# Patient Record
Sex: FEMALE | Race: BLACK OR AFRICAN AMERICAN | NOT HISPANIC OR LATINO | Employment: FULL TIME | URBAN - METROPOLITAN AREA
[De-identification: names, ages, dates, MRNs, and addresses within clinical notes are randomized per-mention and may not be internally consistent; named-entity substitution may affect disease eponyms.]

---

## 2017-01-27 ENCOUNTER — CLINICAL SUPPORT (OUTPATIENT)
Dept: CARDIOLOGY | Facility: CLINIC | Age: 42
End: 2017-01-27
Payer: COMMERCIAL

## 2017-01-27 ENCOUNTER — LAB VISIT (OUTPATIENT)
Dept: LAB | Facility: HOSPITAL | Age: 42
End: 2017-01-27
Attending: FAMILY MEDICINE
Payer: COMMERCIAL

## 2017-01-27 ENCOUNTER — OFFICE VISIT (OUTPATIENT)
Dept: FAMILY MEDICINE | Facility: CLINIC | Age: 42
End: 2017-01-27
Payer: COMMERCIAL

## 2017-01-27 VITALS
OXYGEN SATURATION: 100 % | DIASTOLIC BLOOD PRESSURE: 90 MMHG | SYSTOLIC BLOOD PRESSURE: 136 MMHG | BODY MASS INDEX: 27 KG/M2 | HEIGHT: 65 IN | HEART RATE: 103 BPM | TEMPERATURE: 98 F | RESPIRATION RATE: 18 BRPM | WEIGHT: 162.06 LBS

## 2017-01-27 DIAGNOSIS — R55 SYNCOPE, UNSPECIFIED SYNCOPE TYPE: ICD-10-CM

## 2017-01-27 DIAGNOSIS — J34.3 NASAL TURBINATE HYPERTROPHY: ICD-10-CM

## 2017-01-27 DIAGNOSIS — R55 SYNCOPE, UNSPECIFIED SYNCOPE TYPE: Primary | ICD-10-CM

## 2017-01-27 DIAGNOSIS — Z00.00 ANNUAL PHYSICAL EXAM: ICD-10-CM

## 2017-01-27 DIAGNOSIS — R51.9 HEADACHE, UNSPECIFIED HEADACHE TYPE: ICD-10-CM

## 2017-01-27 DIAGNOSIS — G47.00 INSOMNIA, UNSPECIFIED TYPE: ICD-10-CM

## 2017-01-27 LAB
ALBUMIN SERPL BCP-MCNC: 4 G/DL
ALP SERPL-CCNC: 50 U/L
ALT SERPL W/O P-5'-P-CCNC: 14 U/L
ANION GAP SERPL CALC-SCNC: 12 MMOL/L
AST SERPL-CCNC: 18 U/L
BASOPHILS # BLD AUTO: 0.02 K/UL
BASOPHILS NFR BLD: 0.2 %
BILIRUB SERPL-MCNC: 0.4 MG/DL
BUN SERPL-MCNC: 9 MG/DL
CALCIUM SERPL-MCNC: 9.2 MG/DL
CHLORIDE SERPL-SCNC: 106 MMOL/L
CHOLEST/HDLC SERPL: 3.3 {RATIO}
CO2 SERPL-SCNC: 23 MMOL/L
CREAT SERPL-MCNC: 0.8 MG/DL
DIASTOLIC DYSFUNCTION: NO
DIFFERENTIAL METHOD: ABNORMAL
EOSINOPHIL # BLD AUTO: 0.1 K/UL
EOSINOPHIL NFR BLD: 0.9 %
ERYTHROCYTE [DISTWIDTH] IN BLOOD BY AUTOMATED COUNT: 13.7 %
EST. GFR  (AFRICAN AMERICAN): >60 ML/MIN/1.73 M^2
EST. GFR  (NON AFRICAN AMERICAN): >60 ML/MIN/1.73 M^2
ESTIMATED PA SYSTOLIC PRESSURE: 24.9
GLUCOSE SERPL-MCNC: 66 MG/DL
HCT VFR BLD AUTO: 40.5 %
HDL/CHOLESTEROL RATIO: 30.2 %
HDLC SERPL-MCNC: 248 MG/DL
HDLC SERPL-MCNC: 75 MG/DL
HGB BLD-MCNC: 13.7 G/DL
LDLC SERPL CALC-MCNC: 157 MG/DL
LYMPHOCYTES # BLD AUTO: 3 K/UL
LYMPHOCYTES NFR BLD: 23.6 %
MCH RBC QN AUTO: 27.5 PG
MCHC RBC AUTO-ENTMCNC: 33.8 %
MCV RBC AUTO: 81 FL
MITRAL VALVE MOBILITY: NORMAL
MONOCYTES # BLD AUTO: 1.1 K/UL
MONOCYTES NFR BLD: 8.5 %
NEUTROPHILS # BLD AUTO: 8.4 K/UL
NEUTROPHILS NFR BLD: 66.6 %
NONHDLC SERPL-MCNC: 173 MG/DL
PLATELET # BLD AUTO: 327 K/UL
PMV BLD AUTO: 12.2 FL
POTASSIUM SERPL-SCNC: 3.8 MMOL/L
PROT SERPL-MCNC: 8 G/DL
RBC # BLD AUTO: 4.98 M/UL
RETIRED EF AND QEF - SEE NOTES: 60 (ref 55–65)
SODIUM SERPL-SCNC: 141 MMOL/L
TRIGL SERPL-MCNC: 80 MG/DL
TSH SERPL DL<=0.005 MIU/L-ACNC: 1.15 UIU/ML
WBC # BLD AUTO: 12.52 K/UL

## 2017-01-27 PROCEDURE — 1159F MED LIST DOCD IN RCRD: CPT | Mod: S$GLB,,, | Performed by: FAMILY MEDICINE

## 2017-01-27 PROCEDURE — 99999 PR PBB SHADOW E&M-EST. PATIENT-LVL III: CPT | Mod: PBBFAC,,, | Performed by: FAMILY MEDICINE

## 2017-01-27 PROCEDURE — 99203 OFFICE O/P NEW LOW 30 MIN: CPT | Mod: S$GLB,,, | Performed by: FAMILY MEDICINE

## 2017-01-27 PROCEDURE — 80053 COMPREHEN METABOLIC PANEL: CPT

## 2017-01-27 PROCEDURE — 36415 COLL VENOUS BLD VENIPUNCTURE: CPT | Mod: PO

## 2017-01-27 PROCEDURE — 93005 ELECTROCARDIOGRAM TRACING: CPT | Mod: S$GLB,,, | Performed by: FAMILY MEDICINE

## 2017-01-27 PROCEDURE — 93010 ELECTROCARDIOGRAM REPORT: CPT | Mod: S$GLB,,, | Performed by: INTERNAL MEDICINE

## 2017-01-27 PROCEDURE — 80061 LIPID PANEL: CPT

## 2017-01-27 PROCEDURE — 84443 ASSAY THYROID STIM HORMONE: CPT

## 2017-01-27 PROCEDURE — 93307 TTE W/O DOPPLER COMPLETE: CPT | Mod: S$GLB,,, | Performed by: INTERNAL MEDICINE

## 2017-01-27 PROCEDURE — 85025 COMPLETE CBC W/AUTO DIFF WBC: CPT

## 2017-01-27 RX ORDER — TRAZODONE HYDROCHLORIDE 50 MG/1
50 TABLET ORAL NIGHTLY
Qty: 30 TABLET | Refills: 0 | Status: SHIPPED | OUTPATIENT
Start: 2017-01-27 | End: 2017-12-01 | Stop reason: SDUPTHER

## 2017-01-27 RX ORDER — BUTALBITAL, ACETAMINOPHEN AND CAFFEINE 50; 325; 40 MG/1; MG/1; MG/1
1 TABLET ORAL 2 TIMES DAILY PRN
Qty: 30 TABLET | Refills: 0 | Status: SHIPPED | OUTPATIENT
Start: 2017-01-27 | End: 2017-02-26

## 2017-01-27 RX ORDER — FLUTICASONE PROPIONATE 50 MCG
2 SPRAY, SUSPENSION (ML) NASAL DAILY
Qty: 16 G | Refills: 0 | Status: SHIPPED | OUTPATIENT
Start: 2017-01-27 | End: 2018-12-28

## 2017-01-27 NOTE — PROGRESS NOTES
Subjective:       Patient ID: Florecita Noel is a 41 y.o. female.    Chief Complaint: Establish Care; Dizziness; Fall; and Headache      HPI   Ms. Noel presents to clinic today to establish care.   She states she had a episode of fainting about 1230 last night. She states she got up from the bed and was walking to the bathroom. She realized that she ended up next to the commode and tub and had micturated on her self. She does not recall what happened. She does remember that she was nauseated before the fall or syncopal epside.   She states this has never happened before. Since this incident,s he states she has felt a bit light headed.     She also has been having headaches. She states they occur often. They do not go away with over the counter medication such as Tylenol, Excedrin or Motrin.   She states Goody;s seem to help a bit. They do not get better with eating or drinking.   She also states she sleeps poorly . She wakes up multiple times throughout the night.   She states she has tried over the counter sleep aide, and it does not help.     Her last pap smear was 2016 and it was normal.   It was done at [facility].   Her last mammogram was 3-4 years ago.     Review of Systems   Constitutional: Negative for appetite change, fever and unexpected weight change.   HENT: Negative for postnasal drip, rhinorrhea, sinus pressure and sore throat.    Respiratory: Negative for cough and shortness of breath.    Gastrointestinal: Positive for nausea. Negative for abdominal pain, diarrhea and vomiting.   Neurological: Positive for light-headedness. Negative for dizziness.       Medication List with Changes/Refills   Current Medications    IBUPROFEN (ADVIL,MOTRIN) 800 MG TABLET    Take 1 tablet (800 mg total) by mouth every 6 (six) hours as needed for Pain.       Patient Active Problem List   Diagnosis    Hypokalemia    Single liveborn infant delivered vaginally    Sterilization    Avulsion fracture of middle phalanx of finger          Objective:     Physical Exam   Constitutional: She is oriented to person, place, and time. She appears well-developed and well-nourished. No distress.   HENT:   Head: Normocephalic and atraumatic.   Right Ear: External ear normal.   Left Ear: External ear normal.   Mouth/Throat: Oropharynx is clear and moist. No oropharyngeal exudate.   Turbinate hypertrophy   Eyes: EOM are normal. Right eye exhibits no discharge. Left eye exhibits no discharge.   Cardiovascular: Normal rate and regular rhythm.    No carotid bruit b/l    Pulmonary/Chest: Effort normal and breath sounds normal. No respiratory distress. She has no wheezes.   Abdominal: Soft. Bowel sounds are normal. She exhibits no distension. There is no tenderness.   Musculoskeletal: She exhibits no edema.   Neurological: She is alert and oriented to person, place, and time.   Skin: Skin is warm and dry. She is not diaphoretic. No erythema.   Psychiatric: She has a normal mood and affect.   Vitals reviewed.    Vitals:    01/27/17 0854   BP: (!) 136/90   Pulse: 103   Resp: 18   Temp: 98.4 °F (36.9 °C)       Assessment/  PLAN     Syncope, unspecified syncope type  -     CBC auto differential; Future  -     Comprehensive metabolic panel; Future; Expected date: 1/27/17  -     IN OFFICE EKG 12-LEAD (to [place])  -     2D Echo Only; Future  - likely vasovagal syncope, will check above labs to rule out other etiology   - increase water intke for now and advised her to make sure she is eating often     Annual physical exam  -     Lipid panel; Future; Expected date: 1/27/17    Headache, unspecified headache type  -     butalbital-acetaminophen-caffeine -40 mg (FIORICET, ESGIC) -40 mg per tablet; Take 1 tablet by mouth 2 (two) times daily as needed for Pain.  Dispense: 30 tablet; Refill: 0    Insomnia, unspecified type  -     trazodone (DESYREL) 50 MG tablet; Take 1 tablet (50 mg total) by mouth every evening.  Dispense: 30 tablet; Refill: 0    Nasal  turbinate hypertrophy  -     fluticasone (FLONASE) 50 mcg/actuation nasal spray; 2 sprays by Each Nare route once daily.  Dispense: 16 g; Refill: 0    Plan as above   rtc 1 month for follow up     Prem Cardenas MD.  [facility]

## 2017-01-27 NOTE — MR AVS SNAPSHOT
[facility]  [address] [place] [address]  Phone: [phone]                  Florecita Noel   2017 8:40 AM   Office Visit    Description:  Female : 1975   Provider:  Prem Cardenas MD   Department:  [facility]           Reason for Visit     Establish Care     Dizziness     Fall     Headache           Diagnoses this Visit        Comments    Syncope, unspecified syncope type    -  Primary     Annual physical exam         Headache, unspecified headache type         Insomnia, unspecified type         Nasal turbinate hypertrophy                To Do List           Future Appointments        Provider Department Dept Phone    2017 10:20 AM LABORATORY, [address] [phone]    2017 4:00 PM ECHOCARDIOGRAM, [facility] -Cardiology [phone]      Goals (5 Years of Data)     None      Follow-Up and Disposition     Return in about 1 month (around 2017) for follow up , morning .       These Medications        Disp Refills Start End    butalbital-acetaminophen-caffeine -40 mg (FIORICET, ESGIC) -40 mg per tablet 30 tablet 0 2017    Take 1 tablet by mouth 2 (two) times daily as needed for Pain. - Oral    Pharmacy: [facility] [address] [facility] Ph #: [phone]       trazodone (DESYREL) 50 MG tablet 30 tablet 0 2017    Take 1 tablet (50 mg total) by mouth every evening. - Oral    Pharmacy: [facility] [address] & [facility] Ph #: [phone]       fluticasone (FLONASE) 50 mcg/actuation nasal spray 16 g 0 2017     2 sprays by Each Nare route once daily. - Each Nare    Pharmacy: [facility] [address] [facility] Ph #: [phone]         [facility]     [facility]  Care Line -  Assistance  Registered nurses in the [facility] provide clinical advisement, health education, appointment booking, and other advisory services.  Call for this free service at [phone].             Medications           Message regarding Medications     Verify the changes and/or additions to your medication regime listed below are the same as discussed with your clinician today.  If any of these changes or additions are incorrect, please notify your healthcare provider.        START taking these NEW medications        Refills    butalbital-acetaminophen-caffeine -40 mg (FIORICET, ESGIC) -40 mg per tablet 0    Sig: Take 1 tablet by mouth 2 (two) times daily as needed for Pain.    Class: Normal    Route: Oral    trazodone (DESYREL) 50 MG tablet 0    Sig: Take 1 tablet (50 mg total) by mouth every evening.    Class: Normal    Route: Oral    fluticasone (FLONASE) 50 mcg/actuation nasal spray 0    Si sprays by Each Nare route once daily.    Class: Normal    Route: Each Nare      STOP taking these medications     ibuprofen (ADVIL,MOTRIN) 800 MG tablet Take 1 tablet (800 mg total) by mouth every 6 (six) hours as needed for Pain.           Verify that the below list of medications is an accurate representation of the medications you are currently taking.  If none reported, the list may be blank. If incorrect, please contact your healthcare provider. Carry this list with you in case of emergency.           Current Medications     butalbital-acetaminophen-caffeine -40 mg (FIORICET, ESGIC) -40 mg per tablet Take 1 tablet by mouth 2 (two) times daily as needed for Pain.    fluticasone (FLONASE) 50 mcg/actuation nasal spray 2 sprays by Each Nare route once daily.    trazodone (DESYREL) 50 MG tablet Take 1 tablet (50 mg total) by mouth every evening.           Clinical Reference Information           Vital Signs - Last Recorded  Most recent update: 2017  8:55 AM  "by iVcki Claros MA    BP Pulse Temp Resp Ht    (!) 136/90 (BP Location: Right arm, Patient Position: Sitting, BP Method: Manual) 103 98.4 °F (36.9 °C) (Tympanic) 18 5' 4.5" (1.638 m)    Wt LMP SpO2 BMI    73.5 kg (162 lb 0.6 oz) 01/25/2017 (Exact Date) 100% 27.38 kg/m2      Blood Pressure          Most Recent Value    BP  (!)  136/90      Allergies as of 1/27/2017     No Known Allergies      Immunizations Administered on Date of Encounter - 1/27/2017     None      Orders Placed During Today's Visit      Normal Orders This Visit    IN OFFICE EKG 12-LEAD (to [place])     Future Labs/Procedures Expected by Expires    Comprehensive metabolic panel  1/27/2017 3/28/2018    Lipid panel  1/27/2017 3/28/2018    TSH  1/27/2017 3/28/2018    2D Echo Only  As directed 1/27/2018    CBC auto differential  As directed 1/31/2017      "

## 2017-03-20 ENCOUNTER — OFFICE VISIT (OUTPATIENT)
Dept: FAMILY MEDICINE | Facility: CLINIC | Age: 42
End: 2017-03-20
Payer: COMMERCIAL

## 2017-03-20 ENCOUNTER — PATIENT MESSAGE (OUTPATIENT)
Dept: FAMILY MEDICINE | Facility: CLINIC | Age: 42
End: 2017-03-20

## 2017-03-20 VITALS
SYSTOLIC BLOOD PRESSURE: 130 MMHG | WEIGHT: 166.25 LBS | HEIGHT: 64 IN | BODY MASS INDEX: 28.38 KG/M2 | DIASTOLIC BLOOD PRESSURE: 80 MMHG | RESPIRATION RATE: 16 BRPM | TEMPERATURE: 97 F | HEART RATE: 71 BPM | OXYGEN SATURATION: 98 %

## 2017-03-20 DIAGNOSIS — J35.1 TONSILLAR HYPERTROPHY: Primary | ICD-10-CM

## 2017-03-20 DIAGNOSIS — R05.9 COUGH: Primary | ICD-10-CM

## 2017-03-20 DIAGNOSIS — J30.9 ALLERGIC RHINITIS, UNSPECIFIED ALLERGIC RHINITIS TRIGGER, UNSPECIFIED RHINITIS SEASONALITY: ICD-10-CM

## 2017-03-20 DIAGNOSIS — Z76.0 MEDICATION REFILL: ICD-10-CM

## 2017-03-20 LAB
CTP QC/QA: YES
S PYO RRNA THROAT QL PROBE: NEGATIVE

## 2017-03-20 PROCEDURE — 1160F RVW MEDS BY RX/DR IN RCRD: CPT | Mod: S$GLB,,, | Performed by: FAMILY MEDICINE

## 2017-03-20 PROCEDURE — 99214 OFFICE O/P EST MOD 30 MIN: CPT | Mod: 25,S$GLB,, | Performed by: FAMILY MEDICINE

## 2017-03-20 PROCEDURE — 87880 STREP A ASSAY W/OPTIC: CPT | Mod: QW,S$GLB,, | Performed by: FAMILY MEDICINE

## 2017-03-20 PROCEDURE — 99999 PR PBB SHADOW E&M-EST. PATIENT-LVL III: CPT | Mod: PBBFAC,,, | Performed by: FAMILY MEDICINE

## 2017-03-20 RX ORDER — BENZONATATE 100 MG/1
100 CAPSULE ORAL 3 TIMES DAILY PRN
Qty: 30 CAPSULE | Refills: 0 | Status: SHIPPED | OUTPATIENT
Start: 2017-03-20 | End: 2017-10-10 | Stop reason: ALTCHOICE

## 2017-03-20 RX ORDER — PROMETHAZINE HYDROCHLORIDE AND DEXTROMETHORPHAN HYDROBROMIDE 6.25; 15 MG/5ML; MG/5ML
5 SYRUP ORAL EVERY 8 HOURS PRN
Qty: 240 ML | Refills: 0 | Status: SHIPPED | OUTPATIENT
Start: 2017-03-20 | End: 2017-03-20 | Stop reason: ALTCHOICE

## 2017-03-20 RX ORDER — CETIRIZINE HYDROCHLORIDE 10 MG/1
10 TABLET, CHEWABLE ORAL DAILY
Qty: 30 TABLET | Refills: 0 | COMMUNITY
Start: 2017-03-20 | End: 2018-12-28

## 2017-03-20 RX ORDER — BUTALBITAL, ACETAMINOPHEN AND CAFFEINE 50; 325; 40 MG/1; MG/1; MG/1
1 TABLET ORAL DAILY PRN
Qty: 30 TABLET | Refills: 0 | Status: SHIPPED | OUTPATIENT
Start: 2017-03-20 | End: 2017-04-19

## 2017-03-20 NOTE — MR AVS SNAPSHOT
[facility]  [address] [address]  Phone: [phone]                  Florecita Noel   3/20/2017 11:00 AM   Office Visit    Description:  Female : 1975   Provider:  Prem Cardenas MD   Department:  [facility]           Reason for Visit     Cough           Diagnoses this Visit        Comments    Tonsillar hypertrophy    -  Primary            To Do List           Goals (5 Years of Data)     None       These Medications        Disp Refills Start End    butalbital-acetaminophen-caffeine -40 mg (FIORICET, ESGIC) -40 mg per tablet 30 tablet 0 3/20/2017 2017    Take 1 tablet by mouth daily as needed for Pain. - Oral    Pharmacy: [facility] [address] [place] [address] [address]  Ph #: [phone]       promethazine-dextromethorphan (PROMETHAZINE-DM) 6.25-15 mg/5 mL Syrp 240 mL 0 3/20/2017     Take 5 mLs by mouth every 8 (eight) hours as needed. - Oral    [facility] [address] [place] [address] [address]  Ph #: [phone]         PURCHASE these Medications (No prescription required)        Start End    cetirizine 10 mg chewable tablet 3/20/2017 3/20/2018    Sig - Route: Take 10 mg by mouth once daily. - Oral    Class: OTC      [facility]     [facility] On Call Nurse Care Line -  Assistance  Registered nurses in the [facility] On Call Center provide clinical advisement, health education, appointment booking, and other advisory services.  Call for this free service at [phone].             Medications           Message regarding Medications     Verify the changes and/or additions to your medication regime listed below are the same as discussed with your clinician today.  If any of these changes or additions are incorrect, please notify your healthcare provider.        START taking these NEW medications        Refills     "butalbital-acetaminophen-caffeine -40 mg (FIORICET, ESGIC) -40 mg per tablet 0    Sig: Take 1 tablet by mouth daily as needed for Pain.    Class: Normal    Route: Oral    cetirizine 10 mg chewable tablet 0    Sig: Take 10 mg by mouth once daily.    Class: OTC    Route: Oral    promethazine-dextromethorphan (PROMETHAZINE-DM) 6.25-15 mg/5 mL Syrp 0    Sig: Take 5 mLs by mouth every 8 (eight) hours as needed.    Class: Normal    Route: Oral           Verify that the below list of medications is an accurate representation of the medications you are currently taking.  If none reported, the list may be blank. If incorrect, please contact your healthcare provider. Carry this list with you in case of emergency.           Current Medications     trazodone (DESYREL) 50 MG tablet Take 1 tablet (50 mg total) by mouth every evening.    butalbital-acetaminophen-caffeine -40 mg (FIORICET, ESGIC) -40 mg per tablet Take 1 tablet by mouth daily as needed for Pain.    cetirizine 10 mg chewable tablet Take 10 mg by mouth once daily.    fluticasone (FLONASE) 50 mcg/actuation nasal spray 2 sprays by Each Nare route once daily.    promethazine-dextromethorphan (PROMETHAZINE-DM) 6.25-15 mg/5 mL Syrp Take 5 mLs by mouth every 8 (eight) hours as needed.           Clinical Reference Information           Your Vitals Were     BP Pulse Temp Resp Height Weight    130/80 71 96.9 °F (36.1 °C) 16 5' 4" (1.626 m) 75.4 kg (166 lb 3.6 oz)    Last Period SpO2 BMI          03/19/2017 98% 28.53 kg/m2        Blood Pressure          Most Recent Value    BP  130/80      Allergies as of 3/20/2017     No Known Allergies      Immunizations Administered on Date of Encounter - 3/20/2017     None      Orders Placed During Today's Visit      Normal Orders This Visit    POCT rapid strep A       Language Assistance Services     ATTENTION: Language assistance services are available, free of charge. Please call [phone].      ATENCIÓN: Si " habla yulissa, tiene a erickson disposición servicios gratuitos de asistencia lingüística. Llrosie al [phone].     RIGOBERTO Ý: N?u b?n nói Ti?ng Vi?t, có các d?ch v? h? tr? ngôn ng? mi?n phí dành cho b?n. G?i s? [phone].         [facility] complies with applicable Federal civil rights laws and does not discriminate on the basis of race, color, national origin, age, disability, or sex.

## 2017-03-20 NOTE — PROGRESS NOTES
Subjective:       Patient ID: Florecita Noel is a 41 y.o. female.    Chief Complaint: Cough (Congestion)      HPI   Ms. Noel presents to clinic for complaints of congestion since Tuesday.   She has had congestion and cough. On Wednesday , she was hoarse.   Her cough is productive. Her eyes are also tearing and she has been sneezing a lot.   She states her children are sick with runny nose.   She has tried mucinex and it is not helping.   The cough is worst at night.     Review of Systems   Constitutional: Negative for fever.   HENT: Positive for congestion, postnasal drip, rhinorrhea, sneezing and sore throat. Negative for sinus pressure.    Eyes: Positive for discharge and itching.   Respiratory: Positive for cough.    Gastrointestinal: Negative for abdominal pain, nausea and vomiting.       Medication List with Changes/Refills   New Medications    BENZONATATE (TESSALON) 100 MG CAPSULE    Take 1 capsule (100 mg total) by mouth 3 (three) times daily as needed.    BUTALBITAL-ACETAMINOPHEN-CAFFEINE -40 MG (FIORICET, ESGIC) -40 MG PER TABLET    Take 1 tablet by mouth daily as needed for Pain.    CETIRIZINE 10 MG CHEWABLE TABLET    Take 10 mg by mouth once daily.   Current Medications    FLUTICASONE (FLONASE) 50 MCG/ACTUATION NASAL SPRAY    2 sprays by Each Nare route once daily.    TRAZODONE (DESYREL) 50 MG TABLET    Take 1 tablet (50 mg total) by mouth every evening.       Patient Active Problem List   Diagnosis    Hypokalemia    Single liveborn infant delivered vaginally    Sterilization    Avulsion fracture of middle phalanx of finger         Objective:     Physical Exam   Constitutional: She is oriented to person, place, and time. She appears well-developed and well-nourished. No distress.   HENT:   Head: Normocephalic and atraumatic.   Right Ear: External ear normal.   Left Ear: External ear normal.   Mouth/Throat: No oropharyngeal exudate.   Turbinate hypertrophy   Tonsils that are inflamed, non  erythematous    Eyes: EOM are normal. Right eye exhibits no discharge. Left eye exhibits no discharge.   Cardiovascular: Normal rate and regular rhythm.    Pulmonary/Chest: Effort normal and breath sounds normal. No respiratory distress. She has no wheezes.   Musculoskeletal: She exhibits no edema.   Neurological: She is alert and oriented to person, place, and time.   Skin: Skin is warm and dry. She is not diaphoretic. No erythema.   Psychiatric: She has a normal mood and affect.   Vitals reviewed.    Vitals:    03/20/17 1106   BP: 130/80   Pulse: 71   Resp: 16   Temp: 96.9 °F (36.1 °C)       Assessment/  PLAN     Tonsillar hypertrophy  -     POCT rapid strep A  - strep negative     Allergic rhinitis, unspecified allergic rhinitis trigger, unspecified rhinitis seasonality  -     cetirizine 10 mg chewable tablet; Take 10 mg by mouth once daily.  Dispense: 30 tablet; Refill: 0  -continue flonase    Medication refill  -     butalbital-acetaminophen-caffeine -40 mg (FIORICET, ESGIC) -40 mg per tablet; Take 1 tablet by mouth daily as needed for Pain.  Dispense: 30 tablet; Refill: 0    Other orders  -     Discontinue: promethazine-dextromethorphan (PROMETHAZINE-DM) 6.25-15 mg/5 mL Syrp; Take 5 mLs by mouth every 8 (eight) hours as needed.  Dispense: 240 mL; Refill: 0    Plan as above   Continue supportive care, rest and hydration  rtc if not better    Prem Cardenas MD  [facility]

## 2017-03-24 DIAGNOSIS — Z12.31 OTHER SCREENING MAMMOGRAM: ICD-10-CM

## 2017-04-22 ENCOUNTER — HOSPITAL ENCOUNTER (EMERGENCY)
Facility: HOSPITAL | Age: 42
Discharge: HOME | End: 2017-04-22
Payer: COMMERCIAL

## 2017-04-22 VITALS
HEART RATE: 99 BPM | DIASTOLIC BLOOD PRESSURE: 90 MMHG | OXYGEN SATURATION: 96 % | HEIGHT: 64 IN | BODY MASS INDEX: 27.83 KG/M2 | WEIGHT: 163 LBS | RESPIRATION RATE: 16 BRPM | TEMPERATURE: 98 F | SYSTOLIC BLOOD PRESSURE: 155 MMHG

## 2017-04-22 DIAGNOSIS — R51.9 ACUTE NONINTRACTABLE HEADACHE, UNSPECIFIED HEADACHE TYPE: Primary | ICD-10-CM

## 2017-04-22 PROCEDURE — 99284 EMERGENCY DEPT VISIT MOD MDM: CPT | Mod: 25

## 2017-04-22 PROCEDURE — 63600175 PHARM REV CODE 636 W HCPCS: Performed by: PHYSICIAN ASSISTANT

## 2017-04-22 PROCEDURE — 96372 THER/PROPH/DIAG INJ SC/IM: CPT

## 2017-04-22 RX ORDER — PROMETHAZINE HYDROCHLORIDE 25 MG/ML
25 INJECTION, SOLUTION INTRAMUSCULAR; INTRAVENOUS
Status: COMPLETED | OUTPATIENT
Start: 2017-04-22 | End: 2017-04-22

## 2017-04-22 RX ORDER — METOCLOPRAMIDE 10 MG/1
10 TABLET ORAL EVERY 6 HOURS
Qty: 30 TABLET | Refills: 0 | Status: SHIPPED | OUTPATIENT
Start: 2017-04-22 | End: 2018-10-29 | Stop reason: ALTCHOICE

## 2017-04-22 RX ORDER — HYDROMORPHONE HYDROCHLORIDE 2 MG/ML
1 INJECTION, SOLUTION INTRAMUSCULAR; INTRAVENOUS; SUBCUTANEOUS
Status: COMPLETED | OUTPATIENT
Start: 2017-04-22 | End: 2017-04-22

## 2017-04-22 RX ADMIN — PROMETHAZINE HYDROCHLORIDE 25 MG: 25 INJECTION INTRAMUSCULAR; INTRAVENOUS at 11:04

## 2017-04-22 RX ADMIN — HYDROMORPHONE HYDROCHLORIDE 1 MG: 2 INJECTION, SOLUTION INTRAMUSCULAR; INTRAVENOUS; SUBCUTANEOUS at 11:04

## 2017-04-22 NOTE — ED AVS SNAPSHOT
[facility] -   [address]  [place] [address]               Florecita Noel   2017 11:13 AM   ED    Description:  Female : 1975   Department:  [facility]           Your Care was Coordinated By:     Provider Role From To    CHELSIE Rashid Physician Assistant 17 1113 --      Reason for Visit     Migraine           Diagnoses this Visit        Comments    Acute nonintractable headache, unspecified headache type    -  Primary       ED Disposition     ED Disposition Condition Comment    Discharge             To Do List           Follow-up Information     Follow up with Prem Cardenas MD In 1 week(s).    Specialty:  Family Medicine    Contact information:    [address]  [facility] [address] [phone]         These Medications        Disp Refills Start End    metoclopramide HCl (REGLAN) 10 MG tablet 30 tablet 0 2017     Take 1 tablet (10 mg total) by mouth every 6 (six) hours. - Oral    Pharmacy: [facility] [address] [place] [address] Ph #: [phone]         [facility]     [facility] Care Line -  Assistance  Unless otherwise directed by your provider, please contact [facility], our nurse care line that is available for / assistance.     Registered nurses in the [facility] provide: appointment scheduling, clinical advisement, health education, and other advisory services.  Call: [phone] (toll free)               Medications           Message regarding Medications     Verify the changes and/or additions to your medication regime listed below are the same as discussed with your clinician today.  If any of these changes or additions are incorrect, please notify your healthcare provider.        START taking these NEW medications        Refills    metoclopramide HCl (REGLAN) 10 MG tablet 0    Sig: Take 1 tablet (10 mg  "total) by mouth every 6 (six) hours.    Class: Print    Route: Oral      These medications were administered today        Dose Freq    hydromorphone (PF) injection 1 mg 1 mg ED 1 Time    Sig: Inject 0.5 mLs (1 mg total) into the muscle ED 1 Time.    Class: Normal    Route: Intramuscular    Cosign for Ordering: Required by Yue Foster DO    promethazine injection 25 mg 25 mg ED 1 Time    Sig: Inject 1 mL (25 mg total) into the muscle ED 1 Time.    Class: Normal    Route: Intramuscular    Cosign for Ordering: Required by Yue Foster DO           Verify that the below list of medications is an accurate representation of the medications you are currently taking.  If none reported, the list may be blank. If incorrect, please contact your healthcare provider. Carry this list with you in case of emergency.           Current Medications     benzonatate (TESSALON) 100 MG capsule Take 1 capsule (100 mg total) by mouth 3 (three) times daily as needed.    cetirizine 10 mg chewable tablet Take 10 mg by mouth once daily.    fluticasone (FLONASE) 50 mcg/actuation nasal spray 2 sprays by Each Nare route once daily.    metoclopramide HCl (REGLAN) 10 MG tablet Take 1 tablet (10 mg total) by mouth every 6 (six) hours.    trazodone (DESYREL) 50 MG tablet Take 1 tablet (50 mg total) by mouth every evening.           Clinical Reference Information           Your Vitals Were     BP Pulse Temp Resp Height Weight    155/95 (BP Location: Right arm, Patient Position: Sitting) 110 98 °F (36.7 °C) (Oral) 18 5' 4" (1.626 m) 73.9 kg (163 lb)    SpO2 BMI             99% 27.98 kg/m2         Allergies as of 4/22/2017     No Known Allergies      Immunizations Administered on Date of Encounter - 4/22/2017     None      ED Micro, Lab, POCT     None      ED Imaging Orders     Start Ordered       Status Ordering Provider    04/22/17 1125 04/22/17 1125  CT Head Without Contrast  1 time imaging      Final result         Discharge Instructions " "          Headache, Unspecified    A number of things can cause headaches. The cause of your headache isnt clear. But it doesnt seem to be a sign of any serious illness.  You could have a tension headache or a migraine headache.  Stress can cause a tension headache. This can happen if you tense the muscles of your shoulders, neck, and scalp without knowing it. If this stress lasts long enough, you may develop a tension headache.  It is not clear why migraines occur, but certain things called" triggers" can raise the risk of having a migraine attack. Migraine triggers may include emotional stress or depression, or by hormone changes during the menstrual cycle. Other triggers include birth control pills and other medicines, alcohol or caffeine, foods with tyramine (such as aged cheese, wine), eyestrain, weather changes, missed meals, and lack of sleep or oversleeping.  Other causes of headache include:  · Viral illness with high fever  · Head injury with concussion  · Sinus, ear, or throat infection  · Dental pain and jaw joint (TMJ) pain  More serious but less common causes of headache include stroke, brain hemorrhage, brain tumor, meningitis, and encephalitis.  Home care  Follow these tips when taking care of yourself at home:  · Dont drive yourself home if you were given pain medicine for your headache. Instead, have someone else drive you home. Try to sleep when you get home. You should feel much better when you wake up.  · Apply heat to the back of your neck to ease a neck muscle spasm. Take care of a migraine headache by putting an ice pack on your forehead or at the base of your skull.  · If you have nausea or vomiting, eat a light diet until your headache eases.  · If you have a migraine headache, use sunglasses when in the daylight or around bright indoor lighting until your symptoms get better. Bright glaring light can make this type of headache worse.  Follow-up care  Follow up with your healthcare " provider, or as advised. Talk with your provider if you have frequent headaches. He or she can help figure out a treatment plan. By knowing the earliest signs of headache, and starting treatment right away, you may be able to stop the pain yourself.  When to seek medical advice  Call your healthcare provider right away if any of these occur:  · Your head pain suddenly gets worse after sexual intercourse or strenuous activity  · Your head pain doesnt get better within 24 hours  · You arent able to keep liquids down (repeated vomiting)  · Fever of 100.4ºF (38ºC) or higher, or as directed by your healthcare provider  · Stiff neck  · Extreme drowsiness, confusion, or fainting  · Dizziness or dizziness with spinning sensation (vertigo)  · Weakness in an arm or leg or one side of your face  · You have trouble talking or seeing  Date Last Reviewed: 8/1/2016  © 4501-3914 [employer]. [address] [place] [address]. All rights reserved. This information is not intended as a substitute for professional medical care. Always follow your healthcare professional's instructions.           [facility] complies with applicable Federal civil rights laws and does not discriminate on the basis of race, color, national origin, age, disability, or sex.        Language Assistance Services     ATTENTION: Language assistance services are available, free of charge. Please call [phone].      ATENCIÓN: Si roxanala yulissa, tiene a erickson disposición servicios gratuitos de asistencia lingüística. Llame al [phone].     CHÚ Ý: N?u b?n nói Ti?ng Vi?t, có các d?ch v? h? tr? ngôn ng? mi?n phí dành cho b?n. G?i s? [phone].

## 2017-04-22 NOTE — ED PROVIDER NOTES
Encounter Date: 4/22/2017       History     Chief Complaint   Patient presents with    Migraine     Review of patient's allergies indicates:  No Known Allergies  Patient is a 41 y.o. female presenting with the following complaint: headaches. The history is provided by the patient.   Headache    This is a recurrent problem. The current episode started today. The problem occurs monthly. The problem has been unchanged. The pain is located in the bilateral region. The pain does not radiate. The pain quality is not similar to prior headaches. The quality of the pain is described as pounding. The pain is at a severity of 3/10. Associated symptoms include nausea, phonophobia, photophobia and vomiting. Pertinent negatives include no abdominal pain, abnormal behavior, anorexia, back pain, blurred vision, coughing, dizziness, ear pain, eye pain, eye watering, facial sweating, fever, hearing loss, loss of balance, neck pain, numbness, rhinorrhea, scalp tenderness, seizures, sinus pressure, sore throat, swollen glands, tingling, tinnitus, visual change or weakness. The symptoms are aggravated by bright light and noise. She has tried darkened room and oral narcotics for the symptoms. The treatment provided no relief. Her past medical history is significant for migraine headaches.     Past Medical History:   Diagnosis Date    Abnormal Pap smear 1996    Anemia     Hypokalemia     in pregnancy     Past Surgical History:   Procedure Laterality Date    CHOLECYSTECTOMY      COSMETIC SURGERY      tummy tuck    GALLBLADDER SURGERY      TUBAL LIGATION      Tummy Tuck       Family History   Problem Relation Age of Onset    Cancer Father      Social History   Substance Use Topics    Smoking status: Never Smoker    Smokeless tobacco: Never Used    Alcohol use 0.0 oz/week     0 Standard drinks or equivalent per week      Comment: socially      Review of Systems   Constitutional: Negative for fever.   HENT: Negative for ear pain,  hearing loss, rhinorrhea, sinus pressure, sore throat and tinnitus.    Eyes: Positive for photophobia. Negative for blurred vision and pain.   Respiratory: Negative for cough and shortness of breath.    Cardiovascular: Negative for chest pain.   Gastrointestinal: Positive for nausea and vomiting. Negative for abdominal pain and anorexia.   Endocrine: Negative for polydipsia and polyphagia.   Genitourinary: Negative for dysuria.   Musculoskeletal: Negative for back pain and neck pain.   Skin: Negative for rash.   Neurological: Positive for headaches. Negative for dizziness, tingling, seizures, syncope, facial asymmetry, speech difficulty, weakness, light-headedness, numbness and loss of balance.   Hematological: Does not bruise/bleed easily.   Psychiatric/Behavioral: The patient is not nervous/anxious.    All other systems reviewed and are negative.      Physical Exam   Initial Vitals   BP Pulse Resp Temp SpO2   04/22/17 1104 04/22/17 1104 04/22/17 1104 04/22/17 1104 04/22/17 1104   155/95 110 18 98 °F (36.7 °C) 99 %     Physical Exam    Nursing note and vitals reviewed.  Constitutional: Vital signs are normal. She appears well-developed and well-nourished. She appears distressed (secondary to pain).   HENT:   Head: Normocephalic and atraumatic.   Right Ear: External ear normal.   Left Ear: External ear normal.   Nose: Nose normal.   Mouth/Throat: Oropharynx is clear and moist.   Eyes: Conjunctivae, EOM and lids are normal. Pupils are equal, round, and reactive to light.   Neck: Normal range of motion and full passive range of motion without pain. Neck supple.   Cardiovascular: Normal rate, regular rhythm, S1 normal, S2 normal, normal heart sounds, intact distal pulses and normal pulses.   Pulmonary/Chest: Breath sounds normal. No respiratory distress. She has no wheezes. She has no rales.   Abdominal: Soft. Normal appearance and bowel sounds are normal. She exhibits no distension. There is no tenderness.    Musculoskeletal: Normal range of motion.   Lymphadenopathy:     She has no cervical adenopathy.   Neurological: She is alert and oriented to person, place, and time. She has normal strength. No cranial nerve deficit or sensory deficit. Coordination and gait normal.   Skin: Skin is warm, dry and intact.   Psychiatric: She has a normal mood and affect. Her speech is normal and behavior is normal. Judgment and thought content normal. Cognition and memory are normal.         ED Course   Procedures  Labs Reviewed - No data to display                            ED Course     Clinical Impression:   The encounter diagnosis was Acute nonintractable headache, unspecified headache type.    Disposition:   Disposition: Discharged  Condition: Stable       CHELSIE Rashid  04/22/17 1203

## 2017-04-22 NOTE — DISCHARGE INSTRUCTIONS
"    Headache, Unspecified    A number of things can cause headaches. The cause of your headache isnt clear. But it doesnt seem to be a sign of any serious illness.  You could have a tension headache or a migraine headache.  Stress can cause a tension headache. This can happen if you tense the muscles of your shoulders, neck, and scalp without knowing it. If this stress lasts long enough, you may develop a tension headache.  It is not clear why migraines occur, but certain things called" triggers" can raise the risk of having a migraine attack. Migraine triggers may include emotional stress or depression, or by hormone changes during the menstrual cycle. Other triggers include birth control pills and other medicines, alcohol or caffeine, foods with tyramine (such as aged cheese, wine), eyestrain, weather changes, missed meals, and lack of sleep or oversleeping.  Other causes of headache include:  · Viral illness with high fever  · Head injury with concussion  · Sinus, ear, or throat infection  · Dental pain and jaw joint (TMJ) pain  More serious but less common causes of headache include stroke, brain hemorrhage, brain tumor, meningitis, and encephalitis.  Home care  Follow these tips when taking care of yourself at home:  · Dont drive yourself home if you were given pain medicine for your headache. Instead, have someone else drive you home. Try to sleep when you get home. You should feel much better when you wake up.  · Apply heat to the back of your neck to ease a neck muscle spasm. Take care of a migraine headache by putting an ice pack on your forehead or at the base of your skull.  · If you have nausea or vomiting, eat a light diet until your headache eases.  · If you have a migraine headache, use sunglasses when in the daylight or around bright indoor lighting until your symptoms get better. Bright glaring light can make this type of headache worse.  Follow-up care  Follow up with your healthcare provider, " or as advised. Talk with your provider if you have frequent headaches. He or she can help figure out a treatment plan. By knowing the earliest signs of headache, and starting treatment right away, you may be able to stop the pain yourself.  When to seek medical advice  Call your healthcare provider right away if any of these occur:  · Your head pain suddenly gets worse after sexual intercourse or strenuous activity  · Your head pain doesnt get better within 24 hours  · You arent able to keep liquids down (repeated vomiting)  · Fever of 100.4ºF (38ºC) or higher, or as directed by your healthcare provider  · Stiff neck  · Extreme drowsiness, confusion, or fainting  · Dizziness or dizziness with spinning sensation (vertigo)  · Weakness in an arm or leg or one side of your face  · You have trouble talking or seeing  Date Last Reviewed: 8/1/2016  © 5475-7743 [employer]. [address] [place] [address], [place] [address]. All rights reserved. This information is not intended as a substitute for professional medical care. Always follow your healthcare professional's instructions.

## 2017-04-22 NOTE — ED TRIAGE NOTES
"Migraine headache today.  "different than usual.  Goes down into neck"  Vomited x 1.  Photophobia  "

## 2017-05-11 ENCOUNTER — PATIENT OUTREACH (OUTPATIENT)
Dept: ADMINISTRATIVE | Facility: HOSPITAL | Age: 42
End: 2017-05-11

## 2017-10-10 ENCOUNTER — OFFICE VISIT (OUTPATIENT)
Dept: FAMILY MEDICINE | Facility: CLINIC | Age: 42
End: 2017-10-10
Payer: COMMERCIAL

## 2017-10-10 DIAGNOSIS — F32.A DEPRESSION, UNSPECIFIED DEPRESSION TYPE: Primary | ICD-10-CM

## 2017-10-10 DIAGNOSIS — I10 HYPERTENSION, UNSPECIFIED TYPE: ICD-10-CM

## 2017-10-10 PROCEDURE — 99214 OFFICE O/P EST MOD 30 MIN: CPT | Mod: S$GLB,,, | Performed by: FAMILY MEDICINE

## 2017-10-10 PROCEDURE — 99999 PR PBB SHADOW E&M-EST. PATIENT-LVL IV: CPT | Mod: PBBFAC,,, | Performed by: FAMILY MEDICINE

## 2017-10-10 RX ORDER — LISINOPRIL 20 MG/1
20 TABLET ORAL DAILY
Qty: 30 TABLET | Refills: 0 | Status: SHIPPED | OUTPATIENT
Start: 2017-10-10 | End: 2018-12-28

## 2017-10-10 RX ORDER — SERTRALINE HYDROCHLORIDE 25 MG/1
25 TABLET, FILM COATED ORAL DAILY
Qty: 30 TABLET | Refills: 0 | Status: SHIPPED | OUTPATIENT
Start: 2017-10-10 | End: 2017-12-01 | Stop reason: SDUPTHER

## 2017-10-10 NOTE — PATIENT INSTRUCTIONS
Established High Blood Pressure    High blood pressure (hypertension) is a chronic disease. Often, healthcare providers dont know what causes it. But it can be caused by certain health conditions and medicines.  If you have high blood pressure, you may not have any symptoms. If you do have symptoms, they may include headache, dizziness, changes in your vision, chest pain, and shortness of breath. But even without symptoms, high blood pressure thats not treated raises your risk for heart attack and stroke. High blood pressure is a serious health risk and shouldnt be ignored.  A blood pressure reading is made up of two numbers: a higher number over a lower number. The top number is the systolic pressure. The bottom number is the diastolic pressure. A normal blood pressure is a systolic pressure of  less than 120 over a diastolic pressure of less than 80. You will see your blood pressure readings written together. For example, a person with a systolic pressure of 188 and a diastolic pressure of 78 will have 118/78 written in the medical record.  High blood pressure is when either the top number is 140 or higher, or the bottom number is 90 or higher. This must be the result when taking your blood pressure a number of times. The blood pressures between normal and high are called prehypertension.  Home care  If you have high blood pressure, you should do what is listed below to lower your blood pressure. If you are taking medicines for high blood pressure, these methods may reduce or end your need for medicines in the future.  · Begin a weight-loss program if you are overweight.  · Cut back on how much salt you get in your diet. Heres how to do this:  ¨ Dont eat foods that have a lot of salt. These include olives, pickles, smoked meats, and salted potato chips.  ¨ Dont add salt to your food at the table.  ¨ Use only small amounts of salt when cooking.  · Start an exercise program. Talk with your healthcare  provider about the type of exercise program that would be best for you. It doesn't have to be hard. Even brisk walking for 20 minutes 3 times a week is a good form of exercise.  · Dont take medicines that stimulate the heart. This includes many over-the-counter cold and sinus decongestant pills and sprays, as well as diet pills. Check the warnings about hypertension on the label. Before buying any over-the-counter medicines or supplements, always ask the pharmacist about the product's potential interaction with your high blood pressure and your high blood pressure medicines.  · Stimulants such as amphetamine or cocaine could be deadly for someone with high blood pressure. Never take these.  · Limit how much caffeine you get in your diet. Switch to caffeine-free products.  · Stop smoking. If you are a long-time smoker, this can be hard. Talk to your healthcare provider about medicines and nicotine replacement options to help you. Also, enroll in a stop-smoking program to make it more likely that you will quit for good.  · Learn how to handle stress. This is an important part of any program to lower blood pressure. Learn about relaxation methods like meditation, yoga, or biofeedback.  · If your provider prescribed medicines, take them exactly as directed. Missing doses may cause your blood pressure get out of control.  · If you miss a dose or doses, check with your healthcare provider or pharmacist about what to do.  · Consider buying an automatic blood pressure machine. Ask your provider for a recommendation. You can get one of these at most pharmacies.     The American Heart Association recommends the following guidelines for home blood pressure monitoring:  · Don't smoke or drink coffee for 30 minutes before taking your blood pressure.  · Go to the bathroom before the test.  · Relax for 5 minutes before taking the measurement.  · Sit with your back supported (don't sit on a couch or soft chair); keep your feet on  the floor uncrossed. Place your arm on a solid flat surface (like a table) with the upper part of the arm at heart level. Place the middle of the cuff directly above the eye of the elbow. Check the monitor's instruction manual for an illustration.  · Take multiple readings. When you measure, take 2 to 3 readings one minute apart and record all of the results.  · Take your blood pressure at the same time every day, or as your healthcare provider recommends.  · Record the date, time, and blood pressure reading.  · Take the record with you to your next medical appointment. If your blood pressure monitor has a built-in memory, simply take the monitor with you to your next appointment.  · Call your provider if you have several high readings. Don't be frightened by a single high blood pressure reading, but if you get several high readings, check in with your healthcare provider.  · Note: When blood pressure reaches a systolic (top number) of 180 or higher OR diastolic (bottom number) of 110 or higher, seek emergency medical treatment.  Follow-up care  You will need to see your healthcare provider regularly. This is to check your blood pressure and to make changes to your medicines. Make a follow-up appointment as directed. Bring the record of your home blood pressure readings to the appointment.  When to seek medical advice  Call your healthcare provider right away if any of these occur:  · Blood pressure reaches a systolic (upper number) of 180 or higher OR a diastolic (bottom number) of 110 or higher  · Chest pain or shortness of breath  · Severe headache  · Throbbing or rushing sound in the ears  · Nosebleed  · Sudden severe pain in your belly (abdomen)  · Extreme drowsiness, confusion, or fainting  · Dizziness or spinning sensation (vertigo)  · Weakness of an arm or leg or one side of the face  · You have problems speaking or seeing   Date Last Reviewed: 12/1/2016  © 3788-6030 [employer] [address] [place] [address]  [place] [address]. All rights reserved. This information is not intended as a substitute for professional medical care. Always follow your healthcare professional's instructions.

## 2017-10-10 NOTE — PROGRESS NOTES
Subjective:       Patient ID: Florecita Noel is a 42 y.o. female.    Chief Complaint: Anxiety      HPI   Ms. Noel presents to clinic today for complaints of feeling anxious.   She states she has had poor sleep.   She also has been crying more.   She states she will cry randomly.   She states her  is concerned and wanted her to be seen.   She also states that her coworkers have noticed also.   She denies any suicidal thoughts or homicidal thoughts.   She also feels very tense in her shoulders and neck.   She also states her blood pressure are elevated at work and thinks this could be stress related.     Review of Systems   Constitutional: Positive for activity change. Negative for unexpected weight change.   HENT: Negative for hearing loss, rhinorrhea and trouble swallowing.    Eyes: Negative for discharge and visual disturbance.   Respiratory: Positive for chest tightness. Negative for wheezing.    Cardiovascular: Positive for palpitations. Negative for chest pain.   Gastrointestinal: Positive for diarrhea. Negative for blood in stool, constipation and vomiting.   Endocrine: Negative for polydipsia and polyuria.   Genitourinary: Negative for difficulty urinating, dysuria, hematuria and menstrual problem.   Musculoskeletal: Negative for arthralgias and joint swelling.   Neurological: Positive for headaches. Negative for weakness.   Psychiatric/Behavioral: Positive for dysphoric mood. Negative for confusion.       Medication List with Changes/Refills   New Medications    LISINOPRIL (PRINIVIL,ZESTRIL) 20 MG TABLET    Take 1 tablet (20 mg total) by mouth once daily.    SERTRALINE (ZOLOFT) 25 MG TABLET    Take 1 tablet (25 mg total) by mouth once daily.   Current Medications    CETIRIZINE 10 MG CHEWABLE TABLET    Take 10 mg by mouth once daily.    FLUTICASONE (FLONASE) 50 MCG/ACTUATION NASAL SPRAY    2 sprays by Each Nare route once daily.    METOCLOPRAMIDE HCL (REGLAN) 10 MG TABLET    Take 1 tablet (10 mg total) by  mouth every 6 (six) hours.    TRAZODONE (DESYREL) 50 MG TABLET    Take 1 tablet (50 mg total) by mouth every evening.   Discontinued Medications    BENZONATATE (TESSALON) 100 MG CAPSULE    Take 1 capsule (100 mg total) by mouth 3 (three) times daily as needed.       Patient Active Problem List   Diagnosis    Hypokalemia    Single liveborn infant delivered vaginally    Sterilization    Avulsion fracture of middle phalanx of finger         Objective:     Physical Exam   Constitutional: She is oriented to person, place, and time. She appears well-developed and well-nourished. No distress.   HENT:   Head: Normocephalic and atraumatic.   Right Ear: External ear normal.   Left Ear: External ear normal.   Eyes: EOM are normal. Right eye exhibits no discharge. Left eye exhibits no discharge.   Cardiovascular: Normal rate and regular rhythm.    Pulmonary/Chest: Effort normal and breath sounds normal. No respiratory distress. She has no wheezes.   Musculoskeletal: She exhibits no edema.   Neurological: She is alert and oriented to person, place, and time.   Skin: Skin is warm and dry. She is not diaphoretic. No erythema.   Psychiatric: She has a normal mood and affect.   Vitals reviewed.    Vitals:    10/10/17 1629   BP: (!) 140/90   Pulse: 76   Resp: 16   Temp: 98 °F (36.7 °C)       Assessment/  PLAN     Depression, unspecified depression type  -     sertraline (ZOLOFT) 25 MG tablet; Take 1 tablet (25 mg total) by mouth once daily.  Dispense: 30 tablet; Refill: 0    Hypertension, unspecified type  -     lisinopril (PRINIVIL,ZESTRIL) 20 MG tablet; Take 1 tablet (20 mg total) by mouth once daily.  Dispense: 30 tablet; Refill: 0    plan as above   rtc 1 month for follow up     Prem Cardenas MD  [facility]

## 2017-10-11 ENCOUNTER — PATIENT MESSAGE (OUTPATIENT)
Dept: FAMILY MEDICINE | Facility: CLINIC | Age: 42
End: 2017-10-11

## 2017-10-11 VITALS
WEIGHT: 159.63 LBS | BODY MASS INDEX: 27.25 KG/M2 | TEMPERATURE: 98 F | HEART RATE: 76 BPM | RESPIRATION RATE: 16 BRPM | OXYGEN SATURATION: 100 % | HEIGHT: 64 IN | DIASTOLIC BLOOD PRESSURE: 90 MMHG | SYSTOLIC BLOOD PRESSURE: 140 MMHG

## 2017-10-22 ENCOUNTER — PATIENT MESSAGE (OUTPATIENT)
Dept: FAMILY MEDICINE | Facility: CLINIC | Age: 42
End: 2017-10-22

## 2017-10-23 ENCOUNTER — PATIENT MESSAGE (OUTPATIENT)
Dept: FAMILY MEDICINE | Facility: CLINIC | Age: 42
End: 2017-10-23

## 2017-10-27 ENCOUNTER — TELEPHONE (OUTPATIENT)
Dept: FAMILY MEDICINE | Facility: CLINIC | Age: 42
End: 2017-10-27

## 2017-10-27 ENCOUNTER — HOSPITAL ENCOUNTER (OUTPATIENT)
Dept: RADIOLOGY | Facility: HOSPITAL | Age: 42
Discharge: HOME | End: 2017-10-27
Attending: FAMILY MEDICINE
Payer: COMMERCIAL

## 2017-10-27 VITALS — BODY MASS INDEX: 27.14 KG/M2 | HEIGHT: 64 IN | WEIGHT: 159 LBS

## 2017-10-27 DIAGNOSIS — Z12.31 SCREENING MAMMOGRAM, ENCOUNTER FOR: ICD-10-CM

## 2017-10-27 PROCEDURE — 77067 SCR MAMMO BI INCL CAD: CPT | Mod: 26,,, | Performed by: RADIOLOGY

## 2017-10-27 PROCEDURE — 77067 SCR MAMMO BI INCL CAD: CPT | Mod: TC

## 2017-11-09 ENCOUNTER — PATIENT MESSAGE (OUTPATIENT)
Dept: FAMILY MEDICINE | Facility: CLINIC | Age: 42
End: 2017-11-09

## 2017-11-10 ENCOUNTER — PATIENT MESSAGE (OUTPATIENT)
Dept: FAMILY MEDICINE | Facility: CLINIC | Age: 42
End: 2017-11-10

## 2017-11-16 ENCOUNTER — PATIENT MESSAGE (OUTPATIENT)
Dept: FAMILY MEDICINE | Facility: CLINIC | Age: 42
End: 2017-11-16

## 2017-11-20 ENCOUNTER — PATIENT MESSAGE (OUTPATIENT)
Dept: FAMILY MEDICINE | Facility: CLINIC | Age: 42
End: 2017-11-20

## 2017-12-01 DIAGNOSIS — G47.00 INSOMNIA, UNSPECIFIED TYPE: ICD-10-CM

## 2017-12-01 DIAGNOSIS — F32.A DEPRESSION, UNSPECIFIED DEPRESSION TYPE: ICD-10-CM

## 2017-12-01 RX ORDER — TRAZODONE HYDROCHLORIDE 50 MG/1
50 TABLET ORAL NIGHTLY
Qty: 30 TABLET | Refills: 0 | Status: SHIPPED | OUTPATIENT
Start: 2017-12-01 | End: 2018-02-16 | Stop reason: SDUPTHER

## 2017-12-01 RX ORDER — SERTRALINE HYDROCHLORIDE 25 MG/1
25 TABLET, FILM COATED ORAL DAILY
Qty: 30 TABLET | Refills: 0 | Status: SHIPPED | OUTPATIENT
Start: 2017-12-01 | End: 2018-02-16 | Stop reason: SDUPTHER

## 2017-12-04 ENCOUNTER — PATIENT MESSAGE (OUTPATIENT)
Dept: FAMILY MEDICINE | Facility: CLINIC | Age: 42
End: 2017-12-04

## 2018-02-16 DIAGNOSIS — F32.A DEPRESSION, UNSPECIFIED DEPRESSION TYPE: ICD-10-CM

## 2018-02-16 DIAGNOSIS — G47.00 INSOMNIA, UNSPECIFIED TYPE: ICD-10-CM

## 2018-02-16 RX ORDER — TRAZODONE HYDROCHLORIDE 50 MG/1
50 TABLET ORAL NIGHTLY
Qty: 30 TABLET | Refills: 0 | Status: SHIPPED | OUTPATIENT
Start: 2018-02-16 | End: 2018-12-28

## 2018-02-16 RX ORDER — SERTRALINE HYDROCHLORIDE 25 MG/1
25 TABLET, FILM COATED ORAL DAILY
Qty: 30 TABLET | Refills: 0 | Status: SHIPPED | OUTPATIENT
Start: 2018-02-16 | End: 2018-12-28

## 2018-08-28 ENCOUNTER — HOSPITAL ENCOUNTER (EMERGENCY)
Facility: HOSPITAL | Age: 43
Discharge: HOME | End: 2018-08-28
Attending: EMERGENCY MEDICINE
Payer: COMMERCIAL

## 2018-08-28 VITALS
RESPIRATION RATE: 18 BRPM | BODY MASS INDEX: 27.62 KG/M2 | HEART RATE: 96 BPM | TEMPERATURE: 99 F | SYSTOLIC BLOOD PRESSURE: 137 MMHG | WEIGHT: 160.94 LBS | OXYGEN SATURATION: 97 % | DIASTOLIC BLOOD PRESSURE: 91 MMHG

## 2018-08-28 DIAGNOSIS — K52.9 GASTROENTERITIS: Primary | ICD-10-CM

## 2018-08-28 DIAGNOSIS — I10 HYPERTENSION, UNSPECIFIED TYPE: ICD-10-CM

## 2018-08-28 PROCEDURE — 99283 EMERGENCY DEPT VISIT LOW MDM: CPT

## 2018-08-28 RX ORDER — DICYCLOMINE HYDROCHLORIDE 20 MG/1
20 TABLET ORAL 2 TIMES DAILY
Qty: 20 TABLET | Refills: 0 | Status: SHIPPED | OUTPATIENT
Start: 2018-08-28 | End: 2018-09-27

## 2018-08-28 RX ORDER — ONDANSETRON 4 MG/1
8 TABLET, ORALLY DISINTEGRATING ORAL EVERY 6 HOURS PRN
Qty: 20 TABLET | Refills: 0 | Status: SHIPPED | OUTPATIENT
Start: 2018-08-28 | End: 2018-10-29 | Stop reason: ALTCHOICE

## 2018-08-28 RX ORDER — PROMETHAZINE HYDROCHLORIDE 25 MG/1
25 SUPPOSITORY RECTAL EVERY 6 HOURS PRN
Qty: 10 SUPPOSITORY | Refills: 0 | Status: SHIPPED | OUTPATIENT
Start: 2018-08-28 | End: 2018-10-29 | Stop reason: ALTCHOICE

## 2018-08-28 NOTE — ED PROVIDER NOTES
Encounter Date: 8/28/2018       History     Chief Complaint   Patient presents with    Diarrhea     pt reports diarrhea, vomitting, chills, and generalized weakness that started yesterday afternoon.      The history is provided by the patient.   Emesis    This is a new problem. The current episode started yesterday. The problem occurs rarely. The problem has been waxing and waning. The emesis has an appearance of stomach contents. Associated symptoms include abdominal pain, chills, diarrhea and myalgias. Pertinent negatives include no arthralgias, no cough, no fever, no headaches, no sweats and no URI. Risk factors include ill contacts.     Review of patient's allergies indicates:  No Known Allergies  Past Medical History:   Diagnosis Date    Abnormal Pap smear 1996    Anemia     Hypokalemia     in pregnancy     Past Surgical History:   Procedure Laterality Date    CHOLECYSTECTOMY      COSMETIC SURGERY      tummy tuck    GALLBLADDER SURGERY      TUBAL LIGATION      Tummy Tuck       Family History   Problem Relation Age of Onset    Cancer Father      Social History     Tobacco Use    Smoking status: Never Smoker    Smokeless tobacco: Never Used   Substance Use Topics    Alcohol use: Yes     Alcohol/week: 0.0 oz     Comment: socially     Drug use: No     Review of Systems   Constitutional: Positive for chills. Negative for fever.   HENT: Negative for sore throat.    Eyes: Negative for photophobia and redness.   Respiratory: Negative for cough and shortness of breath.    Cardiovascular: Negative for chest pain.   Gastrointestinal: Positive for abdominal pain, diarrhea, nausea and vomiting.   Endocrine: Negative for polydipsia and polyphagia.   Genitourinary: Negative for dysuria.   Musculoskeletal: Positive for myalgias. Negative for arthralgias and back pain.   Skin: Negative for rash.   Neurological: Negative for weakness and headaches.   Hematological: Does not bruise/bleed easily.    Psychiatric/Behavioral: The patient is not nervous/anxious.    All other systems reviewed and are negative.      Physical Exam     Initial Vitals [08/28/18 1022]   BP Pulse Resp Temp SpO2   (!) 137/91 96 18 98.6 °F (37 °C) 97 %      MAP       --         Physical Exam    Nursing note and vitals reviewed.  Constitutional: Vital signs are normal. She appears well-developed and well-nourished. No distress.   HENT:   Head: Normocephalic and atraumatic.   Right Ear: External ear normal.   Left Ear: External ear normal.   Nose: Nose normal.   Mouth/Throat: Oropharynx is clear and moist.   Eyes: Conjunctivae, EOM and lids are normal. Pupils are equal, round, and reactive to light.   Neck: Normal range of motion and full passive range of motion without pain. Neck supple.   Cardiovascular: Normal rate, regular rhythm, S1 normal, S2 normal, normal heart sounds, intact distal pulses and normal pulses.   Pulmonary/Chest: Breath sounds normal. No respiratory distress. She has no wheezes. She has no rales.   Abdominal: Soft. Normal appearance and bowel sounds are normal. She exhibits no distension. There is no tenderness.   Musculoskeletal: Normal range of motion.   Lymphadenopathy:     She has no cervical adenopathy.   Neurological: She is alert and oriented to person, place, and time. She has normal strength. No cranial nerve deficit or sensory deficit. Coordination and gait normal.   Skin: Skin is warm, dry and intact.   Psychiatric: She has a normal mood and affect. Her speech is normal and behavior is normal. Judgment and thought content normal. Cognition and memory are normal.         ED Course   Procedures  Labs Reviewed - No data to display       Imaging Results    None                               Clinical Impression:   The primary encounter diagnosis was Gastroenteritis. A diagnosis of Hypertension, unspecified type was also pertinent to this visit.      Disposition:   Disposition: Discharged  Condition:  CHELSIE Hanley  08/28/18 1149

## 2018-09-13 ENCOUNTER — PATIENT OUTREACH (OUTPATIENT)
Dept: ADMINISTRATIVE | Facility: HOSPITAL | Age: 43
End: 2018-09-13

## 2018-09-13 NOTE — PROGRESS NOTES
L/M for pt to call, for BP Check ..      Sonya DSOUZA LPN Care Coordinator  Care Coordination Department  [facility]  [phone]

## 2018-09-25 ENCOUNTER — PATIENT OUTREACH (OUTPATIENT)
Dept: ADMINISTRATIVE | Facility: HOSPITAL | Age: 43
End: 2018-09-25

## 2018-10-21 ENCOUNTER — HOSPITAL ENCOUNTER (EMERGENCY)
Facility: HOSPITAL | Age: 43
Discharge: HOME | End: 2018-10-22
Attending: EMERGENCY MEDICINE
Payer: COMMERCIAL

## 2018-10-21 DIAGNOSIS — K21.9 GASTROESOPHAGEAL REFLUX DISEASE WITHOUT ESOPHAGITIS: Primary | ICD-10-CM

## 2018-10-21 PROCEDURE — 99284 EMERGENCY DEPT VISIT MOD MDM: CPT

## 2018-10-22 VITALS
OXYGEN SATURATION: 100 % | HEART RATE: 73 BPM | DIASTOLIC BLOOD PRESSURE: 92 MMHG | WEIGHT: 165.38 LBS | TEMPERATURE: 98 F | BODY MASS INDEX: 28.24 KG/M2 | HEIGHT: 64 IN | SYSTOLIC BLOOD PRESSURE: 147 MMHG | RESPIRATION RATE: 20 BRPM

## 2018-10-22 LAB
ALBUMIN SERPL BCP-MCNC: 3.8 G/DL
ALP SERPL-CCNC: 45 U/L
ALT SERPL W/O P-5'-P-CCNC: 23 U/L
AMYLASE SERPL-CCNC: 67 U/L
ANION GAP SERPL CALC-SCNC: 13 MMOL/L
AST SERPL-CCNC: 35 U/L
B-HCG UR QL: NEGATIVE
BACTERIA #/AREA URNS HPF: NORMAL /HPF
BASOPHILS # BLD AUTO: 0.04 K/UL
BASOPHILS NFR BLD: 0.3 %
BILIRUB SERPL-MCNC: 0.4 MG/DL
BILIRUB UR QL STRIP: NEGATIVE
BUN SERPL-MCNC: 8 MG/DL
CALCIUM SERPL-MCNC: 8.7 MG/DL
CHLORIDE SERPL-SCNC: 106 MMOL/L
CLARITY UR: CLEAR
CO2 SERPL-SCNC: 20 MMOL/L
COLOR UR: YELLOW
CREAT SERPL-MCNC: 0.7 MG/DL
DIFFERENTIAL METHOD: ABNORMAL
EOSINOPHIL # BLD AUTO: 0.3 K/UL
EOSINOPHIL NFR BLD: 1.8 %
ERYTHROCYTE [DISTWIDTH] IN BLOOD BY AUTOMATED COUNT: 13.7 %
EST. GFR  (AFRICAN AMERICAN): >60 ML/MIN/1.73 M^2
EST. GFR  (NON AFRICAN AMERICAN): >60 ML/MIN/1.73 M^2
GLUCOSE SERPL-MCNC: 86 MG/DL
GLUCOSE UR QL STRIP: NEGATIVE
HCT VFR BLD AUTO: 40.8 %
HGB BLD-MCNC: 13.8 G/DL
HGB UR QL STRIP: ABNORMAL
KETONES UR QL STRIP: NEGATIVE
LEUKOCYTE ESTERASE UR QL STRIP: NEGATIVE
LIPASE SERPL-CCNC: 9 U/L
LYMPHOCYTES # BLD AUTO: 4 K/UL
LYMPHOCYTES NFR BLD: 27.1 %
MCH RBC QN AUTO: 28.2 PG
MCHC RBC AUTO-ENTMCNC: 33.8 G/DL
MCV RBC AUTO: 83 FL
MICROSCOPIC COMMENT: NORMAL
MONOCYTES # BLD AUTO: 1.5 K/UL
MONOCYTES NFR BLD: 10.4 %
NEUTROPHILS # BLD AUTO: 8.9 K/UL
NEUTROPHILS NFR BLD: 60.4 %
NITRITE UR QL STRIP: NEGATIVE
PH UR STRIP: 6 [PH] (ref 5–8)
PLATELET # BLD AUTO: 290 K/UL
PMV BLD AUTO: 11.4 FL
POTASSIUM SERPL-SCNC: 5.3 MMOL/L
PROT SERPL-MCNC: 8.3 G/DL
PROT UR QL STRIP: NEGATIVE
RBC # BLD AUTO: 4.9 M/UL
RBC #/AREA URNS HPF: 0 /HPF (ref 0–4)
SODIUM SERPL-SCNC: 139 MMOL/L
SP GR UR STRIP: 1.01 (ref 1–1.03)
SQUAMOUS #/AREA URNS HPF: 0 /HPF
URN SPEC COLLECT METH UR: ABNORMAL
UROBILINOGEN UR STRIP-ACNC: NEGATIVE EU/DL
WBC # BLD AUTO: 14.73 K/UL
WBC #/AREA URNS HPF: 0 /HPF (ref 0–5)

## 2018-10-22 PROCEDURE — 81000 URINALYSIS NONAUTO W/SCOPE: CPT

## 2018-10-22 PROCEDURE — 85025 COMPLETE CBC W/AUTO DIFF WBC: CPT

## 2018-10-22 PROCEDURE — 81025 URINE PREGNANCY TEST: CPT

## 2018-10-22 PROCEDURE — 83690 ASSAY OF LIPASE: CPT

## 2018-10-22 PROCEDURE — 82150 ASSAY OF AMYLASE: CPT

## 2018-10-22 PROCEDURE — 80053 COMPREHEN METABOLIC PANEL: CPT

## 2018-10-22 PROCEDURE — 25000003 PHARM REV CODE 250: Performed by: EMERGENCY MEDICINE

## 2018-10-22 RX ORDER — FAMOTIDINE 20 MG/1
20 TABLET, FILM COATED ORAL
Status: COMPLETED | OUTPATIENT
Start: 2018-10-22 | End: 2018-10-22

## 2018-10-22 RX ADMIN — FAMOTIDINE 20 MG: 20 TABLET, FILM COATED ORAL at 12:10

## 2018-10-22 RX ADMIN — DICYCLOMINE HYDROCHLORIDE 50 ML: 10 SOLUTION ORAL at 12:10

## 2018-10-22 NOTE — DISCHARGE INSTRUCTIONS
ZantacDaily as prescribed.  Use Tums for breakthrough symptoms.  Follow up with her doctor on Friday as scheduled already.  Return as needed for any worsening symptoms, problems, questions or concerns.

## 2018-10-22 NOTE — ED PROVIDER NOTES
SCRIBE #1 NOTE: I, Ghanshyam Davis, am scribing for, and in the presence of, Wolfgang Antony Jr., MD. I have scribed the entire note.      History      Chief Complaint   Patient presents with    Abdominal Pain     Beginning 1 hour PTA.  Described as a severe, tight, cramping to center of abdomen.  Denies N/V/D.  Denies dysuria.       Review of patient's allergies indicates:  No Known Allergies     HPI   HPI    10/22/2018, 12:13 AM   History obtained from the patient      History of Present Illness: Florecita Noel is a 43 y.o. female patient with a PMHx of anemia who presents to the Emergency Department for abdominal pain which onset gradually at 10:30 PM. Pt reports trying to fall asleep when she started to feel cramping in her abdomen. Symptoms are constant and moderate in severity. Sx mitigated when pt is sitting upright. Sx exacerbated when pt is lying down. Associated sx includes nausea. Patient denies any fever, chills, constipation, hematochezia, dysuria, hematuria, urinary frequency, vomiting, diarrhea, and all other sxs at this time. No further complaints or concerns at this time.         Arrival mode: Personal vehicle      PCP: Prem Cardenas MD       Past Medical History:  Past Medical History:   Diagnosis Date    Abnormal Pap smear 1996    Anemia     Hypokalemia     in pregnancy       Past Surgical History:  Past Surgical History:   Procedure Laterality Date    CHOLECYSTECTOMY      COSMETIC SURGERY      tummy tuck    GALLBLADDER SURGERY      OCCLUSION, FALLOPIAN TUBE, LAPAROSCOPIC, USING DEVICE Bilateral 2/25/2014    [facility]formed [facility]    TUBAL LIGATION      Tummy Tuck           Family History:  Family History   Problem Relation Age of Onset    Cancer Father        Social History:  Social History     Tobacco Use    Smoking status: Never Smoker    Smokeless tobacco: Never Used   Substance and Sexual Activity    Alcohol use: Yes     Alcohol/week: 0.0 oz     Frequency:  2-3 times a week     Comment: socially     Drug use: No    Sexual activity: Yes     Partners: Male       ROS   Review of Systems   Constitutional: Negative for chills and fever.   HENT: Negative for sore throat.    Respiratory: Negative for shortness of breath.    Cardiovascular: Negative for chest pain.   Gastrointestinal: Positive for abdominal pain and nausea. Negative for blood in stool, constipation, diarrhea and vomiting.   Genitourinary: Negative for dysuria, frequency and hematuria.   Musculoskeletal: Negative for back pain.   Skin: Negative for rash.   Neurological: Negative for weakness.   Hematological: Does not bruise/bleed easily.   All other systems reviewed and are negative.      Physical Exam      Initial Vitals [10/21/18 2349]   BP Pulse Resp Temp SpO2   (!) 180/104 90 20 98.2 °F (36.8 °C) 98 %      MAP       --          Physical Exam  Nursing Notes and Vital Signs Reviewed.  Constitutional: Patient is in no acute distress. Well-developed and well-nourished.  Head: Atraumatic. Normocephalic.  Eyes: PERRL. EOM intact. Conjunctivae are not pale. No scleral icterus.  ENT: Mucous membranes are moist. Oropharynx is clear and symmetric.    Neck: Supple. Full ROM. No lymphadenopathy.  Cardiovascular: Regular rate. Regular rhythm. No murmurs, rubs, or gallops. Distal pulses are 2+ and symmetric.  Pulmonary/Chest: No respiratory distress. Clear to auscultation bilaterally. No wheezing or rales.  Abdominal: Soft and non-distended.  Epigastric region TTP. No rebound, guarding, or rigidity. Good bowel sounds.  Genitourinary: No CVA tenderness  Musculoskeletal: Moves all extremities. No obvious deformities. No edema. No calf tenderness.  Skin: Warm and dry.  Neurological:  Alert, awake, and appropriate.  Normal speech.  No acute focal neurological deficits are appreciated.  Psychiatric: Normal affect. Good eye contact. Appropriate in content.    ED Course    Procedures  ED Vital Signs:  Vitals:    10/21/18  "2349 10/22/18 0019 10/22/18 0115   BP: (!) 180/104 138/83 126/74   Pulse: 90 65 66   Resp: 20 18 18   Temp: 98.2 °F (36.8 °C)     TempSrc: Oral     SpO2: 98% 98% 98%   Weight: 75 kg (165 lb 5.5 oz)     Height: 5' 4" (1.626 m)         Abnormal Lab Results:  Labs Reviewed   CBC W/ AUTO DIFFERENTIAL - Abnormal; Notable for the following components:       Result Value    WBC 14.73 (*)     Gran # (ANC) 8.9 (*)     Mono # 1.5 (*)     All other components within normal limits   URINALYSIS, REFLEX TO URINE CULTURE - Abnormal; Notable for the following components:    Occult Blood UA 1+ (*)     All other components within normal limits    Narrative:     Preferred Collection Type->Urine, Clean Catch   PREGNANCY TEST, URINE RAPID   URINALYSIS MICROSCOPIC    Narrative:     Preferred Collection Type->Urine, Clean Catch   COMPREHENSIVE METABOLIC PANEL   AMYLASE   LIPASE        All Lab Results:  Results for orders placed or performed during the hospital encounter of 10/21/18   CBC auto differential   Result Value Ref Range    WBC 14.73 (H) 3.90 - 12.70 K/uL    RBC 4.90 4.00 - 5.40 M/uL    Hemoglobin 13.8 12.0 - 16.0 g/dL    Hematocrit 40.8 37.0 - 48.5 %    MCV 83 82 - 98 fL    MCH 28.2 27.0 - 31.0 pg    MCHC 33.8 32.0 - 36.0 g/dL    RDW 13.7 11.5 - 14.5 %    Platelets 290 150 - 350 K/uL    MPV 11.4 9.2 - 12.9 fL    Gran # (ANC) 8.9 (H) 1.8 - 7.7 K/uL    Lymph # 4.0 1.0 - 4.8 K/uL    Mono # 1.5 (H) 0.3 - 1.0 K/uL    Eos # 0.3 0.0 - 0.5 K/uL    Baso # 0.04 0.00 - 0.20 K/uL    Gran% 60.4 38.0 - 73.0 %    Lymph% 27.1 18.0 - 48.0 %    Mono% 10.4 4.0 - 15.0 %    Eosinophil% 1.8 0.0 - 8.0 %    Basophil% 0.3 0.0 - 1.9 %    Differential Method Automated    Urinalysis, Reflex to Urine Culture Urine, Clean Catch   Result Value Ref Range    Specimen UA Urine, Clean Catch     Color, UA Yellow Yellow, Straw, Yusra    Appearance, UA Clear Clear    pH, UA 6.0 5.0 - 8.0    Specific Gravity, UA 1.010 1.005 - 1.030    Protein, UA Negative Negative    " Glucose, UA Negative Negative    Ketones, UA Negative Negative    Bilirubin (UA) Negative Negative    Occult Blood UA 1+ (A) Negative    Nitrite, UA Negative Negative    Urobilinogen, UA Negative <2.0 EU/dL    Leukocytes, UA Negative Negative   Pregnancy, urine rapid   Result Value Ref Range    Preg Test, Ur Negative    Urinalysis Microscopic   Result Value Ref Range    RBC, UA 0 0 - 4 /hpf    WBC, UA 0 0 - 5 /hpf    Bacteria, UA None None-Occ /hpf    Squam Epithel, UA 0 /hpf    Microscopic Comment SEE COMMENT          Imaging Results:  Imaging Results    None                 The Emergency Provider reviewed the vital signs and test results, which are outlined above.    ED Discussion     1:50 AM: Re-evaluated pt. Pt is resting comfortably and is in no acute distress.  Pt states she is feeling completely better after GI cocktail.  D/w pt all pertinent results. D/w pt any concerns expressed at this time. Answered all questions. Pt expresses understanding at this time.    1:55 AM: Reassessed pt at this time.  Pt states her condition has improved at this time. Discussed with pt all pertinent ED information and results. Gave pt all f/u and return to the ED instructions. All questions and concerns were addressed at this time. Pt expresses understanding of information and instructions, and is comfortable with plan to discharge. Pt is stable for discharge.    2:37 AM: Reassessed pt at this time.  Pt states her condition has improved at this time. Discussed with pt all pertinent ED information and results. Discussed pt dx and plan of tx. Gave pt all f/u and return to the ED instructions. All questions and concerns were addressed at this time. Pt expresses understanding of information and instructions, and is comfortable with plan to discharge. Pt is stable for discharge.      I discussed with patient and/or family/caretaker that evaluation in the ED does not suggest any emergent or life threatening medical conditions  requiring immediate intervention beyond what was provided in the ED, and I believe patient is safe for discharge.  Regardless, an unremarkable evaluation in the ED does not preclude the development or presence of a serious of life threatening condition. As such, patient was instructed to return immediately for any worsening or change in current symptoms.    Current Discharge Medication List      CONTINUE these medications which have NOT CHANGED    Details   cetirizine 10 mg chewable tablet Take 10 mg by mouth once daily.  Qty: 30 tablet, Refills: 0    Associated Diagnoses: Allergic rhinitis, unspecified allergic rhinitis trigger, unspecified rhinitis seasonality      fluticasone (FLONASE) 50 mcg/actuation nasal spray 2 sprays by Each Nare route once daily.  Qty: 16 g, Refills: 0    Associated Diagnoses: Nasal turbinate hypertrophy      lisinopril (PRINIVIL,ZESTRIL) 20 MG tablet Take 1 tablet (20 mg total) by mouth once daily.  Qty: 30 tablet, Refills: 0    Associated Diagnoses: Hypertension, unspecified type      metoclopramide HCl (REGLAN) 10 MG tablet Take 1 tablet (10 mg total) by mouth every 6 (six) hours.  Qty: 30 tablet, Refills: 0      ondansetron (ZOFRAN-ODT) 4 MG TbDL Take 2 tablets (8 mg total) by mouth every 6 (six) hours as needed.  Qty: 20 tablet, Refills: 0      promethazine (PHENERGAN) 25 MG suppository Place 1 suppository (25 mg total) rectally every 6 (six) hours as needed for Nausea.  Qty: 10 suppository, Refills: 0      sertraline (ZOLOFT) 25 MG tablet Take 1 tablet (25 mg total) by mouth once daily.  Qty: 30 tablet, Refills: 0    Associated Diagnoses: Depression, unspecified depression type      traZODone (DESYREL) 50 MG tablet Take 1 tablet (50 mg total) by mouth every evening.  Qty: 30 tablet, Refills: 0    Associated Diagnoses: Insomnia, unspecified type                     ED Medication(s):  Medications   GI cocktail (mylanta 30 mL, lidocaine 2 % viscous 10 mL, dicyclomine 10 mL) 50 mL (50  mLs Oral Given 10/22/18 0038)   famotidine tablet 20 mg (20 mg Oral Given 10/22/18 0038)             Medical Decision Making    Medical Decision Making:   Clinical Tests:   Lab Tests: Ordered and Reviewed           Scribe Attestation:   Scribe #1: I performed the above scribed service and the documentation accurately describes the services I performed. I attest to the accuracy of the note.    Attending:   Physician Attestation Statement for Scribe #1: I, Wolfgang Antony Jr., MD, personally performed the services described in this documentation, as scribed by Ghanshyam Davis, in my presence, and it is both accurate and complete.          Clinical Impression     No diagnosis found.    Disposition:   Disposition: Discharged  Condition: Stable         Wolfgang Antony Jr., MD  10/22/18 0245

## 2018-10-29 ENCOUNTER — OFFICE VISIT (OUTPATIENT)
Dept: FAMILY MEDICINE | Facility: CLINIC | Age: 43
End: 2018-10-29
Payer: COMMERCIAL

## 2018-10-29 VITALS
BODY MASS INDEX: 28.49 KG/M2 | SYSTOLIC BLOOD PRESSURE: 130 MMHG | OXYGEN SATURATION: 99 % | WEIGHT: 166.88 LBS | TEMPERATURE: 99 F | DIASTOLIC BLOOD PRESSURE: 100 MMHG | HEART RATE: 98 BPM | HEIGHT: 64 IN

## 2018-10-29 DIAGNOSIS — I10 ESSENTIAL HYPERTENSION: ICD-10-CM

## 2018-10-29 DIAGNOSIS — G43.009 MIGRAINE WITHOUT AURA AND WITHOUT STATUS MIGRAINOSUS, NOT INTRACTABLE: Primary | ICD-10-CM

## 2018-10-29 PROCEDURE — 99999 PR PBB SHADOW E&M-EST. PATIENT-LVL III: CPT | Mod: PBBFAC,,, | Performed by: FAMILY MEDICINE

## 2018-10-29 PROCEDURE — 99214 OFFICE O/P EST MOD 30 MIN: CPT | Mod: S$GLB,,, | Performed by: FAMILY MEDICINE

## 2018-10-29 RX ORDER — SUMATRIPTAN SUCCINATE 25 MG/1
25 TABLET ORAL
Qty: 20 TABLET | Refills: 3 | Status: SHIPPED | OUTPATIENT
Start: 2018-10-29 | End: 2019-01-03

## 2018-10-29 RX ORDER — PROPRANOLOL HYDROCHLORIDE 40 MG/1
40 TABLET ORAL 2 TIMES DAILY
Qty: 60 TABLET | Refills: 5 | Status: SHIPPED | OUTPATIENT
Start: 2018-10-29 | End: 2018-11-26

## 2018-10-29 NOTE — PROGRESS NOTES
Subjective:     Patient ID: Florecita Noel is a 43 y.o. female.    Chief Complaint: Annual Exam and Establish Care    HPI     Patient here for headaches   --starts in the front of head, sometimes will go down the neck   --sensitive to light and sound   --also will have tension headaches   --has hx of headaches in the past, hx of migraines  --having them everyday at times   --had three headaches last month that she considered migraines   --using goodies headache powder - notes some stomach irritation due to medication, taking 3 or 4 a day   --friend of hers suffers with headaches - she did take his propranolol which resolved her headache for 3-4 days.     HTN   --hx of elevation in the past, was on lisinopril but no longer had refills 7 months ago so hasn't taken since  --not at goal today   --takes bp at home and numbers are elevated there as well     Past Medical History:   Diagnosis Date    Abnormal Pap smear 1996    Anemia     Hypokalemia     in pregnancy     Past Surgical History:   Procedure Laterality Date    CHOLECYSTECTOMY      COSMETIC SURGERY      tummy tuck    GALLBLADDER SURGERY      OCCLUSION, FALLOPIAN TUBE, LAPAROSCOPIC, USING DEVICE Bilateral 2/25/2014    [facility] by [facility] at [facility] OR    TUBAL LIGATION      José Miguel Davila       Family History   Problem Relation Age of Onset    Cancer Father      Social History     Socioeconomic History    Marital status:      Spouse name: Not on file    Number of children: Not on file    Years of education: Not on file    Highest education level: Not on file   Social Needs    Financial resource strain: Not on file    Food insecurity - worry: Not on file    Food insecurity - inability: Not on file    Transportation needs - medical: Not on file    Transportation needs - non-medical: Not on file   Occupational History    Not on file   Tobacco Use    Smoking status: Never Smoker    Smokeless tobacco: Never Used   Substance and  Sexual Activity    Alcohol use: Yes     Alcohol/week: 0.0 oz     Frequency: 2-3 times a week     Comment: socially     Drug use: No    Sexual activity: Yes     Partners: Male   Other Topics Concern    Not on file   Social History Narrative    Not on file     Health Maintenance Topics with due status: Not Due       Topic Last Completion Date    TETANUS VACCINE 12/13/2013    Mammogram 10/30/2017        Medication List           Accurate as of 10/29/18 11:59 PM. If you have any questions, ask your nurse or doctor.               START taking these medications    propranolol 40 MG tablet  Commonly known as:  INDERAL  Take 1 tablet (40 mg total) by mouth 2 (two) times daily.  Started by:  Alesha Alonso MD     sumatriptan 25 MG Tab  Commonly known as:  IMITREX  Take 1 tablet (25 mg total) by mouth every 2 (two) hours as needed (for migraine). Do not take more than 3-4 pills per day  Started by:  Alesha Alonso MD        CHANGE how you take these medications    ranitidine 150 MG tablet  Commonly known as:  ZANTAC  Take 1 tablet (150 mg total) by mouth 2 (two) times daily.  What changed:    · medication strength  · how much to take  · when to take this  Changed by:  Alesha Alonso MD        CONTINUE taking these medications    cetirizine 10 mg chewable tablet  Take 10 mg by mouth once daily.     fluticasone 50 mcg/actuation nasal spray  Commonly known as:  FLONASE  2 sprays by Each Nare route once daily.     lisinopril 20 MG tablet  Commonly known as:  PRINIVIL,ZESTRIL  Take 1 tablet (20 mg total) by mouth once daily.     sertraline 25 MG tablet  Commonly known as:  ZOLOFT  Take 1 tablet (25 mg total) by mouth once daily.     traZODone 50 MG tablet  Commonly known as:  DESYREL  Take 1 tablet (50 mg total) by mouth every evening.        STOP taking these medications    metoclopramide HCl 10 MG tablet  Commonly known as:  REGLAN  Stopped by:  Alesha Alonso MD     ondansetron 4 MG Tbdl  Commonly known as:   ZOFRAN-ODT  Stopped by:  Alesha Alonso MD     promethazine 25 MG suppository  Commonly known as:  PHENERGAN  Stopped by:  Alesha Alonso MD           Where to Get Your Medications      These medications were sent to [facility] [id number]  [place], [place] - [address] [address] [facility] [address] [facility]  [address] [address]    Phone:  [phone]   · propranolol 40 MG tablet  · ranitidine 150 MG tablet  · sumatriptan 25 MG Tab       Review of patient's allergies indicates:  No Known Allergies  Review of Systems   Constitutional: Negative for chills and fever.   HENT: Negative for hearing loss.    Eyes: Negative for discharge.   Respiratory: Negative for shortness of breath and wheezing.    Cardiovascular: Negative for chest pain and leg swelling.   Gastrointestinal: Negative for blood in stool, constipation, diarrhea and vomiting.   Genitourinary: Negative for dysuria and hematuria.   Musculoskeletal: Negative for neck pain.   Skin: Negative for rash.   Neurological: Positive for headaches. Negative for weakness.   Endo/Heme/Allergies: Negative for polydipsia.   Psychiatric/Behavioral: Positive for depression. Negative for substance abuse.       Objective:   Body mass index is 28.65 kg/m².  Vitals:    10/29/18 1643   BP: (!) 130/100   Pulse:    Temp:            Physical Exam   Constitutional: She is oriented to person, place, and time. She appears well-developed and well-nourished. No distress.   HENT:   Head: Normocephalic and atraumatic.   Right Ear: External ear normal.   Left Ear: External ear normal.   Nose: Nose normal.   Mouth/Throat: Oropharynx is clear and moist.   Eyes: Conjunctivae and EOM are normal. Pupils are equal, round, and reactive to light.   Cardiovascular: Normal rate, regular rhythm, normal heart sounds and intact distal pulses.   No murmur heard.  Pulmonary/Chest: Effort normal and breath sounds normal. No respiratory distress. She has no wheezes.  She has no rales. She exhibits no tenderness.   Abdominal: Soft. Bowel sounds are normal. She exhibits no distension. There is no tenderness.   Musculoskeletal: She exhibits no edema.   Lymphadenopathy:     She has no cervical adenopathy.   Neurological: She is alert and oriented to person, place, and time. She displays normal reflexes. No cranial nerve deficit or sensory deficit. She exhibits normal muscle tone. Coordination normal.   Skin: Skin is warm and dry.   Psychiatric: She has a normal mood and affect.   Nursing note and vitals reviewed.        Assessment and Plan      Migraine without aura and without status migrainosus, not intractable   --will start on propranolol daily for prophylaxsis    --as needed triptan at very start of migraine headaches   --advised to titrate goody powder use down very slowly, as I feel this is causing withdrawal headaches as well    --increase water intake, will refill zantac as I believe goody powder is causing some abdominal discomfort     Essential hypertension   --not controlled, will add propranolol for headaches and BP control   --will recheck in 2 weeks, if not at goal - may add lisinopril or norvasc    --keep bp log and bring in at next visit    --low salt diet     Will refill other medications   Other orders  -     ranitidine (ZANTAC) 150 MG tablet; Take 1 tablet (150 mg total) by mouth 2 (two) times daily.  Dispense: 60 tablet; Refill: 11  -     propranolol (INDERAL) 40 MG tablet; Take 1 tablet (40 mg total) by mouth 2 (two) times daily.  Dispense: 60 tablet; Refill: 5  -     sumatriptan (IMITREX) 25 MG Tab; Take 1 tablet (25 mg total) by mouth every 2 (two) hours as needed (for migraine). Do not take more than 3-4 pills per day  Dispense: 20 tablet; Refill: 3    Due to time constraints will review depression at next visit     Follow-up in about 2 weeks (around 11/12/2018).

## 2018-10-30 ENCOUNTER — PATIENT MESSAGE (OUTPATIENT)
Dept: FAMILY MEDICINE | Facility: CLINIC | Age: 43
End: 2018-10-30

## 2018-11-13 ENCOUNTER — PATIENT MESSAGE (OUTPATIENT)
Dept: FAMILY MEDICINE | Facility: CLINIC | Age: 43
End: 2018-11-13

## 2018-11-26 ENCOUNTER — OFFICE VISIT (OUTPATIENT)
Dept: FAMILY MEDICINE | Facility: CLINIC | Age: 43
End: 2018-11-26
Payer: COMMERCIAL

## 2018-11-26 ENCOUNTER — LAB VISIT (OUTPATIENT)
Dept: LAB | Facility: HOSPITAL | Age: 43
End: 2018-11-26
Attending: FAMILY MEDICINE
Payer: COMMERCIAL

## 2018-11-26 VITALS
OXYGEN SATURATION: 97 % | SYSTOLIC BLOOD PRESSURE: 132 MMHG | HEIGHT: 64 IN | HEART RATE: 60 BPM | BODY MASS INDEX: 29.32 KG/M2 | WEIGHT: 171.75 LBS | DIASTOLIC BLOOD PRESSURE: 92 MMHG | TEMPERATURE: 98 F

## 2018-11-26 DIAGNOSIS — G43.009 MIGRAINE WITHOUT AURA AND WITHOUT STATUS MIGRAINOSUS, NOT INTRACTABLE: Primary | ICD-10-CM

## 2018-11-26 DIAGNOSIS — R53.83 FATIGUE, UNSPECIFIED TYPE: ICD-10-CM

## 2018-11-26 DIAGNOSIS — R07.89 CHEST DISCOMFORT: ICD-10-CM

## 2018-11-26 LAB
T4 FREE SERPL-MCNC: 0.79 NG/DL
TSH SERPL DL<=0.005 MIU/L-ACNC: 22.93 UIU/ML

## 2018-11-26 PROCEDURE — 36415 COLL VENOUS BLD VENIPUNCTURE: CPT | Mod: PO

## 2018-11-26 PROCEDURE — 99214 OFFICE O/P EST MOD 30 MIN: CPT | Mod: S$GLB,,, | Performed by: FAMILY MEDICINE

## 2018-11-26 PROCEDURE — 84443 ASSAY THYROID STIM HORMONE: CPT

## 2018-11-26 PROCEDURE — 93005 ELECTROCARDIOGRAM TRACING: CPT | Mod: S$GLB,,, | Performed by: FAMILY MEDICINE

## 2018-11-26 PROCEDURE — 84439 ASSAY OF FREE THYROXINE: CPT

## 2018-11-26 PROCEDURE — 93010 ELECTROCARDIOGRAM REPORT: CPT | Mod: S$GLB,,, | Performed by: INTERNAL MEDICINE

## 2018-11-26 PROCEDURE — 99999 PR PBB SHADOW E&M-EST. PATIENT-LVL IV: CPT | Mod: PBBFAC,,, | Performed by: FAMILY MEDICINE

## 2018-11-26 RX ORDER — BUTALBITAL, ACETAMINOPHEN AND CAFFEINE 50; 325; 40 MG/1; MG/1; MG/1
1 TABLET ORAL EVERY 4 HOURS PRN
Qty: 30 TABLET | Refills: 0 | Status: SHIPPED | OUTPATIENT
Start: 2018-11-26 | End: 2018-12-07 | Stop reason: SDUPTHER

## 2018-11-26 RX ORDER — MULTIVITAMIN
1 TABLET ORAL DAILY
COMMUNITY
End: 2018-12-28

## 2018-11-26 RX ORDER — VERAPAMIL HYDROCHLORIDE 120 MG/1
120 TABLET, FILM COATED ORAL 3 TIMES DAILY
Qty: 90 TABLET | Refills: 11 | Status: SHIPPED | OUTPATIENT
Start: 2018-11-26 | End: 2019-12-01 | Stop reason: SDUPTHER

## 2018-11-26 NOTE — PROGRESS NOTES
Subjective:     Patient ID: Florecita Noel is a 43 y.o. female.    Chief Complaint: Follow-up (hypertension and headaches)    HPI     Patient here for follow up of headaches and chest discomfort    Headaches   --has been having these headaches for most of life and over the last year has been getting worse   --headache starts on sides of head and then radiates to front and then back   --now daily, will go away after a while   --started taking propranolol and it helped some with intesnity   --is trying to decrease her goody powder intake, now down to once a day/ 2-3 times per week  --sumatriptan caused anaphylactic reaction - went to ER given benadryl, steroids and another medication  --has not tried any other prophylactic meds or Fioricet   --has never been to neurologist   --patient has had CT and MRI of brain - every thing has come back normal with this     Chest discomfort   --x a few months   --like a chest pressure, lasts for a few seconds   --in the middle of chest --no radiation of chest discomfort, only in center of chest   --not constant, happens out the blue  --patient does go up the stairs and doesn't feel it with climbing   --no sob, headache or sweating associated with this   --family hx of CVA in grandmother, no family hx of heart disease   --no hx of smoking   --did have this chest pain before last year which was associated with syncopal episodes that lasted for 3 weeks or more   --EKG at that time reviewed at this visit     Past Medical History:   Diagnosis Date    Abnormal Pap smear 1996    Anemia     Hypokalemia     in pregnancy     Past Surgical History:   Procedure Laterality Date    CHOLECYSTECTOMY      COSMETIC SURGERY      tumchel cui    GALLBLADDER SURGERY      OCCLUSION, FALLOPIAN TUBE, LAPAROSCOPIC, USING DEVICE Bilateral 2/25/2014    [facility]    TUBAL LIGATION      José Miguel Davila       Family History   Problem Relation Age of Onset    Cancer  Father      Social History     Socioeconomic History    Marital status:      Spouse name: Not on file    Number of children: Not on file    Years of education: Not on file    Highest education level: Not on file   Social Needs    Financial resource strain: Not on file    Food insecurity - worry: Not on file    Food insecurity - inability: Not on file    Transportation needs - medical: Not on file    Transportation needs - non-medical: Not on file   Occupational History    Not on file   Tobacco Use    Smoking status: Never Smoker    Smokeless tobacco: Never Used   Substance and Sexual Activity    Alcohol use: Yes     Alcohol/week: 0.0 oz     Frequency: 2-3 times a week     Comment: socially     Drug use: No    Sexual activity: Yes     Partners: Male   Other Topics Concern    Not on file   Social History Narrative    Not on file     Health Maintenance Topics with due status: Not Due       Topic Last Completion Date    TETANUS VACCINE 12/13/2013    Mammogram 10/30/2017        Medication List           Accurate as of 11/26/18  7:28 PM. If you have any questions, ask your nurse or doctor.               START taking these medications    butalbital-acetaminophen-caffeine -40 mg -40 mg per tablet  Commonly known as:  FIORICET, ESGIC  Take 1 tablet by mouth every 4 (four) hours as needed for Pain.  Started by:  Alesha Alonso MD     verapamil 120 MG tablet  Commonly known as:  CALAN  Take 1 tablet (120 mg total) by mouth 3 (three) times daily.  Started by:  Alesha Alonso MD        CONTINUE taking these medications    cetirizine 10 mg chewable tablet  Take 10 mg by mouth once daily.     fluticasone 50 mcg/actuation nasal spray  Commonly known as:  FLONASE  2 sprays by Each Nare route once daily.     lisinopril 20 MG tablet  Commonly known as:  PRINIVIL,ZESTRIL  Take 1 tablet (20 mg total) by mouth once daily.     multivitamin per tablet  Commonly known as:  THERAGRAN      propranolol 40 MG tablet  Commonly known as:  INDERAL  Take 1 tablet (40 mg total) by mouth 2 (two) times daily.     ranitidine 150 MG tablet  Commonly known as:  ZANTAC  Take 1 tablet (150 mg total) by mouth 2 (two) times daily.     sertraline 25 MG tablet  Commonly known as:  ZOLOFT  Take 1 tablet (25 mg total) by mouth once daily.     sumatriptan 25 MG Tab  Commonly known as:  IMITREX  Take 1 tablet (25 mg total) by mouth every 2 (two) hours as needed (for migraine). Do not take more than 3-4 pills per day     traZODone 50 MG tablet  Commonly known as:  DESYREL  Take 1 tablet (50 mg total) by mouth every evening.           Where to Get Your Medications      These medications [facility] to [facility] [id number] [facility] [address] [facility]  [address] [address]    Phone:  [phone]   · butalbital-acetaminophen-caffeine -40 mg -40 mg per tablet  · verapamil 120 MG tablet       Review of patient's allergies indicates:   Allergen Reactions    Sumatriptan Other (See Comments)     Chest tightness that moved into her throat     Review of Systems   Constitutional: Negative for chills and fever.   HENT: Negative for hearing loss.    Eyes: Negative for discharge.   Respiratory: Negative for wheezing.    Cardiovascular: Negative for chest pain and palpitations.        Chest discomfort    Gastrointestinal: Negative for blood in stool, constipation, diarrhea and vomiting.   Genitourinary: Negative for dysuria and hematuria.   Musculoskeletal: Negative for neck pain.   Skin: Negative for rash.   Neurological: Positive for headaches. Negative for weakness.   Endo/Heme/Allergies: Negative for polydipsia.       Objective:   Body mass index is 29.48 kg/m².  Vitals:    11/26/18 1645   BP: (!) 132/92   Pulse:    Temp:            Physical Exam   Constitutional: She is oriented to person, place, and time. She appears well-developed and  well-nourished. No distress.   HENT:   Head: Normocephalic and atraumatic.   Eyes: Conjunctivae and EOM are normal. Pupils are equal, round, and reactive to light.   Cardiovascular: Normal rate, regular rhythm, normal heart sounds and intact distal pulses.   No murmur heard.  Pulmonary/Chest: Effort normal and breath sounds normal. No respiratory distress. She has no wheezes. She has no rales. She exhibits no tenderness.   Abdominal: Soft. Bowel sounds are normal. She exhibits no distension. There is no tenderness.   Musculoskeletal: She exhibits no edema.   Neurological: She is alert and oriented to person, place, and time.   Skin: Skin is warm and dry.   Psychiatric: She has a normal mood and affect.   Nursing note and vitals reviewed.    EKG similar to previous EKG in January   See report     Assessment and Plan      Migraine without aura and without status migrainosus, not intractable   --given heart rate (60 and lower on EKG) will not increase propranolol or continue at this time    --will try verapamil daily and prn Fioricet    --if this regimen does not work, will consider sending to neurology    --continue to decrease goody powder use     Chest discomfort   --will refer to cardiology, would like to rule out cardiac cause given the length of symptoms/tightness in chest, EKG findings, and distress it is causing patient  -     IN OFFICE EKG 12-LEAD (to [place])  -     Ambulatory referral to Cardiology    Fatigue, unspecified type  -     TSH; Future; Expected date: 11/26/2018    Other orders  -     butalbital-acetaminophen-caffeine -40 mg (FIORICET, ESGIC) -40 mg per tablet; Take 1 tablet by mouth every 4 (four) hours as needed for Pain.  Dispense: 30 tablet; Refill: 0        No Follow-up on file.      Answers for HPI/ROS submitted by the patient on 11/26/2018   activity change: No  unexpected weight change: No  rhinorrhea: No  trouble swallowing: No  visual disturbance: No  chest tightness:  Yes  polyuria: No  difficulty urinating: No  menstrual problem: No  joint swelling: No  arthralgias: No  confusion: No  dysphoric mood: Yes

## 2018-11-27 ENCOUNTER — TELEPHONE (OUTPATIENT)
Dept: FAMILY MEDICINE | Facility: CLINIC | Age: 43
End: 2018-11-27

## 2018-11-27 DIAGNOSIS — E03.9 HYPOTHYROIDISM, UNSPECIFIED TYPE: Primary | ICD-10-CM

## 2018-11-27 RX ORDER — LEVOTHYROXINE SODIUM 112 UG/1
112 TABLET ORAL DAILY
Qty: 30 TABLET | Refills: 11 | Status: SHIPPED | OUTPATIENT
Start: 2018-11-27 | End: 2018-12-28

## 2018-11-27 NOTE — TELEPHONE ENCOUNTER
----- Message from Christina Moss sent at 11/27/2018  9:12 AM CST -----  Contact: pt  She's calling in regards to missed call pls call pt back at [phone] (home)

## 2018-12-03 ENCOUNTER — PATIENT MESSAGE (OUTPATIENT)
Dept: FAMILY MEDICINE | Facility: CLINIC | Age: 43
End: 2018-12-03

## 2018-12-07 ENCOUNTER — CLINICAL SUPPORT (OUTPATIENT)
Dept: CARDIOLOGY | Facility: CLINIC | Age: 43
End: 2018-12-07
Payer: COMMERCIAL

## 2018-12-07 DIAGNOSIS — R07.9 CHEST PAIN: ICD-10-CM

## 2018-12-07 PROCEDURE — 93000 ELECTROCARDIOGRAM COMPLETE: CPT | Mod: S$GLB,,, | Performed by: INTERNAL MEDICINE

## 2018-12-07 RX ORDER — BUTALBITAL, ACETAMINOPHEN AND CAFFEINE 50; 325; 40 MG/1; MG/1; MG/1
1 TABLET ORAL EVERY 4 HOURS PRN
Qty: 30 TABLET | Refills: 0 | Status: SHIPPED | OUTPATIENT
Start: 2018-12-07 | End: 2019-01-06

## 2018-12-10 DIAGNOSIS — R07.9 CHEST PAIN: Primary | ICD-10-CM

## 2018-12-13 ENCOUNTER — PATIENT MESSAGE (OUTPATIENT)
Dept: FAMILY MEDICINE | Facility: CLINIC | Age: 43
End: 2018-12-13

## 2018-12-20 ENCOUNTER — PATIENT MESSAGE (OUTPATIENT)
Dept: FAMILY MEDICINE | Facility: CLINIC | Age: 43
End: 2018-12-20

## 2018-12-21 ENCOUNTER — OFFICE VISIT (OUTPATIENT)
Dept: FAMILY MEDICINE | Facility: CLINIC | Age: 43
End: 2018-12-21
Payer: COMMERCIAL

## 2018-12-21 VITALS
HEIGHT: 64 IN | BODY MASS INDEX: 28.86 KG/M2 | WEIGHT: 169.06 LBS | SYSTOLIC BLOOD PRESSURE: 124 MMHG | TEMPERATURE: 98 F | DIASTOLIC BLOOD PRESSURE: 91 MMHG | OXYGEN SATURATION: 98 % | HEART RATE: 73 BPM

## 2018-12-21 DIAGNOSIS — E03.8 SUBCLINICAL HYPOTHYROIDISM: ICD-10-CM

## 2018-12-21 DIAGNOSIS — E04.9 GOITER: Primary | ICD-10-CM

## 2018-12-21 DIAGNOSIS — R13.10 DYSPHAGIA, UNSPECIFIED TYPE: ICD-10-CM

## 2018-12-21 PROCEDURE — 99213 OFFICE O/P EST LOW 20 MIN: CPT | Mod: S$GLB,,, | Performed by: FAMILY MEDICINE

## 2018-12-21 PROCEDURE — 99999 PR PBB SHADOW E&M-EST. PATIENT-LVL IV: CPT | Mod: PBBFAC,,, | Performed by: FAMILY MEDICINE

## 2018-12-22 NOTE — PROGRESS NOTES
Subjective:     Patient ID: Florecita Noel is a 43 y.o. female.    Chief Complaint: Neck Pain (swollen neck )    Neck Pain    This is a new problem. The current episode started 1 to 4 weeks ago. The problem occurs daily. The problem has been gradually worsening. The pain is associated with an unknown factor. The pain is present in the anterior neck. The quality of the pain is described as aching. The symptoms are aggravated by swallowing. Associated symptoms include headaches. Pertinent negatives include no chest pain, fever or weakness.        Patient here for enlarged neck  --started getting bigger 3 weeks ago  --thought she may have been over reacting to her neck size until a few other people started to notice  --her last TSH was 22, low nml T4, she was started on 112 mcg of levothyroxine at that time   --denies any hx of thyroid disease or cancer in her family  --does note associated difficulty swallowing at times   --no other symptoms     Past Medical History:   Diagnosis Date    Abnormal Pap smear 1996    Anemia     Hypokalemia     in pregnancy     Past Surgical History:   Procedure Laterality Date    CHOLECYSTECTOMY      COSMETIC SURGERY      tummy antonino    GALLBLADDER SURGERY      OCCLUSION, FALLOPIAN TUBE, LAPAROSCOPIC, USING DEVICE Bilateral 2/25/2014    [facility]    TUBAL LIGATION      José Miguel Davila       Family History   Problem Relation Age of Onset    Cancer Father      Social History     Socioeconomic History    Marital status:      Spouse name: Not on file    Number of children: Not on file    Years of education: Not on file    Highest education level: Not on file   Social Needs    Financial resource strain: Not on file    Food insecurity - worry: Not on file    Food insecurity - inability: Not on file    Transportation needs - medical: Not on file    Transportation needs - non-medical: Not on file   Occupational History    Not on file    Tobacco Use    Smoking status: Never Smoker    Smokeless tobacco: Never Used   Substance and Sexual Activity    Alcohol use: Yes     Alcohol/week: 0.0 oz     Frequency: 2-3 times a week     Comment: socially     Drug use: No    Sexual activity: Yes     Partners: Male   Other Topics Concern    Not on file   Social History Narrative    Not on file     Health Maintenance Topics with due status: Not Due       Topic Last Completion Date    TETANUS VACCINE 12/13/2013    Mammogram 10/30/2017        Medication List           Accurate as of 12/21/18  6:47 PM. If you have any questions, ask your nurse or doctor.               CONTINUE taking these medications    butalbital-acetaminophen-caffeine -40 mg -40 mg per tablet  Commonly known as:  FIORICET, ESGIC  Take 1 tablet by mouth every 4 (four) hours as needed for Pain.     cetirizine 10 mg chewable tablet  Take 10 mg by mouth once daily.     fluticasone 50 mcg/actuation nasal spray  Commonly known as:  FLONASE  2 sprays by Each Nare route once daily.     levothyroxine 112 MCG tablet  Commonly known as:  SYNTHROID  Take 1 tablet (112 mcg total) by mouth once daily. Take on an empty stomach with water 1 hour before breakfast     lisinopril 20 MG tablet  Commonly known as:  PRINIVIL,ZESTRIL  Take 1 tablet (20 mg total) by mouth once daily.     multivitamin per tablet  Commonly known as:  THERAGRAN     ranitidine 150 MG tablet  Commonly known as:  ZANTAC  Take 1 tablet (150 mg total) by mouth 2 (two) times daily.     sertraline 25 MG tablet  Commonly known as:  ZOLOFT  Take 1 tablet (25 mg total) by mouth once daily.     sumatriptan 25 MG Tab  Commonly known as:  IMITREX  Take 1 tablet (25 mg total) by mouth every 2 (two) hours as needed (for migraine). Do not take more than 3-4 pills per day     traZODone 50 MG tablet  Commonly known as:  DESYREL  Take 1 tablet (50 mg total) by mouth every evening.     verapamil 120 MG tablet  Commonly known as:   CALAN  Take 1 tablet (120 mg total) by mouth 3 (three) times daily.          Review of patient's allergies indicates:   Allergen Reactions    Sumatriptan Other (See Comments)     Chest tightness that moved into her throat     Review of Systems   Constitutional: Negative for chills and fever.   HENT: Negative for hearing loss.    Eyes: Negative for discharge.   Respiratory: Negative for wheezing.    Cardiovascular: Negative for chest pain and palpitations.   Gastrointestinal: Negative for constipation, diarrhea and vomiting.   Genitourinary: Negative for dysuria and hematuria.   Musculoskeletal: Positive for neck pain.   Neurological: Positive for headaches. Negative for weakness.   Endo/Heme/Allergies: Negative for polydipsia.       Objective:   Body mass index is 29.02 kg/m².  Vitals:    12/21/18 1636   BP: (!) 124/91   Pulse:    Temp:            Physical Exam   Constitutional: She is oriented to person, place, and time. She appears well-developed and well-nourished. No distress.   HENT:   Head: Normocephalic and atraumatic.   Nose: Nose normal.   Mouth/Throat: Oropharynx is clear and moist.   Eyes: Conjunctivae are normal.   Neck: Normal range of motion. Carotid bruit is not present. Thyromegaly present. No thyroid mass present.       Cardiovascular: Normal rate, regular rhythm and normal heart sounds.   No murmur heard.  Pulmonary/Chest: Effort normal and breath sounds normal. No respiratory distress. She has no wheezes.   Abdominal: Soft. Bowel sounds are normal. There is no tenderness.   Lymphadenopathy:     She has no cervical adenopathy.   Neurological: She is alert and oriented to person, place, and time.   Skin: Skin is warm and dry.   Psychiatric: She has a normal mood and affect.         Assessment and Plan      Goiter  -     US Soft Tissue Head Neck Thyroid; Future; Expected date: 12/21/2018  -     Ambulatory referral to Endocrinology    Dysphagia, unspecified type  -     US Soft Tissue Head Neck  Thyroid; Future; Expected date: 12/21/2018    Subclinical hypothyroidism  -     Ambulatory referral to Endocrinology    US scheduled next week   Will recheck TSH   C/w with current therapy for now     No Follow-up on file.      Answers for HPI/ROS submitted by the patient on 12/21/2018   activity change: No  unexpected weight change: No  rhinorrhea: No  trouble swallowing: No  visual disturbance: No  polyuria: No  difficulty urinating: No  menstrual problem: No  joint swelling: No  arthralgias: No  confusion: No  dysphoric mood: No

## 2018-12-26 ENCOUNTER — HOSPITAL ENCOUNTER (OUTPATIENT)
Dept: RADIOLOGY | Facility: HOSPITAL | Age: 43
Discharge: HOME | End: 2018-12-26
Attending: FAMILY MEDICINE
Payer: COMMERCIAL

## 2018-12-26 DIAGNOSIS — R13.10 DYSPHAGIA, UNSPECIFIED TYPE: ICD-10-CM

## 2018-12-26 DIAGNOSIS — E04.9 GOITER: ICD-10-CM

## 2018-12-26 PROCEDURE — 76536 US EXAM OF HEAD AND NECK: CPT | Mod: 26,,, | Performed by: RADIOLOGY

## 2018-12-26 PROCEDURE — 76536 US EXAM OF HEAD AND NECK: CPT | Mod: TC,PO

## 2018-12-27 ENCOUNTER — PATIENT MESSAGE (OUTPATIENT)
Dept: FAMILY MEDICINE | Facility: CLINIC | Age: 43
End: 2018-12-27

## 2018-12-27 DIAGNOSIS — R79.89 ELEVATED TSH: Primary | ICD-10-CM

## 2018-12-28 ENCOUNTER — LAB VISIT (OUTPATIENT)
Dept: LAB | Facility: HOSPITAL | Age: 43
End: 2018-12-28
Attending: FAMILY MEDICINE
Payer: COMMERCIAL

## 2018-12-28 ENCOUNTER — OFFICE VISIT (OUTPATIENT)
Dept: ENDOCRINOLOGY | Facility: CLINIC | Age: 43
End: 2018-12-28
Payer: COMMERCIAL

## 2018-12-28 VITALS
HEART RATE: 59 BPM | HEIGHT: 64 IN | WEIGHT: 168.44 LBS | DIASTOLIC BLOOD PRESSURE: 90 MMHG | SYSTOLIC BLOOD PRESSURE: 132 MMHG | BODY MASS INDEX: 28.76 KG/M2 | TEMPERATURE: 99 F

## 2018-12-28 DIAGNOSIS — R79.89 ELEVATED TSH: ICD-10-CM

## 2018-12-28 DIAGNOSIS — E04.9 ENLARGED THYROID: ICD-10-CM

## 2018-12-28 DIAGNOSIS — E03.9 HYPOTHYROIDISM (ACQUIRED): Primary | ICD-10-CM

## 2018-12-28 LAB
T4 FREE SERPL-MCNC: 1.37 NG/DL
TSH SERPL DL<=0.005 MIU/L-ACNC: 0.19 UIU/ML

## 2018-12-28 PROCEDURE — 99204 OFFICE O/P NEW MOD 45 MIN: CPT | Mod: S$GLB,,, | Performed by: INTERNAL MEDICINE

## 2018-12-28 PROCEDURE — 84443 ASSAY THYROID STIM HORMONE: CPT

## 2018-12-28 PROCEDURE — 99999 PR PBB SHADOW E&M-EST. PATIENT-LVL III: CPT | Mod: PBBFAC,,, | Performed by: INTERNAL MEDICINE

## 2018-12-28 PROCEDURE — 36415 COLL VENOUS BLD VENIPUNCTURE: CPT | Mod: PO

## 2018-12-28 PROCEDURE — 84439 ASSAY OF FREE THYROXINE: CPT

## 2018-12-28 RX ORDER — LEVOTHYROXINE SODIUM 50 UG/1
50 TABLET ORAL DAILY
Qty: 30 TABLET | Refills: 2 | Status: SHIPPED | OUTPATIENT
Start: 2018-12-28 | End: 2019-04-11

## 2018-12-28 NOTE — PROGRESS NOTES
Referring Provider:  Alesha Alonso MD  PCP:  Alesha Alonso MD    Reason for referral:   Goiter  Subclinical hypothyroidism    CC:  Thyroid problem     HPI:  Florecita GUPTA Serf 43 y.o. female  Patient was tired last month so she visited Dr. Alonso who ordered blood test.  The test results showed  TSH of 22 so patient was prescribed levothyroxine 112 mcg tablet daily.  Patient has been taking the pill daily at 5:00 a.m..  She is still tired and not feeling much better.  Thyroid ultrasound showed heterogeneous thyroid tissue with no specific nodule  There was enlargement of the thyroid gland.  Patient has been feeling some swelling in her neck, and feeling sore  In her neck only if she yawns or when laying down.  There is no history of thyroid disorder or thyroid surgery in the family.  Patient has been feeling warm in the last 2 weeks same time when other people feel cold.  No complaints of sweating,  Chest pain, palpitations, nausea, vomiting, abdominal pain, or edema. Patient was started on blood pressure medication recently.  There is no history of diabetes.  History of chronic headache.  Patient delivered her baby in 2014.    Past Medical History:   Diagnosis Date    Abnormal Pap smear 1996    Anemia     Hypokalemia     in pregnancy       Past Surgical History:   Procedure Laterality Date    CHOLECYSTECTOMY      COSMETIC SURGERY      tummy tuck    GALLBLADDER SURGERY      OCCLUSION, FALLOPIAN TUBE, LAPAROSCOPIC, USING DEVICE Bilateral 2/25/2014    [facility]    TUBAL LIGATION      Tummy Tuck         Social History     Socioeconomic History    Marital status:      Spouse name: Not on file    Number of children: Not on file    Years of education: Not on file    Highest education level: Not on file   Social Needs    Financial resource strain: Not on file    Food insecurity - worry: Not on file    Food insecurity - inability: Not on file     Transportation needs - medical: Not on file    Transportation needs - non-medical: Not on file   Occupational History    Not on file   Tobacco Use    Smoking status: Never Smoker    Smokeless tobacco: Never Used   Substance and Sexual Activity    Alcohol use: Yes     Alcohol/week: 0.0 oz     Frequency: 2-3 times a week     Comment: socially     Drug use: No    Sexual activity: Yes     Partners: Male   Other Topics Concern    Not on file   Social History Narrative    Not on file         ROS:   + thyroid disorder  Cold intolerance history  Some heat intolerance in the last 2 weeks  No dysphagia  No Diabetes  No pain or itching in eyes  No chest pain or shortness of breath  No abdominal pain  No kidney problem  No rash  No pain or numbness in feet  ROS otherwise neg except for what is mentioned in the PMH, PSH and HPI    PE:  Vitals:    12/28/18 0944   BP: (!) 132/90   Pulse: (!) 59   Temp: 98.9 °F (37.2 °C)     Alert and oriented  No acute distress  No redness or flushing in face  No acne  No Proptosis or conjunctivitis  Nose normal  No rash on tongue  No goitre by inspection  Thyroid gland is palpable  No tenderness at the thyroid gland  No thyroid bruit  No cervical lymphadenopathy  Heart reg, no gallop  Lungs cta, no wheezing  Abd soft, no tnd  No edema in lower legs  No rash  No bruises  No tremor  Behavior normal  Speech normal  Body mass index is 28.91 kg/m².      Lab:    COMPARISON:  None.    FINDINGS:  Isthmus: 0.76 cm    Right lobe: 6.2 x 2.4 x 2.1 cm    Left lobe: 5.3 x 2.2 x 2.5 cm    Total thyroid volume: 29 cc    Echotexture: Diffusely heterogeneous without increased vascularity.    Nodules:   No concerning thyroid nodule currently.      Impression       Enlarged heterogeneous thyroid without increased vascularity.         Lab Results   Component Value Date    TSH 22.932 (H) 11/26/2018    FREET4 0.79 11/26/2018     Results for SERFLEV CANDY (MRN [id number]) as of 12/28/2018 10:10   Ref. Range  9/14/2012 13:37 12/13/2013 11:03 1/27/2017 10:00 11/26/2018 17:05   TSH Latest Ref Range: 0.400 - 4.000 uIU/mL 0.226 (L) 1.146 1.146 22.932 (H)     Results for SERFLEV (MRN [id number]) as of 12/28/2018 10:10   Ref. Range 9/14/2012 13:37 12/13/2013 11:03 11/26/2018 17:05   Free T4 Latest Ref Range: 0.71 - 1.51 ng/dL 1.48 1.04 0.79         No components found for: AGBA1C  Lab Results   Component Value Date    CHOL 248 (H) 01/27/2017    TRIG 80 01/27/2017    HDL 75 01/27/2017    CHOLHDL 30.2 01/27/2017    TOTALCHOLEST 3.3 01/27/2017    NONHDLCHOL 173 01/27/2017     BMP  Lab Results   Component Value Date     10/22/2018    K 5.3 (H) 10/22/2018     10/22/2018    CO2 20 (L) 10/22/2018    BUN 8 10/22/2018    CREATININE 0.7 10/22/2018    CALCIUM 8.7 10/22/2018    ANIONGAP 13 10/22/2018    ESTGFRAFRICA >60 10/22/2018    EGFRNONAA >60 10/22/2018     Lab Results   Component Value Date    CREATRANDUR 59.0 01/10/2014       A/P:  Hypothyroidism (acquired)  Autoimmune hypothyroidism secondary to Hashimoto is likely  TSH was 22 prior to starting on levothyroxine in November 2018  Levothyroxine dose will be changed to 50 mcg daily  I recommend that follow-up on thyroid function to be done again in about 1 month    -     THYROID PEROXIDASE ANTIBODY; Future; Expected date: 12/28/2018    Enlarged thyroid  Minimal compression symptoms if any.  It is discussed with the patient regarding compression symptoms, and that surgery may be indicated  If dysphagia develops or if choking sensation during sleep or worsening of symptoms occur.  For now patient will follow-up with her primary care physician .  Patient can be referred back to me on as-needed basis.    -     levothyroxine (SYNTHROID) 50 MCG tablet; Take 1 tablet (50 mcg total) by mouth once daily.  Dispense: 30 tablet; Refill: 2    Pt understands the plan and instructions.

## 2018-12-28 NOTE — LETTER
December 28, 2018      Alesha Alonso MD  [address] [place] [address]           [facility] - Endocrinology  [address]  [address]th [address]  [place] [address]  Phone: [phone]  Fax: [phone]          Patient: Florecita Noel   MR Number: [id number]   YOB: 1975   Date of Visit: 12/28/2018       Dear Dr. Alesha Alonso:    Thank you for referring Florecita Noel to me for evaluation. Here you will find relevant portions of my assessment and plan of care.      A/P:  Hypothyroidism (acquired)  Autoimmune hypothyroidism secondary to Hashimoto is likely  TSH was 22 prior to starting on levothyroxine in November 2018  Levothyroxine dose will be changed to 50 mcg daily  I recommend that follow-up on thyroid function to be done again in about 1 month    -     THYROID PEROXIDASE ANTIBODY; Future; Expected date: 12/28/2018    Enlarged thyroid  Minimal compression symptoms if any.  It is discussed with the patient regarding compression symptoms, and that surgery may be indicated  If dysphagia develops or if choking sensation during sleep or worsening of symptoms occur.  For now patient will follow-up with her primary care physician .  Patient can be referred back to me on as-needed basis.    -     levothyroxine (SYNTHROID) 50 MCG tablet; Take 1 tablet (50 mcg total) by mouth once daily.  Dispense: 30 tablet; Refill: 2    Pt understands the plan and instructions.       If you have questions, please do not hesitate to call me. I look forward to following Florecita Noel along with you.    Sincerely,    Robert Carrasquillo MD    Enclosure  CC:  No Recipients    If you would like to receive this communication electronically, please contact [email or web address] or [phone] to request more information on [employer] Link access.    For providers and/or their staff who would like to refer a patient to [facility], please contact us through our one-stop-shop provider referral line, Vasiliy Aguero, at  [phone].    If you feel you have received this communication in error or would no longer like to receive these types of communications, please e-mail [email or web address]

## 2019-01-03 ENCOUNTER — OFFICE VISIT (OUTPATIENT)
Dept: CARDIOLOGY | Facility: CLINIC | Age: 44
End: 2019-01-03
Payer: COMMERCIAL

## 2019-01-03 ENCOUNTER — PATIENT MESSAGE (OUTPATIENT)
Dept: ENDOCRINOLOGY | Facility: CLINIC | Age: 44
End: 2019-01-03

## 2019-01-03 VITALS
HEIGHT: 64 IN | DIASTOLIC BLOOD PRESSURE: 92 MMHG | HEART RATE: 70 BPM | BODY MASS INDEX: 28.79 KG/M2 | WEIGHT: 168.63 LBS | SYSTOLIC BLOOD PRESSURE: 134 MMHG

## 2019-01-03 DIAGNOSIS — R94.31 ABNORMAL EKG: ICD-10-CM

## 2019-01-03 DIAGNOSIS — E03.9 HYPOTHYROIDISM (ACQUIRED): ICD-10-CM

## 2019-01-03 DIAGNOSIS — G43.009 MIGRAINE WITHOUT AURA AND WITHOUT STATUS MIGRAINOSUS, NOT INTRACTABLE: ICD-10-CM

## 2019-01-03 DIAGNOSIS — E87.6 HYPOKALEMIA: ICD-10-CM

## 2019-01-03 DIAGNOSIS — I10 ESSENTIAL HYPERTENSION: Primary | ICD-10-CM

## 2019-01-03 DIAGNOSIS — E04.9 ENLARGED THYROID: ICD-10-CM

## 2019-01-03 DIAGNOSIS — R07.9 CHEST PAIN SYNDROME: ICD-10-CM

## 2019-01-03 PROCEDURE — 99999 PR PBB SHADOW E&M-EST. PATIENT-LVL III: ICD-10-PCS | Mod: PBBFAC,,, | Performed by: INTERNAL MEDICINE

## 2019-01-03 PROCEDURE — 99999 PR PBB SHADOW E&M-EST. PATIENT-LVL III: CPT | Mod: PBBFAC,,, | Performed by: INTERNAL MEDICINE

## 2019-01-03 PROCEDURE — 99204 PR OFFICE/OUTPT VISIT, NEW, LEVL IV, 45-59 MIN: ICD-10-PCS | Mod: S$GLB,,, | Performed by: INTERNAL MEDICINE

## 2019-01-03 PROCEDURE — 99204 OFFICE O/P NEW MOD 45 MIN: CPT | Mod: S$GLB,,, | Performed by: INTERNAL MEDICINE

## 2019-01-03 RX ORDER — LOSARTAN POTASSIUM 25 MG/1
25 TABLET ORAL DAILY
Qty: 30 TABLET | Refills: 6 | Status: SHIPPED | OUTPATIENT
Start: 2019-01-03 | End: 2020-07-08 | Stop reason: SDUPTHER

## 2019-01-03 NOTE — LETTER
January 3, 2019      Alesha Alonso MD  [address]  [facility] [address]           [facility]  [address] [address]  Phone: [phone]  Fax: [phone]          Patient: Florecita Noel   MR Number: [id number]   YOB: 1975   Date of Visit: 1/3/2019       Dear Dr. Alesha Alonso:    Thank you for referring Florecita Noel to me for evaluation. Attached you will find relevant portions of my assessment and plan of care.    If you have questions, please do not hesitate to call me. I look forward to following Florecita Noel along with you.    Sincerely,    Blair Singh MD    Enclosure  CC:  No Recipients    If you would like to receive this communication electronically, please contact [email or web address] or [phone] to request more information on [employer] Link access.    For providers and/or their staff who would like to refer a patient to [facility], please contact us through our one-stop-shop provider referral line, [facility], at [phone].    If you feel you have received this communication in error or would no longer like to receive these types of communications, please e-mail [email or web address]

## 2019-01-03 NOTE — PROGRESS NOTES
Subjective:   Patient ID:  Florecita Noel is a 43 y.o. female who presents for evaluation of Abnormal ECG (c/o intermittent chest tightness)      HPI  A 44 yo female with migraine enlarged thyroid had chest tightness and throat closing up after imitrex went to the er at [facility] and subsequently received therapy for allergic reaction . She has been told her ekg is abnormal has htn now on verapamil not treated. Her ekg shows lvh with repolarization abnormalities. Has  bhas non exertional chest pain has no shortness of breath she snores at nite.  She has no recollection od breathing cessation being mentioned to her . She is however having a lot of daytime sleepiness. And fatigue. Has had multiple episode of chest tightness at rest  W/o any associated constitutional symptoms. No radiation of the pain.she is compliant with salt. She drinks alcohol twice a week minimal caffeine intake.   Past Medical History:   Diagnosis Date    Abnormal Pap smear 1996    Anemia     Hypokalemia     in pregnancy    Hypothyroidism (acquired) 12/28/2018       Past Surgical History:   Procedure Laterality Date    CHOLECYSTECTOMY      COSMETIC SURGERY      tummy tuck    GALLBLADDER SURGERY      OCCLUSION, FALLOPIAN TUBE, LAPAROSCOPIC, USING DEVICE Bilateral 2/25/2014    [facility] OR    TUBAL LIGATION      Tummy Tuck         Social History     Tobacco Use    Smoking status: Never Smoker    Smokeless tobacco: Never Used   Substance Use Topics    Alcohol use: Yes     Alcohol/week: 0.0 oz     Frequency: 2-3 times a week     Comment: socially     Drug use: No       Family History   Problem Relation Age of Onset    Cancer Father     Heart disease Maternal Grandmother        Current Outpatient Medications   Medication Sig    butalbital-acetaminophen-caffeine -40 mg (FIORICET, ESGIC) -40 mg per tablet Take 1 tablet by mouth every 4 (four) hours as needed for Pain.    levothyroxine  (SYNTHROID) 50 MCG tablet Take 1 tablet (50 mcg total) by mouth once daily.    ranitidine (ZANTAC) 150 MG tablet Take 1 tablet (150 mg total) by mouth 2 (two) times daily.    verapamil (CALAN) 120 MG tablet Take 1 tablet (120 mg total) by mouth 3 (three) times daily.    losartan (COZAAR) 25 MG tablet Take 1 tablet (25 mg total) by mouth once daily.     No current facility-administered medications for this visit.      Current Outpatient Medications on File Prior to Visit   Medication Sig    butalbital-acetaminophen-caffeine -40 mg (FIORICET, ESGIC) -40 mg per tablet Take 1 tablet by mouth every 4 (four) hours as needed for Pain.    levothyroxine (SYNTHROID) 50 MCG tablet Take 1 tablet (50 mcg total) by mouth once daily.    ranitidine (ZANTAC) 150 MG tablet Take 1 tablet (150 mg total) by mouth 2 (two) times daily.    verapamil (CALAN) 120 MG tablet Take 1 tablet (120 mg total) by mouth 3 (three) times daily.    [DISCONTINUED] sumatriptan (IMITREX) 25 MG Tab Take 1 tablet (25 mg total) by mouth every 2 (two) hours as needed (for migraine). Do not take more than 3-4 pills per day     No current facility-administered medications on file prior to visit.        Review of patient's allergies indicates:   Allergen Reactions    Sumatriptan Other (See Comments)     Chest tightness that moved into her throat       Review of Systems   Constitution: Positive for weakness and malaise/fatigue. Negative for diaphoresis and weight gain.   HENT: Negative for hoarse voice.    Eyes: Negative for double vision and visual disturbance.   Cardiovascular: Positive for chest pain. Negative for claudication, cyanosis, dyspnea on exertion, irregular heartbeat, leg swelling, near-syncope, orthopnea, palpitations, paroxysmal nocturnal dyspnea and syncope.   Respiratory: Positive for snoring. Negative for cough, hemoptysis and shortness of breath.    Hematologic/Lymphatic: Negative for bleeding problem. Does not bruise/bleed  easily.   Skin: Negative for color change and poor wound healing.   Musculoskeletal: Negative for muscle cramps, muscle weakness and myalgias.   Gastrointestinal: Negative for bloating, abdominal pain, change in bowel habit, diarrhea, heartburn, hematemesis, hematochezia, melena and nausea.   Neurological: Positive for excessive daytime sleepiness. Negative for dizziness, headaches, light-headedness, loss of balance and numbness.   Psychiatric/Behavioral: Negative for memory loss. The patient does not have insomnia.    Allergic/Immunologic: Negative for hives.       Objective:   Physical Exam   Constitutional: She is oriented to person, place, and time. She appears well-developed and well-nourished. She does not appear ill. No distress.   HENT:   Head: Normocephalic and atraumatic.   Eyes: EOM are normal. Pupils are equal, round, and reactive to light. No scleral icterus.   Neck: Normal range of motion. Neck supple. Normal carotid pulses, no hepatojugular reflux and no JVD present. Carotid bruit is not present. No tracheal deviation present. Thyromegaly present.   Cardiovascular: Normal rate, regular rhythm, normal heart sounds and normal pulses. Exam reveals no gallop and no friction rub.   No murmur heard.  Pulmonary/Chest: Effort normal and breath sounds normal. No respiratory distress. She has no wheezes. She has no rhonchi. She has no rales. She exhibits no tenderness.   Abdominal: Soft. Normal appearance, normal aorta and bowel sounds are normal. She exhibits no abdominal bruit, no ascites and no pulsatile midline mass. There is no hepatomegaly. There is no tenderness.   Musculoskeletal: She exhibits no edema.        Right shoulder: She exhibits no deformity.   Neurological: She is alert and oriented to person, place, and time. She has normal strength. No cranial nerve deficit. Coordination normal.   Skin: Skin is warm and dry. No rash noted. No cyanosis or erythema. Nails show no clubbing.   Psychiatric: She  "has a normal mood and affect. Her speech is normal and behavior is normal.     Vitals:    01/03/19 1549   BP: (!) 134/92   Patient Position: Sitting   BP Method: Large (Manual)   Pulse: 70   Weight: 76.5 kg (168 lb 10.4 oz)   Height: 5' 4" (1.626 m)     Lab Results   Component Value Date    CHOL 248 (H) 01/27/2017     Lab Results   Component Value Date    HDL 75 01/27/2017     Lab Results   Component Value Date    LDLCALC 157.0 01/27/2017     Lab Results   Component Value Date    TRIG 80 01/27/2017     Lab Results   Component Value Date    CHOLHDL 30.2 01/27/2017       Chemistry        Component Value Date/Time     10/22/2018 0200    K 5.3 (H) 10/22/2018 0200     10/22/2018 0200    CO2 20 (L) 10/22/2018 0200    BUN 8 10/22/2018 0200    CREATININE 0.7 10/22/2018 0200    GLU 86 10/22/2018 0200        Component Value Date/Time    CALCIUM 8.7 10/22/2018 0200    ALKPHOS 45 (L) 10/22/2018 0200    AST 35 10/22/2018 0200    ALT 23 10/22/2018 0200    BILITOT 0.4 10/22/2018 0200    ESTGFRAFRICA >60 10/22/2018 0200    EGFRNONAA >60 10/22/2018 0200          Lab Results   Component Value Date    TSH 0.193 (L) 12/28/2018     No results found for: INR, PROTIME  Lab Results   Component Value Date    WBC 14.73 (H) 10/22/2018    HGB 13.8 10/22/2018    HCT 40.8 10/22/2018    MCV 83 10/22/2018     10/22/2018     BNP  @LABRCNTIP(BNP,BNPTRIAGEBLO)@  CrCl cannot be calculated (Patient's most recent lab result is older than the maximum 7 days allowed.).  Assessment:     1. Essential hypertension    2. Hypokalemia    3. Migraine without aura and without status migrainosus, not intractable    4. Enlarged thyroid    5. Hypothyroidism (acquired)    6. Chest pain syndrome    7. Abnormal EKG      Has abnormal ekg related to htn with repolarization and t wave inversion with atypical chest pain she will need better control of htn by adding arb as well as r/o sleep apnea and r/o any ischemic etiologies  Plan:   Echo   lexiscan "   Sleep consult   Add losartan 25 mg po daily.  Low salt diet   No more imitrex.

## 2019-01-06 ENCOUNTER — TELEPHONE (OUTPATIENT)
Dept: FAMILY MEDICINE | Facility: CLINIC | Age: 44
End: 2019-01-06

## 2019-01-07 ENCOUNTER — PATIENT MESSAGE (OUTPATIENT)
Dept: CARDIOLOGY | Facility: CLINIC | Age: 44
End: 2019-01-07

## 2019-01-24 ENCOUNTER — PATIENT MESSAGE (OUTPATIENT)
Dept: ADMINISTRATIVE | Facility: HOSPITAL | Age: 44
End: 2019-01-24

## 2019-02-04 ENCOUNTER — PATIENT MESSAGE (OUTPATIENT)
Dept: FAMILY MEDICINE | Facility: CLINIC | Age: 44
End: 2019-02-04

## 2019-02-04 ENCOUNTER — TELEPHONE (OUTPATIENT)
Dept: FAMILY MEDICINE | Facility: CLINIC | Age: 44
End: 2019-02-04

## 2019-02-04 RX ORDER — BUTALBITAL, ACETAMINOPHEN AND CAFFEINE 50; 325; 40 MG/1; MG/1; MG/1
1 TABLET ORAL EVERY 4 HOURS PRN
Qty: 30 TABLET | Refills: 2 | Status: SHIPPED | OUTPATIENT
Start: 2019-02-04 | End: 2019-03-06

## 2019-02-06 ENCOUNTER — OFFICE VISIT (OUTPATIENT)
Dept: OBSTETRICS AND GYNECOLOGY | Facility: CLINIC | Age: 44
End: 2019-02-06
Payer: COMMERCIAL

## 2019-02-06 VITALS
SYSTOLIC BLOOD PRESSURE: 132 MMHG | WEIGHT: 174.63 LBS | BODY MASS INDEX: 29.81 KG/M2 | HEIGHT: 64 IN | DIASTOLIC BLOOD PRESSURE: 92 MMHG

## 2019-02-06 DIAGNOSIS — Z00.00 PREVENTATIVE HEALTH CARE: ICD-10-CM

## 2019-02-06 DIAGNOSIS — Z01.419 PAP SMEAR, AS PART OF ROUTINE GYNECOLOGICAL EXAMINATION: Primary | ICD-10-CM

## 2019-02-06 DIAGNOSIS — R10.2 PELVIC PAIN IN FEMALE: ICD-10-CM

## 2019-02-06 PROCEDURE — 99999 PR PBB SHADOW E&M-EST. PATIENT-LVL III: CPT | Mod: PBBFAC,,, | Performed by: NURSE PRACTITIONER

## 2019-02-06 PROCEDURE — 99386 PREV VISIT NEW AGE 40-64: CPT | Mod: S$GLB,,, | Performed by: NURSE PRACTITIONER

## 2019-02-06 PROCEDURE — 99386 PR PREVENTIVE VISIT,NEW,40-64: ICD-10-PCS | Mod: S$GLB,,, | Performed by: NURSE PRACTITIONER

## 2019-02-06 PROCEDURE — 87624 HPV HI-RISK TYP POOLED RSLT: CPT

## 2019-02-06 PROCEDURE — 99999 PR PBB SHADOW E&M-EST. PATIENT-LVL III: ICD-10-PCS | Mod: PBBFAC,,, | Performed by: NURSE PRACTITIONER

## 2019-02-06 PROCEDURE — 88175 CYTOPATH C/V AUTO FLUID REDO: CPT

## 2019-02-06 RX ORDER — PROPRANOLOL HYDROCHLORIDE 40 MG/1
40 TABLET ORAL DAILY PRN
Refills: 4 | COMMUNITY
Start: 2019-01-05 | End: 2020-10-16

## 2019-02-07 ENCOUNTER — OFFICE VISIT (OUTPATIENT)
Dept: OBSTETRICS AND GYNECOLOGY | Facility: CLINIC | Age: 44
End: 2019-02-07
Payer: COMMERCIAL

## 2019-02-07 VITALS
WEIGHT: 176.56 LBS | BODY MASS INDEX: 30.31 KG/M2 | SYSTOLIC BLOOD PRESSURE: 126 MMHG | DIASTOLIC BLOOD PRESSURE: 84 MMHG

## 2019-02-07 DIAGNOSIS — N92.0 MENORRHAGIA WITH REGULAR CYCLE: ICD-10-CM

## 2019-02-07 PROCEDURE — 99999 PR PBB SHADOW E&M-EST. PATIENT-LVL III: CPT | Mod: PBBFAC,,, | Performed by: OBSTETRICS & GYNECOLOGY

## 2019-02-07 PROCEDURE — 99213 PR OFFICE/OUTPT VISIT, EST, LEVL III, 20-29 MIN: ICD-10-PCS | Mod: S$GLB,,, | Performed by: OBSTETRICS & GYNECOLOGY

## 2019-02-07 PROCEDURE — 99213 OFFICE O/P EST LOW 20 MIN: CPT | Mod: S$GLB,,, | Performed by: OBSTETRICS & GYNECOLOGY

## 2019-02-07 PROCEDURE — 99999 PR PBB SHADOW E&M-EST. PATIENT-LVL III: ICD-10-PCS | Mod: PBBFAC,,, | Performed by: OBSTETRICS & GYNECOLOGY

## 2019-02-07 NOTE — PROGRESS NOTES
Florecita Noel is a 43 y.o. female  presents today for follow up of menorrhagia.  She has regular menses every month that are very heavy, as well as crampy.  She has flooding and clots at times.  She reports having to change a super tampon every hour on heavy days.  Menses became heavy after her BTL 4 years ago, and have been gradually becoming worse.  She desires ablation.  She has had side effects from birth control pills in past.      Past Medical History:   Diagnosis Date    Abnormal Pap smear     Anemia     Hypertension     Hypokalemia     in pregnancy    Hypothyroidism (acquired) 2018     Past Surgical History:   Procedure Laterality Date    CHOLECYSTECTOMY      COSMETIC SURGERY      tummy tuck    GALLBLADDER SURGERY      OCCLUSION, FALLOPIAN TUBE, LAPAROSCOPIC, USING DEVICE Bilateral 2014    [facility]    [facility] LIGATION      [facility]       Family History   Problem Relation Age of Onset    Cancer Father     Heart disease Maternal Grandmother      Social History     Tobacco Use    Smoking status: Never Smoker    Smokeless tobacco: Never Used   Substance Use Topics    Alcohol use: Yes     Alcohol/week: 0.0 oz     Frequency: 2-3 times a week     Comment: socially     Drug use: No     OB History    Para Term  AB Living   7 6 6   1 6   SAB TAB Ectopic Multiple Live Births           7      # Outcome Date GA Lbr Jarred/2nd Weight Sex Delivery Anes PTL Lv   7 Term 14 37w1d / 00:08  M Vag-Spont None Y MADDISON   6 Term 12 38w0d  3.005 kg (6 lb 10 oz) F Vag-Spont EPI N MADDISON   5 Term 05 40w0d  3.175 kg (7 lb) F Vag-Spont None N MADDISON   4 Term 98   3.005 kg (6 lb 10 oz) M Vag-Spont EPI N MADDISON   3 Term 96 40w0d  3.005 kg (6 lb 10 oz) M Vag-Spont EPI N MADDISON   2 AB 1996        DEC   1 Term 92 40w0d  3.856 kg (8 lb 8 oz) M Vag-Spont EPI N MADDISON          /84   Wt 80.1 kg (176 lb 9.4 oz)   LMP 2019    Breastfeeding? No   BMI 30.31 kg/m²     ROS:  GENERAL: No fever, chills, fatigability or weight loss.  VULVAR: No pain, no lesions and no itching.  VAGINAL: No relaxation, no itching, no discharge, no abnormal discharge and no lesions.  ABDOMEN: No abdominal pain. Denies nausea. Denies vomiting. No diarrhea. No constipation  BREAST: Denies pain. No lumps. No discharge.  URINARY: No incontinence, no nocturia, no frequency and no dysuria.      PE: deferred as it was done yesterday by Yue Gary NP    Pelvic u/s pending.    ASSESSMENT:    1. Menorrhagia with regular cycle             PLAN  Patient counseled regarding treatment options for menorrhagia.  She would like to proceed with scheduling Novasure ablation.    Patient counseled regarding procedure, risks, and benefits.  Total visit time 15 minutes, with greater than half of that spent face to face counseling patient.

## 2019-02-07 NOTE — PROGRESS NOTES
CC: Well woman exam    Florecita Noel is a 43 y.o. female  presents for well woman exam.  LMP: Patient's last menstrual period was 2019..  S/P BTL> Is sexually active. Reports cycles are every 26-28 days, heavy and painful. Last pap exam was normal. Last mammogram was normal.    Past Medical History:   Diagnosis Date    Abnormal Pap smear     Anemia     Hypertension     Hypokalemia     in pregnancy    Hypothyroidism (acquired) 2018     Past Surgical History:   Procedure Laterality Date    CHOLECYSTECTOMY      COSMETIC SURGERY      tummy tu    GALLBLADDER SURGERY      OCCLUSION, FALLOPIAN TUBE, LAPAROSCOPIC, USING DEVICE Bilateral 2014    Performed by [facility]ila [facility] at [facility] OR    TUBAL LIGATION      [facility]       Social History     Socioeconomic History    Marital status:      Spouse name: Not on file    Number of children: Not on file    Years of education: Not on file    Highest education level: Not on file   Social Needs    Financial resource strain: Not on file    Food insecurity - worry: Not on file    Food insecurity - inability: Not on file    Transportation needs - medical: Not on file    Transportation needs - non-medical: Not on file   Occupational History    Not on file   Tobacco Use    Smoking status: Never Smoker    Smokeless tobacco: Never Used   Substance and Sexual Activity    Alcohol use: Yes     Alcohol/week: 0.0 oz     Frequency: 2-3 times a week     Comment: socially     Drug use: No    Sexual activity: Yes     Partners: Male   Other Topics Concern    Not on file   Social History Narrative    Not on file     Family History   Problem Relation Age of Onset    Cancer Father     Heart disease Maternal Grandmother      OB History      Para Term  AB Living    7 6 6   1 6    SAB TAB Ectopic Multiple Live Births            7          BP (!) 132/92 (BP Location: Right arm, Patient Position: Sitting, BP Method:  "Medium (Manual))   Ht 5' 4" (1.626 m)   Wt 79.2 kg (174 lb 9.7 oz)   LMP 01/28/2019   BMI 29.97 kg/m²       ROS:  GENERAL: Denies weight gain or weight loss. Feeling well overall.   SKIN: Denies rash or lesions.   HEAD: Denies head injury or headache.   NODES: Denies enlarged lymph nodes.   CHEST: Denies chest pain or shortness of breath.   CARDIOVASCULAR: Denies palpitations or left sided chest pain.   ABDOMEN: No abdominal pain, constipation, diarrhea, nausea, vomiting or rectal bleeding.   URINARY: No frequency, dysuria, hematuria, or burning on urination.  REPRODUCTIVE: See HPI.   BREASTS: The patient performs breast self-examination and denies pain, lumps, or nipple discharge.   HEMATOLOGIC: No easy bruisability or excessive bleeding.   MUSCULOSKELETAL: Denies joint pain or swelling.   NEUROLOGIC: Denies syncope or weakness.   PSYCHIATRIC: Denies depression, anxiety or mood swings.    PHYSICAL EXAM:  APPEARANCE: Well nourished, well developed, in no acute distress.  AFFECT: WNL, alert and oriented x 3  SKIN: No acne or hirsutism  NECK: Neck symmetric without masses or thyromegaly  NODES: No inguinal, cervical, axillary, or femoral lymph node enlargement  CHEST: Good respiratory effect  ABDOMEN: Soft.  No tenderness or masses.  No hepatosplenomegaly.  No hernias.  BREASTS: Symmetrical, no skin changes or visible lesions.  No palpable masses, nipple discharge bilaterally.  PELVIC: Normal external genitalia without lesions.  Normal hair distribution.  Adequate perineal body, normal urethral meatus.  Vagina moist and well rugated without lesions or discharge.  Cervix pink, without lesions, discharge or tenderness.  No significant cystocele or rectocele.  Bimanual exam shows uterus to be normal size, regular, mobile and nontender.  Adnexa without masses or tenderness.    EXTREMITIES: No edema.    1. Pap smear, as part of routine gynecological examination  Liquid-based pap smear, screening    HPV High Risk " Genotypes, PCR   2. Preventative health care  Mammo Digital Screening Bilat    Liquid-based pap smear, screening    HPV High Risk Genotypes, PCR   3. Pelvic pain in female  US Pelvis Complete Non OB    Liquid-based pap smear, screening    HPV High Risk Genotypes, PCR    PLAN:  1. Pap exam  2. Mammogram  3. Information concerning Ablation  4. Referral for possible Ablation         Patient was counseled today on A.C.S. Pap guidelines and recommendations for yearly pelvic exams, mammograms and monthly self breast exams; to see her PCP for other health maintenance.

## 2019-02-08 ENCOUNTER — TELEPHONE (OUTPATIENT)
Dept: OBSTETRICS AND GYNECOLOGY | Facility: CLINIC | Age: 44
End: 2019-02-08

## 2019-02-08 DIAGNOSIS — N92.0 MENORRHAGIA WITH REGULAR CYCLE: Primary | ICD-10-CM

## 2019-02-11 ENCOUNTER — PATIENT MESSAGE (OUTPATIENT)
Dept: OBSTETRICS AND GYNECOLOGY | Facility: CLINIC | Age: 44
End: 2019-02-11

## 2019-02-12 LAB
HPV HR 12 DNA CVX QL NAA+PROBE: NEGATIVE
HPV16 AG SPEC QL: NEGATIVE
HPV18 DNA SPEC QL NAA+PROBE: NEGATIVE

## 2019-02-13 ENCOUNTER — PATIENT MESSAGE (OUTPATIENT)
Dept: OBSTETRICS AND GYNECOLOGY | Facility: CLINIC | Age: 44
End: 2019-02-13

## 2019-02-13 RX ORDER — METRONIDAZOLE 7.5 MG/G
1 GEL VAGINAL NIGHTLY
Qty: 5 APPLICATOR | Refills: 0 | Status: SHIPPED | OUTPATIENT
Start: 2019-02-13 | End: 2019-02-18

## 2019-02-14 ENCOUNTER — TELEPHONE (OUTPATIENT)
Dept: OBSTETRICS AND GYNECOLOGY | Facility: CLINIC | Age: 44
End: 2019-02-14

## 2019-02-14 NOTE — TELEPHONE ENCOUNTER
----- Message from Brittany Penny sent at 2/14/2019 10:40 AM CST -----  Contact: [employer] request  Message     ----- Message from [employer], System Message sent at 2/13/2019 11:12 AM CST -----    Appointment Request From: Florecita Noel    With Provider: ultrasound    Preferred Date Range: 2/15/2019 - 2/19/2019    Preferred Times: Any time    Reason for visit: pelvic ultrasound    Comments:  The original appointment was cancelled on February 12th. I requested that it be rescheduled but have not heard from anyone yet.

## 2019-02-14 NOTE — TELEPHONE ENCOUNTER
Spoke with pt. Scheduled pelvic ultrasound for 2/18/19 at [facility] [place]. Pt verbalized understanding.

## 2019-02-15 ENCOUNTER — TELEPHONE (OUTPATIENT)
Dept: RADIOLOGY | Facility: HOSPITAL | Age: 44
End: 2019-02-15

## 2019-02-18 ENCOUNTER — HOSPITAL ENCOUNTER (OUTPATIENT)
Dept: RADIOLOGY | Facility: HOSPITAL | Age: 44
Discharge: HOME | End: 2019-02-18
Attending: NURSE PRACTITIONER
Payer: COMMERCIAL

## 2019-02-18 DIAGNOSIS — R10.2 PELVIC PAIN IN FEMALE: ICD-10-CM

## 2019-02-18 PROCEDURE — 76830 US PELVIS COMP WITH TRANSVAG NON-OB (XPD): ICD-10-PCS | Mod: 26,,, | Performed by: RADIOLOGY

## 2019-02-18 PROCEDURE — 76830 TRANSVAGINAL US NON-OB: CPT | Mod: 26,,, | Performed by: RADIOLOGY

## 2019-02-18 PROCEDURE — 76830 TRANSVAGINAL US NON-OB: CPT | Mod: TC

## 2019-02-18 PROCEDURE — 76856 US EXAM PELVIC COMPLETE: CPT | Mod: 26,,, | Performed by: RADIOLOGY

## 2019-02-18 PROCEDURE — 76856 US PELVIS COMP WITH TRANSVAG NON-OB (XPD): ICD-10-PCS | Mod: 26,,, | Performed by: RADIOLOGY

## 2019-02-21 ENCOUNTER — TELEPHONE (OUTPATIENT)
Dept: OBSTETRICS AND GYNECOLOGY | Facility: CLINIC | Age: 44
End: 2019-02-21

## 2019-02-21 DIAGNOSIS — N83.202 LEFT OVARIAN CYST: Primary | ICD-10-CM

## 2019-02-21 NOTE — TELEPHONE ENCOUNTER
Attempted to contact patient with pelvic ultrasound results, no answer.  Left patient a voicemail message to call the clinic back.      Pt has pre-op appts tomorrow.

## 2019-02-21 NOTE — TELEPHONE ENCOUNTER
Spoke with patient, patient has reviewed results of pelvic u/s on portal. Scheduled 12 week f/u @[facility] ER office. Patient voiced understanding.

## 2019-02-21 NOTE — TELEPHONE ENCOUNTER
----- Message from Yue Gray NP sent at 2/21/2019  8:21 AM CST -----  Needs repeat pelvic ultrasound in 12 weeks.

## 2019-02-22 ENCOUNTER — HOSPITAL ENCOUNTER (OUTPATIENT)
Dept: PREADMISSION TESTING | Facility: HOSPITAL | Age: 44
Discharge: HOME | End: 2019-02-22
Attending: OBSTETRICS & GYNECOLOGY
Payer: COMMERCIAL

## 2019-02-22 ENCOUNTER — OFFICE VISIT (OUTPATIENT)
Dept: OBSTETRICS AND GYNECOLOGY | Facility: CLINIC | Age: 44
End: 2019-02-22
Payer: COMMERCIAL

## 2019-02-22 VITALS
DIASTOLIC BLOOD PRESSURE: 94 MMHG | HEART RATE: 77 BPM | RESPIRATION RATE: 20 BRPM | WEIGHT: 173.5 LBS | SYSTOLIC BLOOD PRESSURE: 124 MMHG | DIASTOLIC BLOOD PRESSURE: 86 MMHG | WEIGHT: 165 LBS | TEMPERATURE: 98 F | BODY MASS INDEX: 28.17 KG/M2 | SYSTOLIC BLOOD PRESSURE: 138 MMHG | BODY MASS INDEX: 29.78 KG/M2 | HEIGHT: 64 IN

## 2019-02-22 DIAGNOSIS — N92.0 MENORRHAGIA WITH REGULAR CYCLE: Primary | ICD-10-CM

## 2019-02-22 DIAGNOSIS — N83.202 CYST OF LEFT OVARY: ICD-10-CM

## 2019-02-22 PROCEDURE — 99999 PR PBB SHADOW E&M-EST. PATIENT-LVL III: CPT | Mod: PBBFAC,,, | Performed by: OBSTETRICS & GYNECOLOGY

## 2019-02-22 PROCEDURE — 99999 PR PBB SHADOW E&M-EST. PATIENT-LVL III: ICD-10-PCS | Mod: PBBFAC,,, | Performed by: OBSTETRICS & GYNECOLOGY

## 2019-02-22 PROCEDURE — 99213 OFFICE O/P EST LOW 20 MIN: CPT | Mod: S$GLB,,, | Performed by: OBSTETRICS & GYNECOLOGY

## 2019-02-22 PROCEDURE — 99213 PR OFFICE/OUTPT VISIT, EST, LEVL III, 20-29 MIN: ICD-10-PCS | Mod: S$GLB,,, | Performed by: OBSTETRICS & GYNECOLOGY

## 2019-02-22 NOTE — DISCHARGE INSTRUCTIONS
To confirm, Your doctor has instructed you that surgery is scheduled for 02/26/19  at  3:00 p.m.       Please report to [facility], [address], 1st floor, main lobby by 1:30 p.m.  Pre admit office will call afternoon prior to surgery with final arrival time    INSTRUCTIONS IMPORTANT!!!   Do not eat, drink, or smoke after 12 midnight, may have water and apple juice until 3 hours. OK to brush teeth, no gum, candy or mints!    ¨ Take only these medicines with a small swallow of water-morning of surgery.  Levothyroxine, Ranitidine         Pre operative instructions:  Please review the Pre-Operative Instruction booklet that you were given.        Bathing Instructions--See page 6 in the Pre-operative booklet.      Prevention of surgical site infections:     -Keep incisions clean and dry.   -Do not soak/submerge incisions in water until completely healed.   -Do not apply lotions, powders, creams, or deodorants to site.   -Always make sure hands are cleaned with antibacterial soap/ alcohol-based                 prior to touching the surgical site.  (This includes doctors,                 nurses, staff, and yourself.)    Signs and symptoms:   -Redness and pain around the area where you had surgery   -Drainage of cloudy fluid from your surgical wound   -Fever over 100.4       I have read or had read and explained to me, and understand the above information.  Additional comments or instructions:  Received a copy of Pre-operative instructions booklet, FAQ surgical site infection sheet, and packets of hibiclens (if indicated).

## 2019-02-23 PROBLEM — N83.202 CYST OF LEFT OVARY: Status: ACTIVE | Noted: 2019-02-23

## 2019-02-24 NOTE — PROGRESS NOTES
Florecita Noel is a 43 y.o. female  presents today for follow up of recent pelvic u/s, pelvic pain, and preop appt.  She reports she has had LLQ pelvic pain for over 6 months, but gradually worsening.  U/S shows complex cyst on left ovary 1.7 cm.  10 cm uterus with 2 fibroids.  She has had prior BTL.  She is scheduled for Novasure ablation next week, but is interested in discussing laparoscopy at the same time.  Pain is present daily.      Pelvic u/s from 19:  FINDINGS:  Uterus:    Size: 10.7 x 5.0 x 6.2 cm    Masses: Two small fundal fibroids measuring 1.7 cm and 1.2 cm respectively.  Incidental small nabothian cyst in the cervix    Endometrium: Normal in this pre menopausal patient, measuring 6.6 mm.    Right ovary:    Size: 3.2 x 1.5 x 2.1 cm    Appearance: Normal    Vascular flow: Normal.    Left ovary:    Size: 3.5 x 2.1 x 2.3 cm    Appearance: Mixed echogenicity complex cystic lesion measuring 1.7 cm.    Vascular Flow: Normal.    Free Fluid:    None.            Past Medical History:   Diagnosis Date    Abnormal Pap smear     Anemia     Hypertension     Hypokalemia     in pregnancy    Hypothyroidism (acquired) 2018     Past Surgical History:   Procedure Laterality Date    CHOLECYSTECTOMY      COSMETIC SURGERY      tummy tuck    OCCLUSION, FALLOPIAN TUBE, LAPAROSCOPIC, USING DEVICE Bilateral 2014    Performed by [facility]ia [facility]ila [facility] at [facility] OR    TUBAL LIGATION      Tummy Tuck       Family History   Problem Relation Age of Onset    Cancer Father     Heart disease Maternal Grandmother      Social History     Tobacco Use    Smoking status: Never Smoker    Smokeless tobacco: Never Used   Substance Use Topics    Alcohol use: Yes     Alcohol/week: 0.0 oz     Frequency: 2-3 times a week     Comment: socially   No alcohol 72h prior to sx    Drug use: No     OB History    Para Term  AB Living   7 6 6   1 6   SAB TAB Ectopic Multiple Live Births           7       # Outcome Date GA Lbr Jarred/2nd Weight Sex Delivery Anes PTL Lv   7 Term 01/11/14 37w1d / 00:08  M Vag-Spont None Y MADDISON   6 Term 07/20/12 38w0d  3.005 kg (6 lb 10 oz) F Vag-Spont EPI N MADDISON   5 Term 04/22/05 40w0d  3.175 kg (7 lb) F Vag-Spont None N MADDISON   4 Term 07/05/98   3.005 kg (6 lb 10 oz) M Vag-Spont EPI N MADDISON   3 Term 02/22/96 40w0d  3.005 kg (6 lb 10 oz) M Vag-Spont EPI N MADDISON   2 AB 1996        DEC   1 Term 08/19/92 40w0d  3.856 kg (8 lb 8 oz) M Vag-Spont EPI N MADDISON          /86   Wt 78.7 kg (173 lb 8 oz)   LMP 02/21/2019 (Exact Date)   BMI 29.78 kg/m²     ROS:  GENERAL: No fever, chills, fatigability or weight loss.  VULVAR: No pain, no lesions and no itching.  VAGINAL: No relaxation, no itching, no discharge, no abnormal bleeding and no lesions.  ABDOMEN: Positive abdominal pain. Denies nausea. Denies vomiting. No diarrhea. No constipation  BREAST: Denies pain. No lumps. No discharge.  URINARY: No incontinence, no nocturia, no frequency and no dysuria.      PE:  deferred    ASSESSMENT:    1. Menorrhagia with regular cycle     2. Cyst of left ovary             PLAN  Discussed ultrasound findings with patient and treatment options discussed, including hysterectomy or Novasure ablation with laparoscopy.  She would like to proceed with Novasure with laparoscopy to address the ovarian cyst and pelvic pain.  Will add laparoscopy.    Procedure was explained to the patient and procedure specific consent form reviewed with the patient and signed by her.  All questions were answered to the patient's satisfaction.  Patient seems to understand the procedure, as well as risks and benefits associated with it.  Will plan to proceed with surgery as planned.    Total visit time 15 minutes, with greater than half of that spent face to face counseling patient.

## 2019-02-25 ENCOUNTER — ANESTHESIA EVENT (OUTPATIENT)
Dept: SURGERY | Facility: HOSPITAL | Age: 44
End: 2019-02-25
Payer: COMMERCIAL

## 2019-02-25 DIAGNOSIS — Z01.818 PRE-OP TESTING: Primary | ICD-10-CM

## 2019-02-26 ENCOUNTER — ANESTHESIA (OUTPATIENT)
Dept: SURGERY | Facility: HOSPITAL | Age: 44
End: 2019-02-26
Payer: COMMERCIAL

## 2019-02-26 ENCOUNTER — HOSPITAL ENCOUNTER (OUTPATIENT)
Facility: HOSPITAL | Age: 44
Discharge: HOME | End: 2019-02-26
Attending: OBSTETRICS & GYNECOLOGY | Admitting: OBSTETRICS & GYNECOLOGY
Payer: COMMERCIAL

## 2019-02-26 DIAGNOSIS — N83.202 CYST OF LEFT OVARY: Primary | ICD-10-CM

## 2019-02-26 DIAGNOSIS — N92.0 MENORRHAGIA WITH REGULAR CYCLE: ICD-10-CM

## 2019-02-26 PROBLEM — E04.9 ENLARGED THYROID: Status: RESOLVED | Noted: 2018-12-28 | Resolved: 2019-02-26

## 2019-02-26 PROCEDURE — 25000003 PHARM REV CODE 250: Performed by: ANESTHESIOLOGY

## 2019-02-26 PROCEDURE — 88305 TISSUE EXAM BY PATHOLOGIST: CPT | Mod: 26,,, | Performed by: PATHOLOGY

## 2019-02-26 PROCEDURE — 58662 LAPAROSCOPY EXCISE LESIONS: CPT | Mod: ,,, | Performed by: OBSTETRICS & GYNECOLOGY

## 2019-02-26 PROCEDURE — 36000709 HC OR TIME LEV III EA ADD 15 MIN: Performed by: OBSTETRICS & GYNECOLOGY

## 2019-02-26 PROCEDURE — 58563 HYSTEROSCOPY ABLATION: CPT | Mod: 51,,, | Performed by: OBSTETRICS & GYNECOLOGY

## 2019-02-26 PROCEDURE — 25000003 PHARM REV CODE 250: Performed by: NURSE ANESTHETIST, CERTIFIED REGISTERED

## 2019-02-26 PROCEDURE — 27201423 OPTIME MED/SURG SUP & DEVICES STERILE SUPPLY: Performed by: OBSTETRICS & GYNECOLOGY

## 2019-02-26 PROCEDURE — 88305 TISSUE SPECIMEN TO PATHOLOGY - SURGERY: ICD-10-PCS | Mod: 26,,, | Performed by: PATHOLOGY

## 2019-02-26 PROCEDURE — 63600175 PHARM REV CODE 636 W HCPCS: Performed by: NURSE ANESTHETIST, CERTIFIED REGISTERED

## 2019-02-26 PROCEDURE — 58662 PR LAP,FULGURATE/EXCISE LESIONS: ICD-10-PCS | Mod: ,,, | Performed by: OBSTETRICS & GYNECOLOGY

## 2019-02-26 PROCEDURE — 71000033 HC RECOVERY, INTIAL HOUR: Performed by: OBSTETRICS & GYNECOLOGY

## 2019-02-26 PROCEDURE — 25000003 PHARM REV CODE 250: Performed by: STUDENT IN AN ORGANIZED HEALTH CARE EDUCATION/TRAINING PROGRAM

## 2019-02-26 PROCEDURE — 71000015 HC POSTOP RECOV 1ST HR: Performed by: OBSTETRICS & GYNECOLOGY

## 2019-02-26 PROCEDURE — 36000708 HC OR TIME LEV III 1ST 15 MIN: Performed by: OBSTETRICS & GYNECOLOGY

## 2019-02-26 PROCEDURE — 63600175 PHARM REV CODE 636 W HCPCS: Performed by: STUDENT IN AN ORGANIZED HEALTH CARE EDUCATION/TRAINING PROGRAM

## 2019-02-26 PROCEDURE — 88305 TISSUE EXAM BY PATHOLOGIST: CPT | Performed by: PATHOLOGY

## 2019-02-26 PROCEDURE — 37000008 HC ANESTHESIA 1ST 15 MINUTES: Performed by: OBSTETRICS & GYNECOLOGY

## 2019-02-26 PROCEDURE — 37000009 HC ANESTHESIA EA ADD 15 MINS: Performed by: OBSTETRICS & GYNECOLOGY

## 2019-02-26 PROCEDURE — 58563 PR HYSTEROSCOPY,W/ENDOMETRIAL ABLATION: ICD-10-PCS | Mod: 51,,, | Performed by: OBSTETRICS & GYNECOLOGY

## 2019-02-26 RX ORDER — SODIUM CHLORIDE, SODIUM LACTATE, POTASSIUM CHLORIDE, CALCIUM CHLORIDE 600; 310; 30; 20 MG/100ML; MG/100ML; MG/100ML; MG/100ML
INJECTION, SOLUTION INTRAVENOUS CONTINUOUS
Status: DISCONTINUED | OUTPATIENT
Start: 2019-02-26 | End: 2019-02-26 | Stop reason: HOSPADM

## 2019-02-26 RX ORDER — IBUPROFEN 800 MG/1
800 TABLET ORAL ONCE
Status: COMPLETED | OUTPATIENT
Start: 2019-02-26 | End: 2019-02-26

## 2019-02-26 RX ORDER — ONDANSETRON 2 MG/ML
4 INJECTION INTRAMUSCULAR; INTRAVENOUS DAILY PRN
Status: DISCONTINUED | OUTPATIENT
Start: 2019-02-26 | End: 2019-02-26 | Stop reason: HOSPADM

## 2019-02-26 RX ORDER — MIDAZOLAM HYDROCHLORIDE 1 MG/ML
INJECTION, SOLUTION INTRAMUSCULAR; INTRAVENOUS
Status: DISCONTINUED | OUTPATIENT
Start: 2019-02-26 | End: 2019-02-26

## 2019-02-26 RX ORDER — FENTANYL CITRATE 50 UG/ML
INJECTION, SOLUTION INTRAMUSCULAR; INTRAVENOUS
Status: DISCONTINUED | OUTPATIENT
Start: 2019-02-26 | End: 2019-02-26

## 2019-02-26 RX ORDER — GLYCOPYRROLATE 0.2 MG/ML
INJECTION INTRAMUSCULAR; INTRAVENOUS
Status: DISCONTINUED | OUTPATIENT
Start: 2019-02-26 | End: 2019-02-26

## 2019-02-26 RX ORDER — PROPOFOL 10 MG/ML
VIAL (ML) INTRAVENOUS
Status: DISCONTINUED | OUTPATIENT
Start: 2019-02-26 | End: 2019-02-26

## 2019-02-26 RX ORDER — DEXAMETHASONE SODIUM PHOSPHATE 4 MG/ML
INJECTION, SOLUTION INTRA-ARTICULAR; INTRALESIONAL; INTRAMUSCULAR; INTRAVENOUS; SOFT TISSUE
Status: DISCONTINUED | OUTPATIENT
Start: 2019-02-26 | End: 2019-02-26

## 2019-02-26 RX ORDER — FENTANYL CITRATE 50 UG/ML
25 INJECTION, SOLUTION INTRAMUSCULAR; INTRAVENOUS EVERY 5 MIN PRN
Status: DISCONTINUED | OUTPATIENT
Start: 2019-02-26 | End: 2019-02-26 | Stop reason: HOSPADM

## 2019-02-26 RX ORDER — SODIUM CHLORIDE 0.9 % (FLUSH) 0.9 %
3 SYRINGE (ML) INJECTION
Status: DISCONTINUED | OUTPATIENT
Start: 2019-02-26 | End: 2019-02-26 | Stop reason: HOSPADM

## 2019-02-26 RX ORDER — OXYCODONE AND ACETAMINOPHEN 5; 325 MG/1; MG/1
1 TABLET ORAL
Status: DISCONTINUED | OUTPATIENT
Start: 2019-02-26 | End: 2019-02-26 | Stop reason: HOSPADM

## 2019-02-26 RX ORDER — ACETAMINOPHEN 10 MG/ML
INJECTION, SOLUTION INTRAVENOUS
Status: DISCONTINUED | OUTPATIENT
Start: 2019-02-26 | End: 2019-02-26

## 2019-02-26 RX ORDER — NEOSTIGMINE METHYLSULFATE 1 MG/ML
INJECTION, SOLUTION INTRAVENOUS
Status: DISCONTINUED | OUTPATIENT
Start: 2019-02-26 | End: 2019-02-26

## 2019-02-26 RX ORDER — ROCURONIUM BROMIDE 10 MG/ML
INJECTION, SOLUTION INTRAVENOUS
Status: DISCONTINUED | OUTPATIENT
Start: 2019-02-26 | End: 2019-02-26

## 2019-02-26 RX ORDER — SUCCINYLCHOLINE CHLORIDE 20 MG/ML
INJECTION INTRAMUSCULAR; INTRAVENOUS
Status: DISCONTINUED | OUTPATIENT
Start: 2019-02-26 | End: 2019-02-26

## 2019-02-26 RX ORDER — KETOROLAC TROMETHAMINE 30 MG/ML
INJECTION, SOLUTION INTRAMUSCULAR; INTRAVENOUS
Status: DISCONTINUED | OUTPATIENT
Start: 2019-02-26 | End: 2019-02-26

## 2019-02-26 RX ORDER — IBUPROFEN 800 MG/1
800 TABLET ORAL EVERY 6 HOURS PRN
Qty: 20 TABLET | Refills: 0 | Status: SHIPPED | OUTPATIENT
Start: 2019-02-26 | End: 2019-03-06 | Stop reason: SDUPTHER

## 2019-02-26 RX ORDER — ONDANSETRON 2 MG/ML
INJECTION INTRAMUSCULAR; INTRAVENOUS
Status: DISCONTINUED | OUTPATIENT
Start: 2019-02-26 | End: 2019-02-26

## 2019-02-26 RX ORDER — ACETAMINOPHEN 500 MG
1000 TABLET ORAL ONCE
Status: COMPLETED | OUTPATIENT
Start: 2019-02-26 | End: 2019-02-26

## 2019-02-26 RX ORDER — LIDOCAINE HYDROCHLORIDE 10 MG/ML
INJECTION INFILTRATION; PERINEURAL
Status: DISCONTINUED | OUTPATIENT
Start: 2019-02-26 | End: 2019-02-26

## 2019-02-26 RX ORDER — HYDROMORPHONE HYDROCHLORIDE 2 MG/ML
0.2 INJECTION, SOLUTION INTRAMUSCULAR; INTRAVENOUS; SUBCUTANEOUS EVERY 5 MIN PRN
Status: DISCONTINUED | OUTPATIENT
Start: 2019-02-26 | End: 2019-02-26 | Stop reason: HOSPADM

## 2019-02-26 RX ADMIN — FENTANYL CITRATE 100 MCG: 50 INJECTION, SOLUTION INTRAMUSCULAR; INTRAVENOUS at 03:02

## 2019-02-26 RX ADMIN — MIDAZOLAM 2 MG: 1 INJECTION INTRAMUSCULAR; INTRAVENOUS at 03:02

## 2019-02-26 RX ADMIN — HYDROMORPHONE HYDROCHLORIDE 0.2 MG: 2 INJECTION, SOLUTION INTRAMUSCULAR; INTRAVENOUS; SUBCUTANEOUS at 05:02

## 2019-02-26 RX ADMIN — SODIUM CHLORIDE, SODIUM LACTATE, POTASSIUM CHLORIDE, AND CALCIUM CHLORIDE: 600; 310; 30; 20 INJECTION, SOLUTION INTRAVENOUS at 03:02

## 2019-02-26 RX ADMIN — ACETAMINOPHEN 1000 MG: 10 INJECTION, SOLUTION INTRAVENOUS at 04:02

## 2019-02-26 RX ADMIN — ROBINUL 0.8 MG: 0.2 INJECTION INTRAMUSCULAR; INTRAVENOUS at 04:02

## 2019-02-26 RX ADMIN — DEXAMETHASONE SODIUM PHOSPHATE 4 MG: 4 INJECTION, SOLUTION INTRA-ARTICULAR; INTRALESIONAL; INTRAMUSCULAR; INTRAVENOUS; SOFT TISSUE at 04:02

## 2019-02-26 RX ADMIN — ACETAMINOPHEN 1000 MG: 500 TABLET ORAL at 01:02

## 2019-02-26 RX ADMIN — NEOSTIGMINE METHYLSULFATE 5 MG: 1 INJECTION INTRAVENOUS at 04:02

## 2019-02-26 RX ADMIN — ROCURONIUM BROMIDE 35 MG: 10 INJECTION, SOLUTION INTRAVENOUS at 04:02

## 2019-02-26 RX ADMIN — IBUPROFEN 800 MG: 800 TABLET ORAL at 05:02

## 2019-02-26 RX ADMIN — LIDOCAINE HYDROCHLORIDE 50 MG: 10 INJECTION, SOLUTION INFILTRATION; PERINEURAL at 03:02

## 2019-02-26 RX ADMIN — ROCURONIUM BROMIDE 5 MG: 10 INJECTION, SOLUTION INTRAVENOUS at 03:02

## 2019-02-26 RX ADMIN — PROPOFOL 180 MG: 10 INJECTION, EMULSION INTRAVENOUS at 03:02

## 2019-02-26 RX ADMIN — KETOROLAC TROMETHAMINE 30 MG: 30 INJECTION, SOLUTION INTRAMUSCULAR; INTRAVENOUS at 04:02

## 2019-02-26 RX ADMIN — SUCCINYLCHOLINE CHLORIDE 120 MG: 20 INJECTION, SOLUTION INTRAMUSCULAR; INTRAVENOUS at 03:02

## 2019-02-26 RX ADMIN — ONDANSETRON 4 MG: 2 INJECTION, SOLUTION INTRAMUSCULAR; INTRAVENOUS at 04:02

## 2019-02-26 NOTE — SUBJECTIVE & OBJECTIVE
Obstetric History       T6      L6     SAB0   TAB0   Ectopic0   Multiple0   Live Births7       # Outcome Date GA Lbr Jarred/2nd Weight Sex Delivery Anes PTL Lv   7 Term 14 37w1d / 00:08  M Vag-Spont None Y MADDISON      Name: ASHTYN,BABY BOY      Apgar1:  9                Apgar5: 9   6 Term 12 38w0d  3.005 kg (6 lb 10 oz) F Vag-Spont EPI N MADDISON      Name: Silver Serf   5 Term 05 40w0d  3.175 kg (7 lb) F Vag-Spont None N MADDISON      Name: tom Serf   4 Term 98   3.005 kg (6 lb 10 oz) M Vag-Spont EPI N MADDISON      Name: Enmanuel Serf   3 Term 96 40w0d  3.005 kg (6 lb 10 oz) M Vag-Spont EPI N MADDISON      Name: Stoney Serf   2 AB 1996        DEC   1 Term 92 40w0d  3.856 kg (8 lb 8 oz) M Vag-Spont EPI N MADDISON      Name: Daniel Noel        Past Medical History:   Diagnosis Date    Abnormal Pap smear     Anemia     Hypertension     Hypokalemia     in pregnancy    Hypothyroidism (acquired) 2018     Past Surgical History:   Procedure Laterality Date    CHOLECYSTECTOMY      COSMETIC SURGERY      tummy tuck    OCCLUSION, FALLOPIAN TUBE, LAPAROSCOPIC, USING DEVICE Bilateral 2014    [facility] at [facility] OR    TUBAL LIGATION      Tummy Tuck         No medications prior to admission.       Review of patient's allergies indicates:   Allergen Reactions    Sumatriptan Other (See Comments)     Chest tightness that moved into her throat        Family History     Problem Relation (Age of Onset)    Cancer Father    Heart disease Maternal Grandmother        Tobacco Use    Smoking status: Never Smoker    Smokeless tobacco: Never Used   Substance and Sexual Activity    Alcohol use: Yes     Alcohol/week: 0.0 oz     Frequency: 2-3 times a week     Comment: socially   No alcohol 72h prior to sx    Drug use: No    Sexual activity: Yes     Partners: Male     Review of Systems   Constitutional: Negative for chills and fever.   Respiratory: Negative for  cough and shortness of breath.    Cardiovascular: Negative for chest pain.   Gastrointestinal: Negative for abdominal pain, nausea and vomiting.   Genitourinary: Positive for menorrhagia and pelvic pain. Negative for dysuria and vaginal bleeding.      Objective:     Vital Signs (Most Recent):    Vital Signs (24h Range):           There is no height or weight on file to calculate BMI.    Patient's last menstrual period was 02/21/2019.    Physical Exam:   Constitutional: She is oriented to person, place, and time. She appears well-developed and well-nourished. No distress.    HENT:   Head: Normocephalic and atraumatic.     Neck: Neck supple.    Cardiovascular: Normal rate and regular rhythm.     Pulmonary/Chest: Effort normal.        Abdominal: Soft. She exhibits no distension. There is no tenderness.             Musculoskeletal: She exhibits no edema or tenderness.       Neurological: She is alert and oriented to person, place, and time.    Skin: Skin is warm and dry.    Psychiatric: She has a normal mood and affect.       Laboratory:  I have personallly reviewed all pertinent lab results from the last 24 hours.    Diagnostic Results:  US: Reviewed  2 cm complex left ovarian cyst   Small fibroids, 10 cm uterus

## 2019-02-26 NOTE — INTERVAL H&P NOTE
The patient has been examined and the H&P has been reviewed:    I concur with the findings and no changes have occurred since H&P was written.    Anesthesia/Surgery risks, benefits and alternative options discussed and understood by patient/family.          Active Hospital Problems    Diagnosis  POA    Cyst of left ovary [N83.202]  Yes    Menorrhagia with regular cycle [N92.0]  Yes      Resolved Hospital Problems   No resolved problems to display.

## 2019-02-26 NOTE — ANESTHESIA PREPROCEDURE EVALUATION
02/26/2019  Florecita Noel is a 43 y.o., female.    Anesthesia Evaluation    I have reviewed the Patient Summary Reports.    I have reviewed the Nursing Notes.      Review of Systems  Anesthesia Hx:  No problems with previous Anesthesia  History of prior surgery of interest to airway management or planning: Previous anesthesia: General  Denies Personal Hx of Anesthesia complications.   Social:  No Alcohol Use, Social Alcohol Use    Cardiovascular:   Exercise tolerance: good Hypertension, well controlled Denies CAD.         Functional Capacity good / => 4 METS  Hypertension, Essential Hypertension    Pulmonary:   Denies Pneumonia Denies COPD.  Denies Asthma.    Hepatic/GI:   GERD, well controlled    Neurological:  Neurology Normal    Endocrine:   Hypothyroidism        Physical Exam  General:  Well nourished    Airway/Jaw/Neck:  Airway Findings: Mouth Opening: Normal Tongue: Normal  General Airway Assessment: Adult  Mallampati: II  TM Distance: Normal, at least 6 cm  Jaw/Neck Findings:  Neck ROM: Normal ROM      Dental:  Dental Findings: In tact   Chest/Lungs:  Chest/Lungs Findings: Clear to auscultation, Normal Respiratory Rate     Heart/Vascular:  Heart Findings: Rate: Normal  Rhythm: Regular Rhythm  Sounds: Normal        Mental Status:  Mental Status Findings:  Cooperative, Alert and Oriented         Anesthesia Plan  Type of Anesthesia, risks & benefits discussed:  Anesthesia Type:  general  Patient's Preference:   Intra-op Monitoring Plan:   Intra-op Monitoring Plan Comments:   Post Op Pain Control Plan: multimodal analgesia  Post Op Pain Control Plan Comments:   Induction:   IV  Beta Blocker:  Patient is not currently on a Beta-Blocker (No further documentation required).       Informed Consent: Patient understands risks and agrees with Anesthesia plan.  Questions answered. Anesthesia consent signed with  patient.  ASA Score: 2     Day of Surgery Review of History & Physical: I have interviewed and examined the patient. I have reviewed the patient's H&P dated:    H&P update referred to the surgeon.         Ready For Surgery From Anesthesia Perspective.

## 2019-02-26 NOTE — OP NOTE
OPERATIVE NOTE    02/26/2019    PREOPERATIVE DIAGNOSIS:    1.  Left ovarian cyst  2.  Left pelvic pain  3.  Menorrhagia     POSTOPERATIVE DIAGNOSIS    1.  Left ovarian cyst  2.  Left pelvic pain  3.  Menorrhagia   4.  Endometriosis of posterior cul de sac    OPERATIVE PROCEDURE:    1.  Diagnostic laparoscopy  2.  Fulguration of endometriosis  3.  Drainage of left ovarian cyst  4.  Dilation and Curettage  5.  Hysteroscopy  6.  Novasure Endometrial Ablation    SURGEON:  iPa Aguila MD    ASSISTANT:  Elzbieta Rodriguez CST    ANESTHESIA:  General    ESTIMATED BLOOD LOSS:  100 ml    FINDINGS:  Enlarged uterus, 10 cm in length, 2 cm left ovarian cyst, implants of endometriosis in posterior cul de sac, normal appearing endometrial cavity    COMPLICATIONS:  None    SPECIMENS TO PATHOLOGY:  Endometrial curettings    PROCEDURE IN DETAIL:    The procedure was explained to the patient and informed consent was obtained.  The patient was brought to the operating room where general anesthesia was administered. She was placed in adjustable Dario stirrups.  A downs catheter was placed as well as a Zumi uterine manipulator, and she was prepped and draped in the usual sterile manner.   A time out was performed.       While tenting up on the abdomen with towel clips, a Veress needle was placed through the umbilicus into the peritoneal cavity.  A saline drop test was noted to be normal.  The abdomen was insufflated with carbon dioxide to reach a pressure of 15 mm of mercury.  A small intraumbilical incision was made with the scalpel and a 5 mm trocar and sleeve were placed without difficulty.  Laparoscopic confirmation of location was achieved.  The upper abdomen was visualized and appeared normal.  A 5 mm suprapubic port was also placed.  The patient was placed in the deep trendelenberg position.  The pelvis was visualized with the findings noted above.  The uterus, tubes, and ovaries were carefully inspected.  A monopolar hook was  used to cauterize the endometriosis noted in the posterior cul de sac.  The monopolar hook was also used to drain the cyst in the left ovary, with clear fluid removed.  No other abnormalities were noted.    At this time the procedure was concluded.  All instruments were removed without difficulty.  She was positioned in the dorsal lithotomy position for the hysteroscopy.    A weighted speculum was placed in the vagina and the anterior lip of the cervix was grasped with a single tooth tenaculum.  The uterus was sounded to 10 cm.  Cervical length was noted to be 3 cm.  The cervix was dilated to a number 8 Hegar dilator without difficulty.  A 5 mm diagnostic hysteroscope was then gently advanced to the uterine fundus with normal saline used as the distending medium.  The entire endometrial cavity was well visualized with no abnormalities noted.  The hysteroscope was then removed.  A sharp curettage was then performed with a large amount of tissue removed.  The tissue removed was sent to pathology.  At this time a Novasure ablation catheter was inserted into the uterus.  Uterine width was noted to be 4.8 cm.  The Novasure ablation cycle was then completed without difficulty.  Following the ablation, the endometrial cavity was visualized again with the hysteroscope.  The entire endometrial cavity appeared well cauterized.    At this time all instruments were removed, and the patient was awakened from anesthesia and brought to the recovery room in stable condition.  The patient tolerated the procedure well.  All counts were correct x 3.

## 2019-02-26 NOTE — H&P
[facility]  Obstetrics & Gynecology  History & Physical    Patient Name: Florecita Noel  MRN: [id number]  Admission Date: (Not on file)  Primary Care Provider: Alesha Alonso MD    Subjective:     Chief Complaint/Reason for Admission: Novasure ablation, diagnostic laparoscopy    History of Present Illness:  43 y.o. female  with longstanding menorrhagia.  Also with pelvic pain and 2 cm complex left ovarian cyst.  Patient desires Novasure ablation and diagnostic laparoscopy.          Obstetric History       T6      L6     SAB0   TAB0   Ectopic0   Multiple0   Live Births7       # Outcome Date GA Lbr Jarred/2nd Weight Sex Delivery Anes PTL Lv   7 Term 14 37w1d / 00:08  M Vag-Spont None Y MADDISON      Name: ASHTYN,BABY BOY      Apgar1:  9                Apgar5: 9   6 Term 12 38w0d  3.005 kg (6 lb 10 oz) F Vag-Spont EPI N MADDISON      Name: Silver Serf   5 Term 05 40w0d  3.175 kg (7 lb) F Vag-Spont None N MADDISON      Name: antonuyn Serf   4 Term 98   3.005 kg (6 lb 10 oz) M Vag-Spont EPI N MADDISON      Name: Enmanuel Serf   3 Term 96 40w0d  3.005 kg (6 lb 10 oz) M Vag-Spont EPI N MADDISON      Name: Barriedavian Serf   2 AB 1996        DEC   1 Term 92 40w0d  3.856 kg (8 lb 8 oz) M Vag-Spont EPI N MADDISON      Name: Daniel Noel        Past Medical History:   Diagnosis Date    Abnormal Pap smear     Anemia     Hypertension     Hypokalemia     in pregnancy    Hypothyroidism (acquired) 2018     Past Surgical History:   Procedure Laterality Date    CHOLECYSTECTOMY      COSMETIC SURGERY      tummy tuck    OCCLUSION, FALLOPIAN TUBE, LAPAROSCOPIC, USING DEVICE Bilateral 2014    Performed by Pia Aguila MD at [facility]    TUBAL LIGATION      Tummy Tuck         No medications prior to admission.       Review of patient's allergies indicates:   Allergen Reactions    Sumatriptan Other (See Comments)     Chest tightness that moved into her throat        Family  History     Problem Relation (Age of Onset)    Cancer Father    Heart disease Maternal Grandmother        Tobacco Use    Smoking status: Never Smoker    Smokeless tobacco: Never Used   Substance and Sexual Activity    Alcohol use: Yes     Alcohol/week: 0.0 oz     Frequency: 2-3 times a week     Comment: socially   No alcohol 72h prior to sx    Drug use: No    Sexual activity: Yes     Partners: Male     Review of Systems   Constitutional: Negative for chills and fever.   Respiratory: Negative for cough and shortness of breath.    Cardiovascular: Negative for chest pain.   Gastrointestinal: Negative for abdominal pain, nausea and vomiting.   Genitourinary: Positive for menorrhagia and pelvic pain. Negative for dysuria and vaginal bleeding.      Objective:     Vital Signs (Most Recent):    Vital Signs (24h Range):           There is no height or weight on file to calculate BMI.    Patient's last menstrual period was 02/21/2019.    Physical Exam:   Constitutional: She is oriented to person, place, and time. She appears well-developed and well-nourished. No distress.    HENT:   Head: Normocephalic and atraumatic.     Neck: Neck supple.    Cardiovascular: Normal rate and regular rhythm.     Pulmonary/Chest: Effort normal.        Abdominal: Soft. She exhibits no distension. There is no tenderness.             Musculoskeletal: She exhibits no edema or tenderness.       Neurological: She is alert and oriented to person, place, and time.    Skin: Skin is warm and dry.    Psychiatric: She has a normal mood and affect.       Laboratory:  I have personallly reviewed all pertinent lab results from the last 24 hours.    Diagnostic Results:  US: Reviewed  2 cm complex left ovarian cyst   Small fibroids, 10 cm uterus    Assessment/Plan:     Cyst of left ovary    Proceed with diagnostic laparoscopy as planned.     Menorrhagia with regular cycle    Proceed with Novasure ablation, D&C, hysteroscopy, as planned.         Pia GUTIERREZ  Armando Aguila MD  Obstetrics & Gynecology  [facility]

## 2019-02-26 NOTE — DISCHARGE INSTRUCTIONS
What to expect during recovery    Pain  · You will experience some level of pain after surgery.  Pain medication should help with the pain, but may not be able to eliminate it entirely.  Pain will decrease with time, and most pain will be gone by 4 to 6 weeks after surgery.  · Ice packs may help with pain and can reduce swelling.  · Your prescription pain medication may contain acetaminophen (Tylenol).  If so, you should not take additional acetaminophen (Tylenol) at the same time as your pain medication.   · Do not drive, operate machinery or power tools, or sign legal papers for 24 hours or as long as you are on your postoperative pain medication.   · Prescription pain medication should be taken only as directed.  We are not able to replace pain medication that has been lost or stolen.    Nausea/vomiting  · You may experience nausea or vomiting as a result of anesthesia or pain medication.  · If you experience severe nausea or you are unable to keep fluids down, contact your doctor.    Bleeding  · A small amount of clear or reddish drainage from the incision is normal after surgery.  · For mild bleeding from the incision, apply pressure for five minutes.  · If bleeding is severe or does not stop with pressure, contact your doctor.    Signs of infection  · Notify your doctor if you have the following signs of infection:  · Fever over 101 degrees  · Worsening redness around incsion  · Thick drainage from incision  · Foul smell from incision  · You may experience low fever/chills, this is normal after surgery.    Other post-operative symptoms  · It is safe to take over-the-counter medications for constipation, heartburn, sleep, or itching if needed.    · You may experience light-headedness, dizziness, and sleepiness following surgery. Please do not stay alone. A responsible adult should be with you for this 24 hour period.     Activity  · Try to rest and avoid strenuous activity, but also get out of bed regularly  unless your doctor has ordered strict bedrest.  · Several times every hour while you are awake, pump and flex your feet 5-6 times and do foot circles. This will help prevent blood clots.  · Several times every hour while you are awake, take 2 to 3 deep breaths and cough. If you had stomach surgery, hold a pillow or rolled towel firmly against your stomach before you cough. This will help with any pain the cough might cause.  · Do not smoke after surgery, it decreases your ability to heal and increases the risk of infection and pneumonia.    Nozin: Nasal   · Nozin reduces nasal germs to help decrease the risk of infection after surgery.  · Continue Nozin provided at discharge twice daily for 7 days or until the incision is healed.    · Place 4 drops to cotton swab tip and swab both nostril rims 6 times in each direction.  · See pamphlet for more information.     Diet  · Drink lots of fluids after surgery, unless otherwise instructed.  · You might not have much appetite at first.  Progress slowly to a normal diet unless given other specific diet instructions by your doctor.  Begin with liquids, then soup and crackers, working up to solid foods.  · Do not drink alcoholic beverages including beer for 24 hours or as long as you are on post-operative pain medication.    Follow-up after surgery  · You can contact your doctor through the patient portal using the [employer] [facility] or at [email or web address].  · You can also contact your doctor at any time by calling [phone] for the [facility] on [facility], or [phone] for the [facility] on [facility].  · A nurse will be calling you sometime after surgery. Do not be alarmed. This is our way of finding out how you are doing.

## 2019-02-26 NOTE — TRANSFER OF CARE
"Anesthesia Transfer of Care Note    Patient: Florecita GUPTA Serf    Procedure(s) Performed: Procedure(s) (LRB):  HYSTEROSCOPY, WITH DILATION AND CURETTAGE OF UTERUS AND HYDROTHERMAL ENDOMETRIAL ABLATION (N/A)  LAPAROSCOPY, DIAGNOSTIC ovarian cyst (N/A)  DRAINAGE, CYST, OVARY, LAPAROSCOPIC (Left)    Patient location: PACU    Anesthesia Type: general    Transport from OR: Transported from OR on room air with adequate spontaneous ventilation    Post pain: adequate analgesia    Post assessment: no apparent anesthetic complications    Post vital signs: stable    Level of consciousness: awake    Nausea/Vomiting: no nausea/vomiting    Complications: none    Transfer of care protocol was followed      Last vitals:   Visit Vitals  BP (!) 148/102 (BP Location: Right arm, Patient Position: Sitting)   Pulse (!) 59   Temp 36.7 °C (98.1 °F) (Temporal)   Resp 18   Ht 5' 4" (1.626 m)   Wt 76.5 kg (168 lb 10.4 oz)   LMP 02/21/2019   SpO2 99%   Breastfeeding? No   BMI 28.95 kg/m²     "

## 2019-02-26 NOTE — HPI
43 y.o. female  with longstanding menorrhagia.  Also with pelvic pain and 2 cm complex left ovarian cyst.  Patient desires Novasure ablation and diagnostic laparoscopy.

## 2019-02-27 ENCOUNTER — PATIENT MESSAGE (OUTPATIENT)
Dept: OBSTETRICS AND GYNECOLOGY | Facility: CLINIC | Age: 44
End: 2019-02-27

## 2019-02-27 NOTE — ANESTHESIA POSTPROCEDURE EVALUATION
"Anesthesia Post Evaluation    Patient: Florecita GUPTA Serf    Procedure(s) Performed: Procedure(s) (LRB):  HYSTEROSCOPY, WITH DILATION AND CURETTAGE OF UTERUS AND HYDROTHERMAL ENDOMETRIAL ABLATION (N/A)  LAPAROSCOPY, DIAGNOSTIC ovarian cyst (N/A)  DRAINAGE, CYST, OVARY, LAPAROSCOPIC (Left)    Final Anesthesia Type: general  Patient location during evaluation: PACU  Patient participation: Yes- Able to Participate  Level of consciousness: awake and alert and oriented  Post-procedure vital signs: reviewed and stable  Pain management: adequate  Airway patency: patent  PONV status at discharge: No PONV  Anesthetic complications: no      Cardiovascular status: blood pressure returned to baseline and hemodynamically stable  Respiratory status: unassisted and spontaneous ventilation  Hydration status: euvolemic  Follow-up not needed.        Visit Vitals  /88   Pulse (!) 56   Temp 36.1 °C (97 °F) (Temporal)   Resp 18   Ht 5' 4" (1.626 m)   Wt 76.5 kg (168 lb 10.4 oz)   LMP 02/21/2019   SpO2 97%   Breastfeeding? No   BMI 28.95 kg/m²       Pain/Eduarda Score: Pain Rating Prior to Med Admin: 5 (2/26/2019  5:20 PM)  Pain Rating Post Med Admin: 4 (2/26/2019  5:25 PM)  Eduarda Score: 10 (2/26/2019  5:25 PM)        "

## 2019-02-28 ENCOUNTER — PATIENT MESSAGE (OUTPATIENT)
Dept: OBSTETRICS AND GYNECOLOGY | Facility: CLINIC | Age: 44
End: 2019-02-28

## 2019-02-28 ENCOUNTER — HOSPITAL ENCOUNTER (OUTPATIENT)
Dept: RADIOLOGY | Facility: HOSPITAL | Age: 44
Discharge: HOME | End: 2019-02-28
Attending: NURSE PRACTITIONER
Payer: COMMERCIAL

## 2019-02-28 DIAGNOSIS — Z00.00 PREVENTATIVE HEALTH CARE: ICD-10-CM

## 2019-02-28 PROCEDURE — 77063 MAMMO DIGITAL SCREENING BILAT WITH TOMOSYNTHESIS_CAD: ICD-10-PCS | Mod: 26,,, | Performed by: RADIOLOGY

## 2019-02-28 PROCEDURE — 77067 SCR MAMMO BI INCL CAD: CPT | Mod: TC

## 2019-02-28 PROCEDURE — 77067 MAMMO DIGITAL SCREENING BILAT WITH TOMOSYNTHESIS_CAD: ICD-10-PCS | Mod: 26,,, | Performed by: RADIOLOGY

## 2019-02-28 PROCEDURE — 77063 BREAST TOMOSYNTHESIS BI: CPT | Mod: 26,,, | Performed by: RADIOLOGY

## 2019-02-28 PROCEDURE — 77067 SCR MAMMO BI INCL CAD: CPT | Mod: 26,,, | Performed by: RADIOLOGY

## 2019-02-28 RX ORDER — HYDROCODONE BITARTRATE AND ACETAMINOPHEN 5; 325 MG/1; MG/1
1 TABLET ORAL EVERY 4 HOURS PRN
Qty: 10 TABLET | Refills: 0 | Status: SHIPPED | OUTPATIENT
Start: 2019-02-28 | End: 2019-03-21

## 2019-03-05 ENCOUNTER — PATIENT MESSAGE (OUTPATIENT)
Dept: OBSTETRICS AND GYNECOLOGY | Facility: CLINIC | Age: 44
End: 2019-03-05

## 2019-03-05 VITALS
WEIGHT: 168.63 LBS | DIASTOLIC BLOOD PRESSURE: 88 MMHG | OXYGEN SATURATION: 97 % | SYSTOLIC BLOOD PRESSURE: 139 MMHG | BODY MASS INDEX: 28.79 KG/M2 | RESPIRATION RATE: 18 BRPM | HEART RATE: 56 BPM | HEIGHT: 64 IN | TEMPERATURE: 97 F

## 2019-03-05 NOTE — DISCHARGE SUMMARY
[facility]   Obstetrics & Gynecology  Discharge Summary    Patient Name: Florecita Noel  MRN: [id number]  Admission Date: 2019  Hospital Length of Stay: 0 days  Discharge Date and Time: 2019  5:50 PM  Attending Physician: No att. providers found   Discharging Provider: Pia Aguila MD  Primary Care Provider: Alesha Alonso MD    HPI:  43 y.o. female  with longstanding menorrhagia.  Also with pelvic pain and 2 cm complex left ovarian cyst.  Patient desires Novasure ablation and diagnostic laparoscopy.      Hospital Course:  No notes on file    Procedure(s) (LRB):  HYSTEROSCOPY, WITH DILATION AND CURETTAGE OF UTERUS AND HYDROTHERMAL ENDOMETRIAL ABLATION (N/A)  LAPAROSCOPY, DIAGNOSTIC (N/A)  DRAINAGE, CYST, OVARY, LAPAROSCOPIC (Left)  FULGURATION, ENDOMETRIOSIS (N/A)         Significant Diagnostic Studies: Labs: All labs within the past 24 hours have been reviewed    Pending Diagnostic Studies:     None        Final Active Diagnoses:    Diagnosis Date Noted POA    Cyst of left ovary [N83.202] 2019 Yes    Menorrhagia with regular cycle [N92.0] 2019 Yes      Problems Resolved During this Admission:        Discharged Condition: stable    Disposition: Home or Self Care    Follow Up:  Follow-up Information     Pia Aguila MD In 4 weeks.    Specialties:  Obstetrics, Obstetrics and Gynecology  Why:  Postop check  Contact information:  [address] [place] [address] DR Ericka ARMSTRONG [address] [phone]                 Patient Instructions:      Other restrictions (specify):   Order Comments: Pelvic rest x 2 weeks     Call MD for:  temperature >100.4     Call MD for:  persistent nausea and vomiting or diarrhea     Call MD for:  severe uncontrolled pain     Call MD for:  redness, tenderness, or signs of infection (pain, swelling, redness, odor or green/yellow discharge around incision site)     Call MD for:  difficulty breathing or increased cough     Call MD for:   persistent dizziness, light-headedness, or visual disturbances     No dressing needed     Medications:  Reconciled Home Medications:      Medication List      START taking these medications    ibuprofen 800 MG tablet  Commonly known as:  ADVIL,MOTRIN  Take 1 tablet (800 mg total) by mouth every 6 (six) hours as needed for Pain.        CHANGE how you take these medications    ranitidine 150 MG tablet  Commonly known as:  ZANTAC  Take 1 tablet (150 mg total) by mouth 2 (two) times daily.  What changed:    · when to take this  · reasons to take this        CONTINUE taking these medications    butalbital-acetaminophen-caffeine -40 mg -40 mg per tablet  Commonly known as:  FIORICET, ESGIC  Take 1 tablet by mouth every 4 (four) hours as needed for Pain.     GOODY'S EXTRA STRENGTH ORAL  Take by mouth daily as needed.     levothyroxine 50 MCG tablet  Commonly known as:  SYNTHROID  Take 1 tablet (50 mcg total) by mouth once daily.     losartan 25 MG tablet  Commonly known as:  COZAAR  Take 1 tablet (25 mg total) by mouth once daily.     propranolol 40 MG tablet  Commonly known as:  INDERAL  Take 40 mg by mouth daily as needed.     verapamil 120 MG tablet  Commonly known as:  CALAN  Take 1 tablet (120 mg total) by mouth 3 (three) times daily.            Pia Aguila MD  Obstetrics & Gynecology  [facility]

## 2019-03-05 NOTE — TELEPHONE ENCOUNTER
Letter done.  Attempted to reach patient.  Left message that return to work letter written and in chart and she can call [phone] if she needs help.

## 2019-03-06 ENCOUNTER — PATIENT MESSAGE (OUTPATIENT)
Dept: OBSTETRICS AND GYNECOLOGY | Facility: CLINIC | Age: 44
End: 2019-03-06

## 2019-03-06 RX ORDER — METRONIDAZOLE 500 MG/1
500 TABLET ORAL 2 TIMES DAILY
Qty: 14 TABLET | Refills: 0 | Status: SHIPPED | OUTPATIENT
Start: 2019-03-06 | End: 2019-03-13

## 2019-03-06 RX ORDER — IBUPROFEN 800 MG/1
800 TABLET ORAL EVERY 6 HOURS PRN
Qty: 20 TABLET | Refills: 0 | Status: SHIPPED | OUTPATIENT
Start: 2019-03-06 | End: 2019-05-08 | Stop reason: SDUPTHER

## 2019-03-18 ENCOUNTER — PATIENT MESSAGE (OUTPATIENT)
Dept: FAMILY MEDICINE | Facility: CLINIC | Age: 44
End: 2019-03-18

## 2019-03-21 ENCOUNTER — OFFICE VISIT (OUTPATIENT)
Dept: CARDIOLOGY | Facility: CLINIC | Age: 44
End: 2019-03-21
Payer: COMMERCIAL

## 2019-03-21 VITALS
HEART RATE: 76 BPM | HEIGHT: 64 IN | DIASTOLIC BLOOD PRESSURE: 82 MMHG | SYSTOLIC BLOOD PRESSURE: 118 MMHG | WEIGHT: 172.38 LBS | BODY MASS INDEX: 29.43 KG/M2

## 2019-03-21 DIAGNOSIS — I10 ESSENTIAL HYPERTENSION: Primary | ICD-10-CM

## 2019-03-21 DIAGNOSIS — G43.009 MIGRAINE WITHOUT AURA AND WITHOUT STATUS MIGRAINOSUS, NOT INTRACTABLE: ICD-10-CM

## 2019-03-21 DIAGNOSIS — R07.9 CHEST PAIN SYNDROME: ICD-10-CM

## 2019-03-21 DIAGNOSIS — R94.31 ABNORMAL EKG: ICD-10-CM

## 2019-03-21 PROCEDURE — 99214 OFFICE O/P EST MOD 30 MIN: CPT | Mod: S$GLB,,, | Performed by: INTERNAL MEDICINE

## 2019-03-21 PROCEDURE — 99999 PR PBB SHADOW E&M-EST. PATIENT-LVL III: ICD-10-PCS | Mod: PBBFAC,,, | Performed by: INTERNAL MEDICINE

## 2019-03-21 PROCEDURE — 99999 PR PBB SHADOW E&M-EST. PATIENT-LVL III: CPT | Mod: PBBFAC,,, | Performed by: INTERNAL MEDICINE

## 2019-03-21 PROCEDURE — 99214 PR OFFICE/OUTPT VISIT, EST, LEVL IV, 30-39 MIN: ICD-10-PCS | Mod: S$GLB,,, | Performed by: INTERNAL MEDICINE

## 2019-03-21 NOTE — PROGRESS NOTES
Subjective:   Patient ID:  Florecita Noel is a 43 y.o. female who presents for follow up of Chest Pain and Follow-up      HPI  A 44 yo female with migraine abnormal ekg htn is here for f/u had allergic rx to imitrex. Had no w/u for ischemia. Has still symptoms of chest discomfort that is intermittent.. She is not able to afford her copay . It is unrelated toe xertion it occurs at rest very short lived feels like a squeeze and let go. Her last echo was normal.her mom had an mi last week.her bp is improved she is on b blcokers ca blockers and losartan . She is trying to be compliant with diet./ had surgery w/o any complications.she is on thyroid supplement she is asymptomatic follows with endocrinology.  Past Medical History:   Diagnosis Date    Abnormal Pap smear 1996    Anemia     Hypertension     Hypokalemia     in pregnancy    Hypothyroidism (acquired) 12/28/2018       Past Surgical History:   Procedure Laterality Date    CHOLECYSTECTOMY      COSMETIC SURGERY      tumchel muñiz    DRAINAGE, CYST, OVARY, LAPAROSCOPIC Left 2/26/2019    [facility]    FULGURATION, ENDOMETRIOSIS N/A 2/26/2019    [facility] by [facility]ai [facility] at [facility]    HYSTEROSCOPY, WITH DILATION AND CURETTAGE OF UTERUS AND HYDROTHERMAL ENDOMETRIAL ABLATION N/A 2/26/2019    [facility] at [facility]    LAPAROSCOPY, DIAGNOSTIC N/A 2/26/2019    [facility] at [facility]    OCCLUSION, FALLOPIAN TUBE, LAPAROSCOPIC, USING DEVICE Bilateral 2/25/2014    [facility] by [facility] at [facility]    TUBAL LIGATION      Tummy Tuck         Social History     Tobacco Use    Smoking status: Never Smoker    Smokeless tobacco: Never Used   Substance Use Topics    Alcohol use: Yes     Alcohol/week: 0.0 oz     Frequency: 2-3 times a week     Comment: socially   No alcohol 72h prior to sx    Drug use: No       Family History   Problem Relation Age  of Onset    Cancer Father     Heart disease Maternal Grandmother        Current Outpatient Medications   Medication Sig    aspirin/acetaminophen/caffeine (GOODY'S EXTRA STRENGTH ORAL) Take by mouth daily as needed.    levothyroxine (SYNTHROID) 50 MCG tablet Take 1 tablet (50 mcg total) by mouth once daily.    losartan (COZAAR) 25 MG tablet Take 1 tablet (25 mg total) by mouth once daily.    propranolol (INDERAL) 40 MG tablet Take 40 mg by mouth daily as needed.     ranitidine (ZANTAC) 150 MG tablet Take 1 tablet (150 mg total) by mouth 2 (two) times daily. (Patient taking differently: Take 150 mg by mouth 2 (two) times daily as needed. )    verapamil (CALAN) 120 MG tablet Take 1 tablet (120 mg total) by mouth 3 (three) times daily.    ibuprofen (ADVIL,MOTRIN) 800 MG tablet Take 1 tablet (800 mg total) by mouth every 6 (six) hours as needed for Pain.     No current facility-administered medications for this visit.      Current Outpatient Medications on File Prior to Visit   Medication Sig    aspirin/acetaminophen/caffeine (GOODY'S EXTRA STRENGTH ORAL) Take by mouth daily as needed.    levothyroxine (SYNTHROID) 50 MCG tablet Take 1 tablet (50 mcg total) by mouth once daily.    losartan (COZAAR) 25 MG tablet Take 1 tablet (25 mg total) by mouth once daily.    propranolol (INDERAL) 40 MG tablet Take 40 mg by mouth daily as needed.     ranitidine (ZANTAC) 150 MG tablet Take 1 tablet (150 mg total) by mouth 2 (two) times daily. (Patient taking differently: Take 150 mg by mouth 2 (two) times daily as needed. )    verapamil (CALAN) 120 MG tablet Take 1 tablet (120 mg total) by mouth 3 (three) times daily.    ibuprofen (ADVIL,MOTRIN) 800 MG tablet Take 1 tablet (800 mg total) by mouth every 6 (six) hours as needed for Pain.    [DISCONTINUED] HYDROcodone-acetaminophen (NORCO) 5-325 mg per tablet Take 1 tablet by mouth every 4 (four) hours as needed for Pain.     No current facility-administered medications on  file prior to visit.      Review of patient's allergies indicates:   Allergen Reactions    Sumatriptan Other (See Comments)     Chest tightness that moved into her throat     Review of Systems   Constitution: Positive for weakness and malaise/fatigue. Negative for diaphoresis and weight gain.   HENT: Negative for hoarse voice.    Eyes: Negative for double vision and visual disturbance.   Cardiovascular: Positive for chest pain. Negative for claudication, cyanosis, dyspnea on exertion, irregular heartbeat, leg swelling, near-syncope, orthopnea, palpitations, paroxysmal nocturnal dyspnea and syncope.   Respiratory: Negative for cough, hemoptysis, shortness of breath and snoring.    Hematologic/Lymphatic: Negative for bleeding problem. Does not bruise/bleed easily.   Skin: Negative for color change and poor wound healing.   Musculoskeletal: Negative for muscle cramps, muscle weakness and myalgias.   Gastrointestinal: Negative for bloating, abdominal pain, change in bowel habit, diarrhea, heartburn, hematemesis, hematochezia, melena and nausea.   Neurological: Negative for excessive daytime sleepiness, dizziness, headaches, light-headedness, loss of balance and numbness.   Psychiatric/Behavioral: Negative for memory loss. The patient does not have insomnia.    Allergic/Immunologic: Negative for hives.       Objective:   Physical Exam   Constitutional: She is oriented to person, place, and time. She appears well-developed and well-nourished. She does not appear ill. No distress.   HENT:   Head: Normocephalic and atraumatic.   Eyes: EOM are normal. Pupils are equal, round, and reactive to light. No scleral icterus.   Neck: Normal range of motion. Neck supple. Normal carotid pulses, no hepatojugular reflux and no JVD present. Carotid bruit is not present. No tracheal deviation present. Thyromegaly present.   Cardiovascular: Normal rate, regular rhythm, normal heart sounds and normal pulses. Exam reveals no gallop and no  "friction rub.   No murmur heard.  Pulmonary/Chest: Effort normal and breath sounds normal. No respiratory distress. She has no wheezes. She has no rhonchi. She has no rales. She exhibits no tenderness.   Abdominal: Soft. Normal appearance, normal aorta and bowel sounds are normal. She exhibits no abdominal bruit, no ascites and no pulsatile midline mass. There is no hepatomegaly. There is no tenderness.   Musculoskeletal: She exhibits no edema.        Right shoulder: She exhibits no deformity.   Neurological: She is alert and oriented to person, place, and time. She has normal strength. No cranial nerve deficit. Coordination normal.   Skin: Skin is warm and dry. No rash noted. No cyanosis or erythema. Nails show no clubbing.   Psychiatric: She has a normal mood and affect. Her speech is normal and behavior is normal.     Vitals:    03/21/19 1553   BP: 118/82   BP Location: Right arm   Patient Position: Sitting   BP Method: Medium (Manual)   Pulse: 76   Weight: 78.2 kg (172 lb 6.4 oz)   Height: 5' 4" (1.626 m)     Lab Results   Component Value Date    CHOL 248 (H) 01/27/2017     Lab Results   Component Value Date    HDL 75 01/27/2017     Lab Results   Component Value Date    LDLCALC 157.0 01/27/2017     Lab Results   Component Value Date    TRIG 80 01/27/2017     Lab Results   Component Value Date    CHOLHDL 30.2 01/27/2017       Chemistry        Component Value Date/Time     02/22/2019 1401    K 3.3 (L) 02/26/2019 1320     02/22/2019 1401    CO2 24 02/22/2019 1401    BUN 12 02/22/2019 1401    CREATININE 1.0 02/22/2019 1401    GLU 77 02/22/2019 1401        Component Value Date/Time    CALCIUM 9.8 02/22/2019 1401    ALKPHOS 45 (L) 10/22/2018 0200    AST 35 10/22/2018 0200    ALT 23 10/22/2018 0200    BILITOT 0.4 10/22/2018 0200    ESTGFRAFRICA >60.0 02/22/2019 1401    EGFRNONAA >60.0 02/22/2019 1401          Lab Results   Component Value Date    TSH 0.193 (L) 12/28/2018     No results found for: INR, " PROTIME  Lab Results   Component Value Date    WBC 14.29 (H) 02/22/2019    HGB 13.3 02/22/2019    HCT 43.0 02/22/2019    MCV 87 02/22/2019     02/22/2019     BMP  Sodium   Date Value Ref Range Status   02/22/2019 140 136 - 145 mmol/L Final     Potassium   Date Value Ref Range Status   02/26/2019 3.3 (L) 3.5 - 5.1 mmol/L Final     Chloride   Date Value Ref Range Status   02/22/2019 105 95 - 110 mmol/L Final     CO2   Date Value Ref Range Status   02/22/2019 24 23 - 29 mmol/L Final     BUN, Bld   Date Value Ref Range Status   02/22/2019 12 6 - 20 mg/dL Final     Creatinine   Date Value Ref Range Status   02/22/2019 1.0 0.5 - 1.4 mg/dL Final     Calcium   Date Value Ref Range Status   02/22/2019 9.8 8.7 - 10.5 mg/dL Final     Anion Gap   Date Value Ref Range Status   02/22/2019 11 8 - 16 mmol/L Final     eGFR if    Date Value Ref Range Status   02/22/2019 >60.0 >60 mL/min/1.73 m^2 Final     eGFR if non    Date Value Ref Range Status   02/22/2019 >60.0 >60 mL/min/1.73 m^2 Final     Comment:     Calculation used to obtain the estimated glomerular filtration  rate (eGFR) is the CKD-EPI equation.        CrCl cannot be calculated (Patient's most recent lab result is older than the maximum 7 days allowed.).    Assessment:     1. Essential hypertension    2. Migraine without aura and without status migrainosus, not intractable    3. Chest pain syndrome    4. Abnormal EKG      Has no new symptoms however her mother had mi she has atypical symptoms will get plain. stress test   Plan:   ett   Needs lipids vit d  Low salt diet   Phone review   F/u in 6 months

## 2019-03-22 ENCOUNTER — PATIENT MESSAGE (OUTPATIENT)
Dept: CARDIOLOGY | Facility: CLINIC | Age: 44
End: 2019-03-22

## 2019-04-05 ENCOUNTER — TELEPHONE (OUTPATIENT)
Dept: OBSTETRICS AND GYNECOLOGY | Facility: CLINIC | Age: 44
End: 2019-04-05

## 2019-04-05 ENCOUNTER — PATIENT MESSAGE (OUTPATIENT)
Dept: FAMILY MEDICINE | Facility: CLINIC | Age: 44
End: 2019-04-05

## 2019-04-05 DIAGNOSIS — R53.83 FATIGUE, UNSPECIFIED TYPE: ICD-10-CM

## 2019-04-05 DIAGNOSIS — E03.9 HYPOTHYROIDISM (ACQUIRED): Primary | ICD-10-CM

## 2019-04-05 DIAGNOSIS — Z79.899 LONG TERM USE OF DRUG: ICD-10-CM

## 2019-04-05 NOTE — TELEPHONE ENCOUNTER
Pt states she will not be off in time for her post op appointment today and would like to reschedule for a different day. Your schedule is full, would you be able to notify me if there is a slot that we can override to put her in within the next few weeks. Thanks so much.

## 2019-04-10 ENCOUNTER — CLINICAL SUPPORT (OUTPATIENT)
Dept: CARDIOLOGY | Facility: CLINIC | Age: 44
End: 2019-04-10
Attending: INTERNAL MEDICINE
Payer: COMMERCIAL

## 2019-04-10 ENCOUNTER — LAB VISIT (OUTPATIENT)
Dept: LAB | Facility: HOSPITAL | Age: 44
End: 2019-04-10
Attending: FAMILY MEDICINE
Payer: COMMERCIAL

## 2019-04-10 ENCOUNTER — PATIENT MESSAGE (OUTPATIENT)
Dept: OBSTETRICS AND GYNECOLOGY | Facility: CLINIC | Age: 44
End: 2019-04-10

## 2019-04-10 DIAGNOSIS — I10 ESSENTIAL HYPERTENSION: ICD-10-CM

## 2019-04-10 DIAGNOSIS — R07.9 CHEST PAIN SYNDROME: ICD-10-CM

## 2019-04-10 DIAGNOSIS — R94.31 ABNORMAL EKG: ICD-10-CM

## 2019-04-10 DIAGNOSIS — R53.83 FATIGUE, UNSPECIFIED TYPE: ICD-10-CM

## 2019-04-10 DIAGNOSIS — Z79.899 LONG TERM USE OF DRUG: ICD-10-CM

## 2019-04-10 DIAGNOSIS — E03.9 HYPOTHYROIDISM (ACQUIRED): ICD-10-CM

## 2019-04-10 LAB
BASOPHILS # BLD AUTO: 0.06 K/UL (ref 0–0.2)
BASOPHILS NFR BLD: 0.4 % (ref 0–1.9)
DIASTOLIC DYSFUNCTION: NO
DIFFERENTIAL METHOD: ABNORMAL
EOSINOPHIL # BLD AUTO: 0.1 K/UL (ref 0–0.5)
EOSINOPHIL NFR BLD: 0.7 % (ref 0–8)
ERYTHROCYTE [DISTWIDTH] IN BLOOD BY AUTOMATED COUNT: 14.6 % (ref 11.5–14.5)
HCT VFR BLD AUTO: 39.7 % (ref 37–48.5)
HGB BLD-MCNC: 12.9 G/DL (ref 12–16)
IMM GRANULOCYTES # BLD AUTO: 0.09 K/UL (ref 0–0.04)
IMM GRANULOCYTES NFR BLD AUTO: 0.6 % (ref 0–0.5)
LYMPHOCYTES # BLD AUTO: 4.8 K/UL (ref 1–4.8)
LYMPHOCYTES NFR BLD: 34.3 % (ref 18–48)
MCH RBC QN AUTO: 27.6 PG (ref 27–31)
MCHC RBC AUTO-ENTMCNC: 32.5 G/DL (ref 32–36)
MCV RBC AUTO: 85 FL (ref 82–98)
MONOCYTES # BLD AUTO: 1.2 K/UL (ref 0.3–1)
MONOCYTES NFR BLD: 8.5 % (ref 4–15)
NEUTROPHILS # BLD AUTO: 7.7 K/UL (ref 1.8–7.7)
NEUTROPHILS NFR BLD: 55.5 % (ref 38–73)
NRBC BLD-RTO: 0 /100 WBC
PLATELET # BLD AUTO: 271 K/UL (ref 150–350)
PMV BLD AUTO: 12.7 FL (ref 9.2–12.9)
RBC # BLD AUTO: 4.68 M/UL (ref 4–5.4)
TSH SERPL DL<=0.005 MIU/L-ACNC: 131.21 UIU/ML (ref 0.4–4)
WBC # BLD AUTO: 13.97 K/UL (ref 3.9–12.7)

## 2019-04-10 PROCEDURE — 84443 ASSAY THYROID STIM HORMONE: CPT

## 2019-04-10 PROCEDURE — 36415 COLL VENOUS BLD VENIPUNCTURE: CPT | Mod: PO

## 2019-04-10 PROCEDURE — 93015 CV STRESS TEST SUPVJ I&R: CPT | Mod: S$GLB,,, | Performed by: INTERNAL MEDICINE

## 2019-04-10 PROCEDURE — 85025 COMPLETE CBC W/AUTO DIFF WBC: CPT

## 2019-04-10 PROCEDURE — 84439 ASSAY OF FREE THYROXINE: CPT

## 2019-04-10 PROCEDURE — 93015 CARDIAC TREADMILL STRESS TEST: ICD-10-PCS | Mod: S$GLB,,, | Performed by: INTERNAL MEDICINE

## 2019-04-11 ENCOUNTER — OFFICE VISIT (OUTPATIENT)
Dept: OBSTETRICS AND GYNECOLOGY | Facility: CLINIC | Age: 44
End: 2019-04-11
Payer: COMMERCIAL

## 2019-04-11 ENCOUNTER — PATIENT MESSAGE (OUTPATIENT)
Dept: FAMILY MEDICINE | Facility: CLINIC | Age: 44
End: 2019-04-11

## 2019-04-11 VITALS
HEIGHT: 64 IN | WEIGHT: 172.19 LBS | DIASTOLIC BLOOD PRESSURE: 82 MMHG | BODY MASS INDEX: 29.4 KG/M2 | SYSTOLIC BLOOD PRESSURE: 112 MMHG

## 2019-04-11 DIAGNOSIS — Z98.890 POST-OPERATIVE STATE: Primary | ICD-10-CM

## 2019-04-11 LAB — T4 FREE SERPL-MCNC: 0.43 NG/DL (ref 0.71–1.51)

## 2019-04-11 PROCEDURE — 99024 PR POST-OP FOLLOW-UP VISIT: ICD-10-PCS | Mod: S$GLB,,, | Performed by: OBSTETRICS & GYNECOLOGY

## 2019-04-11 PROCEDURE — 99999 PR PBB SHADOW E&M-EST. PATIENT-LVL III: ICD-10-PCS | Mod: PBBFAC,,, | Performed by: OBSTETRICS & GYNECOLOGY

## 2019-04-11 PROCEDURE — 99024 POSTOP FOLLOW-UP VISIT: CPT | Mod: S$GLB,,, | Performed by: OBSTETRICS & GYNECOLOGY

## 2019-04-11 PROCEDURE — 99999 PR PBB SHADOW E&M-EST. PATIENT-LVL III: CPT | Mod: PBBFAC,,, | Performed by: OBSTETRICS & GYNECOLOGY

## 2019-04-11 RX ORDER — LEVOTHYROXINE SODIUM 75 UG/1
75 TABLET ORAL
Qty: 30 TABLET | Refills: 1 | Status: SHIPPED | OUTPATIENT
Start: 2019-04-11 | End: 2021-03-29

## 2019-04-11 NOTE — PROGRESS NOTES
Patient presents today for postoperative follow up.  Pt. underwent Diagnostic laparoscopy, drainage of left ovarian cyst, cauterization of endometriosis, D&C, [place] on 2/26/19.  Patient is recovering well and has no complaints today.    Pathology report reviewed.    Physical Exam:  General - no acute distress  Abdomen - soft, nontender and nondistended.  No masses.  Incisions are well healed with no evidence of infection.  Pelvic exam - normal  Extremities - nontender and no edema noted.    Encounter Diagnoses   Name Primary?    Post-operative state Yes       PLAN:  Follow up as needed.

## 2019-04-12 ENCOUNTER — TELEPHONE (OUTPATIENT)
Dept: CARDIOLOGY | Facility: CLINIC | Age: 44
End: 2019-04-12

## 2019-04-12 NOTE — TELEPHONE ENCOUNTER
The patient has been notified of this information and all questions answered. Voiced understand

## 2019-04-22 ENCOUNTER — HOSPITAL ENCOUNTER (EMERGENCY)
Facility: HOSPITAL | Age: 44
Discharge: HOME | End: 2019-04-22
Attending: EMERGENCY MEDICINE
Payer: COMMERCIAL

## 2019-04-22 VITALS
HEIGHT: 64 IN | SYSTOLIC BLOOD PRESSURE: 154 MMHG | RESPIRATION RATE: 16 BRPM | HEART RATE: 87 BPM | BODY MASS INDEX: 28.92 KG/M2 | DIASTOLIC BLOOD PRESSURE: 101 MMHG | TEMPERATURE: 98 F | WEIGHT: 169.38 LBS | OXYGEN SATURATION: 98 %

## 2019-04-22 DIAGNOSIS — D72.829 LEUKOCYTOSIS, UNSPECIFIED TYPE: ICD-10-CM

## 2019-04-22 DIAGNOSIS — R07.9 CHEST PAIN: Primary | ICD-10-CM

## 2019-04-22 LAB
ALBUMIN SERPL BCP-MCNC: 4.7 G/DL (ref 3.5–5.2)
ALP SERPL-CCNC: 61 U/L (ref 55–135)
ALT SERPL W/O P-5'-P-CCNC: 17 U/L (ref 10–44)
ANION GAP SERPL CALC-SCNC: 14 MMOL/L (ref 8–16)
AST SERPL-CCNC: 20 U/L (ref 10–40)
BACTERIA #/AREA URNS HPF: NORMAL /HPF
BASOPHILS # BLD AUTO: 0.03 K/UL (ref 0–0.2)
BASOPHILS NFR BLD: 0.2 % (ref 0–1.9)
BILIRUB SERPL-MCNC: 0.5 MG/DL (ref 0.1–1)
BILIRUB UR QL STRIP: NEGATIVE
BNP SERPL-MCNC: 29 PG/ML (ref 0–99)
BUN SERPL-MCNC: 8 MG/DL (ref 6–20)
CALCIUM SERPL-MCNC: 10.1 MG/DL (ref 8.7–10.5)
CHLORIDE SERPL-SCNC: 103 MMOL/L (ref 95–110)
CK SERPL-CCNC: 93 U/L (ref 20–180)
CLARITY UR: CLEAR
CO2 SERPL-SCNC: 25 MMOL/L (ref 23–29)
COLOR UR: YELLOW
CREAT SERPL-MCNC: 0.9 MG/DL (ref 0.5–1.4)
DIFFERENTIAL METHOD: ABNORMAL
EOSINOPHIL # BLD AUTO: 0 K/UL (ref 0–0.5)
EOSINOPHIL NFR BLD: 0.2 % (ref 0–8)
ERYTHROCYTE [DISTWIDTH] IN BLOOD BY AUTOMATED COUNT: 14 % (ref 11.5–14.5)
EST. GFR  (AFRICAN AMERICAN): >60 ML/MIN/1.73 M^2
EST. GFR  (NON AFRICAN AMERICAN): >60 ML/MIN/1.73 M^2
GLUCOSE SERPL-MCNC: 102 MG/DL (ref 70–110)
GLUCOSE UR QL STRIP: NEGATIVE
HCT VFR BLD AUTO: 44.9 % (ref 37–48.5)
HGB BLD-MCNC: 14.9 G/DL (ref 12–16)
HGB UR QL STRIP: ABNORMAL
HYALINE CASTS #/AREA URNS LPF: 0 /LPF
KETONES UR QL STRIP: NEGATIVE
LEUKOCYTE ESTERASE UR QL STRIP: NEGATIVE
LIPASE SERPL-CCNC: 8 U/L (ref 4–60)
LYMPHOCYTES # BLD AUTO: 2.3 K/UL (ref 1–4.8)
LYMPHOCYTES NFR BLD: 13.5 % (ref 18–48)
MCH RBC QN AUTO: 28.5 PG (ref 27–31)
MCHC RBC AUTO-ENTMCNC: 33.2 G/DL (ref 32–36)
MCV RBC AUTO: 86 FL (ref 82–98)
MICROSCOPIC COMMENT: NORMAL
MONOCYTES # BLD AUTO: 0.7 K/UL (ref 0.3–1)
MONOCYTES NFR BLD: 4.2 % (ref 4–15)
NEUTROPHILS # BLD AUTO: 14 K/UL (ref 1.8–7.7)
NEUTROPHILS NFR BLD: 81.9 % (ref 38–73)
NITRITE UR QL STRIP: NEGATIVE
PH UR STRIP: 6 [PH] (ref 5–8)
PLATELET # BLD AUTO: 313 K/UL (ref 150–350)
PMV BLD AUTO: 11.8 FL (ref 9.2–12.9)
POTASSIUM SERPL-SCNC: 3.7 MMOL/L (ref 3.5–5.1)
PROT SERPL-MCNC: 9.2 G/DL (ref 6–8.4)
PROT UR QL STRIP: ABNORMAL
RBC # BLD AUTO: 5.23 M/UL (ref 4–5.4)
RBC #/AREA URNS HPF: 3 /HPF (ref 0–4)
SODIUM SERPL-SCNC: 142 MMOL/L (ref 136–145)
SP GR UR STRIP: 1.02 (ref 1–1.03)
SQUAMOUS #/AREA URNS HPF: 4 /HPF
TROPONIN I SERPL DL<=0.01 NG/ML-MCNC: <0.006 NG/ML (ref 0–0.03)
URN SPEC COLLECT METH UR: ABNORMAL
UROBILINOGEN UR STRIP-ACNC: NEGATIVE EU/DL
WBC # BLD AUTO: 17.08 K/UL (ref 3.9–12.7)
WBC #/AREA URNS HPF: 2 /HPF (ref 0–5)

## 2019-04-22 PROCEDURE — 80053 COMPREHEN METABOLIC PANEL: CPT

## 2019-04-22 PROCEDURE — 82550 ASSAY OF CK (CPK): CPT

## 2019-04-22 PROCEDURE — 83690 ASSAY OF LIPASE: CPT

## 2019-04-22 PROCEDURE — 93010 EKG 12-LEAD: ICD-10-PCS | Mod: ,,, | Performed by: INTERNAL MEDICINE

## 2019-04-22 PROCEDURE — 83880 ASSAY OF NATRIURETIC PEPTIDE: CPT

## 2019-04-22 PROCEDURE — 84484 ASSAY OF TROPONIN QUANT: CPT

## 2019-04-22 PROCEDURE — 85025 COMPLETE CBC W/AUTO DIFF WBC: CPT

## 2019-04-22 PROCEDURE — 93010 ELECTROCARDIOGRAM REPORT: CPT | Mod: ,,, | Performed by: INTERNAL MEDICINE

## 2019-04-22 PROCEDURE — 99285 EMERGENCY DEPT VISIT HI MDM: CPT

## 2019-04-22 PROCEDURE — 25000003 PHARM REV CODE 250: Performed by: EMERGENCY MEDICINE

## 2019-04-22 PROCEDURE — 81000 URINALYSIS NONAUTO W/SCOPE: CPT

## 2019-04-22 PROCEDURE — 93005 ELECTROCARDIOGRAM TRACING: CPT

## 2019-04-22 RX ADMIN — DICYCLOMINE HYDROCHLORIDE 50 ML: 10 SOLUTION ORAL at 09:04

## 2019-04-22 NOTE — ED PROVIDER NOTES
SCRIBE #1 NOTE: I, Jose Maurice, am scribing for, and in the presence of, Bhaskar Miner MD. I have scribed the entire note.      History      Chief Complaint   Patient presents with    Chest Pain     began while getting dressed for work. pt reports negative stress test 2 weeks ago       Review of patient's allergies indicates:   Allergen Reactions    Sumatriptan Other (See Comments)     Chest tightness that moved into her throat        HPI   HPI    4/22/2019, 9:05 AM   History obtained from the patient      History of Present Illness: Florecita Noel is a 43 y.o. female patient with a PMHx of HTN and anemia who presents to the Emergency Department for chest pain, onset last night. Symptoms are constant and moderate in severity. No mitigating or exacerbating factors reported. No associated sxs reported. Patient denies any fever, chills, SOB, dizziness, chest tightness, weakness, and all other sxs at this time. No prior Tx repoted. No further complaints or concerns at this time.     Arrival mode: Personal vehicle    PCP: Alesha Alonso MD       Past Medical History:  Past Medical History:   Diagnosis Date    Abnormal Pap smear 1996    Anemia     Hypertension     Hypokalemia     in pregnancy    Hypothyroidism (acquired) 12/28/2018       Past Surgical History:  Past Surgical History:   Procedure Laterality Date    CHOLECYSTECTOMY      COSMETIC SURGERY      tummy tuck    DRAINAGE, CYST, OVARY, LAPAROSCOPIC Left 2/26/2019    [facility]    FULGURATION, ENDOMETRIOSIS N/A 2/26/2019    [facility] OR    HYSTEROSCOPY, WITH DILATION AND CURETTAGE OF UTERUS AND HYDROTHERMAL ENDOMETRIAL ABLATION N/A 2/26/2019    [facility] at [facility] OR    LAPAROSCOPY, DIAGNOSTIC N/A 2/26/2019    [facility] OR    OCCLUSION, FALLOPIAN TUBE, LAPAROSCOPIC, USING DEVICE Bilateral 2/25/2014     [facility]    TUBAL LIGATION      Tummy Tuck           Family History:  Family History   Problem Relation Age of Onset    Cancer Father     Heart disease Maternal Grandmother        Social History:  Social History     Tobacco Use    Smoking status: Never Smoker    Smokeless tobacco: Never Used   Substance and Sexual Activity    Alcohol use: Yes     Alcohol/week: 0.0 oz     Frequency: 2-3 times a week     Comment: socially   No alcohol 72h prior to sx    Drug use: No    Sexual activity: Yes     Partners: Male       ROS   Review of Systems   Constitutional: Negative for chills, diaphoresis and fever.   HENT: Negative for sore throat.    Respiratory: Negative for cough, chest tightness, shortness of breath and wheezing.    Cardiovascular: Positive for chest pain.   Gastrointestinal: Negative for diarrhea, nausea and vomiting.   Genitourinary: Negative for dysuria.   Musculoskeletal: Negative for back pain.   Skin: Negative for rash.   Neurological: Negative for dizziness, weakness, light-headedness, numbness and headaches.   Hematological: Does not bruise/bleed easily.   All other systems reviewed and are negative.      Physical Exam      Initial Vitals   BP Pulse Resp Temp SpO2   04/22/19 0856 04/22/19 0856 04/22/19 0856 04/22/19 0903 04/22/19 0856   (!) 181/106 79 15 98.1 °F (36.7 °C) 100 %      MAP       --                 Physical Exam  Nursing Notes and Vital Signs Reviewed.  Constitutional: Patient is in no acute distress. Well-developed and well-nourished.  Head: Atraumatic. Normocephalic.  Eyes: PERRL. EOM intact. Conjunctivae are not pale. No scleral icterus.  ENT: Mucous membranes are moist. Oropharynx is clear and symmetric.    Neck: Supple. Full ROM. No lymphadenopathy.  Cardiovascular: Regular rate. Regular rhythm. No murmurs, rubs, or gallops. Distal pulses are 2+ and symmetric.  Pulmonary/Chest: No respiratory distress. Clear to auscultation bilaterally.  "No wheezing or rales.  Abdominal: Soft and non-distended.  There is no tenderness.  No rebound, guarding, or rigidity. Good bowel sounds.  Musculoskeletal: Moves all extremities. No obvious deformities. No edema. No calf tenderness.  Skin: Warm and dry.  Neurological:  Alert, awake, and appropriate.  Normal speech.  No acute focal neurological deficits are appreciated.  Psychiatric: Normal affect. Good eye contact. Appropriate in content.    ED Course    Procedures  ED Vital Signs:  Vitals:    04/22/19 0856 04/22/19 0900 04/22/19 0903 04/22/19 0948   BP: (!) 181/106      Pulse: 79 78  75   Resp: 15   17   Temp:   98.1 °F (36.7 °C)    TempSrc: Oral  Oral    SpO2: 100%   99%   Weight: 76.8 kg (169 lb 6.4 oz)      Height: 5' 4" (1.626 m)          Abnormal Lab Results:  Labs Reviewed   CBC W/ AUTO DIFFERENTIAL - Abnormal; Notable for the following components:       Result Value    WBC 17.08 (*)     Gran # (ANC) 14.0 (*)     Gran% 81.9 (*)     Lymph% 13.5 (*)     All other components within normal limits   COMPREHENSIVE METABOLIC PANEL - Abnormal; Notable for the following components:    Total Protein 9.2 (*)     All other components within normal limits   LIPASE   B-TYPE NATRIURETIC PEPTIDE   CK   TROPONIN I   URINALYSIS, REFLEX TO URINE CULTURE   URINALYSIS MICROSCOPIC        All Lab Results:  Results for orders placed or performed during the hospital encounter of 04/22/19   CBC auto differential   Result Value Ref Range    WBC 17.08 (H) 3.90 - 12.70 K/uL    RBC 5.23 4.00 - 5.40 M/uL    Hemoglobin 14.9 12.0 - 16.0 g/dL    Hematocrit 44.9 37.0 - 48.5 %    MCV 86 82 - 98 fL    MCH 28.5 27.0 - 31.0 pg    MCHC 33.2 32.0 - 36.0 g/dL    RDW 14.0 11.5 - 14.5 %    Platelets 313 150 - 350 K/uL    MPV 11.8 9.2 - 12.9 fL    Gran # (ANC) 14.0 (H) 1.8 - 7.7 K/uL    Lymph # 2.3 1.0 - 4.8 K/uL    Mono # 0.7 0.3 - 1.0 K/uL    Eos # 0.0 0.0 - 0.5 K/uL    Baso # 0.03 0.00 - 0.20 K/uL    Gran% 81.9 (H) 38.0 - 73.0 %    Lymph% 13.5 (L) " 18.0 - 48.0 %    Mono% 4.2 4.0 - 15.0 %    Eosinophil% 0.2 0.0 - 8.0 %    Basophil% 0.2 0.0 - 1.9 %    Differential Method Automated    Comprehensive metabolic panel   Result Value Ref Range    Sodium 142 136 - 145 mmol/L    Potassium 3.7 3.5 - 5.1 mmol/L    Chloride 103 95 - 110 mmol/L    CO2 25 23 - 29 mmol/L    Glucose 102 70 - 110 mg/dL    BUN, Bld 8 6 - 20 mg/dL    Creatinine 0.9 0.5 - 1.4 mg/dL    Calcium 10.1 8.7 - 10.5 mg/dL    Total Protein 9.2 (H) 6.0 - 8.4 g/dL    Albumin 4.7 3.5 - 5.2 g/dL    Total Bilirubin 0.5 0.1 - 1.0 mg/dL    Alkaline Phosphatase 61 55 - 135 U/L    AST 20 10 - 40 U/L    ALT 17 10 - 44 U/L    Anion Gap 14 8 - 16 mmol/L    eGFR if African American >60 >60 mL/min/1.73 m^2    eGFR if non African American >60 >60 mL/min/1.73 m^2   Lipase   Result Value Ref Range    Lipase 8 4 - 60 U/L   Brain natriuretic peptide   Result Value Ref Range    BNP 29 0 - 99 pg/mL   CK   Result Value Ref Range    CPK 93 20 - 180 U/L   Troponin I   Result Value Ref Range    Troponin I <0.006 0.000 - 0.026 ng/mL       Imaging Results:  Imaging Results          X-Ray Chest PA And Lateral (Final result)  Result time 04/22/19 09:21:52    Final result by Theodore Grijalva MD (04/22/19 09:21:52)                 Impression:      No acute abnormality.      Electronically signed by: Theodore Grijalva  Date:    04/22/2019  Time:    09:21             Narrative:    EXAMINATION:  XR CHEST PA AND LATERAL    CLINICAL HISTORY:  chest pain;    TECHNIQUE:  PA and lateral views of the chest were performed.    COMPARISON:  None    FINDINGS:  The lungs are clear, with normal appearance of pulmonary vasculature and no pleural effusion or pneumothorax.    The cardiac silhouette is normal in size. The hilar and mediastinal contours are unremarkable.    Bones are intact.                               The EKG was ordered, reviewed, and independently interpreted by the ED provider.  Interpretation time: 8:56  Rate: 81 BPM  Rhythm: normal sinus  rhythm  Interpretation: LVH. Nonspecific T wave abnormality. No STEMI.    The Emergency Provider reviewed the vital signs and test results, which are outlined above.    ED Discussion     10:18 AM: Reassessed pt at this time. Discussed with pt all pertinent ED information and results. Discussed pt dx and plan of tx. Gave pt all f/u and return to the ED instructions. All questions and concerns were addressed at this time. Pt expresses understanding of information and instructions, and is comfortable with plan to discharge. Pt is stable for discharge.    I discussed with patient and/or family/caretaker that evaluation in the ED does not suggest any emergent or life threatening medical conditions requiring immediate intervention beyond what was provided in the ED, and I believe patient is safe for discharge.  Regardless, an unremarkable evaluation in the ED does not preclude the development or presence of a serious of life threatening condition. As such, patient was instructed to return immediately for any worsening or change in current symptoms.    ED Medication(s):  Medications   GI cocktail (mylanta 30 mL, lidocaine 2 % viscous 10 mL, dicyclomine 10 mL) 50 mL (50 mLs Oral Given 4/22/19 0924)       Follow-up Information     Alesha Alonso MD In 2 days.    Specialty:  Family Medicine  Contact information:  [address] [facility] [address] [phone]                  New Prescriptions    No medications on file         Medical Decision Making    Medical Decision Making:   Clinical Tests:   Lab Tests: Ordered and Reviewed  Radiological Study: Ordered and Reviewed  Medical Tests: Ordered and Reviewed           Scribe Attestation:   Scribe #1: I performed the above scribed service and the documentation accurately describes the services I performed. I attest to the accuracy of the note.    Attending:   Physician Attestation Statement for Scribe #1: I, Bhaskar Miner MD, personally performed the services  described in this documentation, as scribed by Jose Maurice, in my presence, and it is both accurate and complete.          Clinical Impression       ICD-10-CM ICD-9-CM   1. Chest pain R07.9 786.50   2. Leukocytosis, unspecified type D72.829 288.60       Disposition:   Disposition: Discharged  Condition: Stable         Bhaskar Miner MD  04/22/19 1038

## 2019-05-08 ENCOUNTER — PATIENT MESSAGE (OUTPATIENT)
Dept: OBSTETRICS AND GYNECOLOGY | Facility: CLINIC | Age: 44
End: 2019-05-08

## 2019-05-08 RX ORDER — IBUPROFEN 800 MG/1
TABLET ORAL
Qty: 20 TABLET | Refills: 0 | Status: SHIPPED | OUTPATIENT
Start: 2019-05-08 | End: 2019-08-27 | Stop reason: SDUPTHER

## 2019-05-13 ENCOUNTER — PATIENT MESSAGE (OUTPATIENT)
Dept: FAMILY MEDICINE | Facility: CLINIC | Age: 44
End: 2019-05-13

## 2019-05-13 ENCOUNTER — TELEPHONE (OUTPATIENT)
Dept: FAMILY MEDICINE | Facility: CLINIC | Age: 44
End: 2019-05-13

## 2019-05-13 NOTE — TELEPHONE ENCOUNTER
----- Message from Alesha Alonso MD sent at 5/13/2019 12:55 PM CDT -----  Please call patient and remind her to come in at the beginning of June for repeat thyroid testing.   ----- Message -----  From: SYSTEM  Sent: 5/11/2019  12:14 AM  To: Alesha Alonso MD

## 2019-07-02 ENCOUNTER — PATIENT MESSAGE (OUTPATIENT)
Dept: OBSTETRICS AND GYNECOLOGY | Facility: CLINIC | Age: 44
End: 2019-07-02

## 2019-08-02 ENCOUNTER — OFFICE VISIT (OUTPATIENT)
Dept: OBSTETRICS AND GYNECOLOGY | Facility: CLINIC | Age: 44
End: 2019-08-02
Payer: COMMERCIAL

## 2019-08-02 DIAGNOSIS — D21.9 FIBROIDS: ICD-10-CM

## 2019-08-02 DIAGNOSIS — N93.9 ABNORMAL UTERINE BLEEDING: Primary | ICD-10-CM

## 2019-08-02 DIAGNOSIS — N80.9 ENDOMETRIOSIS: ICD-10-CM

## 2019-08-02 DIAGNOSIS — N94.10 DYSPAREUNIA IN FEMALE: ICD-10-CM

## 2019-08-02 PROCEDURE — 99999 PR PBB SHADOW E&M-EST. PATIENT-LVL II: ICD-10-PCS | Mod: PBBFAC,,, | Performed by: OBSTETRICS & GYNECOLOGY

## 2019-08-02 PROCEDURE — 99213 OFFICE O/P EST LOW 20 MIN: CPT | Mod: S$GLB,,, | Performed by: OBSTETRICS & GYNECOLOGY

## 2019-08-02 PROCEDURE — 99999 PR PBB SHADOW E&M-EST. PATIENT-LVL II: CPT | Mod: PBBFAC,,, | Performed by: OBSTETRICS & GYNECOLOGY

## 2019-08-02 PROCEDURE — 99213 PR OFFICE/OUTPT VISIT, EST, LEVL III, 20-29 MIN: ICD-10-PCS | Mod: S$GLB,,, | Performed by: OBSTETRICS & GYNECOLOGY

## 2019-08-02 NOTE — PROGRESS NOTES
HPI:  43 y.o. female  presents today with complaint of continued prolonged and heavy menses.  Menses are regular, lasting 8-9 days each with moderately heavy flow.  Patient also with daily pelvic pain and with deep dyspareunia.  S/P Novasure ablation and laparoscopic ablation of endometriosis on 19.  Patient interested in hysterectomy for definitive management.    Past Medical History:   Diagnosis Date    Abnormal Pap smear     Anemia     Hypertension     Hypokalemia     in pregnancy    Hypothyroidism (acquired) 2018        Past Surgical History:   Procedure Laterality Date    CHOLECYSTECTOMY      COSMETIC SURGERY      tummy tusrinivasan    DRAINAGE, CYST, OVARY, LAPAROSCOPIC Left 2019    [facility]    FULGURATION, ENDOMETRIOSIS N/A 2019    [facility] at [facility] OR    HYSTEROSCOPY, WITH DILATION AND CURETTAGE OF UTERUS AND HYDROTHERMAL ENDOMETRIAL ABLATION N/A 2019    [facility] at [facility] OR    LAPAROSCOPY, DIAGNOSTIC N/A 2019    [facility] at [facility] OR    OCCLUSION, FALLOPIAN TUBE, LAPAROSCOPIC, USING DEVICE Bilateral 2014    [facility] at [facility] OR    TUBAL LIGATION      Tummy Tuck          OB History    Para Term  AB Living   7 6 6   1 6   SAB TAB Ectopic Multiple Live Births           7      # Outcome Date GA Lbr Jarred/2nd Weight Sex Delivery Anes PTL Lv   7 Term 14 37w1d / 00:08  M Vag-Spont None Y MADDISON   6 Term 12 38w0d  3.005 kg (6 lb 10 oz) F Vag-Spont EPI N MADDISON   5 Term 05 40w0d  3.175 kg (7 lb) F Vag-Spont None N MADDISON   4 Term 98   3.005 kg (6 lb 10 oz) M Vag-Spont EPI N MADDISON   3 Term 96 40w0d  3.005 kg (6 lb 10 oz) M Vag-Spont EPI N MADDISON   2 AB 1996        DEC   1 Term 92 40w0d  3.856 kg (8 lb 8 oz) M Vag-Spont EPI N MADDISON        Social History     Social History  Narrative    Not on file          REVIEW OF SYSTEMS:  GENERAL:  No fever, chills, fatigue, or weight loss  ABDOMEN:  Normal appetite, no weight loss or nausea  URINARY:  No flank pain, dysuria, or hematuria  REPRODUCTIVE:  No abnormal vaginal discharge  BREASTS:  No tenderness, masses, or nipple discharge noted of breasts    PHYSICAL EXAM:    APPEARANCE:  Well nourished, well developed, in no acute distress  ABDOMEN:  Soft, no tenderness or masses, no distension noted  PELVIC:  VULVA:  No lesions.  Normal female genitalia  URETHRAL MEATUS:  Normal size and location.  No lesions.  No prolapse  URETHRA:  No masses, tenderness, prolapse, or scarring  VAGINA:  No lesions or discharge.  No significant cystocele or rectocele  CERVIX:  No lesions.  Normal diameter, positive cervical motion tenderness.  UTERUS:  10 week size, regular shape, mobile, tender, normal position, good support  ADNEXA:  No masses or tenderness on right side, left side is tender to palpation.  ANUS AND PERINEUM:  No lesions.  No external hemorrhoids    ASSESSMENT:  1. Abnormal uterine bleeding  - US Pelvis Comp with Transvag NON-OB (xpd; Future    2. Fibroids    3. Dyspareunia in female    4. Endometriosis         PLAN:  Discussed options with patient.  She declines any type of hormonal contraception, due to issues with side effects in the past.  She would like to proceed with ROXANA unsure about ovaries.  Risks vs benefits discussed with her of ovary removal vs leaving in place.  Will check pelvic u/s to see if ovarian cysts or abnormalities are present.  May consider leaving right ovary in place if normal on u/s.

## 2019-08-27 RX ORDER — IBUPROFEN 800 MG/1
800 TABLET ORAL 3 TIMES DAILY
Qty: 20 TABLET | Refills: 0 | Status: SHIPPED | OUTPATIENT
Start: 2019-08-27 | End: 2020-07-10 | Stop reason: SDUPTHER

## 2019-08-29 ENCOUNTER — TELEPHONE (OUTPATIENT)
Dept: RADIOLOGY | Facility: HOSPITAL | Age: 44
End: 2019-08-29

## 2019-08-30 ENCOUNTER — TELEPHONE (OUTPATIENT)
Dept: OBSTETRICS AND GYNECOLOGY | Facility: CLINIC | Age: 44
End: 2019-08-30

## 2019-08-30 ENCOUNTER — HOSPITAL ENCOUNTER (OUTPATIENT)
Dept: RADIOLOGY | Facility: HOSPITAL | Age: 44
Discharge: HOME | End: 2019-08-30
Attending: OBSTETRICS & GYNECOLOGY
Payer: COMMERCIAL

## 2019-08-30 DIAGNOSIS — N93.9 ABNORMAL UTERINE BLEEDING: ICD-10-CM

## 2019-08-30 PROCEDURE — 76856 US EXAM PELVIC COMPLETE: CPT | Mod: 26,,, | Performed by: RADIOLOGY

## 2019-08-30 PROCEDURE — 76830 US PELVIS COMP WITH TRANSVAG NON-OB (XPD): ICD-10-PCS | Mod: 26,,, | Performed by: RADIOLOGY

## 2019-08-30 PROCEDURE — 76830 TRANSVAGINAL US NON-OB: CPT | Mod: 26,,, | Performed by: RADIOLOGY

## 2019-08-30 PROCEDURE — 76830 TRANSVAGINAL US NON-OB: CPT | Mod: TC

## 2019-08-30 PROCEDURE — 76856 US PELVIS COMP WITH TRANSVAG NON-OB (XPD): ICD-10-PCS | Mod: 26,,, | Performed by: RADIOLOGY

## 2019-09-03 ENCOUNTER — PATIENT MESSAGE (OUTPATIENT)
Dept: OBSTETRICS AND GYNECOLOGY | Facility: CLINIC | Age: 44
End: 2019-09-03

## 2019-09-03 ENCOUNTER — TELEPHONE (OUTPATIENT)
Dept: OBSTETRICS AND GYNECOLOGY | Facility: CLINIC | Age: 44
End: 2019-09-03

## 2019-09-03 DIAGNOSIS — D21.9 FIBROIDS: ICD-10-CM

## 2019-09-03 DIAGNOSIS — N80.9 ENDOMETRIOSIS: ICD-10-CM

## 2019-09-03 DIAGNOSIS — N93.9 ABNORMAL UTERINE BLEEDING: Primary | ICD-10-CM

## 2019-09-03 DIAGNOSIS — N94.10 DYSPAREUNIA IN FEMALE: ICD-10-CM

## 2019-11-01 ENCOUNTER — HOSPITAL ENCOUNTER (OUTPATIENT)
Dept: PREADMISSION TESTING | Facility: HOSPITAL | Age: 44
Discharge: HOME | End: 2019-11-01
Attending: OBSTETRICS & GYNECOLOGY
Payer: COMMERCIAL

## 2019-11-01 ENCOUNTER — OFFICE VISIT (OUTPATIENT)
Dept: OBSTETRICS AND GYNECOLOGY | Facility: CLINIC | Age: 44
End: 2019-11-01
Payer: COMMERCIAL

## 2019-11-01 VITALS — BODY MASS INDEX: 29.5 KG/M2 | WEIGHT: 172.81 LBS | HEIGHT: 64 IN

## 2019-11-01 VITALS
SYSTOLIC BLOOD PRESSURE: 128 MMHG | BODY MASS INDEX: 29.59 KG/M2 | WEIGHT: 173.31 LBS | HEIGHT: 64 IN | DIASTOLIC BLOOD PRESSURE: 82 MMHG

## 2019-11-01 DIAGNOSIS — N92.0 MENORRHAGIA WITH REGULAR CYCLE: ICD-10-CM

## 2019-11-01 DIAGNOSIS — Z01.818 PREOP EXAMINATION: Primary | ICD-10-CM

## 2019-11-01 DIAGNOSIS — D21.9 FIBROIDS: ICD-10-CM

## 2019-11-01 PROCEDURE — 99999 PR PBB SHADOW E&M-EST. PATIENT-LVL III: CPT | Mod: PBBFAC,,, | Performed by: OBSTETRICS & GYNECOLOGY

## 2019-11-01 PROCEDURE — 99499 UNLISTED E&M SERVICE: CPT | Mod: S$GLB,,, | Performed by: OBSTETRICS & GYNECOLOGY

## 2019-11-01 PROCEDURE — 99499 NO LOS: ICD-10-PCS | Mod: S$GLB,,, | Performed by: OBSTETRICS & GYNECOLOGY

## 2019-11-01 PROCEDURE — 99999 PR PBB SHADOW E&M-EST. PATIENT-LVL III: ICD-10-PCS | Mod: PBBFAC,,, | Performed by: OBSTETRICS & GYNECOLOGY

## 2019-11-01 NOTE — PROGRESS NOTES
Patient presents today for preoperative visit.  She is scheduled for RALH/BSO on 11/5/2019.  Procedure was explained to the patient and procedure specific consent form reviewed with the patient and signed by her.  All questions were answered to the patient's satisfaction.  Patient seems to understand the procedure, as well as risks and benefits associated with it.  Will plan to proceed with surgery as planned.  Risks vs benefits of ovarian conservation vs removal discussed.  In light of bilateral ovarian cysts, pelvic pain, and history of endometriosis, patient desires removal of both ovaries.

## 2019-11-05 ENCOUNTER — ANESTHESIA (OUTPATIENT)
Dept: SURGERY | Facility: HOSPITAL | Age: 44
End: 2019-11-05
Payer: COMMERCIAL

## 2019-11-05 ENCOUNTER — HOSPITAL ENCOUNTER (OUTPATIENT)
Facility: HOSPITAL | Age: 44
Discharge: HOME | End: 2019-11-05
Attending: OBSTETRICS & GYNECOLOGY | Admitting: OBSTETRICS & GYNECOLOGY
Payer: COMMERCIAL

## 2019-11-05 ENCOUNTER — ANESTHESIA EVENT (OUTPATIENT)
Dept: SURGERY | Facility: HOSPITAL | Age: 44
End: 2019-11-05
Payer: COMMERCIAL

## 2019-11-05 VITALS
TEMPERATURE: 98 F | RESPIRATION RATE: 15 BRPM | SYSTOLIC BLOOD PRESSURE: 112 MMHG | HEART RATE: 78 BPM | DIASTOLIC BLOOD PRESSURE: 59 MMHG | BODY MASS INDEX: 28.68 KG/M2 | OXYGEN SATURATION: 98 % | HEIGHT: 64 IN | WEIGHT: 168 LBS

## 2019-11-05 DIAGNOSIS — Z90.710 S/P LAPAROSCOPIC HYSTERECTOMY: Primary | ICD-10-CM

## 2019-11-05 DIAGNOSIS — D21.9 FIBROIDS: ICD-10-CM

## 2019-11-05 PROBLEM — N83.201 CYSTS OF BOTH OVARIES: Status: ACTIVE | Noted: 2019-02-23

## 2019-11-05 LAB
B-HCG UR QL: NEGATIVE
CTP QC/QA: YES
POCT GLUCOSE: 89 MG/DL (ref 70–110)

## 2019-11-05 PROCEDURE — 63600175 PHARM REV CODE 636 W HCPCS: Performed by: CLINIC/CENTER

## 2019-11-05 PROCEDURE — 25000003 PHARM REV CODE 250: Performed by: CLINIC/CENTER

## 2019-11-05 PROCEDURE — 88307 TISSUE SPECIMEN TO PATHOLOGY - SURGERY: ICD-10-PCS | Mod: 26,,, | Performed by: PATHOLOGY

## 2019-11-05 PROCEDURE — 37000008 HC ANESTHESIA 1ST 15 MINUTES: Performed by: OBSTETRICS & GYNECOLOGY

## 2019-11-05 PROCEDURE — 81025 URINE PREGNANCY TEST: CPT | Performed by: OBSTETRICS & GYNECOLOGY

## 2019-11-05 PROCEDURE — 71000039 HC RECOVERY, EACH ADD'L HOUR: Performed by: OBSTETRICS & GYNECOLOGY

## 2019-11-05 PROCEDURE — 71000015 HC POSTOP RECOV 1ST HR: Performed by: OBSTETRICS & GYNECOLOGY

## 2019-11-05 PROCEDURE — C2628 CATHETER, OCCLUSION: HCPCS | Performed by: OBSTETRICS & GYNECOLOGY

## 2019-11-05 PROCEDURE — 88307 TISSUE EXAM BY PATHOLOGIST: CPT | Performed by: PATHOLOGY

## 2019-11-05 PROCEDURE — 27201423 OPTIME MED/SURG SUP & DEVICES STERILE SUPPLY: Performed by: OBSTETRICS & GYNECOLOGY

## 2019-11-05 PROCEDURE — 63600175 PHARM REV CODE 636 W HCPCS: Performed by: ANESTHESIOLOGY

## 2019-11-05 PROCEDURE — 63600175 PHARM REV CODE 636 W HCPCS: Performed by: OBSTETRICS & GYNECOLOGY

## 2019-11-05 PROCEDURE — 36000713 HC OR TIME LEV V EA ADD 15 MIN: Performed by: OBSTETRICS & GYNECOLOGY

## 2019-11-05 PROCEDURE — 37000009 HC ANESTHESIA EA ADD 15 MINS: Performed by: OBSTETRICS & GYNECOLOGY

## 2019-11-05 PROCEDURE — 58571 TLH W/T/O 250 G OR LESS: CPT | Mod: ,,, | Performed by: OBSTETRICS & GYNECOLOGY

## 2019-11-05 PROCEDURE — 88307 TISSUE EXAM BY PATHOLOGIST: CPT | Mod: 26,,, | Performed by: PATHOLOGY

## 2019-11-05 PROCEDURE — 36000712 HC OR TIME LEV V 1ST 15 MIN: Performed by: OBSTETRICS & GYNECOLOGY

## 2019-11-05 PROCEDURE — 71000033 HC RECOVERY, INTIAL HOUR: Performed by: OBSTETRICS & GYNECOLOGY

## 2019-11-05 PROCEDURE — 25000003 PHARM REV CODE 250: Performed by: ANESTHESIOLOGY

## 2019-11-05 PROCEDURE — 58571 PR LAPAROSCOPY W TOT HYSTERECTUTERUS <=250 GRAM  W TUBE/OVARY: ICD-10-PCS | Mod: ,,, | Performed by: OBSTETRICS & GYNECOLOGY

## 2019-11-05 PROCEDURE — 71000016 HC POSTOP RECOV ADDL HR: Performed by: OBSTETRICS & GYNECOLOGY

## 2019-11-05 PROCEDURE — 25000003 PHARM REV CODE 250: Performed by: OBSTETRICS & GYNECOLOGY

## 2019-11-05 RX ORDER — OXYCODONE AND ACETAMINOPHEN 5; 325 MG/1; MG/1
1 TABLET ORAL EVERY 4 HOURS PRN
Status: DISCONTINUED | OUTPATIENT
Start: 2019-11-05 | End: 2019-11-05 | Stop reason: HOSPADM

## 2019-11-05 RX ORDER — MEPERIDINE HYDROCHLORIDE 50 MG/ML
12.5 INJECTION INTRAMUSCULAR; INTRAVENOUS; SUBCUTANEOUS ONCE AS NEEDED
Status: DISCONTINUED | OUTPATIENT
Start: 2019-11-05 | End: 2019-11-05 | Stop reason: HOSPADM

## 2019-11-05 RX ORDER — ACETAMINOPHEN 500 MG
1000 TABLET ORAL
Status: COMPLETED | OUTPATIENT
Start: 2019-11-05 | End: 2019-11-05

## 2019-11-05 RX ORDER — GLYCOPYRROLATE 0.2 MG/ML
INJECTION INTRAMUSCULAR; INTRAVENOUS
Status: DISCONTINUED | OUTPATIENT
Start: 2019-11-05 | End: 2019-11-05

## 2019-11-05 RX ORDER — FENTANYL CITRATE 50 UG/ML
INJECTION, SOLUTION INTRAMUSCULAR; INTRAVENOUS
Status: DISCONTINUED | OUTPATIENT
Start: 2019-11-05 | End: 2019-11-05

## 2019-11-05 RX ORDER — NEOSTIGMINE METHYLSULFATE 1 MG/ML
INJECTION, SOLUTION INTRAVENOUS
Status: DISCONTINUED | OUTPATIENT
Start: 2019-11-05 | End: 2019-11-05

## 2019-11-05 RX ORDER — ONDANSETRON 2 MG/ML
INJECTION INTRAMUSCULAR; INTRAVENOUS
Status: DISCONTINUED | OUTPATIENT
Start: 2019-11-05 | End: 2019-11-05

## 2019-11-05 RX ORDER — SODIUM CHLORIDE, SODIUM LACTATE, POTASSIUM CHLORIDE, CALCIUM CHLORIDE 600; 310; 30; 20 MG/100ML; MG/100ML; MG/100ML; MG/100ML
INJECTION, SOLUTION INTRAVENOUS CONTINUOUS
Status: DISCONTINUED | OUTPATIENT
Start: 2019-11-05 | End: 2019-11-05 | Stop reason: HOSPADM

## 2019-11-05 RX ORDER — ROCURONIUM BROMIDE 10 MG/ML
INJECTION, SOLUTION INTRAVENOUS
Status: DISCONTINUED | OUTPATIENT
Start: 2019-11-05 | End: 2019-11-05

## 2019-11-05 RX ORDER — SUCCINYLCHOLINE CHLORIDE 20 MG/ML
INJECTION INTRAMUSCULAR; INTRAVENOUS
Status: DISCONTINUED | OUTPATIENT
Start: 2019-11-05 | End: 2019-11-05

## 2019-11-05 RX ORDER — LIDOCAINE HYDROCHLORIDE 10 MG/ML
INJECTION, SOLUTION EPIDURAL; INFILTRATION; INTRACAUDAL; PERINEURAL
Status: DISCONTINUED | OUTPATIENT
Start: 2019-11-05 | End: 2019-11-05

## 2019-11-05 RX ORDER — METOCLOPRAMIDE HYDROCHLORIDE 5 MG/ML
10 INJECTION INTRAMUSCULAR; INTRAVENOUS EVERY 10 MIN PRN
Status: DISCONTINUED | OUTPATIENT
Start: 2019-11-05 | End: 2019-11-05 | Stop reason: HOSPADM

## 2019-11-05 RX ORDER — HYDROMORPHONE HYDROCHLORIDE 2 MG/ML
0.2 INJECTION, SOLUTION INTRAMUSCULAR; INTRAVENOUS; SUBCUTANEOUS EVERY 5 MIN PRN
Status: DISCONTINUED | OUTPATIENT
Start: 2019-11-05 | End: 2019-11-05 | Stop reason: HOSPADM

## 2019-11-05 RX ORDER — CEFAZOLIN SODIUM 2 G/50ML
2 SOLUTION INTRAVENOUS
Status: COMPLETED | OUTPATIENT
Start: 2019-11-05 | End: 2019-11-05

## 2019-11-05 RX ORDER — MIDAZOLAM HYDROCHLORIDE 1 MG/ML
INJECTION, SOLUTION INTRAMUSCULAR; INTRAVENOUS
Status: DISCONTINUED | OUTPATIENT
Start: 2019-11-05 | End: 2019-11-05

## 2019-11-05 RX ORDER — OXYCODONE AND ACETAMINOPHEN 5; 325 MG/1; MG/1
1 TABLET ORAL EVERY 4 HOURS PRN
Qty: 20 TABLET | Refills: 0 | Status: SHIPPED | OUTPATIENT
Start: 2019-11-05 | End: 2020-07-07

## 2019-11-05 RX ORDER — DIPHENHYDRAMINE HYDROCHLORIDE 50 MG/ML
25 INJECTION INTRAMUSCULAR; INTRAVENOUS EVERY 6 HOURS PRN
Status: DISCONTINUED | OUTPATIENT
Start: 2019-11-05 | End: 2019-11-05 | Stop reason: HOSPADM

## 2019-11-05 RX ORDER — PHENAZOPYRIDINE HYDROCHLORIDE 100 MG/1
200 TABLET, FILM COATED ORAL
Status: COMPLETED | OUTPATIENT
Start: 2019-11-05 | End: 2019-11-05

## 2019-11-05 RX ORDER — ESTRADIOL 0.05 MG/D
1 PATCH TRANSDERMAL
Qty: 4 PATCH | Refills: 11 | Status: SHIPPED | OUTPATIENT
Start: 2019-11-05 | End: 2019-12-05

## 2019-11-05 RX ORDER — IBUPROFEN 800 MG/1
800 TABLET ORAL EVERY 8 HOURS
Qty: 30 TABLET | Refills: 1 | Status: SHIPPED | OUTPATIENT
Start: 2019-11-05 | End: 2020-07-07 | Stop reason: SDUPTHER

## 2019-11-05 RX ORDER — KETOROLAC TROMETHAMINE 30 MG/ML
INJECTION, SOLUTION INTRAMUSCULAR; INTRAVENOUS
Status: DISCONTINUED | OUTPATIENT
Start: 2019-11-05 | End: 2019-11-05

## 2019-11-05 RX ORDER — SODIUM CHLORIDE 0.9 % (FLUSH) 0.9 %
3 SYRINGE (ML) INJECTION EVERY 8 HOURS
Status: DISCONTINUED | OUTPATIENT
Start: 2019-11-05 | End: 2019-11-05 | Stop reason: HOSPADM

## 2019-11-05 RX ORDER — PROPOFOL 10 MG/ML
VIAL (ML) INTRAVENOUS
Status: DISCONTINUED | OUTPATIENT
Start: 2019-11-05 | End: 2019-11-05

## 2019-11-05 RX ORDER — DEXAMETHASONE SODIUM PHOSPHATE 4 MG/ML
INJECTION, SOLUTION INTRA-ARTICULAR; INTRALESIONAL; INTRAMUSCULAR; INTRAVENOUS; SOFT TISSUE
Status: DISCONTINUED | OUTPATIENT
Start: 2019-11-05 | End: 2019-11-05

## 2019-11-05 RX ADMIN — CEFAZOLIN SODIUM 2 G: 2 SOLUTION INTRAVENOUS at 12:11

## 2019-11-05 RX ADMIN — DEXAMETHASONE SODIUM PHOSPHATE 4 MG: 4 INJECTION, SOLUTION INTRA-ARTICULAR; INTRALESIONAL; INTRAMUSCULAR; INTRAVENOUS; SOFT TISSUE at 01:11

## 2019-11-05 RX ADMIN — SUCCINYLCHOLINE CHLORIDE 100 MG: 20 INJECTION, SOLUTION INTRAMUSCULAR; INTRAVENOUS at 12:11

## 2019-11-05 RX ADMIN — KETOROLAC TROMETHAMINE 30 MG: 30 INJECTION, SOLUTION INTRAMUSCULAR; INTRAVENOUS at 01:11

## 2019-11-05 RX ADMIN — NEOSTIGMINE METHYLSULFATE 5 MG: 1 INJECTION INTRAVENOUS at 01:11

## 2019-11-05 RX ADMIN — ROCURONIUM BROMIDE 35 MG: 10 INJECTION, SOLUTION INTRAVENOUS at 12:11

## 2019-11-05 RX ADMIN — SODIUM CHLORIDE, SODIUM LACTATE, POTASSIUM CHLORIDE, AND CALCIUM CHLORIDE: 600; 310; 30; 20 INJECTION, SOLUTION INTRAVENOUS at 12:11

## 2019-11-05 RX ADMIN — PHENAZOPYRIDINE 200 MG: 100 TABLET ORAL at 11:11

## 2019-11-05 RX ADMIN — PROPOFOL 150 MG: 10 INJECTION, EMULSION INTRAVENOUS at 12:11

## 2019-11-05 RX ADMIN — ROBINUL 0.5 MG: 0.2 INJECTION INTRAMUSCULAR; INTRAVENOUS at 01:11

## 2019-11-05 RX ADMIN — ROCURONIUM BROMIDE 5 MG: 10 INJECTION, SOLUTION INTRAVENOUS at 12:11

## 2019-11-05 RX ADMIN — HYDROMORPHONE HYDROCHLORIDE 0.2 MG: 2 INJECTION INTRAMUSCULAR; INTRAVENOUS; SUBCUTANEOUS at 02:11

## 2019-11-05 RX ADMIN — ONDANSETRON 4 MG: 2 INJECTION, SOLUTION INTRAMUSCULAR; INTRAVENOUS at 01:11

## 2019-11-05 RX ADMIN — LIDOCAINE HYDROCHLORIDE 40 MG: 10 INJECTION, SOLUTION EPIDURAL; INFILTRATION; INTRACAUDAL; PERINEURAL at 12:11

## 2019-11-05 RX ADMIN — MIDAZOLAM 2 MG: 1 INJECTION INTRAMUSCULAR; INTRAVENOUS at 12:11

## 2019-11-05 RX ADMIN — FENTANYL CITRATE 50 MCG: 50 INJECTION, SOLUTION INTRAMUSCULAR; INTRAVENOUS at 12:11

## 2019-11-05 RX ADMIN — FENTANYL CITRATE 50 MCG: 50 INJECTION, SOLUTION INTRAMUSCULAR; INTRAVENOUS at 01:11

## 2019-11-05 RX ADMIN — OXYCODONE AND ACETAMINOPHEN 1 TABLET: 5; 325 TABLET ORAL at 03:11

## 2019-11-05 RX ADMIN — FENTANYL CITRATE 100 MCG: 50 INJECTION, SOLUTION INTRAMUSCULAR; INTRAVENOUS at 12:11

## 2019-11-05 RX ADMIN — ACETAMINOPHEN 1000 MG: 500 TABLET ORAL at 11:11

## 2019-11-05 NOTE — PLAN OF CARE
Patient  ready for surgery. Family at bedside. Belongings given to family to secure. Patient instructed on Preop Process verbalizes understanding.

## 2019-11-05 NOTE — ANESTHESIA PREPROCEDURE EVALUATION
11/05/2019  Florecita Noel is a 44 y.o., female.    Anesthesia Evaluation     I have reviewed the Nursing Notes.   I have reviewed the Medications.     Review of Systems  Anesthesia Hx:  No problems with previous Anesthesia History of prior surgery of interest to airway management or planning:  Denies Personal Hx of Anesthesia complications.   Social:  Non-Smoker, No Alcohol Use    Hematology/Oncology:  Hematology Normal   Oncology Normal     EENT/Dental:EENT/Dental Normal   Cardiovascular:   Exercise tolerance: good Hypertension Denies MI.    Denies Angina.   CONCLUSIONS     1 - Concentric remodeling.     2 - Normal left ventricular systolic function (EF 60-65%).     3 - Normal left ventricular diastolic function.     4 - Normal right ventricular systolic function .     5 - The estimated PA systolic pressure is 25 mmHg.    Pulmonary:   Denies COPD.  Denies Asthma.  Denies Shortness of breath.    Renal/:   Denies Chronic Renal Disease.     Hepatic/GI:   Denies Liver Disease. Denies Hepatitis.    Musculoskeletal:  Musculoskeletal Normal    Neurological:   Denies CVA. Headaches Denies Seizures.    Endocrine:   Hypothyroidism    Psych:  Psychiatric Normal           Physical Exam  General:  Well nourished    Airway/Jaw/Neck:  Airway Findings: Mouth Opening: Normal Tongue: Normal  General Airway Assessment: Adult  Mallampati: II         Dental:  Dental Findings:   Chest/Lungs:  Chest/Lungs Findings: Clear to auscultation, Normal Respiratory Rate     Heart/Vascular:  Heart Findings: Rate: Normal  Rhythm: Regular Rhythm  Sounds: Normal        Mental Status:  Mental Status Findings:  Cooperative, Alert and Oriented         Anesthesia Plan  Type of Anesthesia, risks & benefits discussed:  Anesthesia Type:  general  Patient's Preference:   Intra-op Monitoring Plan:   Intra-op Monitoring Plan Comments:   Post Op Pain  Control Plan: multimodal analgesia  Post Op Pain Control Plan Comments:   Induction:   IV  Beta Blocker:  Patient is on a Beta-Blocker and has received one dose within the past 24 hours (No further documentation required).       Informed Consent: Patient understands risks and agrees with Anesthesia plan.  Questions answered. Anesthesia consent signed with patient.  ASA Score: 2     Day of Surgery Review of History & Physical:            Ready For Surgery From Anesthesia Perspective.

## 2019-11-05 NOTE — SUBJECTIVE & OBJECTIVE
OB History    Para Term  AB Living   7 6 6 0 1 6   SAB TAB Ectopic Multiple Live Births   0 0 0 0 7      # Outcome Date GA Lbr Jarred/2nd Weight Sex Delivery Anes PTL Lv   7 Term 14 37w1d / 00:08  M Vag-Spont None Y MADDISON      Name: ASHTYN,BABY BOY      Apgar1: 9  Apgar5: 9   6 Term 12 38w0d  3.005 kg (6 lb 10 oz) F Vag-Spont EPI N MADDISON      Name: Silver Serf   5 Term 05 40w0d  3.175 kg (7 lb) F Vag-Spont None N MADDISON      Name: tom Serf   4 Term 98   3.005 kg (6 lb 10 oz) M Vag-Spont EPI N MADDISON      Name: Enmanuel Serf   3 Term 96 40w0d  3.005 kg (6 lb 10 oz) M Vag-Spont EPI N MADDISON      Name: Stoney Serf   2 AB 1996        DEC   1 Term 92 40w0d  3.856 kg (8 lb 8 oz) M Vag-Spont EPI N MADDISON      Name: Daniel Noel     Past Medical History:   Diagnosis Date    Abnormal Pap smear     Anemia     Hypertension     Hypokalemia     in pregnancy    Hypothyroidism (acquired) 2018     Past Surgical History:   Procedure Laterality Date    CHOLECYSTECTOMY      COSMETIC SURGERY      tummy tuck    DIAGNOSTIC LAPAROSCOPY N/A 2019    Procedure: LAPAROSCOPY, DIAGNOSTIC;  Surgeon: Pia Aguila MD;  Location: [facility] OR;  Service: OB/GYN;  Laterality: N/A;    FULGURATION OF ENDOMETRIOSIS N/A 2019    Procedure: FULGURATION, ENDOMETRIOSIS;  Surgeon: Pia Aguila MD;  Location: [facility] OR;  Service: OB/GYN;  Laterality: N/A;    HYSTEROSCOPY WITH HYDROTHERMAL ABLATION OF ENDOMETRIUM WITH DILATION AND CURETTAGE N/A 2019    Procedure: HYSTEROSCOPY, WITH DILATION AND CURETTAGE OF UTERUS AND HYDROTHERMAL ENDOMETRIAL ABLATION;  Surgeon: Pia Aguila MD;  Location: [facility] OR;  Service: OB/GYN;  Laterality: N/A;    LAPAROSCOPIC DRAINAGE OF CYST OF OVARY Left 2019    Procedure: DRAINAGE, CYST, OVARY, LAPAROSCOPIC;  Surgeon: Pia Aguila MD;  Location: [facility] OR;  Service: OB/GYN;  Laterality: Left;    TUBAL LIGATION      Tummy  Giovanni         No medications prior to admission.       Review of patient's allergies indicates:   Allergen Reactions    Sumatriptan Other (See Comments)     Chest tightness that moved into her throat        Family History     Problem Relation (Age of Onset)    Cancer Father    Heart disease Maternal Grandmother        Tobacco Use    Smoking status: Never Smoker    Smokeless tobacco: Never Used   Substance and Sexual Activity    Alcohol use: Yes     Alcohol/week: 0.0 standard drinks     Frequency: 2-3 times a week     Comment: socially;  No alcohol 72h prior to sx    Drug use: No    Sexual activity: Yes     Partners: Male     Review of Systems   Constitutional: Negative for chills and fever.   Respiratory: Negative for cough and shortness of breath.    Cardiovascular: Negative for chest pain.   Gastrointestinal: Positive for abdominal pain. Negative for nausea and vomiting.   Genitourinary: Positive for dysmenorrhea, menorrhagia and pelvic pain. Negative for dysuria and vaginal bleeding.      Objective:     Vital Signs (Most Recent):    Vital Signs (24h Range):           There is no height or weight on file to calculate BMI.    No LMP recorded.    Physical Exam:   Constitutional: She is oriented to person, place, and time. She appears well-developed and well-nourished. No distress.    HENT:   Head: Normocephalic and atraumatic.     Neck: Neck supple.    Cardiovascular: Normal rate and regular rhythm.     Pulmonary/Chest: Effort normal.        Abdominal: Soft. She exhibits no distension. There is no tenderness.             Musculoskeletal: She exhibits no edema or tenderness.       Neurological: She is alert and oriented to person, place, and time.    Skin: Skin is warm and dry.    Psychiatric: She has a normal mood and affect.       Laboratory:  Lab Results   Component Value Date    WBC 16.20 (H) 11/01/2019    HGB 13.9 11/01/2019    HCT 45.1 11/01/2019    MCV 88 11/01/2019     11/01/2019       Diagnostic  Results:  US: Reviewed  10 cm uterus with fibroids, bilateral ovarian cysts

## 2019-11-05 NOTE — OP NOTE
OPERATIVE REPORT    @date@      PREOPERATIVE DIAGNOSIS  1.  Endometriosis  2.  Uterine Fibroids  3.  Pelvic pain  4.  Dyspareunia  5.  Bilateral ovarian cysts    POSTOPERATIVE DIAGNOSIS  1.  Endometriosis  2.  Uterine Fibroids  3.  Pelvic pain  4.  Dyspareunia  5.  Bilateral ovarian cysts        PROCEDURE:  1.  Robotic Assisted Total Laparoscopic Hysterectomy  2.  Bilateral Salpingo-oophorectomy       SURGEON: Pia Aguila MD    ASSISTANT: Elzbieta Rodriguez CST    ANESTHESIA: General    COMPLICATIONS: None    EBL:  100 ml    FINDINGS:  Uterus 10 cm size with multiple fibroids.  Small cysts noted on both ovaries.  Endometriosis noted on left ovary.  Normal tubes bilaterally.  Normal appearing pelvis and upper abdomen.    PROCEDURE: Patient was taking to the operating room where general anesthesia was administered and found to be adequate.  She was prepped and draped in the dorsal lithotomy position.  A weighted sterile speculum was placed in the vagina.  The anterior lip of the cervix was grasped with a single tooth tenaculum.  The uterus was sounded to approximately 11 cm.  A stay suture of 0-Vicryl was placed at 12 o'clock and 6 o'clock on the cervix.  A APRYL manipulator and KANDY colpotomizer were placed without difficulty.    A Veress needle was inserted into the umbilicus while tenting on the anterior abdominal wall with towel clips.  Placement into the peritoneal cavity was confirmed via saline drop test.  The abdomen was insufflated to 15mm Hg using Carbon dioxide.  An 8 mm supra-umbilical skin incision was made with the scalpel.  An 8 mm  trocar was advanced through this incision.  Excellent hemostasis was noted.   An 8 mm left lateral skin incision was made with a scalpel and an 8 mm trocar was advanced through this incision under visualization of the camera.  An 8 mm right lateral skin incision was made with the scalpel.  An 8 mm trocar was advanced through this incision under visualization of the camera.  A  left upper quadrant 5 mm port was also placed under direct laparoscopic visualization.  At this time the patient was placed in the deep Trendelenburg position and docked to the Da Trell robotic operating system.  The remainder of the procedure was performed robotically.    Attention was turned to the left round ligament.  This was cauterized and transected using the vessel sealer device and monopolar hook and continued anteriorly to create the bladder flap to the midline.  Attention was turned to the left infundibulopelvic ligament.  This was cauterized and transected and carried down to the level of the uterine vessels.  The vessels were cauterized but not transected.  Attention was turned to the right round ligament.  It was cauterized and transected and continued anteriorly to finish creating the bladder flap.  Attention was turned to the right infundibulopelvic ligament.  This was cauterized and transected and carried down to the level of the uterine vessels.  Attention was turned to the anterior portion of the cervix.  The bladder was dissected off the cervix. The anterior colpotomy was created directly over the KOH colpotomizer.  This was continued to the level of the uterine vessels bilaterally.  Attention was turned to the posterior portion of the uterus.  The posterior colpotomy was created directly over the KOH colpotomizer and carried around to the level of the uterine vessels bilaterally.  The left uterine vessels were cauterized and transected.   The anterior and posterior colpotomies were connected on the left side.  The right uterine vessels were cauterized and transected.  The anterior and posterior colpotomies were connected.    The uterus was removed vaginally.  A vaginal balloon was placed to maintain adequate hemostasis.    The vaginal cuff was reapproximated in a running fashion using 0-Vicryl suture.  The pelvis was copiously irrigated and suctioned.  Excellent hemostasis was noted of all  pedicles and of the vaginal cuff.  Both ureters were visualized and noted to appear normal and to be peristalsing normally.  The [employer] robotic system was disconnected from the patient at this time.    Attention was turned to the anterior abdominal wall. The abdomen was deflated and all trocars were removed.  The skin incisions were closed with 4-0 Vicryl in a subcuticular fashion.  Dermabond was placed over the incisions.    Sponge, lap, and needle counts were correct x 3.  The patient tolerated the procedure well and was awakened from anesthesia and taken to the recovery room in stable condition with the downs draining clear urine.

## 2019-11-05 NOTE — TRANSFER OF CARE
"Anesthesia Transfer of Care Note    Patient: Florecita Noel    Procedure(s) Performed: Procedure(s) (LRB):  XI ROBOTIC HYSTERECTOMY (N/A)  XI ROBOTIC SALPINGO-OOPHORECTOMY (Bilateral)    Patient location: PACU    Anesthesia Type: general    Transport from OR: Transported from OR on room air with adequate spontaneous ventilation    Post pain: adequate analgesia    Post assessment: no apparent anesthetic complications and tolerated procedure well    Post vital signs: stable    Level of consciousness: awake    Nausea/Vomiting: no nausea/vomiting    Complications: none    Transfer of care protocol was followed      Last vitals:   Visit Vitals  BP (!) 151/82 (BP Location: Right arm, Patient Position: Sitting)   Pulse 76   Temp 36.8 °C (98.2 °F) (Temporal)   Resp 18   Ht 5' 4" (1.626 m)   Wt 76.2 kg (167 lb 15.9 oz)   SpO2 100%   Breastfeeding? No   BMI 28.84 kg/m²     "

## 2019-11-05 NOTE — H&P
[facility]  Obstetrics & Gynecology  History & Physical    Patient Name: Florecita Noel  MRN: [id number]  Admission Date: 2019  Primary Care Provider: Alesha Alonso MD    Subjective:     Chief Complaint/Reason for Admission:  RALH/BSO    History of Present Illness:  44 y.o. female  with symptomatic uterine fibroids, pelvic pain, and endometriosis.  Patient desires RALH/BSO for definitive treatment.          OB History    Para Term  AB Living   7 6 6 0 1 6   SAB TAB Ectopic Multiple Live Births   0 0 0 0 7      # Outcome Date GA Lbr Jarred/2nd Weight Sex Delivery Anes PTL Lv   7 Term 14 37w1d / 00:08  M Vag-Spont None Y MADDISON      Name: ASHTYN,BABY BOY      Apgar1: 9  Apgar5: 9   6 Term 12 38w0d  3.005 kg (6 lb 10 oz) F Vag-Spont EPI N MADDISON      Name: Silver Serf   5 Term 05 40w0d  3.175 kg (7 lb) F Vag-Spont None N MADDISON      Name: antonuyn Serf   4 Term 98   3.005 kg (6 lb 10 oz) M Vag-Spont EPI N MADDISON      Name: Enmanuel Serf   3 Term 96 40w0d  3.005 kg (6 lb 10 oz) M Vag-Spont EPI N MADDISON      Name: Barriedavian Serf   2 AB 1996        DEC   1 Term 92 40w0d  3.856 kg (8 lb 8 oz) M Vag-Spont EPI N MADDISON      Name: Daniel Noel     Past Medical History:   Diagnosis Date    Abnormal Pap smear     Anemia     Hypertension     Hypokalemia     in pregnancy    Hypothyroidism (acquired) 2018     Past Surgical History:   Procedure Laterality Date    CHOLECYSTECTOMY      COSMETIC SURGERY      tummy tuck    DIAGNOSTIC LAPAROSCOPY N/A 2019    Procedure: LAPAROSCOPY, DIAGNOSTIC;  Surgeon: Pia Aguila MD;  Location: [facility] OR;  Service: OB/GYN;  Laterality: N/A;    FULGURATION OF ENDOMETRIOSIS N/A 2019    Procedure: FULGURATION, ENDOMETRIOSIS;  Surgeon: Pia Aguila MD;  Location: [facility] OR;  Service: OB/GYN;  Laterality: N/A;    HYSTEROSCOPY WITH HYDROTHERMAL ABLATION OF ENDOMETRIUM WITH DILATION AND CURETTAGE N/A 2019     Procedure: HYSTEROSCOPY, WITH DILATION AND CURETTAGE OF UTERUS AND HYDROTHERMAL ENDOMETRIAL ABLATION;  Surgeon: Pia Aguila MD;  Location: [facility] OR;  Service: OB/GYN;  Laterality: N/A;    LAPAROSCOPIC DRAINAGE OF CYST OF OVARY Left 2/26/2019    Procedure: DRAINAGE, CYST, OVARY, LAPAROSCOPIC;  Surgeon: Pia Aguila MD;  Location: [facility] OR;  Service: OB/GYN;  Laterality: Left;    TUBAL LIGATION      Tummy Tuck         No medications prior to admission.       Review of patient's allergies indicates:   Allergen Reactions    Sumatriptan Other (See Comments)     Chest tightness that moved into her throat        Family History     Problem Relation (Age of Onset)    Cancer Father    Heart disease Maternal Grandmother        Tobacco Use    Smoking status: Never Smoker    Smokeless tobacco: Never Used   Substance and Sexual Activity    Alcohol use: Yes     Alcohol/week: 0.0 standard drinks     Frequency: 2-3 times a week     Comment: socially;  No alcohol 72h prior to sx    Drug use: No    Sexual activity: Yes     Partners: Male     Review of Systems   Constitutional: Negative for chills and fever.   Respiratory: Negative for cough and shortness of breath.    Cardiovascular: Negative for chest pain.   Gastrointestinal: Positive for abdominal pain. Negative for nausea and vomiting.   Genitourinary: Positive for dysmenorrhea, menorrhagia and pelvic pain. Negative for dysuria and vaginal bleeding.      Objective:     Vital Signs (Most Recent):    Vital Signs (24h Range):           There is no height or weight on file to calculate BMI.    No LMP recorded.    Physical Exam:   Constitutional: She is oriented to person, place, and time. She appears well-developed and well-nourished. No distress.    HENT:   Head: Normocephalic and atraumatic.     Neck: Neck supple.    Cardiovascular: Normal rate and regular rhythm.     Pulmonary/Chest: Effort normal.        Abdominal: Soft. She exhibits no distension.  There is no tenderness.             Musculoskeletal: She exhibits no edema or tenderness.       Neurological: She is alert and oriented to person, place, and time.    Skin: Skin is warm and dry.    Psychiatric: She has a normal mood and affect.       Laboratory:  Lab Results   Component Value Date    WBC 16.20 (H) 11/01/2019    HGB 13.9 11/01/2019    HCT 45.1 11/01/2019    MCV 88 11/01/2019     11/01/2019       Diagnostic Results:  US: Reviewed  10 cm uterus with fibroids, bilateral ovarian cysts    Assessment/Plan:     Endometriosis  Proceed with RALH/BSO as planned    Fibroids  Proceed with RALH/BSO as planned.    Cysts of both ovaries  Proceed with RALH/BSO as planned.    Menorrhagia with regular cycle  Proceed with RALH/BSO as planned.        Pia Aguila MD  Obstetrics & Gynecology  [facility]

## 2019-11-05 NOTE — HPI
44 y.o. female  with symptomatic uterine fibroids, pelvic pain, and endometriosis.  Patient desires RALH/BSO for definitive treatment.

## 2019-11-06 NOTE — ANESTHESIA POSTPROCEDURE EVALUATION
Anesthesia Post Evaluation    Patient: Florecita Noel    Procedure(s) Performed: Procedure(s) (LRB):  XI ROBOTIC HYSTERECTOMY (N/A)  XI ROBOTIC SALPINGO-OOPHORECTOMY (Bilateral)    Final Anesthesia Type: general  Patient location during evaluation: PACU  Patient participation: Yes- Able to Participate  Level of consciousness: awake and alert  Post-procedure vital signs: reviewed and stable  Pain management: adequate  Airway patency: patent  PONV status at discharge: No PONV  Anesthetic complications: no      Cardiovascular status: hemodynamically stable  Respiratory status: spontaneous ventilation  Hydration status: euvolemic  Follow-up not needed.          Vitals Value Taken Time   /59 11/5/2019  5:00 PM   Temp 36.6 °C (97.9 °F) 11/5/2019  5:00 PM   Pulse 78 11/5/2019  5:00 PM   Resp 15 11/5/2019  5:00 PM   SpO2 98 % 11/5/2019  5:00 PM         Event Time     Out of Recovery 15:39:17          Pain/Eduarda Score: Pain Rating Prior to Med Admin: 6 (11/5/2019  3:25 PM)  Eduarda Score: 10 (11/5/2019  5:00 PM)

## 2019-11-12 NOTE — DISCHARGE SUMMARY
[facility]   Obstetrics & Gynecology  Discharge Summary    Patient Name: Florecita Noel  MRN: [id number]  Admission Date: 2019  Hospital Length of Stay: 0 days  Discharge Date and Time: 2019  5:30 PM  Attending Physician: No att. providers found   Discharging Provider: Pia Aguila MD  Primary Care Provider: Alesha Alonso MD    HPI:  44 y.o. female  with symptomatic uterine fibroids, pelvic pain, and endometriosis.  Patient desires RALH/BSO for definitive treatment.      Hospital Course:  No notes on file    Procedure(s) (LRB):  XI ROBOTIC HYSTERECTOMY (N/A)  XI ROBOTIC SALPINGO-OOPHORECTOMY (Bilateral)         Significant Diagnostic Studies: Labs: All labs within the past 24 hours have been reviewed    Pending Diagnostic Studies:     None        Final Active Diagnoses:    Diagnosis Date Noted POA    PRINCIPAL PROBLEM:  S/P laparoscopic hysterectomy [Z90.710] 2019 No    Fibroids [D21.9] 2019 Yes    Endometriosis [N80.9] 2019 Yes    Dyspareunia in female [N94.10] 2019 Yes    Cysts of both ovaries [N83.201, N83.202] 2019 Yes    Menorrhagia with regular cycle [N92.0] 2019 Yes      Problems Resolved During this Admission:        Discharged Condition: stable    Disposition: Home or Self Care    Follow Up:    Patient Instructions:      Call MD for:  temperature >100.4     Call MD for:  persistent nausea and vomiting or diarrhea     Call MD for:  severe uncontrolled pain     Call MD for:  redness, tenderness, or signs of infection (pain, swelling, redness, odor or green/yellow discharge around incision site)     Call MD for:  difficulty breathing or increased cough     Call MD for:  persistent dizziness, light-headedness, or visual disturbances     No dressing needed     Other restrictions (specify):   Scheduling Instructions: Pelvic rest x 8 weeks     No driving until:   Scheduling Instructions: 2 weeks     Medications:  Reconciled Home  Medications:      Medication List      START taking these medications    estradiol 0.05 mg/24 hr td ptwk 0.05 mg/24 hr Ptwk  Place 1 patch onto the skin every 7 days.     oxyCODONE-acetaminophen 5-325 mg per tablet  Commonly known as:  PERCOCET  Take 1 tablet by mouth every 4 (four) hours as needed for Pain.        CHANGE how you take these medications    * ibuprofen 800 MG tablet  Commonly known as:  ADVIL,MOTRIN  Take 1 tablet (800 mg total) by mouth 3 (three) times daily.  What changed:  Another medication with the same name was added. Make sure you understand how and when to take each.     * ibuprofen 800 MG tablet  Commonly known as:  ADVIL,MOTRIN  Take 1 tablet (800 mg total) by mouth every 8 (eight) hours.  What changed:  You were already taking a medication with the same name, and this prescription was added. Make sure you understand how and when to take each.     ranitidine 150 MG tablet  Commonly known as:  ZANTAC  Take 1 tablet (150 mg total) by mouth 2 (two) times daily.  What changed:    · when to take this  · reasons to take this         * This list has 2 medication(s) that are the same as other medications prescribed for you. Read the directions carefully, and ask your doctor or other care provider to review them with you.            CONTINUE taking these medications    GOODY'S EXTRA STRENGTH ORAL  Take by mouth daily as needed.     levothyroxine 75 MCG tablet  Commonly known as:  SYNTHROID  Take 1 tablet (75 mcg total) by mouth before breakfast.     losartan 25 MG tablet  Commonly known as:  COZAAR  Take 1 tablet (25 mg total) by mouth once daily.     propranolol 40 MG tablet  Commonly known as:  INDERAL  Take 40 mg by mouth daily as needed.     verapamil 120 MG tablet  Commonly known as:  CALAN  Take 1 tablet (120 mg total) by mouth 3 (three) times daily.            Pia Aguila MD  Obstetrics & Gynecology  [facility]

## 2019-11-15 ENCOUNTER — PATIENT MESSAGE (OUTPATIENT)
Dept: OBSTETRICS AND GYNECOLOGY | Facility: CLINIC | Age: 44
End: 2019-11-15

## 2019-11-15 RX ORDER — FLUCONAZOLE 150 MG/1
150 TABLET ORAL
Qty: 2 TABLET | Refills: 0 | Status: SHIPPED | OUTPATIENT
Start: 2019-11-15 | End: 2020-07-07

## 2019-11-17 ENCOUNTER — PATIENT MESSAGE (OUTPATIENT)
Dept: OBSTETRICS AND GYNECOLOGY | Facility: CLINIC | Age: 44
End: 2019-11-17

## 2019-11-18 ENCOUNTER — PATIENT MESSAGE (OUTPATIENT)
Dept: OBSTETRICS AND GYNECOLOGY | Facility: CLINIC | Age: 44
End: 2019-11-18

## 2019-11-18 DIAGNOSIS — N93.9 VAGINAL BLEEDING: Primary | ICD-10-CM

## 2019-11-19 ENCOUNTER — LAB VISIT (OUTPATIENT)
Dept: LAB | Facility: HOSPITAL | Age: 44
End: 2019-11-19
Attending: OBSTETRICS & GYNECOLOGY
Payer: COMMERCIAL

## 2019-11-19 DIAGNOSIS — N93.9 VAGINAL BLEEDING: ICD-10-CM

## 2019-11-19 LAB
BASOPHILS # BLD AUTO: 0.04 K/UL (ref 0–0.2)
BASOPHILS NFR BLD: 0.4 % (ref 0–1.9)
DIFFERENTIAL METHOD: ABNORMAL
EOSINOPHIL # BLD AUTO: 0.3 K/UL (ref 0–0.5)
EOSINOPHIL NFR BLD: 2.3 % (ref 0–8)
ERYTHROCYTE [DISTWIDTH] IN BLOOD BY AUTOMATED COUNT: 12.6 % (ref 11.5–14.5)
HCT VFR BLD AUTO: 39.2 % (ref 37–48.5)
HGB BLD-MCNC: 12.1 G/DL (ref 12–16)
IMM GRANULOCYTES # BLD AUTO: 0.11 K/UL (ref 0–0.04)
IMM GRANULOCYTES NFR BLD AUTO: 1 % (ref 0–0.5)
LYMPHOCYTES # BLD AUTO: 3.4 K/UL (ref 1–4.8)
LYMPHOCYTES NFR BLD: 31.1 % (ref 18–48)
MCH RBC QN AUTO: 27.1 PG (ref 27–31)
MCHC RBC AUTO-ENTMCNC: 30.9 G/DL (ref 32–36)
MCV RBC AUTO: 88 FL (ref 82–98)
MONOCYTES # BLD AUTO: 1.3 K/UL (ref 0.3–1)
MONOCYTES NFR BLD: 11.4 % (ref 4–15)
NEUTROPHILS # BLD AUTO: 5.9 K/UL (ref 1.8–7.7)
NEUTROPHILS NFR BLD: 53.8 % (ref 38–73)
NRBC BLD-RTO: 0 /100 WBC
PLATELET # BLD AUTO: 334 K/UL (ref 150–350)
PMV BLD AUTO: 11.5 FL (ref 9.2–12.9)
RBC # BLD AUTO: 4.46 M/UL (ref 4–5.4)
WBC # BLD AUTO: 10.93 K/UL (ref 3.9–12.7)

## 2019-11-19 PROCEDURE — 85025 COMPLETE CBC W/AUTO DIFF WBC: CPT

## 2019-11-19 PROCEDURE — 36415 COLL VENOUS BLD VENIPUNCTURE: CPT

## 2019-11-20 ENCOUNTER — PATIENT MESSAGE (OUTPATIENT)
Dept: OBSTETRICS AND GYNECOLOGY | Facility: CLINIC | Age: 44
End: 2019-11-20

## 2019-11-20 NOTE — TELEPHONE ENCOUNTER
Yes, I would recommend that she come in for evaluation.  Can you add her on our schedule tomorrow.

## 2019-12-02 RX ORDER — VERAPAMIL HYDROCHLORIDE 120 MG/1
TABLET, FILM COATED ORAL
Qty: 90 TABLET | Refills: 0 | Status: SHIPPED | OUTPATIENT
Start: 2019-12-02 | End: 2020-07-08 | Stop reason: SDUPTHER

## 2019-12-05 ENCOUNTER — OFFICE VISIT (OUTPATIENT)
Dept: OBSTETRICS AND GYNECOLOGY | Facility: CLINIC | Age: 44
End: 2019-12-05
Payer: COMMERCIAL

## 2019-12-05 VITALS
WEIGHT: 169.75 LBS | HEIGHT: 64 IN | DIASTOLIC BLOOD PRESSURE: 86 MMHG | BODY MASS INDEX: 28.98 KG/M2 | SYSTOLIC BLOOD PRESSURE: 122 MMHG

## 2019-12-05 DIAGNOSIS — N93.9 ABNORMAL VAGINAL BLEEDING: ICD-10-CM

## 2019-12-05 DIAGNOSIS — Z90.710 S/P LAPAROSCOPIC HYSTERECTOMY: Primary | ICD-10-CM

## 2019-12-05 PROCEDURE — 99024 POSTOP FOLLOW-UP VISIT: CPT | Mod: S$GLB,,, | Performed by: OBSTETRICS & GYNECOLOGY

## 2019-12-05 PROCEDURE — 99999 PR PBB SHADOW E&M-EST. PATIENT-LVL III: ICD-10-PCS | Mod: PBBFAC,,, | Performed by: OBSTETRICS & GYNECOLOGY

## 2019-12-05 PROCEDURE — 99999 PR PBB SHADOW E&M-EST. PATIENT-LVL III: CPT | Mod: PBBFAC,,, | Performed by: OBSTETRICS & GYNECOLOGY

## 2019-12-05 PROCEDURE — 99024 PR POST-OP FOLLOW-UP VISIT: ICD-10-PCS | Mod: S$GLB,,, | Performed by: OBSTETRICS & GYNECOLOGY

## 2019-12-05 RX ORDER — PROGESTERONE 200 MG/1
200 CAPSULE ORAL NIGHTLY
Qty: 30 CAPSULE | Refills: 11 | Status: SHIPPED | OUTPATIENT
Start: 2019-12-05 | End: 2021-11-26

## 2019-12-05 RX ORDER — ESTRADIOL 0.07 MG/D
PATCH TRANSDERMAL
Qty: 4 PATCH | Refills: 11 | Status: SHIPPED | OUTPATIENT
Start: 2019-12-05 | End: 2021-03-29

## 2019-12-05 NOTE — PROGRESS NOTES
Patient presents today for postoperative follow up.  Pt. underwent RALH/BSO on 11/5/19.  Patient is recovering well overall, but is having daily light vaginal bleeding.  Bleeding was heavy last week at times.  Mild pelvic pain.  Her mother was just diagnosed with metastatic cancer, thought to likely be ovarian cancer, but biopsy results still pending.    Pathology reviewed.    Physical Exam:  General - no acute distress  Abdomen - soft, nontender and nondistended.  No masses.  Incisions are well healed with no evidence of infection.  Pelvic - vaginal cuff intact.  Sutures still present.  No evidence of infection or granulation tissue, but light bleeding noted at cuff.  Silver nitrate applied.  Bimanual exam shows no masses or tenderness.  Extremities - nontender and no edema noted.    Encounter Diagnoses   Name Primary?    S/P laparoscopic hysterectomy Yes    Abnormal vaginal bleeding        PLAN:  Follow up 3 weeks to recheck vaginal cuff.   Continue pelvic rest.

## 2019-12-12 ENCOUNTER — PATIENT MESSAGE (OUTPATIENT)
Dept: OBSTETRICS AND GYNECOLOGY | Facility: CLINIC | Age: 44
End: 2019-12-12

## 2019-12-27 ENCOUNTER — OFFICE VISIT (OUTPATIENT)
Dept: OBSTETRICS AND GYNECOLOGY | Facility: CLINIC | Age: 44
End: 2019-12-27
Payer: COMMERCIAL

## 2019-12-27 VITALS
SYSTOLIC BLOOD PRESSURE: 120 MMHG | DIASTOLIC BLOOD PRESSURE: 84 MMHG | WEIGHT: 170.19 LBS | HEIGHT: 64 IN | BODY MASS INDEX: 29.06 KG/M2

## 2019-12-27 DIAGNOSIS — Z98.890 POST-OPERATIVE STATE: Primary | ICD-10-CM

## 2019-12-27 DIAGNOSIS — R39.89 SENSATION OF PRESSURE IN BLADDER AREA: ICD-10-CM

## 2019-12-27 LAB
BILIRUB UR QL STRIP: NEGATIVE
CLARITY UR REFRACT.AUTO: CLEAR
COLOR UR AUTO: NORMAL
GLUCOSE UR QL STRIP: NEGATIVE
HGB UR QL STRIP: NEGATIVE
KETONES UR QL STRIP: NEGATIVE
LEUKOCYTE ESTERASE UR QL STRIP: NEGATIVE
NITRITE UR QL STRIP: NEGATIVE
PH UR STRIP: 7 [PH] (ref 5–8)
PROT UR QL STRIP: NEGATIVE
SP GR UR STRIP: 1.01 (ref 1–1.03)
URN SPEC COLLECT METH UR: NORMAL

## 2019-12-27 PROCEDURE — 81003 URINALYSIS AUTO W/O SCOPE: CPT

## 2019-12-27 PROCEDURE — 99999 PR PBB SHADOW E&M-EST. PATIENT-LVL II: CPT | Mod: PBBFAC,,, | Performed by: OBSTETRICS & GYNECOLOGY

## 2019-12-27 PROCEDURE — 87086 URINE CULTURE/COLONY COUNT: CPT

## 2019-12-27 PROCEDURE — 99999 PR PBB SHADOW E&M-EST. PATIENT-LVL II: ICD-10-PCS | Mod: PBBFAC,,, | Performed by: OBSTETRICS & GYNECOLOGY

## 2019-12-27 PROCEDURE — 99024 POSTOP FOLLOW-UP VISIT: CPT | Mod: S$GLB,,, | Performed by: OBSTETRICS & GYNECOLOGY

## 2019-12-27 PROCEDURE — 99024 PR POST-OP FOLLOW-UP VISIT: ICD-10-PCS | Mod: S$GLB,,, | Performed by: OBSTETRICS & GYNECOLOGY

## 2019-12-28 NOTE — PROGRESS NOTES
Patient presents today for postoperative follow up.  Pt. underwent RALH on 11/5/19.  Patient is recovering well and has no complaints today, except intermittent pressure in the bladder area.  Her mother just passed away from metastatic ovarian cancer.      Physical Exam:  General - no acute distress  Abdomen - soft, nontender and nondistended.  No masses.  Incisions are well healed with no evidence of infection.  Pelvic - vaginal cuff intact.  Sutures still present.  No evidence of infection or granulation tissue.  Bimanual exam shows no masses or tenderness.  Extremities - nontender and no edema noted.    ASSESSMENT:  Post op care    PLAN:    Urinalysis and culture today.  Follow up as needed.

## 2019-12-29 LAB — BACTERIA UR CULT: NORMAL

## 2020-02-25 ENCOUNTER — HOSPITAL ENCOUNTER (EMERGENCY)
Facility: HOSPITAL | Age: 45
Discharge: HOME | End: 2020-02-26
Attending: FAMILY MEDICINE
Payer: COMMERCIAL

## 2020-02-25 DIAGNOSIS — I10 HYPERTENSION: ICD-10-CM

## 2020-02-25 DIAGNOSIS — R51.9 ACUTE NONINTRACTABLE HEADACHE, UNSPECIFIED HEADACHE TYPE: Primary | ICD-10-CM

## 2020-02-25 LAB
ALBUMIN SERPL BCP-MCNC: 4.3 G/DL (ref 3.5–5.2)
ALP SERPL-CCNC: 74 U/L (ref 55–135)
ALT SERPL W/O P-5'-P-CCNC: 20 U/L (ref 10–44)
ANION GAP SERPL CALC-SCNC: 15 MMOL/L (ref 8–16)
AST SERPL-CCNC: 17 U/L (ref 10–40)
BASOPHILS # BLD AUTO: 0.07 K/UL (ref 0–0.2)
BASOPHILS NFR BLD: 0.4 % (ref 0–1.9)
BILIRUB SERPL-MCNC: 0.2 MG/DL (ref 0.1–1)
BUN SERPL-MCNC: 7 MG/DL (ref 6–20)
CALCIUM SERPL-MCNC: 9.9 MG/DL (ref 8.7–10.5)
CHLORIDE SERPL-SCNC: 104 MMOL/L (ref 95–110)
CO2 SERPL-SCNC: 23 MMOL/L (ref 23–29)
CREAT SERPL-MCNC: 0.8 MG/DL (ref 0.5–1.4)
DIFFERENTIAL METHOD: ABNORMAL
EOSINOPHIL # BLD AUTO: 0 K/UL (ref 0–0.5)
EOSINOPHIL NFR BLD: 0.1 % (ref 0–8)
ERYTHROCYTE [DISTWIDTH] IN BLOOD BY AUTOMATED COUNT: 13 % (ref 11.5–14.5)
EST. GFR  (AFRICAN AMERICAN): >60 ML/MIN/1.73 M^2
EST. GFR  (NON AFRICAN AMERICAN): >60 ML/MIN/1.73 M^2
GLUCOSE SERPL-MCNC: 122 MG/DL (ref 70–110)
HCT VFR BLD AUTO: 46.2 % (ref 37–48.5)
HGB BLD-MCNC: 14.8 G/DL (ref 12–16)
HIV 1+2 AB+HIV1 P24 AG SERPL QL IA: NEGATIVE
IMM GRANULOCYTES # BLD AUTO: 0.12 K/UL (ref 0–0.04)
IMM GRANULOCYTES NFR BLD AUTO: 0.7 % (ref 0–0.5)
LYMPHOCYTES # BLD AUTO: 2.2 K/UL (ref 1–4.8)
LYMPHOCYTES NFR BLD: 12.5 % (ref 18–48)
MCH RBC QN AUTO: 26.6 PG (ref 27–31)
MCHC RBC AUTO-ENTMCNC: 32 G/DL (ref 32–36)
MCV RBC AUTO: 83 FL (ref 82–98)
MONOCYTES # BLD AUTO: 0.9 K/UL (ref 0.3–1)
MONOCYTES NFR BLD: 5.4 % (ref 4–15)
NEUTROPHILS # BLD AUTO: 13.9 K/UL (ref 1.8–7.7)
NEUTROPHILS NFR BLD: 80.9 % (ref 38–73)
NRBC BLD-RTO: 0 /100 WBC
PLATELET # BLD AUTO: 372 K/UL (ref 150–350)
PMV BLD AUTO: 11.1 FL (ref 9.2–12.9)
POTASSIUM SERPL-SCNC: 4.1 MMOL/L (ref 3.5–5.1)
PROT SERPL-MCNC: 8.9 G/DL (ref 6–8.4)
RBC # BLD AUTO: 5.56 M/UL (ref 4–5.4)
SODIUM SERPL-SCNC: 142 MMOL/L (ref 136–145)
TROPONIN I SERPL DL<=0.01 NG/ML-MCNC: 0.01 NG/ML (ref 0–0.03)
WBC # BLD AUTO: 17.17 K/UL (ref 3.9–12.7)

## 2020-02-25 PROCEDURE — 80053 COMPREHEN METABOLIC PANEL: CPT

## 2020-02-25 PROCEDURE — 93010 ELECTROCARDIOGRAM REPORT: CPT | Mod: ,,, | Performed by: INTERNAL MEDICINE

## 2020-02-25 PROCEDURE — 85025 COMPLETE CBC W/AUTO DIFF WBC: CPT

## 2020-02-25 PROCEDURE — 96375 TX/PRO/DX INJ NEW DRUG ADDON: CPT

## 2020-02-25 PROCEDURE — 93010 EKG 12-LEAD: ICD-10-PCS | Mod: ,,, | Performed by: INTERNAL MEDICINE

## 2020-02-25 PROCEDURE — 86703 HIV-1/HIV-2 1 RESULT ANTBDY: CPT

## 2020-02-25 PROCEDURE — 84484 ASSAY OF TROPONIN QUANT: CPT

## 2020-02-25 PROCEDURE — 36415 COLL VENOUS BLD VENIPUNCTURE: CPT

## 2020-02-25 PROCEDURE — 96374 THER/PROPH/DIAG INJ IV PUSH: CPT

## 2020-02-25 PROCEDURE — 99284 EMERGENCY DEPT VISIT MOD MDM: CPT | Mod: 25

## 2020-02-25 PROCEDURE — 63600175 PHARM REV CODE 636 W HCPCS: Performed by: FAMILY MEDICINE

## 2020-02-25 PROCEDURE — 83880 ASSAY OF NATRIURETIC PEPTIDE: CPT

## 2020-02-25 PROCEDURE — 93005 ELECTROCARDIOGRAM TRACING: CPT

## 2020-02-25 RX ORDER — ONDANSETRON 2 MG/ML
4 INJECTION INTRAMUSCULAR; INTRAVENOUS
Status: COMPLETED | OUTPATIENT
Start: 2020-02-25 | End: 2020-02-25

## 2020-02-25 RX ORDER — HYDRALAZINE HYDROCHLORIDE 20 MG/ML
10 INJECTION INTRAMUSCULAR; INTRAVENOUS
Status: COMPLETED | OUTPATIENT
Start: 2020-02-25 | End: 2020-02-25

## 2020-02-25 RX ORDER — KETOROLAC TROMETHAMINE 30 MG/ML
30 INJECTION, SOLUTION INTRAMUSCULAR; INTRAVENOUS
Status: COMPLETED | OUTPATIENT
Start: 2020-02-25 | End: 2020-02-25

## 2020-02-25 RX ADMIN — KETOROLAC TROMETHAMINE 30 MG: 30 INJECTION, SOLUTION INTRAMUSCULAR at 11:02

## 2020-02-25 RX ADMIN — HYDRALAZINE HYDROCHLORIDE 10 MG: 20 INJECTION INTRAMUSCULAR; INTRAVENOUS at 11:02

## 2020-02-25 RX ADMIN — ONDANSETRON 4 MG: 2 INJECTION INTRAMUSCULAR; INTRAVENOUS at 11:02

## 2020-02-26 VITALS
SYSTOLIC BLOOD PRESSURE: 138 MMHG | BODY MASS INDEX: 28.42 KG/M2 | HEART RATE: 114 BPM | WEIGHT: 166.44 LBS | DIASTOLIC BLOOD PRESSURE: 90 MMHG | HEIGHT: 64 IN | RESPIRATION RATE: 16 BRPM | TEMPERATURE: 98 F | OXYGEN SATURATION: 97 %

## 2020-02-26 LAB
BACTERIA #/AREA URNS HPF: NORMAL /HPF
BILIRUB UR QL STRIP: NEGATIVE
BNP SERPL-MCNC: <10 PG/ML (ref 0–99)
CLARITY UR: CLEAR
COLOR UR: YELLOW
GLUCOSE UR QL STRIP: NEGATIVE
HGB UR QL STRIP: ABNORMAL
HYALINE CASTS #/AREA URNS LPF: 1 /LPF
KETONES UR QL STRIP: NEGATIVE
LEUKOCYTE ESTERASE UR QL STRIP: NEGATIVE
MICROSCOPIC COMMENT: NORMAL
NITRITE UR QL STRIP: NEGATIVE
PH UR STRIP: 6 [PH] (ref 5–8)
PROT UR QL STRIP: ABNORMAL
RBC #/AREA URNS HPF: 3 /HPF (ref 0–4)
SP GR UR STRIP: >=1.03 (ref 1–1.03)
URN SPEC COLLECT METH UR: ABNORMAL
UROBILINOGEN UR STRIP-ACNC: NEGATIVE EU/DL
WBC #/AREA URNS HPF: 0 /HPF (ref 0–5)

## 2020-02-26 PROCEDURE — 81000 URINALYSIS NONAUTO W/SCOPE: CPT

## 2020-02-26 PROCEDURE — 63600175 PHARM REV CODE 636 W HCPCS: Performed by: FAMILY MEDICINE

## 2020-02-26 RX ORDER — METOCLOPRAMIDE HYDROCHLORIDE 5 MG/ML
10 INJECTION INTRAMUSCULAR; INTRAVENOUS
Status: COMPLETED | OUTPATIENT
Start: 2020-02-26 | End: 2020-02-26

## 2020-02-26 RX ADMIN — METOCLOPRAMIDE 10 MG: 5 INJECTION, SOLUTION INTRAMUSCULAR; INTRAVENOUS at 12:02

## 2020-02-26 NOTE — ED NOTES
"Pt states headache has subsided "a little bit" since medication was given. Rates pain 9/10 at this time.  "
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
## 2020-07-07 ENCOUNTER — PATIENT MESSAGE (OUTPATIENT)
Dept: FAMILY MEDICINE | Facility: CLINIC | Age: 45
End: 2020-07-07
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
## 2020-07-08 ENCOUNTER — OFFICE VISIT (OUTPATIENT)
Dept: FAMILY MEDICINE | Facility: CLINIC | Age: 45
End: 2020-07-08
Payer: COMMERCIAL

## 2020-07-08 VITALS
BODY MASS INDEX: 29.99 KG/M2 | OXYGEN SATURATION: 98 % | HEART RATE: 96 BPM | WEIGHT: 175.69 LBS | DIASTOLIC BLOOD PRESSURE: 80 MMHG | TEMPERATURE: 98 F | HEIGHT: 64 IN | SYSTOLIC BLOOD PRESSURE: 122 MMHG

## 2020-07-08 DIAGNOSIS — S89.91XA INJURY OF RIGHT LOWER EXTREMITY, INITIAL ENCOUNTER: Primary | ICD-10-CM

## 2020-07-08 DIAGNOSIS — I10 ESSENTIAL HYPERTENSION: Primary | ICD-10-CM

## 2020-07-08 DIAGNOSIS — R07.9 CHEST PAIN SYNDROME: ICD-10-CM

## 2020-07-08 PROCEDURE — 99213 PR OFFICE/OUTPT VISIT, EST, LEVL III, 20-29 MIN: ICD-10-PCS | Mod: S$GLB,,, | Performed by: REGISTERED NURSE

## 2020-07-08 PROCEDURE — 99999 PR PBB SHADOW E&M-EST. PATIENT-LVL III: ICD-10-PCS | Mod: PBBFAC,,, | Performed by: REGISTERED NURSE

## 2020-07-08 PROCEDURE — 99213 OFFICE O/P EST LOW 20 MIN: CPT | Mod: S$GLB,,, | Performed by: REGISTERED NURSE

## 2020-07-08 PROCEDURE — 99999 PR PBB SHADOW E&M-EST. PATIENT-LVL III: CPT | Mod: PBBFAC,,, | Performed by: REGISTERED NURSE

## 2020-07-08 RX ORDER — VERAPAMIL HYDROCHLORIDE 120 MG/1
120 TABLET, FILM COATED ORAL DAILY
Qty: 90 TABLET | Refills: 0 | Status: SHIPPED | OUTPATIENT
Start: 2020-07-08 | End: 2021-08-09

## 2020-07-08 RX ORDER — LOSARTAN POTASSIUM 25 MG/1
25 TABLET ORAL DAILY
Qty: 90 TABLET | Refills: 0 | Status: SHIPPED | OUTPATIENT
Start: 2020-07-08 | End: 2020-10-16

## 2020-10-16 ENCOUNTER — HOSPITAL ENCOUNTER (OUTPATIENT)
Dept: RADIOLOGY | Facility: HOSPITAL | Age: 45
Discharge: HOME | End: 2020-10-16
Attending: FAMILY MEDICINE
Payer: COMMERCIAL

## 2020-10-16 ENCOUNTER — OFFICE VISIT (OUTPATIENT)
Dept: FAMILY MEDICINE | Facility: CLINIC | Age: 45
End: 2020-10-16
Payer: COMMERCIAL

## 2020-10-16 VITALS
OXYGEN SATURATION: 95 % | HEART RATE: 87 BPM | TEMPERATURE: 98 F | DIASTOLIC BLOOD PRESSURE: 86 MMHG | SYSTOLIC BLOOD PRESSURE: 122 MMHG | BODY MASS INDEX: 29.77 KG/M2 | HEIGHT: 64 IN | WEIGHT: 174.38 LBS

## 2020-10-16 DIAGNOSIS — Z11.59 ENCOUNTER FOR HEPATITIS C SCREENING TEST FOR LOW RISK PATIENT: ICD-10-CM

## 2020-10-16 DIAGNOSIS — M25.511 ACUTE PAIN OF RIGHT SHOULDER: ICD-10-CM

## 2020-10-16 DIAGNOSIS — M25.562 ACUTE PAIN OF LEFT KNEE: ICD-10-CM

## 2020-10-16 DIAGNOSIS — Z00.00 ENCOUNTER FOR WELLNESS EXAMINATION: Primary | ICD-10-CM

## 2020-10-16 DIAGNOSIS — E03.9 HYPOTHYROIDISM (ACQUIRED): ICD-10-CM

## 2020-10-16 DIAGNOSIS — I10 ESSENTIAL HYPERTENSION: ICD-10-CM

## 2020-10-16 DIAGNOSIS — N80.9 ENDOMETRIOSIS: ICD-10-CM

## 2020-10-16 DIAGNOSIS — Z12.39 ENCOUNTER FOR SCREENING FOR MALIGNANT NEOPLASM OF BREAST, UNSPECIFIED SCREENING MODALITY: ICD-10-CM

## 2020-10-16 PROCEDURE — 73030 X-RAY EXAM OF SHOULDER: CPT | Mod: TC,FY,PO,RT

## 2020-10-16 PROCEDURE — 99396 PREV VISIT EST AGE 40-64: CPT | Mod: 25,S$GLB,, | Performed by: FAMILY MEDICINE

## 2020-10-16 PROCEDURE — 73562 XR KNEE ORTHO LEFT: ICD-10-PCS | Mod: 26,LT,, | Performed by: RADIOLOGY

## 2020-10-16 PROCEDURE — 73560 X-RAY EXAM OF KNEE 1 OR 2: CPT | Mod: TC,FY,PO,RT

## 2020-10-16 PROCEDURE — 99396 PR PREVENTIVE VISIT,EST,40-64: ICD-10-PCS | Mod: 25,S$GLB,, | Performed by: FAMILY MEDICINE

## 2020-10-16 PROCEDURE — 73030 X-RAY EXAM OF SHOULDER: CPT | Mod: 26,RT,, | Performed by: RADIOLOGY

## 2020-10-16 PROCEDURE — 73560 X-RAY EXAM OF KNEE 1 OR 2: CPT | Mod: 26,RT,, | Performed by: RADIOLOGY

## 2020-10-16 PROCEDURE — 99999 PR PBB SHADOW E&M-EST. PATIENT-LVL V: CPT | Mod: PBBFAC,,, | Performed by: FAMILY MEDICINE

## 2020-10-16 PROCEDURE — 73562 X-RAY EXAM OF KNEE 3: CPT | Mod: TC,FY,PO,LT

## 2020-10-16 PROCEDURE — 73562 X-RAY EXAM OF KNEE 3: CPT | Mod: 26,LT,, | Performed by: RADIOLOGY

## 2020-10-16 PROCEDURE — 90471 FLU VACCINE (QUAD) GREATER THAN OR EQUAL TO 3YO PRESERVATIVE FREE IM: ICD-10-PCS | Mod: S$GLB,,, | Performed by: FAMILY MEDICINE

## 2020-10-16 PROCEDURE — 99999 PR PBB SHADOW E&M-EST. PATIENT-LVL V: ICD-10-PCS | Mod: PBBFAC,,, | Performed by: FAMILY MEDICINE

## 2020-10-16 PROCEDURE — 90686 FLU VACCINE (QUAD) GREATER THAN OR EQUAL TO 3YO PRESERVATIVE FREE IM: ICD-10-PCS | Mod: S$GLB,,, | Performed by: FAMILY MEDICINE

## 2020-10-16 PROCEDURE — 73560 XR KNEE ORTHO LEFT: ICD-10-PCS | Mod: 26,RT,, | Performed by: RADIOLOGY

## 2020-10-16 PROCEDURE — 73030 XR SHOULDER COMPLETE 2 OR MORE VIEWS RIGHT: ICD-10-PCS | Mod: 26,RT,, | Performed by: RADIOLOGY

## 2020-10-16 PROCEDURE — 90471 IMMUNIZATION ADMIN: CPT | Mod: S$GLB,,, | Performed by: FAMILY MEDICINE

## 2020-10-16 PROCEDURE — 90686 IIV4 VACC NO PRSV 0.5 ML IM: CPT | Mod: S$GLB,,, | Performed by: FAMILY MEDICINE

## 2020-10-16 PROCEDURE — 99213 PR OFFICE/OUTPT VISIT, EST, LEVL III, 20-29 MIN: ICD-10-PCS | Mod: 25,S$GLB,, | Performed by: FAMILY MEDICINE

## 2020-10-16 PROCEDURE — 99213 OFFICE O/P EST LOW 20 MIN: CPT | Mod: 25,S$GLB,, | Performed by: FAMILY MEDICINE

## 2020-10-16 RX ORDER — IBUPROFEN 800 MG/1
800 TABLET ORAL 3 TIMES DAILY
Qty: 60 TABLET | Refills: 0 | Status: SHIPPED | OUTPATIENT
Start: 2020-10-16 | End: 2021-11-26

## 2020-10-16 NOTE — PROGRESS NOTES
Subjective:      Patient ID: Florecita Noel is a 45 y.o. female.    Chief Complaint: Annual Exam    HPI    Patient with PMHx HTN, hypothyroidism, anemia  here today for annual wellness   Notes that she is out of her thyroid medication - has been a while - was unable to get refills   She does have a complaint of shoulder pain - right shoulder. X 1 macho ago. Worse if reaching for something or when raising her arm up. No numbness or tingling. NO swelling   She notes that she also has pain on her knee - left side. Swelling. Feels like it is going to give out on her when walking. Feels like can't extend knee all the way. Notes that a few years ago this happened, she fell on the back of a U haul truck and hasn't had any issues since. No erythema. Pain and swelling back over the last few weeks. NO numbness or tingling.,   Not taking anything for issues.   Patient is currently working from home   Notes that she is worried about her increased risk for cancer due to having cancer in the family - ovarian cancer, lung cancer     Past Medical History:   Diagnosis Date    Abnormal Pap smear 1996    Anemia     Hypertension     Hypokalemia     in pregnancy    Hypothyroidism (acquired) 12/28/2018       Past Surgical History:   Procedure Laterality Date    CHOLECYSTECTOMY      [facility]    DIAGNOSTIC LAPAROSCOPY N/A 2/26/2019    Procedure: LAPAROSCOPY, DIAGNOSTIC;  Surgeon: Pia Aguila MD;  Location: [facility];  Service: OB/GYN;  Laterality: N/A;    FULGURATION OF ENDOMETRIOSIS N/A 2/26/2019    Procedure: FULGURATION, ENDOMETRIOSIS;  Surgeon: Pia Aguila MD;  Location: [facility] OR;  Service: OB/GYN;  Laterality: N/A;    HYSTEROSCOPY WITH HYDROTHERMAL ABLATION OF ENDOMETRIUM WITH DILATION AND CURETTAGE N/A 2/26/2019    Procedure: HYSTEROSCOPY, WITH DILATION AND CURETTAGE OF UTERUS AND HYDROTHERMAL ENDOMETRIAL ABLATION;  Surgeon: Pia Aguila MD;  Location: [facility] OR;  Service:  OB/GYN;  Laterality: N/A;    LAPAROSCOPIC DRAINAGE OF CYST OF OVARY Left 2/26/2019    Procedure: DRAINAGE, CYST, OVARY, LAPAROSCOPIC;  Surgeon: Pia Aguila MD;  Location: [facility] OR;  Service: OB/GYN;  Laterality: Left;    ROBOT-ASSISTED LAPAROSCOPIC ABDOMINAL HYSTERECTOMY USING DA BEAN XI N/A 11/5/2019    Procedure: XI ROBOTIC HYSTERECTOMY;  Surgeon: Pia Aguila MD;  Location: [facility] OR;  Service: OB/GYN;  Laterality: N/A;    ROBOT-ASSISTED LAPAROSCOPIC SALPINGO-OOPHORECTOMY USING DA BEAN XI Bilateral 11/5/2019    Procedure: XI ROBOTIC SALPINGO-OOPHORECTOMY;  Surgeon: Pia Aguila MD;  Location: [facility] OR;  Service: OB/GYN;  Laterality: Bilateral;    TUBAL LIGATION      Tummy Tuck         Family History   Problem Relation Age of Onset    Ovarian cancer Mother     Cancer Father     Heart disease Maternal Grandmother        Social History     Socioeconomic History    Marital status:      Spouse name: Not on file    Number of children: Not on file    Years of education: Not on file    Highest education level: Not on file   Occupational History    Not on file   Social Needs    Financial resource strain: Somewhat hard    Food insecurity     Worry: Never true     Inability: Never true    Transportation needs     Medical: No     Non-medical: No   Tobacco Use    Smoking status: Never Smoker    Smokeless tobacco: Never Used   Substance and Sexual Activity    Alcohol use: Yes     Alcohol/week: 0.0 standard drinks     Frequency: 2-3 times a week     Drinks per session: 1 or 2     Binge frequency: Never     Comment: socially;  No alcohol 72h prior to sx    Drug use: No    Sexual activity: Yes     Partners: Male   Lifestyle    Physical activity     Days per week: 2 days     Minutes per session: 20 min    Stress: Very much   Relationships    Social connections     Talks on phone: More than three times a week     Gets together: Never     Attends Taoism service: Not  on file     Active member of club or organization: No     Attends meetings of clubs or organizations: Not on file     Relationship status:    Other Topics Concern    Not on file   Social History Narrative    Not on file       Health Maintenance Topics with due status: Not Due       Topic Last Completion Date    TETANUS VACCINE 12/13/2013    Mammogram 02/28/2019       Medication List with Changes/Refills   New Medications    IBUPROFEN (ADVIL,MOTRIN) 800 MG TABLET    Take 1 tablet (800 mg total) by mouth 3 (three) times daily.   Current Medications    ASPIRIN/ACETAMINOPHEN/CAFFEINE (GOODY'S EXTRA STRENGTH ORAL)    Take by mouth daily as needed.     ESTRADIOL (CLIMARA) 0.075 MG/24 HR    Apply 1 patch q week    IBUPROFEN (ADVIL,MOTRIN) 800 MG TABLET    Take 1 tablet (800 mg total) by mouth 3 (three) times daily.    LEVOTHYROXINE (SYNTHROID) 75 MCG TABLET    Take 1 tablet (75 mcg total) by mouth before breakfast.    PROGESTERONE (PROMETRIUM) 200 MG CAPSULE    Take 1 capsule (200 mg total) by mouth nightly.    VERAPAMIL (CALAN) 120 MG TABLET    Take 1 tablet (120 mg total) by mouth once daily.   Discontinued Medications    LOSARTAN (COZAAR) 25 MG TABLET    Take 1 tablet (25 mg total) by mouth once daily.    PROPRANOLOL (INDERAL) 40 MG TABLET    Take 40 mg by mouth daily as needed.     RANITIDINE (ZANTAC) 150 MG TABLET    Take 1 tablet (150 mg total) by mouth 2 (two) times daily.       Review of patient's allergies indicates:   Allergen Reactions    Sumatriptan Other (See Comments)     Chest tightness that moved into her throat       Review of Systems   Constitutional: Negative for fever.   HENT: Negative for congestion.    Eyes: Negative for blurred vision.   Respiratory: Negative for shortness of breath.    Cardiovascular: Negative for chest pain and leg swelling.   Gastrointestinal: Negative for abdominal pain, constipation and diarrhea.   Genitourinary: Negative for dysuria.   Musculoskeletal: Positive for  joint pain.   Skin: Negative for rash.   Neurological: Negative for headaches.       Objective:     Vitals:    10/16/20 1055   BP: 122/86   Pulse: 87   Temp: 97.7 °F (36.5 °C)     Body mass index is 29.93 kg/m².    Physical Exam  Vitals signs and nursing note reviewed.   Constitutional:       General: She is not in acute distress.     Appearance: She is well-developed.   HENT:      Head: Normocephalic and atraumatic.      Right Ear: External ear normal.      Left Ear: External ear normal.      Nose: Nose normal.   Eyes:      Conjunctiva/sclera: Conjunctivae normal.      Pupils: Pupils are equal, round, and reactive to light.   Neck:      Thyroid: No thyromegaly.   Cardiovascular:      Rate and Rhythm: Normal rate and regular rhythm.      Heart sounds: Normal heart sounds. No murmur.   Pulmonary:      Effort: Pulmonary effort is normal. No respiratory distress.      Breath sounds: Normal breath sounds. No wheezing or rales.   Chest:      Chest wall: No tenderness.   Abdominal:      General: Bowel sounds are normal. There is no distension.      Palpations: Abdomen is soft.      Tenderness: There is no abdominal tenderness.   Musculoskeletal:      Right shoulder: She exhibits tenderness. She exhibits normal range of motion, no swelling, no crepitus, no pain, normal pulse and normal strength.      Left shoulder: Normal. She exhibits normal range of motion, no tenderness, no swelling, no crepitus, no deformity and normal strength.      Right knee: Normal.      Left knee: She exhibits swelling. She exhibits normal range of motion, no erythema, no LCL laxity, normal patellar mobility and no MCL laxity. Tenderness found. Medial joint line tenderness noted.        Arms:         Legs:       Comments: Mild swelling in area highlighted    Lymphadenopathy:      Cervical: No cervical adenopathy.   Skin:     General: Skin is warm and dry.   Neurological:      Mental Status: She is alert and oriented to person, place, and time.          Assessment and Plan:     Encounter for wellness examination  -     CBC Without Differential; Future; Expected date: 10/16/2020  -     Comprehensive Metabolic Panel; Future; Expected date: 10/16/2020  -     Hemoglobin A1C; Future; Expected date: 10/16/2020  -     Hepatitis C Antibody; Future; Expected date: 10/16/2020  -     Lipid Panel; Future; Expected date: 10/16/2020  -     TSH; Future; Expected date: 10/16/2020  Age appropriate counselin g    Encounter for hepatitis C screening test for low risk patient  -     Hepatitis C Antibody; Future; Expected date: 10/16/2020    Encounter for screening for malignant neoplasm of breast, unspecified screening modality  -     Mammo Digital Screening Bilat w/ Pablito; Future; Expected date: 10/16/2020    Essential hypertension  Blood pressure controlled at today's visit   Continue with current medications   Ambulatory logs of blood pressure readings recommended   DASH diet, weight loss, exercise     Hypothyroidism (acquired)  -     TSH; Future; Expected date: 10/16/2020    Endometriosis  Stable  S/p hysterectomy     BMI 29.0-29.9,adult  Weight loss, diet changes, exercise     Acute pain of left knee  -     X-ray Knee Ortho Left; Future; Expected date: 10/16/2020  -     Ambulatory referral/consult to Physical/Occupational Therapy; Future; Expected date: 10/23/2020    Acute pain of right shoulder  -     X-ray Shoulder 2 or More Views Right; Future; Expected date: 10/16/2020  -     Ambulatory referral/consult to Physical/Occupational Therapy; Future; Expected date: 10/23/202  -     ibuprofen (ADVIL,MOTRIN) 800 MG tablet; Take 1 tablet (800 mg total) by mouth 3 (three) times daily.  Dispense: 60 tablet; Refill: 0         Follow up in about 6 months (around 4/16/2021).

## 2020-10-21 ENCOUNTER — CLINICAL SUPPORT (OUTPATIENT)
Dept: REHABILITATION | Facility: HOSPITAL | Age: 45
End: 2020-10-21
Payer: COMMERCIAL

## 2020-10-21 DIAGNOSIS — M25.562 ACUTE PAIN OF LEFT KNEE: ICD-10-CM

## 2020-10-21 DIAGNOSIS — M25.562 RECURRENT PAIN OF LEFT KNEE: ICD-10-CM

## 2020-10-21 DIAGNOSIS — R26.9 GAIT DIFFICULTY: ICD-10-CM

## 2020-10-21 DIAGNOSIS — M25.511 ACUTE PAIN OF RIGHT SHOULDER: ICD-10-CM

## 2020-10-21 PROCEDURE — 97110 THERAPEUTIC EXERCISES: CPT

## 2020-10-21 PROCEDURE — 97162 PT EVAL MOD COMPLEX 30 MIN: CPT

## 2020-10-22 PROBLEM — R26.9 GAIT DIFFICULTY: Status: ACTIVE | Noted: 2020-10-22

## 2020-10-22 NOTE — PLAN OF CARE
[facility]  Physical Therapy Initial Evaluation    Name[facility]  Clinic Number: [id number]    Therapy Diagnosis:   Encounter Diagnoses   Name Primary?    Acute pain of left knee     Acute pain of right shoulder     Recurrent pain of left knee     Gait difficulty      Physician: Alesha Alonso MD    Physician Orders: PT Eval and Treat  Medical Diagnosis from Referral:   M25.562 (ICD-10-CM) - Acute pain of left knee   M25.511 (ICD-10-CM) - Acute pain of right shoulder   Evaluation Date: 10/21/2020  Authorization Period Expiration: 12/31/2020  Plan of Care Expiration: 12/30/2020  Visit # / Visits authorized: 1/30    Time In: 5:09 pm  Time Out: 6:04 pm  Total Billable Time: 23 minutes    Precautions: Standard    Subjective   Date of onset: 9/21/2020  History of current condition - Florecita reports: Pt reports R shoulder pain began with no known cause and reaching to the R increases pain. Pt states she fell off a uhaul when she was a teenager and had swelling in the L knee but no other issues until now where all the sudden the knee estelita and she feels like she is going to fall. 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     Pain:  Current 6/10, worst 9/10, best 0/10   Location: R shoulder pain and superior and anterior; L knee pain medial anterior  Description: sharp pain reaching laterally and wakes her when sleeping; buckling and sharp pain medially when putting weight on her knee.  Aggravating Factors: putting weight on her foot  Easing Factors:ease pressure off the L foot to ease pain; sit to rest and keep the R shoulder at her side and uses MH and over the counter pain relievers.    Prior Therapy: none  Social History: spouse and kids  Occupation:  at [employer]  Prior Level of Function: hiking, crafting and managing household and yard chores  Current Level of Function: pain with simple walking, difficulty standing, cooking and  entertaining family and friends is limited.    Imaging: xrays    Medical History:   Past Medical History:   Diagnosis Date    Abnormal Pap smear 1996    Anemia     Hypertension     Hypokalemia     in pregnancy    Hypothyroidism (acquired) 12/28/2018       Surgical History:   Florecita Noel  has a past surgical history that includes Cholecystectomy; Tummy Tuck; Tubal ligation; Cosmetic surgery; Hysteroscopy with hydrothermal ablation of endometrium with dilation and curettage (N/A, 2/26/2019); Diagnostic laparoscopy (N/A, 2/26/2019); Laparoscopic drainage of cyst of ovary (Left, 2/26/2019); Fulguration of endometriosis (N/A, 2/26/2019); Robot-assisted laparoscopic abdominal hysterectomy using da Trell Xi (N/A, 11/5/2019); and Robot-assisted laparoscopic salpingo-oophorectomy using da Trell Xi (Bilateral, 11/5/2019).    Medications:   Florecita has a current medication list which includes the following prescription(s): aspirin/acetaminophen/caffeine, estradiol, ibuprofen, ibuprofen, levothyroxine, progesterone, and verapamil.    Allergies:   Review of patient's allergies indicates:   Allergen Reactions    Sumatriptan Other (See Comments)     Chest tightness that moved into her throat        Pts goals: wants to return to walking and sleeping without pain waking her and to avoid feeling like she is going to fall as the L knee estelita. Lift her purse on the R without pain    Objective       CMS Impairment/Limitation/Restriction for FOTO knee Survey    Therapist reviewed FOTO scores for Florecita Noel on 10/21/2020.   FOTO documents entered into [employer] - see Media section.    Limitation Score: 51%         Posture: Pt noted to present with forward head/rounded shoulder posture with B scapula protracted B, decreased thoracic kyphosis, increased lumbar lordosis, tight hip flexors, tibias internally rotated and knees in valgus at times.     Shoulder ROM:    Active/Passive Joint Range Right Left   Flexion 150 180   ABDuction 150 175    External Rotation 90 at 90 and 75 at 0 abduction 90 at 90; 85 at 90   Internal Rotation 65 65   Adduction 35 45   Extension 60 70     Pain with motions of external and internal rotation, abduction and EROM flexion    Cervical Spine AROM:   Degrees Pain   Flexion 55 n   Extend 65 n   R side bend 50 n   L side bend 50 n   R rotation 90 n   L rotation 90 n     T spine rotation 50% with flexion 70% and extension at 90%        Strength:  Muscle (Myotome) Right Left   Cervical Side bend 5 5   Shoulder Abduction 4 5   Elbow Flexors (C5) 5 5   Wrist Extensors (C6) 5 5   Elbow Extensors (C7) 4 5   ER (arm at side) 4 5   IR (arm at side) 4 5   Serratus Ant 3 5   Supraspinatus 3 5    strength Good  good   Lower traps 2+ 3+   Mid traps   3    4  Core: 2+/5  Sensation: Intact to light touch     Knee ROM:  R knee flexion 135 to -5   L knee flexion 120 to -3    Knee Strength   R L  Knee flexion   5 4  Knee extension 4+ 4  Ankle ROM WFL; tibial internal/external rotation WFL     Hip ROM:  Hip flexion 120 B; external and internal rotation WFL and extension is 10 degrees B; prone hamstring curl R 125 degrees, L 110 degrees    Hip Strength   R L  Hip flexion  5 4  Hip extension  4+ 4  Hip abduction  4 4  Hip internal rotation 5 4  Hip external rotation 5 4  Jovon (-); L VMO not as active as R with contraction    Joint Mobility: limited PA motion in the thoracic spine, tight posterior capsule on the R shoulder    Palpation:  PT noted increased tension in the R Upper trap, levator, trigger points B in the infraspinatus, R teres minor, R lat, rhomboids on R with decreased VMO strength on the L quad and increased tension in the intermediate and lateral quad as well as the lateral hamstring.     TREATMENT   Treatment Time In: 5:41 pm  Treatment Time Out: 6:04 pm  Total Treatment time separate from Evaluation: 23 minutes    Florecita received therapeutic exercises to develop strength, endurance, ROM and flexibility for - minutes  including:  Seated cervical and thoracic extension with hands behind the head 2x3  Seated shoulder external rotation with red theraband 2x10  Shoulder retraction/extension with upside down Y red theraband 2x8 B  Knee extension seated LAQ 10 sec hold x 15 reps  Prone hip extension 2x5 B  Scapular depression seated 2x10 B      Florecita received the following manual therapy techniques: Joint mobilizations, Myofacial release and Soft tissue Mobilization were applied to the: - for - minutes, including:    Home Exercises and Patient Education Provided    Education provided:   -Education on condition, HEP, and PT educated pt in the importance to improving gait on the L LE to reduce her tendency to alter foot mechanics as it will lead to possible back problems down the road. PT suggested a SC if the knee is feeling as unreliable as she expresses and encouraged pt to wear shorts so we can tape on the next visit. PT asked pt to have a photo of her work set up as she is now working from home and it may be contributing to her new neck and shoulder complaints. PT suggested proper shoe wear over using flip flops or heels to help provide better support for the knee to reduce valgus at the L knee.    Written Home Exercises Provided: Patient instructed to cont prior HEP.  Exercises were reviewed and Florecita was able to demonstrate them prior to the end of the session.  Florecita demonstrated good  understanding of the education provided.     See EMR under Patient Instructions for exercises provided 10/21/2020.    Assessment   Florecita is a 45 y.o. female referred to outpatient Physical Therapy with a medical diagnosis of   M25.562 (ICD-10-CM) - Acute pain of left knee   M25.511 (ICD-10-CM) - Acute pain of right shoulder   , pt presents with signs and symptoms consistent with diagnosis along with R shoulder pain, L knee pain, gait difficulty. The patient presents with impairments which include decreased ROM, decreased strength, decreased joint  mobility, joint hypermobility , decreased muscle length, impaired coordination, impaired balance, postural abnormalities, gait abnormalities and decreased overall function.  These impairments are limiting patient's ability to walk long distance, manage curbs and steps, reach and perform household tasks and lifting tasks.     Pt prognosis is Good due to personal factors and co-morbidities listed below.   Pt will benefit from skilled outpatient Physical Therapy to address the deficits stated above and in the chart below, provide pt/family education, and to maximize pt's level of independence.     Plan of care discussed with patient: Yes  Pt's spiritual, cultural and educational needs considered and patient is agreeable to the plan of care and goals as stated below:     Anticipated Barriers for therapy: work schedule, frequent sitting    Medical Necessity is demonstrated by the following  History  Co-morbidities and personal factors that may impact the plan of care Co-morbidities:   coping style/mechanism, difficulty sleeping  Past Medical History:   Diagnosis Date    Abnormal Pap smear 1996    Anemia     Hypertension     Hypokalemia     in pregnancy    Hypothyroidism (acquired) 12/28/2018       Personal Factors:   coping style  social background  lifestyle     moderate   Examination  Body Structures and Functions, activity limitations and participation restrictions that may impact the plan of care Body Regions:   neck  back  lower extremities  upper extremities    Body Systems:    gross symmetry  ROM  strength  gross coordinated movement  balance  gait  transfers  transitions  motor control    Participation Restrictions:   See current limitations    Activity limitations:   Learning and applying knowledge  watching  listening    General Tasks and Commands  undertaking a single task  undertaking multiple tasks    Communication  communicating with/receiving spoken language  communicating with/receiving non-verbal  language    Mobility  lifting and carrying objects  walking  using transportation (bus, train, plane, car)  driving (bike, car, motorcycle)    Self care  washing oneself (bathing, drying, washing hands)  caring for body parts (brushing teeth, shaving, grooming)  toileting  dressing  eating  drinking  looking after one's health    Domestic Life  shopping  cooking  doing house work (cleaning house, washing dishes, laundry)  assisting others    Interactions/Relationships  basic interpersonal interactions  complex interpersonal interactions  formal relationships  family relationships    Life Areas  employment  basic economic transactions    Community and Social Life  community life  recreation and leisure         high   Clinical Presentation evolving clinical presentation with changing clinical characteristics moderate   Decision Making/ Complexity Score: moderate     Goals:  Short Term Goals: In 4 weeks 11/18/2020  1.Pt to be educated on HEP.  2.Patient to increase GH ROM from 150 to 170 with shoulder flexion and abduction on the R    3.Patient to increase strength by 1/2 grade in the lower traps  4.Patient to have pain less than 3/10 or better at all times when using the R UE  5.Patient to improve score on the FOTO by 5%.  6. Pt to be educated on postural and body mechanics awareness.  7. Restore gait to nonantalgic    Long Term Goals: In 10 weeks 12/30/2020  1. Patient to improve score on the FOTO to 25% limitation or better.  2. Patient to demo increase in UE strength to 4+/5 with the mid traps and shoulder external rotators  3. Patient to have decreased pain to 3/10 or better at all times in the L knee.  4. Patient to demo increase knee flexion to 130 degrees on the L.  5. Patient to perform daily activities including squatting to reach for items off the floor for 3x3 without her UE's to assist to prepare for light lifting of groceries, etc.      Plan   Plan of care Certification: 10/21/2020 to  12/30/2020.    Outpatient Physical Therapy 2 times weekly for 10 weeks to include the following interventions: Cervical/Lumbar Traction, Electrical Stimulation Russian, NMES, IFC, Gait Training, Manual Therapy, Moist Heat/ Ice, Neuromuscular Re-ed, Patient Education, Self Care, Therapeutic Activites, Therapeutic Exercise and DN.     Nina Grider, PT, CIDN, SFMA    Thank you for this referral.    These services are reasonable and necessary for the conditions set forth above while under my care.

## 2020-10-28 ENCOUNTER — TELEPHONE (OUTPATIENT)
Dept: REHABILITATION | Facility: HOSPITAL | Age: 45
End: 2020-10-28

## 2020-10-28 NOTE — TELEPHONE ENCOUNTER
PT left a message with pt's voicemail as she has cancelled multiple visits and PT is not sure if she wants to continue PT or needs a more aggressive HEP to advance on her own due to work or family restrictions.

## 2020-10-30 ENCOUNTER — DOCUMENTATION ONLY (OUTPATIENT)
Dept: REHABILITATION | Facility: HOSPITAL | Age: 45
End: 2020-10-30

## 2020-10-30 PROBLEM — M25.511 ACUTE PAIN OF RIGHT SHOULDER: Status: RESOLVED | Noted: 2020-10-21 | Resolved: 2020-10-30

## 2020-10-30 PROBLEM — R26.9 GAIT DIFFICULTY: Status: RESOLVED | Noted: 2020-10-22 | Resolved: 2020-10-30

## 2020-10-30 PROBLEM — M25.562 RECURRENT PAIN OF LEFT KNEE: Status: RESOLVED | Noted: 2020-10-21 | Resolved: 2020-10-30

## 2020-10-30 NOTE — PROGRESS NOTES
Outpatient Therapy Discharge Summary     Name: Florecita Noel  Clinic Number: [id number]     Therapy Diagnosis:        Encounter Diagnoses   Name Primary?    Acute pain of left knee      Acute pain of right shoulder      Recurrent pain of left knee      Gait difficulty        Physician: Alesha Alonso MD     Physician Orders: PT Eval and Treat  Medical Diagnosis from Referral:   M25.562 (ICD-10-CM) - Acute pain of left knee   M25.511 (ICD-10-CM) - Acute pain of right shoulder   Evaluation Date: 10/21/2020  Authorization Period Expiration: 12/31/2020  Plan of Care Expiration: 12/30/2020  Visit # / Visits authorized: 1/30      Precautions: Standard    Date of Last visit: 10/21/2020  Total Visits Received: 1  Cancelled Visits: all  No Show Visits: 0        CMS Impairment/Limitation/Restriction for FOTO not assessed Survey    Therapist reviewed FOTO scores for Florecita Noel on 10/30/2020.   FOTO documents entered into [employer] - see Media section.    Limitation Score: not assessed%       Discharge reason: Other:  PT reached out to pt after she cancelled her first few follow up PT appt. To see if pt needed a different time to attend as she was not showing up for any of the scheduled visits so far. PT was only able to leave a Voice mail so is not certain why she didn't attend any follow up PT appt. Instead, pt cancelled all remaining PT appt. She only attended the eval. See eval for findings.   Goals not assessed as pt didn't return to PT.    Plan   This patient is discharged from Physical Therapy    Nina Grider, PT

## 2020-11-09 ENCOUNTER — HOSPITAL ENCOUNTER (OUTPATIENT)
Dept: RADIOLOGY | Facility: HOSPITAL | Age: 45
Discharge: HOME | End: 2020-11-09
Attending: FAMILY MEDICINE
Payer: COMMERCIAL

## 2020-11-09 VITALS — WEIGHT: 174.38 LBS | BODY MASS INDEX: 29.77 KG/M2 | HEIGHT: 64 IN

## 2020-11-09 DIAGNOSIS — Z12.39 ENCOUNTER FOR SCREENING FOR MALIGNANT NEOPLASM OF BREAST, UNSPECIFIED SCREENING MODALITY: ICD-10-CM

## 2020-11-09 PROCEDURE — 77067 SCR MAMMO BI INCL CAD: CPT | Mod: 26,,, | Performed by: RADIOLOGY

## 2020-11-09 PROCEDURE — 77063 MAMMO DIGITAL SCREENING BILAT WITH TOMO: ICD-10-PCS | Mod: 26,,, | Performed by: RADIOLOGY

## 2020-11-09 PROCEDURE — 77063 BREAST TOMOSYNTHESIS BI: CPT | Mod: 26,,, | Performed by: RADIOLOGY

## 2020-11-09 PROCEDURE — 77067 SCR MAMMO BI INCL CAD: CPT | Mod: TC

## 2020-11-09 PROCEDURE — 77067 MAMMO DIGITAL SCREENING BILAT WITH TOMO: ICD-10-PCS | Mod: 26,,, | Performed by: RADIOLOGY

## 2020-11-10 ENCOUNTER — PATIENT MESSAGE (OUTPATIENT)
Dept: FAMILY MEDICINE | Facility: CLINIC | Age: 45
End: 2020-11-10

## 2020-11-13 ENCOUNTER — HOSPITAL ENCOUNTER (OUTPATIENT)
Dept: RADIOLOGY | Facility: HOSPITAL | Age: 45
Discharge: HOME | End: 2020-11-13
Attending: FAMILY MEDICINE
Payer: COMMERCIAL

## 2020-11-13 DIAGNOSIS — R92.8 ABNORMAL MAMMOGRAM: ICD-10-CM

## 2020-11-13 PROCEDURE — 77061 BREAST TOMOSYNTHESIS UNI: CPT | Mod: TC,RT

## 2020-11-13 PROCEDURE — 76642 US BREAST RIGHT LIMITED: ICD-10-PCS | Mod: 26,RT,, | Performed by: RADIOLOGY

## 2020-11-13 PROCEDURE — 77061 BREAST TOMOSYNTHESIS UNI: CPT | Mod: 26,RT,, | Performed by: RADIOLOGY

## 2020-11-13 PROCEDURE — 77061 MAMMO DIGITAL DIAGNOSTIC RIGHT WITH TOMO: ICD-10-PCS | Mod: 26,RT,, | Performed by: RADIOLOGY

## 2020-11-13 PROCEDURE — 77065 DX MAMMO INCL CAD UNI: CPT | Mod: 26,RT,, | Performed by: RADIOLOGY

## 2020-11-13 PROCEDURE — 76642 ULTRASOUND BREAST LIMITED: CPT | Mod: TC,RT

## 2020-11-13 PROCEDURE — 76642 ULTRASOUND BREAST LIMITED: CPT | Mod: 26,RT,, | Performed by: RADIOLOGY

## 2020-11-13 PROCEDURE — 77065 MAMMO DIGITAL DIAGNOSTIC RIGHT WITH TOMO: ICD-10-PCS | Mod: 26,RT,, | Performed by: RADIOLOGY

## 2020-11-13 PROCEDURE — 77065 DX MAMMO INCL CAD UNI: CPT | Mod: TC,RT

## 2021-02-24 ENCOUNTER — PATIENT MESSAGE (OUTPATIENT)
Dept: FAMILY MEDICINE | Facility: CLINIC | Age: 46
End: 2021-02-24

## 2021-03-05 ENCOUNTER — OFFICE VISIT (OUTPATIENT)
Dept: FAMILY MEDICINE | Facility: CLINIC | Age: 46
End: 2021-03-05
Payer: COMMERCIAL

## 2021-03-05 DIAGNOSIS — G43.809 OTHER MIGRAINE WITHOUT STATUS MIGRAINOSUS, NOT INTRACTABLE: Primary | ICD-10-CM

## 2021-03-05 PROCEDURE — 99214 PR OFFICE/OUTPT VISIT, EST, LEVL IV, 30-39 MIN: ICD-10-PCS | Mod: 95,,, | Performed by: FAMILY MEDICINE

## 2021-03-05 PROCEDURE — 99214 OFFICE O/P EST MOD 30 MIN: CPT | Mod: 95,,, | Performed by: FAMILY MEDICINE

## 2021-03-05 RX ORDER — AMITRIPTYLINE HYDROCHLORIDE 25 MG/1
25 TABLET, FILM COATED ORAL NIGHTLY
Qty: 30 TABLET | Refills: 5 | Status: SHIPPED | OUTPATIENT
Start: 2021-03-05 | End: 2021-11-26

## 2021-03-05 RX ORDER — BUTALBITAL, ACETAMINOPHEN AND CAFFEINE 50; 325; 40 MG/1; MG/1; MG/1
1 TABLET ORAL EVERY 6 HOURS PRN
Qty: 30 TABLET | Refills: 2 | Status: SHIPPED | OUTPATIENT
Start: 2021-03-05 | End: 2021-04-04

## 2021-03-26 ENCOUNTER — OFFICE VISIT (OUTPATIENT)
Dept: FAMILY MEDICINE | Facility: CLINIC | Age: 46
End: 2021-03-26
Payer: COMMERCIAL

## 2021-03-26 DIAGNOSIS — G43.009 MIGRAINE WITHOUT AURA AND WITHOUT STATUS MIGRAINOSUS, NOT INTRACTABLE: Primary | ICD-10-CM

## 2021-03-26 PROCEDURE — 99213 PR OFFICE/OUTPT VISIT, EST, LEVL III, 20-29 MIN: ICD-10-PCS | Mod: 95,,, | Performed by: FAMILY MEDICINE

## 2021-03-26 PROCEDURE — 99213 OFFICE O/P EST LOW 20 MIN: CPT | Mod: 95,,, | Performed by: FAMILY MEDICINE

## 2021-03-29 ENCOUNTER — OFFICE VISIT (OUTPATIENT)
Dept: OBSTETRICS AND GYNECOLOGY | Facility: CLINIC | Age: 46
End: 2021-03-29
Payer: COMMERCIAL

## 2021-03-29 VITALS
BODY MASS INDEX: 30.05 KG/M2 | DIASTOLIC BLOOD PRESSURE: 100 MMHG | WEIGHT: 175.06 LBS | SYSTOLIC BLOOD PRESSURE: 150 MMHG

## 2021-03-29 DIAGNOSIS — Z00.00 PREVENTATIVE HEALTH CARE: Primary | ICD-10-CM

## 2021-03-29 DIAGNOSIS — Z01.419 ROUTINE GYNECOLOGICAL EXAMINATION: ICD-10-CM

## 2021-03-29 DIAGNOSIS — R23.2 VASOMOTOR FLUSHING: ICD-10-CM

## 2021-03-29 PROCEDURE — 99396 PREV VISIT EST AGE 40-64: CPT | Mod: S$GLB,,, | Performed by: NURSE PRACTITIONER

## 2021-03-29 PROCEDURE — 99999 PR PBB SHADOW E&M-EST. PATIENT-LVL III: ICD-10-PCS | Mod: PBBFAC,,, | Performed by: NURSE PRACTITIONER

## 2021-03-29 PROCEDURE — 99396 PR PREVENTIVE VISIT,EST,40-64: ICD-10-PCS | Mod: S$GLB,,, | Performed by: NURSE PRACTITIONER

## 2021-03-29 PROCEDURE — 99999 PR PBB SHADOW E&M-EST. PATIENT-LVL III: CPT | Mod: PBBFAC,,, | Performed by: NURSE PRACTITIONER

## 2021-08-03 ENCOUNTER — PATIENT MESSAGE (OUTPATIENT)
Dept: FAMILY MEDICINE | Facility: CLINIC | Age: 46
End: 2021-08-03

## 2021-11-26 ENCOUNTER — OFFICE VISIT (OUTPATIENT)
Dept: FAMILY MEDICINE | Facility: CLINIC | Age: 46
End: 2021-11-26
Payer: COMMERCIAL

## 2021-11-26 ENCOUNTER — LAB VISIT (OUTPATIENT)
Dept: LAB | Facility: HOSPITAL | Age: 46
End: 2021-11-26
Attending: FAMILY MEDICINE
Payer: COMMERCIAL

## 2021-11-26 VITALS
OXYGEN SATURATION: 97 % | HEART RATE: 104 BPM | DIASTOLIC BLOOD PRESSURE: 84 MMHG | SYSTOLIC BLOOD PRESSURE: 120 MMHG | BODY MASS INDEX: 30.43 KG/M2 | HEIGHT: 64 IN | TEMPERATURE: 98 F | WEIGHT: 178.25 LBS

## 2021-11-26 DIAGNOSIS — Z00.00 ENCOUNTER FOR WELLNESS EXAMINATION: ICD-10-CM

## 2021-11-26 DIAGNOSIS — M25.649 STIFFNESS OF FINGER JOINT, UNSPECIFIED LATERALITY: ICD-10-CM

## 2021-11-26 DIAGNOSIS — I10 ESSENTIAL HYPERTENSION: ICD-10-CM

## 2021-11-26 DIAGNOSIS — Z00.00 ENCOUNTER FOR WELLNESS EXAMINATION: Primary | ICD-10-CM

## 2021-11-26 LAB
ALBUMIN SERPL BCP-MCNC: 4 G/DL (ref 3.5–5.2)
ALP SERPL-CCNC: 88 U/L (ref 55–135)
ALT SERPL W/O P-5'-P-CCNC: 31 U/L (ref 10–44)
ANION GAP SERPL CALC-SCNC: 10 MMOL/L (ref 8–16)
AST SERPL-CCNC: 19 U/L (ref 10–40)
BILIRUB SERPL-MCNC: 0.5 MG/DL (ref 0.1–1)
BUN SERPL-MCNC: 13 MG/DL (ref 6–20)
CALCIUM SERPL-MCNC: 9.7 MG/DL (ref 8.7–10.5)
CHLORIDE SERPL-SCNC: 103 MMOL/L (ref 95–110)
CHOLEST SERPL-MCNC: 301 MG/DL (ref 120–199)
CHOLEST/HDLC SERPL: 4.2 {RATIO} (ref 2–5)
CO2 SERPL-SCNC: 25 MMOL/L (ref 23–29)
CREAT SERPL-MCNC: 0.8 MG/DL (ref 0.5–1.4)
ERYTHROCYTE [DISTWIDTH] IN BLOOD BY AUTOMATED COUNT: 13.2 % (ref 11.5–14.5)
EST. GFR  (AFRICAN AMERICAN): >60 ML/MIN/1.73 M^2
EST. GFR  (NON AFRICAN AMERICAN): >60 ML/MIN/1.73 M^2
ESTIMATED AVG GLUCOSE: 108 MG/DL (ref 68–131)
GLUCOSE SERPL-MCNC: 95 MG/DL (ref 70–110)
HBA1C MFR BLD: 5.4 % (ref 4–5.6)
HCT VFR BLD AUTO: 44.5 % (ref 37–48.5)
HDLC SERPL-MCNC: 72 MG/DL (ref 40–75)
HDLC SERPL: 23.9 % (ref 20–50)
HGB BLD-MCNC: 14 G/DL (ref 12–16)
LDLC SERPL CALC-MCNC: 207.4 MG/DL (ref 63–159)
MCH RBC QN AUTO: 26.7 PG (ref 27–31)
MCHC RBC AUTO-ENTMCNC: 31.5 G/DL (ref 32–36)
MCV RBC AUTO: 85 FL (ref 82–98)
NONHDLC SERPL-MCNC: 229 MG/DL
PLATELET # BLD AUTO: 326 K/UL (ref 150–450)
PMV BLD AUTO: 11.5 FL (ref 9.2–12.9)
POTASSIUM SERPL-SCNC: 3.9 MMOL/L (ref 3.5–5.1)
PROT SERPL-MCNC: 7.7 G/DL (ref 6–8.4)
RBC # BLD AUTO: 5.24 M/UL (ref 4–5.4)
SODIUM SERPL-SCNC: 138 MMOL/L (ref 136–145)
TRIGL SERPL-MCNC: 108 MG/DL (ref 30–150)
TSH SERPL DL<=0.005 MIU/L-ACNC: 2.23 UIU/ML (ref 0.4–4)
WBC # BLD AUTO: 11.29 K/UL (ref 3.9–12.7)

## 2021-11-26 PROCEDURE — 90471 FLU VACCINE (QUAD) GREATER THAN OR EQUAL TO 3YO PRESERVATIVE FREE IM: ICD-10-PCS | Mod: S$GLB,,, | Performed by: FAMILY MEDICINE

## 2021-11-26 PROCEDURE — 80061 LIPID PANEL: CPT | Performed by: FAMILY MEDICINE

## 2021-11-26 PROCEDURE — 90471 IMMUNIZATION ADMIN: CPT | Mod: S$GLB,,, | Performed by: FAMILY MEDICINE

## 2021-11-26 PROCEDURE — 99396 PREV VISIT EST AGE 40-64: CPT | Mod: 25,S$GLB,, | Performed by: FAMILY MEDICINE

## 2021-11-26 PROCEDURE — 99999 PR PBB SHADOW E&M-EST. PATIENT-LVL III: CPT | Mod: PBBFAC,,, | Performed by: FAMILY MEDICINE

## 2021-11-26 PROCEDURE — 84443 ASSAY THYROID STIM HORMONE: CPT | Performed by: FAMILY MEDICINE

## 2021-11-26 PROCEDURE — 80053 COMPREHEN METABOLIC PANEL: CPT | Performed by: FAMILY MEDICINE

## 2021-11-26 PROCEDURE — 83036 HEMOGLOBIN GLYCOSYLATED A1C: CPT | Performed by: FAMILY MEDICINE

## 2021-11-26 PROCEDURE — 85027 COMPLETE CBC AUTOMATED: CPT | Performed by: FAMILY MEDICINE

## 2021-11-26 PROCEDURE — 90686 IIV4 VACC NO PRSV 0.5 ML IM: CPT | Mod: S$GLB,,, | Performed by: FAMILY MEDICINE

## 2021-11-26 PROCEDURE — 90686 FLU VACCINE (QUAD) GREATER THAN OR EQUAL TO 3YO PRESERVATIVE FREE IM: ICD-10-PCS | Mod: S$GLB,,, | Performed by: FAMILY MEDICINE

## 2021-11-26 PROCEDURE — 99999 PR PBB SHADOW E&M-EST. PATIENT-LVL III: ICD-10-PCS | Mod: PBBFAC,,, | Performed by: FAMILY MEDICINE

## 2021-11-26 PROCEDURE — 36415 COLL VENOUS BLD VENIPUNCTURE: CPT | Mod: PO | Performed by: FAMILY MEDICINE

## 2021-11-26 PROCEDURE — 99396 PR PREVENTIVE VISIT,EST,40-64: ICD-10-PCS | Mod: 25,S$GLB,, | Performed by: FAMILY MEDICINE

## 2021-11-26 RX ORDER — VERAPAMIL HYDROCHLORIDE 120 MG/1
TABLET, FILM COATED ORAL
Qty: 90 TABLET | Refills: 3 | Status: SHIPPED | OUTPATIENT
Start: 2021-11-26 | End: 2022-08-22

## 2021-11-30 ENCOUNTER — HOSPITAL ENCOUNTER (OUTPATIENT)
Dept: RADIOLOGY | Facility: HOSPITAL | Age: 46
Discharge: HOME | End: 2021-11-30
Attending: FAMILY MEDICINE
Payer: COMMERCIAL

## 2021-11-30 DIAGNOSIS — Z00.00 ENCOUNTER FOR WELLNESS EXAMINATION: ICD-10-CM

## 2021-11-30 PROCEDURE — 77067 SCR MAMMO BI INCL CAD: CPT | Mod: 26,,, | Performed by: RADIOLOGY

## 2021-11-30 PROCEDURE — 77067 MAMMO DIGITAL SCREENING BILAT WITH TOMO: ICD-10-PCS | Mod: 26,,, | Performed by: RADIOLOGY

## 2021-11-30 PROCEDURE — 77063 MAMMO DIGITAL SCREENING BILAT WITH TOMO: ICD-10-PCS | Mod: 26,,, | Performed by: RADIOLOGY

## 2021-11-30 PROCEDURE — 77063 BREAST TOMOSYNTHESIS BI: CPT | Mod: 26,,, | Performed by: RADIOLOGY

## 2021-11-30 PROCEDURE — 77067 SCR MAMMO BI INCL CAD: CPT | Mod: TC

## 2021-12-02 ENCOUNTER — TELEPHONE (OUTPATIENT)
Dept: RADIOLOGY | Facility: HOSPITAL | Age: 46
End: 2021-12-02
Payer: COMMERCIAL

## 2021-12-03 ENCOUNTER — HOSPITAL ENCOUNTER (OUTPATIENT)
Dept: RADIOLOGY | Facility: HOSPITAL | Age: 46
Discharge: HOME | End: 2021-12-03
Attending: FAMILY MEDICINE
Payer: COMMERCIAL

## 2021-12-03 DIAGNOSIS — R92.8 ABNORMAL MAMMOGRAM: ICD-10-CM

## 2021-12-03 PROCEDURE — 76642 US BREAST BILATERAL LIMITED: ICD-10-PCS | Mod: 26,50,, | Performed by: RADIOLOGY

## 2021-12-03 PROCEDURE — 76642 ULTRASOUND BREAST LIMITED: CPT | Mod: TC,50

## 2021-12-03 PROCEDURE — 77062 BREAST TOMOSYNTHESIS BI: CPT | Mod: 26,,, | Performed by: RADIOLOGY

## 2021-12-03 PROCEDURE — 77066 MAMMO DIGITAL DIAGNOSTIC BILAT WITH TOMO: ICD-10-PCS | Mod: 26,,, | Performed by: RADIOLOGY

## 2021-12-03 PROCEDURE — 77062 MAMMO DIGITAL DIAGNOSTIC BILAT WITH TOMO: ICD-10-PCS | Mod: 26,,, | Performed by: RADIOLOGY

## 2021-12-03 PROCEDURE — 76642 ULTRASOUND BREAST LIMITED: CPT | Mod: 26,50,, | Performed by: RADIOLOGY

## 2021-12-03 PROCEDURE — 77066 DX MAMMO INCL CAD BI: CPT | Mod: 26,,, | Performed by: RADIOLOGY

## 2021-12-03 PROCEDURE — 77062 BREAST TOMOSYNTHESIS BI: CPT | Mod: TC

## 2021-12-10 DIAGNOSIS — N63.10 BREAST MASS, RIGHT: ICD-10-CM

## 2021-12-10 DIAGNOSIS — N63.0 BREAST LUMP: Primary | ICD-10-CM

## 2021-12-10 DIAGNOSIS — Z12.39 ENCOUNTER FOR SCREENING FOR MALIGNANT NEOPLASM OF BREAST, UNSPECIFIED SCREENING MODALITY: Primary | ICD-10-CM

## 2021-12-17 ENCOUNTER — PATIENT MESSAGE (OUTPATIENT)
Dept: FAMILY MEDICINE | Facility: CLINIC | Age: 46
End: 2021-12-17
Payer: COMMERCIAL

## 2021-12-22 ENCOUNTER — PATIENT MESSAGE (OUTPATIENT)
Dept: FAMILY MEDICINE | Facility: CLINIC | Age: 46
End: 2021-12-22
Payer: COMMERCIAL

## 2021-12-27 ENCOUNTER — TELEPHONE (OUTPATIENT)
Dept: SURGERY | Facility: CLINIC | Age: 46
End: 2021-12-27
Payer: COMMERCIAL

## 2022-01-03 NOTE — PROGRESS NOTES
Patient ID: Florecita Noel is a 46 y.o. female.    Chief Complaint: bilateral abnormal mammogram    HPI: patient is referred for the evaluation of bilateral abnormal breast imaging. Dr. Alesha Alonso referred pt.     Risk factors identified:     Menarche at 12  G 7 P 6  misc- 1  First pregnancy at 16  LMP: 44  Estrogen: took 6-9 mons- off for about 1 year  Radiation to the neck or chest wall- none  Prior breast biopsies or atypical hyperplasia- none      ETOH: 3-4 times a week- 1-2 glasses wine     FH: mother ??? ovarian cancer vs breast - diagnosed with 63 y/o     There is no height or weight on file to calculate BMI.    Review of Systems   Constitutional: Negative.    HENT: Negative.    Eyes: Negative.    Respiratory: Negative.    Cardiovascular: Negative.    Gastrointestinal: Negative.         No reflux   Endocrine: Negative.    Genitourinary: Negative.    Musculoskeletal: Negative.    Skin: Negative.    Allergic/Immunologic: Negative.    Neurological: Negative.    Hematological: Negative.  Negative for adenopathy.   Psychiatric/Behavioral: Negative.    Breast: Pt denies any nipple discharge or palpable mass. Bilateral breast tenderness that is intermittent. No prior trauma or bruising. No breast surgeries or abnormalities.    Current Outpatient Medications   Medication Sig Dispense Refill    verapamiL (CALAN) 120 MG tablet TAKE 1 TABLET(120 MG) BY MOUTH EVERY DAY 90 tablet 3     No current facility-administered medications for this visit.       Review of patient's allergies indicates:   Allergen Reactions    Sumatriptan Other (See Comments)     Chest tightness that moved into her throat       Past Medical History:   Diagnosis Date    Abnormal Pap smear 1996    Anemia     Hypertension     Hypokalemia     in pregnancy    Hypothyroidism (acquired) 12/28/2018       Past Surgical History:   Procedure Laterality Date    CHOLECYSTECTOMY      COSMETIC SURGERY      tummy tuck    DIAGNOSTIC LAPAROSCOPY N/A  2/26/2019    Procedure: LAPAROSCOPY, DIAGNOSTIC;  Surgeon: Pia Aguila MD;  Location: [facility] OR;  Service: OB/GYN;  Laterality: N/A;    FULGURATION OF ENDOMETRIOSIS N/A 2/26/2019    Procedure: FULGURATION, ENDOMETRIOSIS;  Surgeon: Pia Aguila MD;  Location: [facility] OR;  Service: OB/GYN;  Laterality: N/A;    HYSTERECTOMY      HYSTEROSCOPY WITH HYDROTHERMAL ABLATION OF ENDOMETRIUM WITH DILATION AND CURETTAGE N/A 2/26/2019    Procedure: HYSTEROSCOPY, WITH DILATION AND CURETTAGE OF UTERUS AND HYDROTHERMAL ENDOMETRIAL ABLATION;  Surgeon: Pia Aguila MD;  Location: [facility] OR;  Service: OB/GYN;  Laterality: N/A;    LAPAROSCOPIC DRAINAGE OF CYST OF OVARY Left 2/26/2019    Procedure: DRAINAGE, CYST, OVARY, LAPAROSCOPIC;  Surgeon: Pia Aguila MD;  Location: [facility] OR;  Service: OB/GYN;  Laterality: Left;    ROBOT-ASSISTED LAPAROSCOPIC ABDOMINAL HYSTERECTOMY USING DA BEAN XI N/A 11/5/2019    Procedure: XI ROBOTIC HYSTERECTOMY;  Surgeon: Pia Aguila MD;  Location: [facility] OR;  Service: OB/GYN;  Laterality: N/A;    ROBOT-ASSISTED LAPAROSCOPIC SALPINGO-OOPHORECTOMY USING DA BEAN XI Bilateral 11/5/2019    Procedure: XI ROBOTIC SALPINGO-OOPHORECTOMY;  Surgeon: Pia Aguila MD;  Location: [facility] OR;  Service: OB/GYN;  Laterality: Bilateral;    TUBAL LIGATION      Tummy Tuck         Family History   Problem Relation Age of Onset    Ovarian cancer Mother     Cancer Father     Heart disease Maternal Grandmother        Social History     Socioeconomic History    Marital status: Other   Tobacco Use    Smoking status: Never Smoker    Smokeless tobacco: Never Used   Substance and Sexual Activity    Alcohol use: Yes     Alcohol/week: 0.0 standard drinks     Comment: socially;  No alcohol 72h prior to sx    Drug use: No    Sexual activity: Yes     Partners: Male     Birth control/protection: Surgical     Comment: st      Social Determinants of Health     Financial  Resource Strain: Low Risk     Difficulty of Paying Living Expenses: Not very hard   Food Insecurity: No Food Insecurity    Worried About Running Out of Food in the Last Year: Never true    Ran Out of Food in the Last Year: Never true   Transportation Needs: No Transportation Needs    Lack of Transportation (Medical): No    Lack of Transportation (Non-Medical): No   Physical Activity: Inactive    Days of Exercise per Week: 0 days    Minutes of Exercise per Session: 0 min   Stress: Stress Concern Present    Feeling of Stress : Rather much   Social Connections: Unknown    Frequency of Communication with Friends and Family: More than three times a week    Frequency of Social Gatherings with Friends and Family: Once a week    Active Member of Clubs or Organizations: No    Attends Club or Organization Meetings: Never    Marital Status:    Housing Stability: High Risk    Unable to Pay for Housing in the Last Year: Yes    Number of Places Lived in the Last Year: 1    Unstable Housing in the Last Year: No       There were no vitals filed for this visit.    Physical Exam  Constitutional:       Appearance: She is well-developed and well-nourished.   HENT:      Head: Normocephalic and atraumatic.      Right Ear: External ear normal.      Left Ear: External ear normal.      Mouth/Throat:      Pharynx: No oropharyngeal exudate.   Eyes:      General: No scleral icterus.        Right eye: No discharge.         Left eye: No discharge.      Extraocular Movements: EOM normal.      Conjunctiva/sclera: Conjunctivae normal.      Pupils: Pupils are equal, round, and reactive to light.   Neck:      Thyroid: No thyromegaly.   Cardiovascular:      Rate and Rhythm: Normal rate and regular rhythm.      Heart sounds: Normal heart sounds.   Pulmonary:      Effort: Pulmonary effort is normal.      Breath sounds: Normal breath sounds.   Chest:   Breasts:      Right: No inverted nipple, mass, nipple discharge, skin change,  tenderness, axillary adenopathy or supraclavicular adenopathy.      Left: No inverted nipple, mass, nipple discharge, skin change, tenderness, axillary adenopathy or supraclavicular adenopathy.       Abdominal:      General: Bowel sounds are normal.      Palpations: Abdomen is soft.   Musculoskeletal:         General: No edema. Normal range of motion.      Right shoulder: No crepitus. Normal strength.      Cervical back: Normal range of motion and neck supple.   Lymphadenopathy:      Head:      Right side of head: No submental, submandibular, tonsillar, preauricular, posterior auricular or occipital adenopathy.      Left side of head: No submental, submandibular, tonsillar, preauricular, posterior auricular or occipital adenopathy.      Cervical: No cervical adenopathy.      Right cervical: No superficial or posterior cervical adenopathy.     Left cervical: No superficial or posterior cervical adenopathy.      Upper Body:   No axillary adenopathy present.     Right upper body: No supraclavicular or axillary adenopathy.      Left upper body: No supraclavicular or axillary adenopathy.   Skin:     General: Skin is warm and dry.      Coloration: Skin is not pale.      Findings: No erythema or rash.   Neurological:      Mental Status: She is alert and oriented to person, place, and time.      Deep Tendon Reflexes: Reflexes are normal and symmetric.   Psychiatric:         Mood and Affect: Mood and affect normal.         Behavior: Behavior normal.         Thought Content: Thought content normal.         Judgment: Judgment normal.       12/3/2021  Mammo Digital Diagnostic Bilat with Pablito, US Breast Bilateral Limited  Narrative: Result:   Mammo Digital Diagnostic Bilat with Pablito  US Breast Bilateral Limited     History:  Patient is 46 y.o. and is seen for diagnostic imaging.    Films Compared:  Compared to: 11/30/2021 Mammo Digital Screening Bilat w/ Pablito, 11/13/2020   Mammo Digital Diagnostic Right with Pablito, 11/09/2020  Mammo Digital   Screening Bilat w/ Pablito, 02/28/2019 Mammo Digital Screening Bilat w/ Pablito,   and 10/27/2017 Mammo Digital Screening Bilat with CAD     Findings:  This procedure was performed using tomosynthesis. Computer-aided detection   was utilized in the interpretation of this examination.  The breasts have scattered areas of fibroglandular density.     Left  Mammo Digital Diagnostic Bilat with Pablito  There is a mass seen in the upper outer quadrant of the left breast in the   posterior depth.     US Breast Bilateral Limited  There are 3 similar complicated cysts seen in the upper outer quadrant of   the left breast in the posterior depth. The largest cyst is thought to be   the correlate and measures 0.7 x 0.5 x 0.5 cm.  There are 2 additional   complex cysts also seen in the upper outer quadrant of the left breast   that measure 0.4 x 0.2 x 0.3 cm and 0.6 x 0.3 x 0.4 cm.     Right  Mammo Digital Diagnostic Bilat with Pablito  There is a mass seen in the lower central region of the right breast in   the posterior depth. The posterior margin of this lesion is not seen on   mammography.  The lesion appears to abut and overlie the pectoralis   musculature.  The lesion appears to measure at least 9 mm in size.     US Breast Bilateral Limited  There is a mass seen in the lower central region of the right breast. A   definitive correlate is not definitely seen on ultrasound.  There is a 6   mm hypoechoic lesion likely representing a complex cyst within the central   portion of the breast that appears to be too small to be the correlate for   the mammographic finding.  There is an additional 1.0 x 0.4 x 0.6 cm   hypoechoic area that is located within a ridge of tissue and also does not   definitively correlate to the lesion.  It is possible this lesion was   present on prior studies although only space visualized on the most recent   study due to increased tissue included on the MLO view.  The visualized   margins of the  lesion also appear to be fairly smooth and therefore the   lesion can be followed.   Impression: Left  Mass: Left breast mass at the upper outer posterior position. Assessment:   3 - Probably benign. Short Interval Follow-Up in 6 Months is recommended.     Right  Mass: Right breast mass at the lower central posterior position.   Assessment: 3 - Probably benign. Short Interval Follow-Up in 6 Months is   recommended.     BI-RADS Category:   Overall: 3 - Probably Benign     Recommendation:  Short interval follow-up is recommended in 6 Months.    Your estimated lifetime risk of breast cancer (to age 85) based on   Tyrer-Cuzick risk assessment model is Tyrer-Cuzick: 5.88 %. According to   the American Cancer Society, patients with a lifetime breast cancer risk   of 20% or higher might benefit from supplemental screening tests.       Assessment & Plan:  Abnormal mammogram    Counseling and coordination of care    Counseling on health promotion and disease prevention    Health education/counseling    Encounter for breast cancer screening using non-mammogram modality      1. Negative clinical findings on exam.  2. Bilateral abnormal mammogram- left breast 3 complex cysts and right breast mass lower central- no correlate on ultrasound- complex cyst noted central right- too small to corelate with the mammo finding. Additional 1.0 cm hypoechoic area within a ridge of tissue- present previously but not well visualized- smooth borders- bilateral 6 mon f/u imaging recommended. Pt counseled regarding short term f/u imaging risk and benefits. Recommend mahesh diagnostic mammo and ultrasound April 2022 and exam  3. BSE technique was demonstrated and explained. Related what to look and feel for on exam monthly. Pt verbalized understanding and return demonstrated proper technique and knowledge of what to look for on self-exam. Pt instructed to call if notes any changes. Contact information given.   30 minutes was spent with this patient  and 75% was spent identifying risk factors, reviewing records and educating pt regarding risk reduction strategies and planning future screenings.

## 2022-01-05 ENCOUNTER — OFFICE VISIT (OUTPATIENT)
Dept: HEMATOLOGY/ONCOLOGY | Facility: CLINIC | Age: 47
End: 2022-01-05
Payer: COMMERCIAL

## 2022-01-05 VITALS
TEMPERATURE: 97 F | HEART RATE: 79 BPM | DIASTOLIC BLOOD PRESSURE: 117 MMHG | WEIGHT: 180.56 LBS | BODY MASS INDEX: 30.83 KG/M2 | OXYGEN SATURATION: 98 % | HEIGHT: 64 IN | SYSTOLIC BLOOD PRESSURE: 165 MMHG

## 2022-01-05 DIAGNOSIS — Z71.89 COUNSELING ON HEALTH PROMOTION AND DISEASE PREVENTION: ICD-10-CM

## 2022-01-05 DIAGNOSIS — Z12.39 ENCOUNTER FOR BREAST CANCER SCREENING USING NON-MAMMOGRAM MODALITY: ICD-10-CM

## 2022-01-05 DIAGNOSIS — R92.8 ABNORMAL MAMMOGRAM: Primary | ICD-10-CM

## 2022-01-05 DIAGNOSIS — Z71.9 HEALTH EDUCATION/COUNSELING: ICD-10-CM

## 2022-01-05 DIAGNOSIS — N63.0 BREAST LUMP: ICD-10-CM

## 2022-01-05 DIAGNOSIS — Z71.89 COUNSELING AND COORDINATION OF CARE: ICD-10-CM

## 2022-01-05 PROCEDURE — 99999 PR PBB SHADOW E&M-EST. PATIENT-LVL IV: ICD-10-PCS | Mod: PBBFAC,,, | Performed by: NURSE PRACTITIONER

## 2022-01-05 PROCEDURE — 99999 PR PBB SHADOW E&M-EST. PATIENT-LVL IV: CPT | Mod: PBBFAC,,, | Performed by: NURSE PRACTITIONER

## 2022-01-05 PROCEDURE — 99203 PR OFFICE/OUTPT VISIT, NEW, LEVL III, 30-44 MIN: ICD-10-PCS | Mod: S$GLB,,, | Performed by: NURSE PRACTITIONER

## 2022-01-05 PROCEDURE — 99203 OFFICE O/P NEW LOW 30 MIN: CPT | Mod: S$GLB,,, | Performed by: NURSE PRACTITIONER

## 2022-03-18 ENCOUNTER — PATIENT MESSAGE (OUTPATIENT)
Dept: ADMINISTRATIVE | Facility: HOSPITAL | Age: 47
End: 2022-03-18
Payer: COMMERCIAL

## 2022-04-07 ENCOUNTER — PATIENT OUTREACH (OUTPATIENT)
Dept: ADMINISTRATIVE | Facility: OTHER | Age: 47
End: 2022-04-07
Payer: COMMERCIAL

## 2022-04-07 DIAGNOSIS — Z12.11 ENCOUNTER FOR FIT (FECAL IMMUNOCHEMICAL TEST) SCREENING: Primary | ICD-10-CM

## 2022-04-08 ENCOUNTER — OFFICE VISIT (OUTPATIENT)
Dept: OBSTETRICS AND GYNECOLOGY | Facility: CLINIC | Age: 47
End: 2022-04-08
Payer: COMMERCIAL

## 2022-04-08 VITALS
BODY MASS INDEX: 30.88 KG/M2 | SYSTOLIC BLOOD PRESSURE: 148 MMHG | DIASTOLIC BLOOD PRESSURE: 72 MMHG | WEIGHT: 179.88 LBS

## 2022-04-08 DIAGNOSIS — Z01.419 WELL WOMAN EXAM WITH ROUTINE GYNECOLOGICAL EXAM: Primary | ICD-10-CM

## 2022-04-08 DIAGNOSIS — N95.1 MENOPAUSAL SYNDROME (HOT FLUSHES): ICD-10-CM

## 2022-04-08 PROCEDURE — 99396 PREV VISIT EST AGE 40-64: CPT | Mod: S$GLB,,, | Performed by: NURSE PRACTITIONER

## 2022-04-08 PROCEDURE — 99999 PR PBB SHADOW E&M-EST. PATIENT-LVL III: CPT | Mod: PBBFAC,,, | Performed by: NURSE PRACTITIONER

## 2022-04-08 PROCEDURE — 99999 PR PBB SHADOW E&M-EST. PATIENT-LVL III: ICD-10-PCS | Mod: PBBFAC,,, | Performed by: NURSE PRACTITIONER

## 2022-04-08 PROCEDURE — 99396 PR PREVENTIVE VISIT,EST,40-64: ICD-10-PCS | Mod: S$GLB,,, | Performed by: NURSE PRACTITIONER

## 2022-04-08 NOTE — PROGRESS NOTES
Subjective:       Patient ID: Florecita Noel is a 46 y.o. female.    Chief Complaint:  Well Woman    No LMP recorded (lmp unknown). Patient has had a hysterectomy.  History of Present Illness  Annual Exam-Premenopausal  Patient presents for annual exam. The patient has no complaints today. The patient is sexually active. GYN screening history: last pap: approximate date 20 and was normal and last mammogram: approximate date 22 and was normal.  History of hysterectomy with BSO, benign indications.  The patient wears seatbelts: yes. The patient participates in regular exercise: no. Has the patient ever been transfused or tattooed?: yes. The patient reports that there is not domestic violence in her life.  Patient has began having increase in hot flashes and night sweats that are really affecting her sleep each night.  Patient has not tried anything over-the-counter for symptom relief.    OB History    Para Term  AB Living   7 6 6   1 6   SAB IAB Ectopic Multiple Live Births           7      # Outcome Date GA Lbr Jarred/2nd Weight Sex Delivery Anes PTL Lv   7 Term 14 37w1d / 00:08  M Vag-Spont None Y MADDISON   6 Term 12 38w0d  3.005 kg (6 lb 10 oz) F Vag-Spont EPI N MADDISON   5 Term 05 40w0d  3.175 kg (7 lb) F Vag-Spont None N MADDISON   4 Term 98   3.005 kg (6 lb 10 oz) M Vag-Spont EPI N MADDISON   3 Term 96 40w0d  3.005 kg (6 lb 10 oz) M Vag-Spont EPI N MADDISON   2 AB 1996        DEC   1 Term 92 40w0d  3.856 kg (8 lb 8 oz) M Vag-Spont EPI N MADDISON       Review of Systems  Review of Systems   Constitutional: Negative for appetite change, fatigue, fever and unexpected weight change.   Eyes: Negative for visual disturbance.   Cardiovascular: Negative for chest pain.   Gastrointestinal: Negative for abdominal pain, bloating, constipation, diarrhea, nausea and vomiting.   Genitourinary: Positive for hot flashes. Negative for bladder incontinence, dysmenorrhea, dyspareunia, dysuria, flank pain,  frequency, genital sores, menorrhagia, menstrual problem, pelvic pain, urgency, vaginal bleeding, vaginal discharge, vaginal pain, postcoital bleeding, vaginal dryness and vaginal odor.   Integumentary:  Negative for rash, acne, mole/lesion, breast mass, nipple discharge, breast skin changes and breast tenderness.   Neurological: Negative for syncope and headaches.   Hematological: Negative for adenopathy. Does not bruise/bleed easily.   Psychiatric/Behavioral: Positive for sleep disturbance.   All other systems reviewed and are negative.  Breast: Positive for breast self exam.Negative for asymmetry, lump, mass, nipple discharge, skin changes and tenderness           Objective:      Physical Exam:   Constitutional: She is oriented to person, place, and time. She appears well-developed and well-nourished.    HENT:   Head: Normocephalic and atraumatic.   Nose: Nose normal.    Eyes: Pupils are equal, round, and reactive to light. Conjunctivae and EOM are normal.     Cardiovascular: Normal rate and regular rhythm.     Pulmonary/Chest: Effort normal. Right breast exhibits no inverted nipple, no mass, no nipple discharge, no skin change, no tenderness, no bleeding and no swelling. Left breast exhibits no inverted nipple, no mass, no nipple discharge, no skin change, no tenderness, no bleeding and no swelling. Breasts are symmetrical.        Abdominal: Soft. She exhibits no distension. There is no abdominal tenderness. Hernia confirmed negative in the right inguinal area and confirmed negative in the left inguinal area.     Genitourinary:    Inguinal canal, vagina and rectum normal.      Pelvic exam was performed with patient supine.   The external female genitalia was normal.   Genitalia hair distrobution normal .   Labial bartholins normal.There is no rash, tenderness, lesion or injury on the right labia. There is no rash, tenderness, lesion or injury on the left labia. There is an absent right adnexa and an absent left  adnexa. No erythema,  no vaginal discharge, rectocele or cystocele in the vagina. Cervix is absent.Uterus is absent.    Genitourinary Comments: Vagina atrophic.             Musculoskeletal: Normal range of motion and moves all extremeties.      Lymphadenopathy: No inguinal adenopathy noted on the right or left side.    Neurological: She is alert and oriented to person, place, and time.    Skin: Skin is warm and dry. She is not diaphoretic.    Psychiatric: She has a normal mood and affect. Her behavior is normal. Judgment and thought content normal.            Assessment:     1. Well woman exam with routine gynecological exam    2. Menopausal syndrome (hot flushes)          Plan:   Florecita was seen today for well woman.    Diagnoses and all orders for this visit:    Well woman exam with routine gynecological exam    Menopausal syndrome (hot flushes)      Due to uncontrolled hypertension, do not recommend beginning HRT today.   Recommend trial of estroven and vitamin E over the counter for menopausal symptoms.   Patient will let me know if no improvement.     Follow up with me in 1 year for annual well woman exam.

## 2022-04-09 DIAGNOSIS — Z12.11 SCREEN FOR COLON CANCER: Primary | ICD-10-CM

## 2022-04-11 NOTE — TELEPHONE ENCOUNTER
Certification of Health Care Provider --- Ms Pryor can you check to see if I have this form in the red folder that the patient dropped off? Will need to fill it out next week   Outgoing call to patient. Reviewed MRI information, surgical pathology and EKG.    EKG routed to PCP for review and follow up.    Patient verbalizes understanding.

## 2022-05-17 RX ORDER — POLYETHYLENE GLYCOL 3350, SODIUM SULFATE ANHYDROUS, SODIUM BICARBONATE, SODIUM CHLORIDE, POTASSIUM CHLORIDE 236; 22.74; 6.74; 5.86; 2.97 G/4L; G/4L; G/4L; G/4L; G/4L
4 POWDER, FOR SOLUTION ORAL ONCE
Qty: 4000 ML | Refills: 0 | Status: SHIPPED | OUTPATIENT
Start: 2022-05-17 | End: 2022-05-17

## 2022-05-24 ENCOUNTER — PATIENT MESSAGE (OUTPATIENT)
Dept: HEMATOLOGY/ONCOLOGY | Facility: CLINIC | Age: 47
End: 2022-05-24
Payer: COMMERCIAL

## 2022-05-24 ENCOUNTER — HOSPITAL ENCOUNTER (OUTPATIENT)
Dept: RADIOLOGY | Facility: HOSPITAL | Age: 47
Discharge: HOME | End: 2022-05-24
Attending: NURSE PRACTITIONER
Payer: COMMERCIAL

## 2022-05-24 VITALS — BODY MASS INDEX: 30.56 KG/M2 | WEIGHT: 179 LBS | HEIGHT: 64 IN

## 2022-05-24 DIAGNOSIS — N63.0 BREAST LUMP: ICD-10-CM

## 2022-05-24 DIAGNOSIS — R92.8 ABNORMAL MAMMOGRAM: ICD-10-CM

## 2022-05-24 DIAGNOSIS — N63.10 BREAST MASS, RIGHT: Primary | ICD-10-CM

## 2022-05-24 PROCEDURE — 77066 DX MAMMO INCL CAD BI: CPT | Mod: 26,,, | Performed by: RADIOLOGY

## 2022-05-24 PROCEDURE — 76642 US BREAST BILATERAL LIMITED: ICD-10-PCS | Mod: 26,50,, | Performed by: RADIOLOGY

## 2022-05-24 PROCEDURE — 76642 ULTRASOUND BREAST LIMITED: CPT | Mod: TC,50

## 2022-05-24 PROCEDURE — 77062 BREAST TOMOSYNTHESIS BI: CPT | Mod: 26,,, | Performed by: RADIOLOGY

## 2022-05-24 PROCEDURE — 77066 MAMMO DIGITAL DIAGNOSTIC BILAT WITH TOMO: ICD-10-PCS | Mod: 26,,, | Performed by: RADIOLOGY

## 2022-05-24 PROCEDURE — 77062 MAMMO DIGITAL DIAGNOSTIC BILAT WITH TOMO: ICD-10-PCS | Mod: 26,,, | Performed by: RADIOLOGY

## 2022-05-24 PROCEDURE — 77066 DX MAMMO INCL CAD BI: CPT | Mod: TC

## 2022-05-24 PROCEDURE — 76642 ULTRASOUND BREAST LIMITED: CPT | Mod: 26,50,, | Performed by: RADIOLOGY

## 2022-05-25 DIAGNOSIS — N63.20 LEFT BREAST MASS: Primary | ICD-10-CM

## 2022-06-08 ENCOUNTER — HOSPITAL ENCOUNTER (OUTPATIENT)
Facility: HOSPITAL | Age: 47
Discharge: HOME | End: 2022-06-08
Attending: INTERNAL MEDICINE | Admitting: INTERNAL MEDICINE
Payer: COMMERCIAL

## 2022-06-08 ENCOUNTER — PATIENT MESSAGE (OUTPATIENT)
Dept: HEMATOLOGY/ONCOLOGY | Facility: CLINIC | Age: 47
End: 2022-06-08
Payer: COMMERCIAL

## 2022-06-08 ENCOUNTER — ANESTHESIA EVENT (OUTPATIENT)
Dept: ENDOSCOPY | Facility: HOSPITAL | Age: 47
End: 2022-06-08
Payer: COMMERCIAL

## 2022-06-08 ENCOUNTER — ANESTHESIA (OUTPATIENT)
Dept: ENDOSCOPY | Facility: HOSPITAL | Age: 47
End: 2022-06-08
Payer: COMMERCIAL

## 2022-06-08 VITALS
TEMPERATURE: 98 F | RESPIRATION RATE: 20 BRPM | BODY MASS INDEX: 30.46 KG/M2 | SYSTOLIC BLOOD PRESSURE: 134 MMHG | WEIGHT: 178.44 LBS | HEIGHT: 64 IN | OXYGEN SATURATION: 100 % | DIASTOLIC BLOOD PRESSURE: 84 MMHG | HEART RATE: 71 BPM

## 2022-06-08 PROCEDURE — D9220A PRA ANESTHESIA: Mod: ANES,,, | Performed by: ANESTHESIOLOGY

## 2022-06-08 PROCEDURE — D9220A PRA ANESTHESIA: Mod: CRNA,,, | Performed by: NURSE ANESTHETIST, CERTIFIED REGISTERED

## 2022-06-08 PROCEDURE — D9220A PRA ANESTHESIA: ICD-10-PCS | Mod: CRNA,,, | Performed by: NURSE ANESTHETIST, CERTIFIED REGISTERED

## 2022-06-08 PROCEDURE — G0121 COLON CA SCRN NOT HI RSK IND: HCPCS | Performed by: INTERNAL MEDICINE

## 2022-06-08 PROCEDURE — D9220A PRA ANESTHESIA: ICD-10-PCS | Mod: ANES,,, | Performed by: ANESTHESIOLOGY

## 2022-06-08 PROCEDURE — G0121 COLON CA SCRN NOT HI RSK IND: ICD-10-PCS | Mod: ,,, | Performed by: INTERNAL MEDICINE

## 2022-06-08 PROCEDURE — 37000009 HC ANESTHESIA EA ADD 15 MINS: Performed by: INTERNAL MEDICINE

## 2022-06-08 PROCEDURE — 63600175 PHARM REV CODE 636 W HCPCS: Performed by: INTERNAL MEDICINE

## 2022-06-08 PROCEDURE — 63600175 PHARM REV CODE 636 W HCPCS: Performed by: NURSE ANESTHETIST, CERTIFIED REGISTERED

## 2022-06-08 PROCEDURE — 37000008 HC ANESTHESIA 1ST 15 MINUTES: Performed by: INTERNAL MEDICINE

## 2022-06-08 PROCEDURE — G0121 COLON CA SCRN NOT HI RSK IND: HCPCS | Mod: ,,, | Performed by: INTERNAL MEDICINE

## 2022-06-08 RX ORDER — LIDOCAINE HCL/PF 100 MG/5ML
SYRINGE (ML) INTRAVENOUS
Status: DISCONTINUED | OUTPATIENT
Start: 2022-06-08 | End: 2022-06-08

## 2022-06-08 RX ORDER — PROPOFOL 10 MG/ML
VIAL (ML) INTRAVENOUS
Status: DISCONTINUED | OUTPATIENT
Start: 2022-06-08 | End: 2022-06-08

## 2022-06-08 RX ORDER — SODIUM CHLORIDE, SODIUM LACTATE, POTASSIUM CHLORIDE, CALCIUM CHLORIDE 600; 310; 30; 20 MG/100ML; MG/100ML; MG/100ML; MG/100ML
INJECTION, SOLUTION INTRAVENOUS CONTINUOUS
Status: DISCONTINUED | OUTPATIENT
Start: 2022-06-08 | End: 2022-06-08 | Stop reason: HOSPADM

## 2022-06-08 RX ADMIN — PROPOFOL 50 MG: 10 INJECTION, EMULSION INTRAVENOUS at 10:06

## 2022-06-08 RX ADMIN — PROPOFOL 75 MG: 10 INJECTION, EMULSION INTRAVENOUS at 10:06

## 2022-06-08 RX ADMIN — SODIUM CHLORIDE, SODIUM LACTATE, POTASSIUM CHLORIDE, AND CALCIUM CHLORIDE: 600; 310; 30; 20 INJECTION, SOLUTION INTRAVENOUS at 10:06

## 2022-06-08 RX ADMIN — PROPOFOL 50 MG: 10 INJECTION, EMULSION INTRAVENOUS at 11:06

## 2022-06-08 RX ADMIN — Medication 50 MG: at 10:06

## 2022-06-08 NOTE — ANESTHESIA PREPROCEDURE EVALUATION
06/08/2022  Florecita Noel is a 46 y.o., female.  Past Medical History:   Diagnosis Date    Abnormal Pap smear 1996    Anemia     Hypertension     Hypokalemia     in pregnancy    Hypothyroidism (acquired) 12/28/2018     Past Surgical History:   Procedure Laterality Date    CHOLECYSTECTOMY      COSMETIC SURGERY      tummy tuck    DIAGNOSTIC LAPAROSCOPY N/A 2/26/2019    Procedure: LAPAROSCOPY, DIAGNOSTIC;  Surgeon: Pia Aguila MD;  Location: [facility] OR;  Service: OB/GYN;  Laterality: N/A;    FULGURATION OF ENDOMETRIOSIS N/A 2/26/2019    Procedure: FULGURATION, ENDOMETRIOSIS;  Surgeon: Pia Aguila MD;  Location: [facility] OR;  Service: OB/GYN;  Laterality: N/A;    HYSTERECTOMY      HYSTEROSCOPY WITH HYDROTHERMAL ABLATION OF ENDOMETRIUM WITH DILATION AND CURETTAGE N/A 2/26/2019    Procedure: HYSTEROSCOPY, WITH DILATION AND CURETTAGE OF UTERUS AND HYDROTHERMAL ENDOMETRIAL ABLATION;  Surgeon: Pia Aguila MD;  Location: [facility] OR;  Service: OB/GYN;  Laterality: N/A;    LAPAROSCOPIC DRAINAGE OF CYST OF OVARY Left 2/26/2019    Procedure: DRAINAGE, CYST, OVARY, LAPAROSCOPIC;  Surgeon: Pia Aguila MD;  Location: [facility] OR;  Service: OB/GYN;  Laterality: Left;    ROBOT-ASSISTED LAPAROSCOPIC ABDOMINAL HYSTERECTOMY USING DA BEAN XI N/A 11/5/2019    Procedure: XI ROBOTIC HYSTERECTOMY;  Surgeon: Pia Aguila MD;  Location: [facility] OR;  Service: OB/GYN;  Laterality: N/A;    ROBOT-ASSISTED LAPAROSCOPIC SALPINGO-OOPHORECTOMY USING DA BEAN XI Bilateral 11/5/2019    Procedure: XI ROBOTIC SALPINGO-OOPHORECTOMY;  Surgeon: Pia Aguila MD;  Location: [facility] OR;  Service: OB/GYN;  Laterality: Bilateral;    TUBAL LIGATION      Tummy Tuck         Sinus tachycardia   Voltage criteria for left ventricular hypertrophy   Cannot rule out Septal infarct ,age undetermined   T  wave abnormality, consider inferior ischemia   Abnormal ECG     Pre-op Assessment    I have reviewed the Patient Summary Reports.     I have reviewed the Nursing Notes. I have reviewed the NPO Status.   I have reviewed the Medications.     Review of Systems  Anesthesia Hx:  No problems with previous Anesthesia  Denies Family Hx of Anesthesia complications.   Denies Personal Hx of Anesthesia complications.   Social:  Non-Smoker, No Alcohol Use    Hematology/Oncology:     Oncology Normal    -- Anemia: Hematology Comments: hypokalemia    EENT/Dental:EENT/Dental Normal   Cardiovascular:   Exercise tolerance: good Hypertension    Pulmonary:  Pulmonary Normal    Renal/:  Renal/ Normal     Hepatic/GI:   Bowel Prep.    Musculoskeletal:  Musculoskeletal Normal    Neurological:   Headaches    Endocrine:   Hypothyroidism  Obesity / BMI > 30  Dermatological:  Skin Normal    Psych:  Psychiatric Normal           Physical Exam  General: Well nourished, Cooperative, Alert and Oriented    Airway:  Mallampati: II / II  Mouth Opening: Normal  TM Distance: Normal  Tongue: Normal  Neck ROM: Normal ROM    Dental:  Intact    Chest/Lungs:  Normal Respiratory Rate, Clear to auscultation    Heart:  Rate: Normal  Rhythm: Regular Rhythm  Sounds: Normal        Anesthesia Plan  Type of Anesthesia, risks & benefits discussed:    Anesthesia Type: Gen Natural Airway  Intra-op Monitoring Plan: Standard ASA Monitors  Post Op Pain Control Plan: multimodal analgesia  Induction:  IV  Informed Consent: Informed consent signed with the Patient and all parties understand the risks and agree with anesthesia plan.  All questions answered.   ASA Score: 2  Day of Surgery Review of History & Physical: H&P Update referred to the surgeon/provider.    Ready For Surgery From Anesthesia Perspective.     .

## 2022-06-08 NOTE — TRANSFER OF CARE
"Anesthesia Transfer of Care Note    Patient: Florecita Noel    Procedure(s) Performed: Procedure(s) (LRB):  COLONOSCOPY (N/A)    Patient location: PACU    Anesthesia Type: general    Transport from OR: Transported from OR on room air with adequate spontaneous ventilation    Post pain: adequate analgesia    Post assessment: no apparent anesthetic complications    Post vital signs: stable    Level of consciousness: awake, alert and oriented    Nausea/Vomiting: no nausea/vomiting    Complications: none    Transfer of care protocol was followed      Last vitals:   Visit Vitals  /60 (BP Location: Right arm, Patient Position: Lying)   Pulse 69   Temp 36.4 °C (97.5 °F) (Temporal)   Resp 19   Ht 5' 4" (1.626 m)   Wt 81 kg (178 lb 7.4 oz)   LMP  (LMP Unknown)   SpO2 97%   Breastfeeding No   BMI 30.63 kg/m²     "

## 2022-06-08 NOTE — ANESTHESIA POSTPROCEDURE EVALUATION
Anesthesia Post Evaluation    Patient: Florecita Noel    Procedure(s) Performed: Procedure(s) (LRB):  COLONOSCOPY (N/A)    Final Anesthesia Type: general      Patient location during evaluation: PACU  Patient participation: Yes- Able to Participate  Level of consciousness: awake and alert and oriented  Post-procedure vital signs: reviewed and stable  Pain management: adequate  Airway patency: patent    PONV status at discharge: No PONV  Anesthetic complications: no      Cardiovascular status: blood pressure returned to baseline, stable and hemodynamically stable  Respiratory status: unassisted  Hydration status: euvolemic  Follow-up not needed.          Vitals Value Taken Time   /84 06/08/22 1137   Temp 36.4 °C (97.5 °F) 06/08/22 1107   Pulse 71 06/08/22 1137   Resp 20 06/08/22 1137   SpO2 100 % 06/08/22 1137         Event Time   Out of Recovery 11:40:00         Pain/Eduarda Score: Eduarda Score: 10 (6/8/2022 11:37 AM)         Consent (Nose)/Introductory Paragraph: The rationale for Mohs was explained to the patient and consent was obtained. The risks, benefits and alternatives to therapy were discussed in detail. Specifically, the risks of nasal deformity, changes in the flow of air through the nose, infection, scarring, bleeding, prolonged wound healing, incomplete removal, allergy to anesthesia, nerve injury and recurrence were addressed. Prior to the procedure, the treatment site was clearly identified and confirmed by the patient. All components of Universal Protocol/PAUSE Rule completed.

## 2022-06-08 NOTE — H&P
PRE PROCEDURE H&P    Patient Name: Florecita Noel  MRN: [id number]  : 1975  Date of Procedure:  2022  Referring Physician: Alesha Alonso MD  Primary Physician: Alesha Alonso MD  Procedure Physician: Rosaline Meyer MD       Planned Procedure: Colonoscopy  Diagnosis: Screening colonsocopy  Chief Complaint: Same as above    HPI: Patient is an 46 y.o. female is here for the above.     Last colonoscopy: none  Family history: none  Anticoagulation: none    Past Medical History:   Past Medical History:   Diagnosis Date    Abnormal Pap smear     Anemia     Hypertension     Hypokalemia     in pregnancy    Hypothyroidism (acquired) 2018        Past Surgical History:  Past Surgical History:   Procedure Laterality Date    CHOLECYSTECTOMY      COSMETIC SURGERY      tummy tuck    DIAGNOSTIC LAPAROSCOPY N/A 2019    Procedure: LAPAROSCOPY, DIAGNOSTIC;  Surgeon: Pia Aguila MD;  Location: [facility] OR;  Service: OB/GYN;  Laterality: N/A;    FULGURATION OF ENDOMETRIOSIS N/A 2019    Procedure: FULGURATION, ENDOMETRIOSIS;  Surgeon: Pia Aguila MD;  Location: [facility] OR;  Service: OB/GYN;  Laterality: N/A;    HYSTERECTOMY      HYSTEROSCOPY WITH HYDROTHERMAL ABLATION OF ENDOMETRIUM WITH DILATION AND CURETTAGE N/A 2019    Procedure: HYSTEROSCOPY, WITH DILATION AND CURETTAGE OF UTERUS AND HYDROTHERMAL ENDOMETRIAL ABLATION;  Surgeon: Pia Aguila MD;  Location: [facility] OR;  Service: OB/GYN;  Laterality: N/A;    LAPAROSCOPIC DRAINAGE OF CYST OF OVARY Left 2019    Procedure: DRAINAGE, CYST, OVARY, LAPAROSCOPIC;  Surgeon: Pia Aguila MD;  Location: [facility] OR;  Service: OB/GYN;  Laterality: Left;    ROBOT-ASSISTED LAPAROSCOPIC ABDOMINAL HYSTERECTOMY USING DA BEAN XI N/A 2019    Procedure: XI ROBOTIC HYSTERECTOMY;  Surgeon: Pia Aguila MD;  Location: [facility] OR;  Service: OB/GYN;  Laterality: N/A;    ROBOT-ASSISTED LAPAROSCOPIC  SALPINGO-OOPHORECTOMY USING DA BEAN XI Bilateral 11/5/2019    Procedure: XI ROBOTIC SALPINGO-OOPHORECTOMY;  Surgeon: Pia Aguila MD;  Location: [facility];  Service: OB/GYN;  Laterality: Bilateral;    TUBAL LIGATION      Tummy Tuck          Home Medications:  Prior to Admission medications    Medication Sig Start Date End Date Taking? Authorizing Provider   verapamiL (CALAN) 120 MG tablet TAKE 1 TABLET(120 MG) BY MOUTH EVERY DAY 11/26/21  Yes Alesha Alonso MD        Allergies:  Review of patient's allergies indicates:   Allergen Reactions    Sumatriptan Other (See Comments)     Chest tightness that moved into her throat        Social History:   Social History     Socioeconomic History    Marital status: Other   Tobacco Use    Smoking status: Never Smoker    Smokeless tobacco: Never Used   Substance and Sexual Activity    Alcohol use: Yes     Alcohol/week: 0.0 standard drinks     Comment: socially;  No alcohol 72h prior to sx    Drug use: No    Sexual activity: Yes     Partners: Male     Birth control/protection: Surgical     Comment: [facility]      Social Determinants of Health     Financial Resource Strain: Low Risk     Difficulty of Paying Living Expenses: Not very hard   Food Insecurity: No Food Insecurity    Worried About Running Out of Food in the Last Year: Never true    Ran Out of Food in the Last Year: Never true   Transportation Needs: No Transportation Needs    Lack of Transportation (Medical): No    Lack of Transportation (Non-Medical): No   Physical Activity: Inactive    Days of Exercise per Week: 0 days    Minutes of Exercise per Session: 0 min   Stress: Stress Concern Present    Feeling of Stress : Rather much   Social Connections: Unknown    Frequency of Communication with Friends and Family: More than three times a week    Frequency of Social Gatherings with Friends and Family: Once a week    Active Member of Clubs or Organizations: No    Attends Club or Organization  "Meetings: Never    Marital Status:    Housing Stability: High Risk    Unable to Pay for Housing in the Last Year: Yes    Number of Places Lived in the Last Year: 1    Unstable Housing in the Last Year: No       Family History:  Family History   Problem Relation Age of Onset    Ovarian cancer Mother     Cancer Father     Heart disease Maternal Grandmother        ROS: No acute cardiac events, no acute respiratory complaints.     Physical Exam (all patients):    BP (!) 148/81 (BP Location: Right arm, Patient Position: Sitting)   Pulse 63   Temp 97.8 °F (36.6 °C) (Temporal)   Resp 18   Ht 5' 4" (1.626 m)   Wt 81 kg (178 lb 7.4 oz)   LMP  (LMP Unknown)   SpO2 98%   Breastfeeding No   BMI 30.63 kg/m²   Lungs: Clear to auscultation bilaterally, respirations unlabored  Heart: Regular rate and rhythm, S1 and S2 normal, no obvious murmurs  Abdomen:         Soft, non-tender, bowel sounds normal, no masses, no organomegaly    Lab Results   Component Value Date    WBC 11.29 11/26/2021    MCV 85 11/26/2021    RDW 13.2 11/26/2021     11/26/2021    GLU 95 11/26/2021    HGBA1C 5.4 11/26/2021    BUN 13 11/26/2021     11/26/2021    K 3.9 11/26/2021     11/26/2021        SEDATION PLAN: per anesthesia      History reviewed, vital signs satisfactory, cardiopulmonary status satisfactory, sedation options, risks and plans have been discussed with the patient  All their questions were answered and the patient agrees to the sedation procedures as planned and the patient is deemed an appropriate candidate for the sedation as planned.    Procedure explained to patient, informed consent obtained and placed in chart.    Rosaline Meyer  6/8/2022  9:59 AM     "

## 2022-06-08 NOTE — PROVATION PATIENT INSTRUCTIONS
Discharge Summary/Instructions after an Endoscopic Procedure  Patient Name: Florecita Neol  Patient MRN: [id number]  Patient YOB: 1975 Wednesday, June 8, 2022  Rosaline Meyer MD  Dear patient,  As a result of recent federal legislation (The Federal Cures Act), you may   receive lab or pathology results from your procedure in your [facility]   account before your physician is able to contact you. Your physician or   their representative will relay the results to you with their   recommendations at their soonest availability.  Thank you,  RESTRICTIONS:  During your procedure today, you received medications for sedation.  These   medications may affect your judgment, balance and coordination.  Therefore,   for 24 hours, you have the following restrictions:   - DO NOT drive a car, operate machinery, make legal/financial decisions,   sign important papers or drink alcohol.    ACTIVITY:  Today: no heavy lifting, straining or running due to procedural   sedation/anesthesia.  The following day: return to full activity including work.  DIET:  Eat and drink normally unless instructed otherwise.     TREATMENT FOR COMMON SIDE EFFECTS:  - Mild abdominal pain, nausea, belching, bloating or excessive gas:  rest,   eat lightly and use a heating pad.  - Sore Throat: treat with throat lozenges and/or gargle with warm salt   water.  - Because air was used during the procedure, expelling large amounts of air   from your rectum or belching is normal.  - If a bowel prep was taken, you may not have a bowel movement for 1-3 days.    This is normal.  SYMPTOMS TO WATCH FOR AND REPORT TO YOUR PHYSICIAN:  1. Abdominal pain or bloating, other than gas cramps.  2. Chest pain.  3. Back pain.  4. Signs of infection such as: chills or fever occurring within 24 hours   after the procedure.  5. Rectal bleeding, which would show as bright red, maroon, or black stools.   (A tablespoon of blood from the rectum is not serious, especially  if   hemorrhoids are present.)  6. Vomiting.  7. Weakness or dizziness.  GO DIRECTLY TO THE NEAREST EMERGENCY ROOM IF YOU HAVE ANY OF THE FOLLOWING:      Difficulty breathing              Chills and/or fever over 101 F   Persistent vomiting and/or vomiting blood   Severe abdominal pain   Severe chest pain   Black, tarry stools   Bleeding- more than one tablespoon   Any other symptom or condition that you feel may need urgent attention  Your doctor recommends these additional instructions:  If any biopsies were taken, your doctors clinic will contact you in 1 to 2   weeks with any results.  - Discharge patient to home.   - Resume previous diet.   - Continue present medications.   - Repeat colonoscopy in 10 years for screening purposes.   - Use fiber, for example Citrucel, Fibercon, Konsyl or Metamucil.  For questions, problems or results please call your physician Rosaline Meyer MD at Work:  [phone]  If you have any questions about the above instructions, call the GI   department at [phone] or call the endoscopy unit at [phone]   from 7am until 3 pm.  [facility], EMERGENCY ROOM PHONE NUMBER:   [phone]  IF A COMPLICATION OR EMERGENCY SITUATION ARISES AND YOU ARE UNABLE TO REACH   YOUR PHYSICIAN - GO DIRECTLY TO THE EMERGENCY ROOM.  I have read or have had read to me these discharge instructions for my   procedure and have received a written copy.  I understand these   instructions and will follow-up with my physician if I have any questions.     __________________________________       _____________________________________  Nurse Signature                                          Patient/Designated   Responsible Party Signature  MD Rosaline Garcia MD  6/8/2022 11:05:34 AM  This report has been verified and signed electronically.  Dear patient,  As a result of recent federal legislation (The Federal Cures Act), you may    receive lab or pathology results from your procedure in your [facility]   account before your physician is able to contact you. Your physician or   their representative will relay the results to you with their   recommendations at their soonest availability.  Thank you,  PROVATION

## 2022-06-09 ENCOUNTER — HOSPITAL ENCOUNTER (OUTPATIENT)
Dept: RADIOLOGY | Facility: HOSPITAL | Age: 47
Discharge: HOME | End: 2022-06-09
Attending: NURSE PRACTITIONER
Payer: COMMERCIAL

## 2022-06-09 VITALS — HEIGHT: 64 IN | BODY MASS INDEX: 30.63 KG/M2

## 2022-06-09 DIAGNOSIS — R92.8 ABNORMAL MAMMOGRAM: ICD-10-CM

## 2022-06-09 DIAGNOSIS — N63.20 LEFT BREAST MASS: ICD-10-CM

## 2022-06-09 PROCEDURE — 77061 MAMMO DIGITAL DIAGNOSTIC LEFT WITH TOMO: ICD-10-PCS | Mod: 26,LT,, | Performed by: RADIOLOGY

## 2022-06-09 PROCEDURE — 88305 TISSUE EXAM BY PATHOLOGIST: ICD-10-PCS | Mod: 26,,, | Performed by: PATHOLOGY

## 2022-06-09 PROCEDURE — 88341 PR IHC OR ICC EACH ADD'L SINGLE ANTIBODY  STAINPR: ICD-10-PCS | Mod: 26,59,, | Performed by: PATHOLOGY

## 2022-06-09 PROCEDURE — 19083 US BREAST BIOPSY WITH IMAGING 1ST SITE LEFT: ICD-10-PCS | Mod: LT,,, | Performed by: RADIOLOGY

## 2022-06-09 PROCEDURE — 77061 BREAST TOMOSYNTHESIS UNI: CPT | Mod: 26,LT,, | Performed by: RADIOLOGY

## 2022-06-09 PROCEDURE — 88341 IMHCHEM/IMCYTCHM EA ADD ANTB: CPT | Mod: 26,59,, | Performed by: PATHOLOGY

## 2022-06-09 PROCEDURE — 77065 DX MAMMO INCL CAD UNI: CPT | Mod: TC,LT

## 2022-06-09 PROCEDURE — 77065 DX MAMMO INCL CAD UNI: CPT | Mod: 26,LT,, | Performed by: RADIOLOGY

## 2022-06-09 PROCEDURE — 77065 MAMMO DIGITAL DIAGNOSTIC LEFT WITH TOMO: ICD-10-PCS | Mod: 26,LT,, | Performed by: RADIOLOGY

## 2022-06-09 PROCEDURE — 88305 TISSUE EXAM BY PATHOLOGIST: CPT | Performed by: PATHOLOGY

## 2022-06-09 PROCEDURE — 88342 IMHCHEM/IMCYTCHM 1ST ANTB: CPT | Performed by: PATHOLOGY

## 2022-06-09 PROCEDURE — 88341 IMHCHEM/IMCYTCHM EA ADD ANTB: CPT | Performed by: PATHOLOGY

## 2022-06-09 PROCEDURE — 19083 BX BREAST 1ST LESION US IMAG: CPT | Mod: LT,,, | Performed by: RADIOLOGY

## 2022-06-09 PROCEDURE — 88360 TUMOR IMMUNOHISTOCHEM/MANUAL: CPT | Mod: 59 | Performed by: PATHOLOGY

## 2022-06-09 PROCEDURE — 88342 IMHCHEM/IMCYTCHM 1ST ANTB: CPT | Mod: 26,59,, | Performed by: PATHOLOGY

## 2022-06-09 PROCEDURE — 19083 BX BREAST 1ST LESION US IMAG: CPT | Mod: LT

## 2022-06-09 PROCEDURE — 88342 CHG IMMUNOCYTOCHEMISTRY: ICD-10-PCS | Mod: 26,59,, | Performed by: PATHOLOGY

## 2022-06-09 PROCEDURE — 88360 TUMOR IMMUNOHISTOCHEM/MANUAL: CPT | Mod: 26,,, | Performed by: PATHOLOGY

## 2022-06-09 PROCEDURE — 88305 TISSUE EXAM BY PATHOLOGIST: CPT | Mod: 26,,, | Performed by: PATHOLOGY

## 2022-06-09 PROCEDURE — 88360 PR  TUMOR IMMUNOHISTOCHEM/MANUAL: ICD-10-PCS | Mod: 26,,, | Performed by: PATHOLOGY

## 2022-06-14 ENCOUNTER — TELEPHONE (OUTPATIENT)
Dept: PALLIATIVE MEDICINE | Facility: CLINIC | Age: 47
End: 2022-06-14
Payer: COMMERCIAL

## 2022-06-14 DIAGNOSIS — C50.412 MALIGNANT NEOPLASM OF UPPER-OUTER QUADRANT OF LEFT BREAST IN FEMALE, ESTROGEN RECEPTOR NEGATIVE: Primary | ICD-10-CM

## 2022-06-14 DIAGNOSIS — Z17.1 MALIGNANT NEOPLASM OF UPPER-OUTER QUADRANT OF LEFT BREAST IN FEMALE, ESTROGEN RECEPTOR NEGATIVE: Primary | ICD-10-CM

## 2022-06-14 LAB
COMMENT: NORMAL
FINAL PATHOLOGIC DIAGNOSIS: NORMAL
GROSS: NORMAL
Lab: NORMAL

## 2022-06-14 NOTE — TELEPHONE ENCOUNTER
----- Message from Shubham Christopher MS sent at 6/14/2022  3:13 PM CDT -----  Regarding: Genetics - Urgent patient  Miguel Sterling,     This patient was diagnosed with breast cancer today. The referral for genetics has been placed. Can you contact her to get her scheduled?    Thank you,  Shubham

## 2022-06-14 NOTE — TELEPHONE ENCOUNTER
Nurse received a message to contact pt regarding a genetic apt, pt accept visit, will see Brianne this friday

## 2022-06-15 ENCOUNTER — TELEPHONE (OUTPATIENT)
Dept: HEMATOLOGY/ONCOLOGY | Facility: CLINIC | Age: 47
End: 2022-06-15
Payer: COMMERCIAL

## 2022-06-15 NOTE — NURSING
0930am: Outgoing call to pt regarding Multi-d breast ref per JAYMIE Buenrostro for Breast cancer. Spoke with pt. Pt aware of diagnosis. Introduced myself and my role in care as NN. Explained multi-D breast process to pt. Pt scheduled for PET scan on 6/20 at 12 pm at , for myRisk Genetic testing on 6/20 at 1 pm at , for Multi-D Breast Conf on 6/20 at 1230 pm virtually, for HemOnc on 6/22 at 1120 am at  with Dr. Hathaway, for RadOnc on 6/23 830 am at  with Dr. Lee, and for GenSurg on 6/27 at 9 am at [facility] with Dr. Quiles. Pt instructed to be NPO for 6 hours prior to PET scan. Pt encouraged to contact me directly if pt have any further concerns and/or questions.  Pt verbalized understanding. Pt plan to report to appts as scheduled.   Oncology Navigation   Intake  Date of Diagnosis: 6/9/2022  Cancer Type: Breast (high-grade invasive carcinoma)  Internal / External Referral: Internal  Referral Source: Chitra Gloria NP  Date of Referral: 6/14/2022  Initial Nurse Navigator Contact: 6/15/2022  Referral to Initial Contact Timeline (days): 1  Date Worked: 6/15/2022  Appointment Date: 6/22/2022  Schedule to Appointment Timeline (days): 7  Multiple appointments: Yes     Treatment  Current Status: Staging work-up    Surgical Oncologist: Dr. Beau Quiles  Consult Date: 6/27/2022    Medical Oncologist: Dr. Maris Hathaway  Consult Date: 6/22/2022    Radiation Oncologist: Dr. Zack Lee    Procedures: Biopsy; PET scan  Biopsy Schedule Date: 6/9/2022  PET Scan Schedule Date: 6/20/2022       ER: Negative  OH: Negative  Her2: Negative    Radiation Oncologist: Dr. Zack Lee        Acuity      Follow Up  No follow-ups on file.

## 2022-06-16 NOTE — PROGRESS NOTES
"  Cancer Genetics  Hereditary/High Risk Clinic  Department of Hematology and Oncology  [facility]         Date of Service: 2022   Provider:  Shubham Christopher MS, GC    Patient Information  Name:  Florecita Noel  :  1975  MRN:  [id number]        Referring Provider: Chitra Gloria NP      SUBJECTIVE:      Chief Complaint: Genetic Counseling     History of Present Illness (HPI): Florecita Noel ("Florecita"), 46 y.o., assigned female sex at birth, is new to the [facility] Department of Hematology and Oncology and to me.  Florecita was referred by High Risk Breast for genetic cancer risk assessment given her recent diagnosis of breast cancer. At age 46, Florecita was diagnosed with invasive ductal carcinoma in the left breast (ER-, MS-, HER2-). No plan for treatment and surgery, Florecita is scheduled to meet with medical oncology, radiology, and surgery teams in the upcoming week.    Focused Medical History:    Germline cancer-genetic testing: No   Cancer:  Yes  o Breast cancer (2022)   Colon polyp:  No  o Most recent colonoscopy (2022), repeat in 10 years   Other benign tumor: Yes  o Ovarian cysts  o Uterine fibroids - 8-10 years ago   Pancreatitis:  No   Pancreatic cyst: No    Surgical History:  TAHBSO (2019)     Ancestry:   Ashkenazi Spiritism ancestry:  No   Paternal: ,    Maternal: ,     Focused Family History:   Consanguinity in ancestors:  No   Germline cancer-genetic testing in blood relatives: No   Cancer in family:  Yes; there are no known blood relatives affected with cancer other than those mentioned in the pedigree below  o Limited paternal family history    Family Cancer Pedigree:          SUBJECTIVE:     Records Reviewed  · Medical History  · Problem List  · Any pertinent, available Procedures and Pathology reports in both [facility] and [facility]    COUNSELING   Causes of cancer  Germline cancer genetic testing is the " testing of genes associated with cancer, known as cancer susceptibility genes.  Just as these genes are inherited from parents, mutations in these genes can be inherited, as well.  A mutation in a cancer susceptibility gene adversely affects the gene's ability to prevent cancer; therefore, carriers of cancer susceptibility gene mutations may be at increased risk for certain cancers.     Only 5-10% cancers are caused by a cancer susceptibility gene mutation, meaning the cancer is genetic/hereditary; rather, most cancers are sporadic.  Causes of sporadic cancers may include environmental risk factors, lifestyle risk factors, and non-modifiable risk factors.  It is important to note that members of a family often share not only their genetics but also risk factors including environmental and lifestyle risk factors, so cancers can be familial.     Potential results of genetic testing, and their implications  Potential results of genetic testing include positive, negative, and variant of unknown significance (VUS).    · A positive result indicates the presence of at least one clinically significant mutation, and the patient's associated cancer risks vary depending upon the cancer susceptibility gene(s) in which there is/are a mutation(s).  With a positive result, in some cases, depending upon the specific result and the patient's clinical history, modified risk management may be recommended, including measures for risk reduction and/or surveillance; however, modified management is not always an option.    · A negative result indicates that no clinically significant mutations were identified in the gene(s) tested.    · A VUS indicates that there is not presently enough data for the laboratory to make a determination as to whether the variant is clinically significant.  VUSs are not typically acted upon clinically.       Modified management may also be recommended, even with a result of no or unknown significance, based  upon risk assessment that incorporates the family history.  Sometimes, depending upon the genetic testing result and the cancer diagnosis, additional/modified treatments may be an option, though this is not guaranteed.     Genetic mutation inheritance  If Florecita tests positive for a mutation, her first-degree relatives would each have a 50% chance of having the same mutation, and other, more distantly related blood-relatives would also be at risk of having the same mutation.       Genetic discrimination  The Genetic Information Nondiscrimination Act (MATTY) is U.S. federal legislation that provides some protections against use of an individual's genetic information by their health insurer and by their employer.  Title I of MATTY prohibits most health insurers from utilizing an individual's genetic information to make decisions regarding insurance eligibility or premium charges.  Title II of MATTY prohibits covered entities, including employers, from requesting the genetic information of employees and applicants.  MATTY does not protect individuals from genetic discrimination toward health insurance obtained through a job with the  or through the Federal Employees Health Benefits Plan; from genetic discrimination by employers with fewer than 15 employees or if employed by the ; or from genetic discrimination by any other type of policies/entities, including but not limited to life insurance, disability insurance, long-term care insurance,  benefits, and [facility] benefits.     Genetic testing logistics  An outside laboratory would perform the testing after a blood sample is collected here at [facility] or a saliva sample is collected by the patient at home.  With genetic testing, there is a potential for the patient to incur out-of-pocket costs.  Results can take 2-3 weeks after genetic testing is ordered.  Post-test genetic counseling can be conducted once the genetic testing results  are available.     Assessment/Plan  Based on the information provided by Florecita, she meets current criteria for genetic testing of hereditary breast and ovarian cancer (HBOC) syndrome according to current clinical guidelines. Florecita's clinical history, including personal history and/or family history, is strongly suggestive of a hereditary cancer syndrome in the family given her 1) personal history of triple negative breast cancer and 2) diagnosis at age 46.      Offered germline cancer-genetic testing at this time versus deferring testing at this time or declining testing altogether. Florecita desires to proceed with germline cancer-genetic testing at this time and has provided her informed consent to proceed.  Various test panel options were discussed. She decided to proceed with genetic testing through the 9-gene Breast Cancer State Panel (ALEX, BRCA1, BRCA2, CDH1, CHEK2, PALB2, PTEN, STK11, TP53) with reflex to the 47-gene [employer] Common Hereditary Cancer Panel (APC, ALEX, AXIN2, BARD1, BMPR1A, BRCA1, BRCA2, BRIP1, CDH1, CDK4, CDKN2A, CHEK2, CTNNA1, DICER1, EPCAM, GREM1, HOXB13, KIT, MEN1, MLH1, MSH2, MSH3, MSH6, MUTYH, NBN, NF1, NTHL1, PALB2, PDGFRA, PMS2, POLD1, POLE, PTEN, RAD50, RAD51C, RAD51D, SDHA, SDHB, SDHC, SDHD, SMAD4, SMARCA4, STK11, TP53, TSC1, TSC2, VHL). A blood sample was collected today.     Questions were encouraged and answered to the patient's satisfaction, and she verbalized understanding of information and agreement with the plan.         Signed,    Shubham Christopher MS, GC  Genetic Counselor, Hereditary and High-Risk Clinic  Hematology/Oncology, [facility]    06/17/2022

## 2022-06-17 ENCOUNTER — OFFICE VISIT (OUTPATIENT)
Dept: GENETICS | Facility: CLINIC | Age: 47
End: 2022-06-17
Payer: COMMERCIAL

## 2022-06-17 DIAGNOSIS — C50.412 MALIGNANT NEOPLASM OF UPPER-OUTER QUADRANT OF LEFT BREAST IN FEMALE, ESTROGEN RECEPTOR NEGATIVE: Primary | ICD-10-CM

## 2022-06-17 DIAGNOSIS — Z17.1 MALIGNANT NEOPLASM OF UPPER-OUTER QUADRANT OF LEFT BREAST IN FEMALE, ESTROGEN RECEPTOR NEGATIVE: Primary | ICD-10-CM

## 2022-06-17 PROCEDURE — 99499 NO LOS: ICD-10-PCS | Mod: S$GLB,,,

## 2022-06-17 PROCEDURE — 96040 PR GENETIC COUNSELING, EACH 30 MIN: ICD-10-PCS | Mod: S$GLB,,,

## 2022-06-17 PROCEDURE — 96040 PR GENETIC COUNSELING, EACH 30 MIN: CPT | Mod: S$GLB,,,

## 2022-06-17 PROCEDURE — 99499 UNLISTED E&M SERVICE: CPT | Mod: S$GLB,,,

## 2022-06-20 ENCOUNTER — HOSPITAL ENCOUNTER (OUTPATIENT)
Dept: RADIOLOGY | Facility: HOSPITAL | Age: 47
Discharge: HOME | End: 2022-06-20
Attending: NURSE PRACTITIONER
Payer: COMMERCIAL

## 2022-06-20 ENCOUNTER — TUMOR BOARD CONFERENCE (OUTPATIENT)
Dept: HEMATOLOGY/ONCOLOGY | Facility: CLINIC | Age: 47
End: 2022-06-20
Payer: COMMERCIAL

## 2022-06-20 DIAGNOSIS — C50.412 MALIGNANT NEOPLASM OF UPPER-OUTER QUADRANT OF LEFT BREAST IN FEMALE, ESTROGEN RECEPTOR NEGATIVE: ICD-10-CM

## 2022-06-20 DIAGNOSIS — Z17.1 MALIGNANT NEOPLASM OF UPPER-OUTER QUADRANT OF LEFT BREAST IN FEMALE, ESTROGEN RECEPTOR NEGATIVE: ICD-10-CM

## 2022-06-20 PROCEDURE — 78815 PET IMAGE W/CT SKULL-THIGH: CPT | Mod: 26,PS,, | Performed by: RADIOLOGY

## 2022-06-20 PROCEDURE — 78815 NM PET CT ROUTINE: ICD-10-PCS | Mod: 26,PS,, | Performed by: RADIOLOGY

## 2022-06-20 PROCEDURE — 78815 PET IMAGE W/CT SKULL-THIGH: CPT | Mod: TC

## 2022-06-20 NOTE — PROGRESS NOTES
Pathology.  I saw your notes wanted to make sure this was not additional information that you needed.

## 2022-06-20 NOTE — PROGRESS NOTES
Tumor Board Documentation      Florecita Noel was presented by Chitra Gloria NP at our Tumor Board on 6/20/2022, which included representatives from (P) Medical Oncology, Navigation, Hematology, Radiation Oncology, Surgical Oncology, Genetics.    Florecita currently presents as (P) a current patient with (P) Breast cancer (high-grade invasive carcinoma), with history of the following treatments: (P) Genetic Counseling.    Additionally, we reviewed previous medical and familial history, history of present illness, and recent lab results along with all available histopathologic and imaging studies. The tumor board considered available treatment options and made the following recommendations:     (P) Surgery, Chemotherapy, Imaging, Radiation    Genetic test results pending; Pending PET scan results Surgery recommend Lumpectomy vs. Mastectomy pt choice, Oncology recommend port placement and neoadjuvant chemo/immunotherapy, and RadOnc recommend Partial vs. Whole radiation.    The following procedures/referrals were also placed: No orders of the defined types were placed in this encounter.      Clinical Trial Status: (P) Not discussed     National site-specific guidelines were discussed with respect to the case.    Tumor board is a meeting of clinicians from various specialty areas who evaluate and discuss patients for whom a multidisciplinary approach is being considered. Final determinations in the plan of care are those of the provider(s). The responsibility for follow up of recommendations given during tumor board is that of the provider.     Chitra Gloria NP

## 2022-06-22 ENCOUNTER — OFFICE VISIT (OUTPATIENT)
Dept: HEMATOLOGY/ONCOLOGY | Facility: CLINIC | Age: 47
End: 2022-06-22
Payer: COMMERCIAL

## 2022-06-22 VITALS
WEIGHT: 176.13 LBS | DIASTOLIC BLOOD PRESSURE: 89 MMHG | SYSTOLIC BLOOD PRESSURE: 142 MMHG | BODY MASS INDEX: 30.07 KG/M2 | HEIGHT: 64 IN | OXYGEN SATURATION: 98 % | TEMPERATURE: 98 F | HEART RATE: 69 BPM | RESPIRATION RATE: 16 BRPM

## 2022-06-22 DIAGNOSIS — C50.412 MALIGNANT NEOPLASM OF UPPER-OUTER QUADRANT OF LEFT BREAST IN FEMALE, ESTROGEN RECEPTOR NEGATIVE: Primary | ICD-10-CM

## 2022-06-22 DIAGNOSIS — Z17.1 MALIGNANT NEOPLASM OF UPPER-OUTER QUADRANT OF LEFT BREAST IN FEMALE, ESTROGEN RECEPTOR NEGATIVE: Primary | ICD-10-CM

## 2022-06-22 PROCEDURE — 99215 OFFICE O/P EST HI 40 MIN: CPT | Mod: S$GLB,,, | Performed by: INTERNAL MEDICINE

## 2022-06-22 PROCEDURE — 99999 PR PBB SHADOW E&M-EST. PATIENT-LVL IV: ICD-10-PCS | Mod: PBBFAC,,, | Performed by: INTERNAL MEDICINE

## 2022-06-22 PROCEDURE — 99999 PR PBB SHADOW E&M-EST. PATIENT-LVL IV: CPT | Mod: PBBFAC,,, | Performed by: INTERNAL MEDICINE

## 2022-06-22 PROCEDURE — 99215 PR OFFICE/OUTPT VISIT, EST, LEVL V, 40-54 MIN: ICD-10-PCS | Mod: S$GLB,,, | Performed by: INTERNAL MEDICINE

## 2022-06-22 NOTE — PLAN OF CARE
START ON PATHWAY REGIMEN - Breast    [id number]        Pembrolizumab (Keytruda)       Paclitaxel (Taxol)       Carboplatin       Filgrastim-xxxx       Pembrolizumab (Keytruda)       Doxorubicin (Adriamycin)       Cyclophosphamide       Pegfilgrastim-xxxx           Additional Orders: In the KEYNOTE-522 trial (Guadalupe et al., 2020), G-CSF   was recommended after each chemotherapy cycle beginning 24 hours after the last   dose of chemotherapy. For the paclitaxel + carboplatin cycles, filgrastim was   recommended and it continued daily at least until 72 hours after the last dose   of chemotherapy. For the doxorubicin + cyclophosphamide cycles, pegfilgrastim   could be used. See protocol for details. Assess LVEF prior to initiation of   doxorubicin and as clinically indicated during and after treatment. Doxorubicin   can cause myocardial damage, including acute left ventricular failure. Risk of   cardiomyopathy is generally proportional to cumulative   anthracycline/anthracenedione exposure. Use of concomitant cardiotoxic agents,   previous radiotherapy to the mediastinum, or presence/history of cardiovascular   disease can additionally increase cardiomyopathy risk. See PI for details.   Serious immune-mediated adverse events can occur with pembrolizumab. Please   monitor your patient and refer to the linked immune-mediated adverse reaction   management materials for more information.    **Always confirm dose/schedule in your pharmacy ordering system**    Patient Characteristics:  Preoperative or Nonsurgical Candidate (Clinical Staging), Neoadjuvant Therapy   followed by Surgery, Invasive Disease, Chemotherapy, HER2   Negative/Unknown/Equivocal, ER Negative/Unknown, Platinum Therapy Indicated,   High-Risk Disease Present  Therapeutic Status: Preoperative or Nonsurgical Candidate (Clinical Staging)  AJCC M Category: cM0  AJCC Grade: G3  Breast Surgical Plan: Neoadjuvant Therapy followed by Surgery  ER Status: Negative  (-)  AJCC 8 Stage Grouping: IB  HER2 Status: Negative (-)  AJCC T Category: cT1c  AJCC N Category: cN0  [place] Status: Negative (-)  Type of Therapy: Platinum Therapy Indicated  Intent of Therapy:  Curative Intent, Discussed with Patient

## 2022-06-22 NOTE — PROGRESS NOTES
Subjective:      DATE OF VISIT: 6/22/22     ?  Patient ID:Jaison Noel is a 46 y.o. female.?? MR#: [id number]   ?   REFERRING PROVIDER: Chitra Gloria NP  [id number] [place] [address]     ? Primary Care Providers:  Alesha Alonso MD, MD (General)     PRIMARY ONCOLOGIST: Dr. Hathaway    CHIEF COMPLAINT:  Establish care with Medical Oncology for newly diagnosed triple negative breast cancer????   ?   ONCOLOGIC DIAGNOSIS:  Stage IB, cT1c cN0 cM0 left breast upper outer quadrant 2:00 a.m. invasive ductal carcinoma grade 3 ER/LA negative, HER2 negative  ?   CURRENT TREATMENT:  Plan for neoadjuvant chemotherapy    PAST TREATMENT:  Biopsy only  ?   ONCOLOGIC HISTORY:     Ms. Noel is a 46-year-old woman with hypothyroidism and hypertension.  She presented for routine screening mammogram 11/30/2021 showing left breast mass upper outer posterior position.    12/03/2021 diagnostic mammogram and ultrasound  Left  Mammo Digital Diagnostic Bilat with [place]  There is a mass seen in the upper outer quadrant of the left breast in the posterior depth.      US Breast Bilateral Limited  There are 3 similar complicated cysts seen in the upper outer quadrant of the left breast in the posterior depth. The largest cyst is thought to be the correlate and measures 0.7 x 0.5 x 0.5 cm.  There are 2 additional complex cysts also seen in the upper outer quadrant of the left breast that measure 0.4 x 0.2 x 0.3 cm and 0.6 x 0.3 x 0.4 cm.      Right  Mammo Digital Diagnostic Bilat with [place]ito  There is a mass seen in the lower central region of the right breast in the posterior depth. The posterior margin of this lesion is not seen on mammography.  The lesion appears to abut and overlie the pectoralis musculature.  The lesion appears to measure at least 9 mm in size.      US Breast Bilateral Limited  There is a mass seen in the lower central region of the right breast. A definitive correlate is not definitely seen on ultrasound.   There is a 6 mm hypoechoic lesion likely representing a complex cyst within the central portion of the breast that appears to be too small to be the correlate for the mammographic finding.  There is an additional 1.0 x 0.4 x 0.6 cm hypoechoic area that is located within a ridge of tissue and also does not definitively correlate to the lesion.  It is possible this lesion was present on prior studies although only space visualized on the most recent study due to increased tissue included on the MLO view.  The visualized margins of the lesion also appear to be fairly smooth and therefore the lesion can be followed.      Impression:  Left  Mass: Left breast mass at the upper outer posterior position. Assessment: 3 - Probably benign. Short Interval Follow-Up in 6 Months is recommended.      Right  Mass: Right breast mass at the lower central posterior position. Assessment: 3 - Probably benign. Short Interval Follow-Up in 6 Months is recommended.      BI-RADS Category:   Overall: 3 - Probably Benign    Repeat evaluation with diagnostic mammogram and ultrasound on 05/24/2022 notable for a suspicious left breast lesion 1.3 cm 2:00 a.m.    Right breast: No detrimental mammographic change. Well-circumscribed lesion within the lower outer posterior breast is unchanged mammographically. 8.6 mm complicated cyst present at 08:00 o'clock, 12 cm from the nipple and corresponds to the stable mammographic finding.       Follow-up imaging of the previously noted central lesions demonstrate no detrimental change.  8 mm complicated cyst noted at 04:00 o'clock, 4 cm from the nipple.         Left breast: Interval increase in size of an upper outer quadrant mass on mammogram. On ultrasound there is an enlarging complicated cystic lesion measuring 1.3 cm at 02:00 o'clock, 7 cm from the nipple. Other complicated cysts within the upper outer breast demonstrate no detrimental change.  No suspicious left axillary lymph  nodes.    Ultrasound-guided biopsy left breast lesion on 2022  Pathology:  Final Pathologic Diagnosis 1. Left breast mass (2 o'clock position), biopsy:       -  High-grade invasive carcinoma of no special type (ductal) with   associated tumor necrosis,          see comment     SYNOPTIC REPORT   PROCEDURE:  Biopsy   SPECIMEN LATERALITY:  Left breast   TUMOR SITE:  2 o'clock position   HISTOLOGIC TYPE:  Invasive carcinoma of no special type (ductal)   HISTOLOGIC GRADE:  Grade 3 of 3          TUBULE FORMATION:  3          PLEOMORPHISM:  3          MITOTIC RATE:  3   TUMOR SIZE:   At least 10 mm in greatest linear dimension   DUCTAL CARCINOMA IN SITU (DCIS):  Not identified   LYMPHOVASCULAR INVASION:  Not identified   MICROCALCIFICATIONS:  Not identified   BREAST BIOMARKERS (PERFORMED WITH APPROPRIATE CONTROLS):           ER:   Negative (0)           UT:   Negative (0)           HER2:  Negative (1+)           KI67:  Greater than 95%       2022 PET-CT without evidence of distant metastatic disease.     FDG avid left breast lesion consistent with the known malignancy.  No other sites of suspicious uptake identified.    Menarche 12 years old  ; 1 miscarriage, age of 1st pregnancy 16 years old  Postmenopausal last menstrual period at 44 years old s/p PIERRE/BSO  Exogenous estrogen for about 1 year last when 45yo.  No history of prior radiation  No prior breast biopsy    Family history notable for mother with either breast or ovarian cancer diagnosis 64 years old.        Review of Systems    ?   A comprehensive 14-point review of systems was reviewed with patient and was negative other than as specified above.   ?   PAST MEDICAL HISTORY:   Past Medical History:   Diagnosis Date    Abnormal Pap smear     Anemia     Hypertension     Hypokalemia     in pregnancy    Hypothyroidism (acquired) 2018    Malignant neoplasm of upper-outer quadrant of left breast in female, estrogen receptor negative  6/22/2022    ?     PAST SURGICAL HISTORY:   Past Surgical History:   Procedure Laterality Date    CHOLECYSTECTOMY      COLONOSCOPY N/A 6/8/2022    Procedure: COLONOSCOPY;  Surgeon: Rosaline Meyer MD;  Location: [facility];  Service: Endoscopy;  Laterality: N/A;    COSMETIC SURGERY      tummy tuck    DIAGNOSTIC LAPAROSCOPY N/A 2/26/2019    Procedure: LAPAROSCOPY, DIAGNOSTIC;  Surgeon: Pia Aguila MD;  Location: [facility] OR;  Service: OB/GYN;  Laterality: N/A;    FULGURATION OF ENDOMETRIOSIS N/A 2/26/2019    Procedure: FULGURATION, ENDOMETRIOSIS;  Surgeon: Pia Aguila MD;  Location: [facility] OR;  Service: OB/GYN;  Laterality: N/A;    HYSTERECTOMY      HYSTEROSCOPY WITH HYDROTHERMAL ABLATION OF ENDOMETRIUM WITH DILATION AND CURETTAGE N/A 2/26/2019    Procedure: HYSTEROSCOPY, WITH DILATION AND CURETTAGE OF UTERUS AND HYDROTHERMAL ENDOMETRIAL ABLATION;  Surgeon: Pia Aguila MD;  Location: [facility] OR;  Service: OB/GYN;  Laterality: N/A;    LAPAROSCOPIC DRAINAGE OF CYST OF OVARY Left 2/26/2019    Procedure: DRAINAGE, CYST, OVARY, LAPAROSCOPIC;  Surgeon: Pia Aguila MD;  Location: [facility] OR;  Service: OB/GYN;  Laterality: Left;    ROBOT-ASSISTED LAPAROSCOPIC ABDOMINAL HYSTERECTOMY USING DA BEAN XI N/A 11/5/2019    Procedure: XI ROBOTIC HYSTERECTOMY;  Surgeon: Pia Aguila MD;  Location: [facility] OR;  Service: OB/GYN;  Laterality: N/A;    ROBOT-ASSISTED LAPAROSCOPIC SALPINGO-OOPHORECTOMY USING DA BEAN XI Bilateral 11/5/2019    Procedure: XI ROBOTIC SALPINGO-OOPHORECTOMY;  Surgeon: Pia Aguila MD;  Location: [facility] OR;  Service: OB/GYN;  Laterality: Bilateral;    TUBAL LIGATION      Tummy Tuck        ?   ALLERGIES:   Allergies as of 06/22/2022 - Reviewed 06/22/2022   Allergen Reaction Noted    Sumatriptan Other (See Comments) 11/26/2018      ?   MEDICATIONS:?   No outpatient medications have been marked as taking for the 6/22/22 encounter (Office  Visit) with Maris Hathaway MD.      ?   SOCIAL HISTORY:?   Social History     Tobacco Use    Smoking status: Never Smoker    Smokeless tobacco: Never Used   Substance Use Topics    Alcohol use: Yes     Alcohol/week: 0.0 standard drinks     Comment: socially;  No alcohol 72h prior to sx      ?      ?   FAMILY HISTORY:   family history includes Cancer in her father and mother; Heart disease in her maternal grandmother.   ?   Mother with breast or ovarian cancer unclear at age 64. No other family history of malignancy.      Objective:      Physical Exam      ?   Vitals:    06/22/22 1124   BP: (!) 142/89   Pulse: 69   Resp: 16   Temp: 97.9 °F (36.6 °C)      ?   ECOG:?0  General appearance: Generally well appearing, in no acute distress.   Head, eyes, ears, nose, and throat: Pupils round and equally reactive to light. Oropharynx clear with moist mucous membranes.   Breasts:  Left upper outer quadrant with ecchymosis and firmness possible hematoma.  No axillary adenopathy bilaterally appreciated.  Abdomen: nontender, nondistended.   Extremities: Warm, without edema.   Neurologic: Alert and oriented. Grossly normal strength, coordination, and gait.   Skin: No rashes, ecchymoses or petechial lesion.     ?   Imaging:  ?    No results found for this or any previous visit (from the past 2160 hour(s)).  No results found for this or any previous visit (from the past 2160 hour(s)).  Results for orders placed or performed during the hospital encounter of 06/20/22 (from the past 2160 hour(s))   NM PET CT Routine Skull to Mid Thigh    Impression    FDG avid left breast lesion consistent with the known malignancy.  No other sites of suspicious uptake identified.      Electronically signed by: Zay Dickerson DO  Date:    06/20/2022  Time:    13:58         Pathology:    No visits with results within 1 Day(s) from this visit.   Latest known visit with results is:   Hospital Outpatient Visit on 06/09/2022   Component Date Value  "Ref Range Status    Final Pathologic Diagnosis 06/09/2022    Final                    Value:1. Left breast mass (2 o'clock position), biopsy:      -  High-grade invasive carcinoma of no special type (ductal) with  associated tumor necrosis,         see comment    SYNOPTIC REPORT  PROCEDURE:  Biopsy  SPECIMEN LATERALITY:  Left breast  TUMOR SITE:  2 o'clock position  HISTOLOGIC TYPE:  Invasive carcinoma of no special type (ductal)  HISTOLOGIC GRADE:  Grade 3 of 3         TUBULE FORMATION:  3         PLEOMORPHISM:  3         MITOTIC RATE:  3  TUMOR SIZE:   At least 10 mm in greatest linear dimension  DUCTAL CARCINOMA IN SITU (DCIS):  Not identified  LYMPHOVASCULAR INVASION:  Not identified  MICROCALCIFICATIONS:  Not identified  BREAST BIOMARKERS (PERFORMED WITH APPROPRIATE CONTROLS):          ER:   Negative (0)          DC:   Negative (0)          HER2:  Negative (1+)          KI67:  Greater than 95%      Gross 06/09/2022    Final                    Value:Patient ID/Pathology ID [id number]  In formalin, labeled "left breast 02:00 o'clock, in formalin at 08:40", is a  2.5 x 2.5 cm aggregate of pink-yellow needle biopsy cores.  Entirely  submitted in cassette QKP--1-A  Ischemic time is not provided.  The fixation time is greater than 6 hrs and  less than 72 hrs.  A gross picture of the container and cassette is taken and  added to file.  Charly HOLGUIN (ASCP) cm      Comment 06/09/2022    Final                    Value:Immunohistochemical stain with appropriate control for p63 was performed and  shows loss of myoepithelial cells throughout the lesion, with no evidence of  ductal carcinoma in situ.  The patient's history of ovarian carcinoma is  noted.  Immunohistochemical stains for GATA3 and PAX8 appropriate controls  show tumor cell positivity for GATA3 and negativity for [place] 8, confirming  ductal (breast) origin.  This case seen in consultation with Dr. Montesinos who agrees with the above  diagnosis.      " Disclaimer 06/09/2022    Final                    Value:Unless the case is a 'gross only' or additional testing only, the final  diagnosis for each specimen is based on a microscopic examination of  appropriate tissue sections.  ER immunohistochemical staining (clone SP1, DAB detection method) is  performed on formalin-fixed, paraffin embedded tissue sections. The  percentage of cell nuclei stained and the strength of staining is reported  (weak, moderate, strong), using the 2010 ACSO/CAP scoring guidelines. Tumors  used for determing predictive markers are fixed in10% neutral buffered  formalin for 6-72 hours. It has been cleared by the [place] FDA for use as an  IVD test. This assay has not been validated on decalcified tissues. Results  should be interpreted with caution given the likelihood of false negativity  on decalcified specimens.  HER-2/aston IHC (4B5) clone, DAB detection method) is done on 10% buffered  formalin-fixed (for 6-72 hrs), paraffin embedded tissue sections. The scoring  is completed on a 4-tiered scoring system of membran                          e staining using the 2014  ASCO/CAP scoring guidelines. It has been cleared by the [place] FDA for use as  an IVD test. This assay has not been validated on decalcified tissues.  Results should be interpreted with caution given the likelihood of false  negativity on decalcified specimens.  PgR immunohistochemical staining (clone 1E2, DAB detection method) is  performed on formalin-fixed, paraffin embedded tissue sections. The  percentage of cell nuclei stained and the strength of staining is report  (weak, moderate, strong), using 2010 ASCO/CAP scoring guidelines. Tumors used  for determing predictive markers are fixed in 10% neutral buffered formalin  for 6-72 hours. It has been cleared by the [place] FDA for use as an IVD test.  This assay has not been validated on decalcified tissues. Results should be  interpreted with caution given the likelihood of false  negativity on  decalcified specimens.            ?   Assessment/Plan:   Malignant neoplasm of upper-outer quadrant of left breast in female, estrogen receptor negative  -     Ambulatory referral/consult to Oncology  -     IR Tunneled Cath Placement With Port; Future; Expected date: 06/22/2022  -     Echo; Future  -     CBC Auto Differential; Standing  -     Comprehensive Metabolic Panel; Standing  -     TSH; Standing       1. Malignant neoplasm of upper-outer quadrant of left breast in female, estrogen receptor negative          Plan:     Problem List Items Addressed This Visit        Oncology    Malignant neoplasm of upper-outer quadrant of left breast in female, estrogen receptor negative        Stage IB, cT1c cN0 cM0 left breast upper outer quadrant 2:00 a.m. invasive ductal carcinoma grade 3 ER/NH negative, HER2 negative:Clinical exam and mammogram/ultrasound without axillary adenopathy.  PET scan without evidence of distant metastatic disease.   We discussed that while early stage and T1c, higher risk features given triple negative and high grade 3 and high ki-67 >95% with noted growth over just 6 month interval recommendation for neoadjuavant approach with chemo immunotherapy per Guadalupe et al. NE 2020. She is healthy aside from prior hypothyroidism now not on supplement.  We did discuss risks and benefits of therapy and importance of re-evaluation with plan for surgery, radiation and further adjuvant therapy as indicated.  She expressed good understanding and agree with the plan for follow-up per below.    Follow-Up:   Patient Instructions   Port placement  Chemotherapy /immunotherapy teaching/consent  TTE  Revisit cycle 1 day 1 CBC, CMP, TSH prior  Follow-up genetic testing, pending  Follow-up with surgery to consider biopsy cyst right breast  CC: NN    120 minutes of total time spent on the encounter, which includes face to face time and non-face to face time preparing to see the patient (eg, review of  tests), Obtaining and/or reviewing separately obtained history, Documenting clinical information in the electronic or other health record, Independently interpreting results (not separately reported) and communicating results to the patient/family/caregiver, or Care coordination (not separately reported).     Addendum:  06/29/2022  I called patient to review plan.  [facility] genetic testing germline returned negative.    We discussed options for chemotherapy with recommendation for neoadjuvant given higher risk triple negative; no contraindication to immunotherapy, and preference for chemo immunotherapy regimen with monitoring for tolerance and modification as needed.  She expressed good understanding and agree with this plan.  She is plan for chemotherapy immunotherapy teaching/consent, port placement, echocardiogram.    Consideration of biopsy contralateral lesion being discussed with surgery/radiology per nurse navigation.

## 2022-06-22 NOTE — PATIENT INSTRUCTIONS
Port placement  Chemotherapy /immunotherapy teaching/consent  TTE  Revisit cycle 1 day 1 CBC, CMP, TSH prior  Follow-up genetic testing, pending  Follow-up with surgery to consider biopsy cyst right breast  CC: NN

## 2022-06-23 ENCOUNTER — OFFICE VISIT (OUTPATIENT)
Dept: RADIATION ONCOLOGY | Facility: CLINIC | Age: 47
End: 2022-06-23
Payer: COMMERCIAL

## 2022-06-23 VITALS
BODY MASS INDEX: 30.27 KG/M2 | DIASTOLIC BLOOD PRESSURE: 88 MMHG | RESPIRATION RATE: 18 BRPM | TEMPERATURE: 98 F | HEART RATE: 71 BPM | HEIGHT: 64 IN | WEIGHT: 177.31 LBS | OXYGEN SATURATION: 98 % | SYSTOLIC BLOOD PRESSURE: 138 MMHG

## 2022-06-23 DIAGNOSIS — C50.412 MALIGNANT NEOPLASM OF UPPER-OUTER QUADRANT OF LEFT BREAST IN FEMALE, ESTROGEN RECEPTOR NEGATIVE: Primary | ICD-10-CM

## 2022-06-23 DIAGNOSIS — Z17.1 MALIGNANT NEOPLASM OF UPPER-OUTER QUADRANT OF LEFT BREAST IN FEMALE, ESTROGEN RECEPTOR NEGATIVE: Primary | ICD-10-CM

## 2022-06-23 PROCEDURE — 99205 OFFICE O/P NEW HI 60 MIN: CPT | Mod: S$GLB,,, | Performed by: RADIOLOGY

## 2022-06-23 PROCEDURE — 99999 PR PBB SHADOW E&M-EST. PATIENT-LVL IV: CPT | Mod: PBBFAC,,, | Performed by: RADIOLOGY

## 2022-06-23 PROCEDURE — 99205 PR OFFICE/OUTPT VISIT, NEW, LEVL V, 60-74 MIN: ICD-10-PCS | Mod: S$GLB,,, | Performed by: RADIOLOGY

## 2022-06-23 PROCEDURE — 99999 PR PBB SHADOW E&M-EST. PATIENT-LVL IV: ICD-10-PCS | Mod: PBBFAC,,, | Performed by: RADIOLOGY

## 2022-06-23 NOTE — PROGRESS NOTES
[facility]  RADIATION ONCOLOGY CONSULTATION    Name: Florecita Noel  : 1975      Patient Referred To Radiation Oncology By:  [place]  [id number] [place] [address]    DIAGNOSIS: L breast invasive ductal carcinoma, triple negative, grade 3, [id number]    HISTORY OF PRESENT ILLNESS:  Florecita Noel is a 46 y.o. female who presents for consultation for the above diagnosis.   Was followed in high risk breast clinic.  Dx MMG 22 showed stable lesion in lower outer R breast and stable other areas; L breast with increased size of UOQ mass now 1.3cm. No suspicious nodes.    L breast biopsy pathology showed invasive ductal carcinoma, grade 3, triple negative, Ki67 >95%    PET showed avid L breast lesion, no other suspicious sites identified.    Today, she has some residual pain from biopsy.  She denies, skin changes, nipple changes, new axillary/supraclavicular masses, discharge, induration, or erythema.     REVIEW OF SYSTEMS: (Positive findings bold, otherwise negative)   Constitutional: fever, fatigue, weight change  Eyes: blurred vision in the past 3 months, double vision   ENT: ear pain, new mouth lesions, jaw pain, difficulty swallowing, sore throat  Cardiovascular: chest pain on exertion, reflux, leg swelling  Respiratory: shortness of breath, dyspnea, cough, hemoptysis.   GI: abdominal pain, diarrhea, constipation, blood in stool, painful bowel movements  : painful or burning urination, blood in urine  Musculoskeletal: new bone or joint pains  Neurologic: headache, seizure, focal numbness or tingling, balance changes, speech changes  Lymph: new or enlarged lymph nodes  Psychiatric: depression, anxiety    PRIOR RADIATION HISTORY: none    PAST MEDICAL HISTORY:  Past Medical History:   Diagnosis Date    Abnormal Pap smear     Anemia     Hypertension     Hypokalemia     in pregnancy    Hypothyroidism (acquired) 2018    Malignant neoplasm of  upper-outer quadrant of left breast in female, estrogen receptor negative 6/22/2022       PAST SURGICAL HISTORY:  Past Surgical History:   Procedure Laterality Date    CHOLECYSTECTOMY      COLONOSCOPY N/A 6/8/2022    Procedure: COLONOSCOPY;  Surgeon: Rosaline Meyer MD;  Location: [facility];  Service: Endoscopy;  Laterality: N/A;    COSMETIC SURGERY      tummy tuck    DIAGNOSTIC LAPAROSCOPY N/A 2/26/2019    Procedure: LAPAROSCOPY, DIAGNOSTIC;  Surgeon: Pia Aguila MD;  Location: [facility];  Service: OB/GYN;  Laterality: N/A;    FULGURATION OF ENDOMETRIOSIS N/A 2/26/2019    Procedure: FULGURATION, ENDOMETRIOSIS;  Surgeon: Pia Aguila MD;  Location: [facility];  Service: OB/GYN;  Laterality: N/A;    HYSTERECTOMY      HYSTEROSCOPY WITH HYDROTHERMAL ABLATION OF ENDOMETRIUM WITH DILATION AND CURETTAGE N/A 2/26/2019    Procedure: HYSTEROSCOPY, WITH DILATION AND CURETTAGE OF UTERUS AND HYDROTHERMAL ENDOMETRIAL ABLATION;  Surgeon: Pia Aguila MD;  Location: [facility];  Service: OB/GYN;  Laterality: N/A;    LAPAROSCOPIC DRAINAGE OF CYST OF OVARY Left 2/26/2019    Procedure: DRAINAGE, CYST, OVARY, LAPAROSCOPIC;  Surgeon: Pia Aguila MD;  Location: [facility];  Service: OB/GYN;  Laterality: Left;    ROBOT-ASSISTED LAPAROSCOPIC ABDOMINAL HYSTERECTOMY USING DA BEAN XI N/A 11/5/2019    Procedure: XI ROBOTIC HYSTERECTOMY;  Surgeon: Pia Aguila MD;  Location: [facility];  Service: OB/GYN;  Laterality: N/A;    ROBOT-ASSISTED LAPAROSCOPIC SALPINGO-OOPHORECTOMY USING DA BEAN XI Bilateral 11/5/2019    Procedure: XI ROBOTIC SALPINGO-OOPHORECTOMY;  Surgeon: Pia Aguila MD;  Location: [facility];  Service: OB/GYN;  Laterality: Bilateral;    TUBAL LIGATION      Tummy Tuck         ALLERGIES:   Review of patient's allergies indicates:   Allergen Reactions    Sumatriptan Other (See Comments)     Chest tightness that moved into her throat        MEDICATIONS:    Current Outpatient Medications:     verapamiL (CALAN) 120 MG tablet, TAKE 1 TABLET(120 MG) BY MOUTH EVERY DAY, Disp: 90 tablet, Rfl: 3    SOCIAL HISTORY:  Social History     Socioeconomic History    Marital status: Other   Tobacco Use    Smoking status: Never Smoker    Smokeless tobacco: Never Used   Substance and Sexual Activity    Alcohol use: Yes     Alcohol/week: 0.0 standard drinks     Comment: socially;  No alcohol 72h prior to sx    Drug use: No    Sexual activity: Yes     Partners: Male     Birth control/protection: Surgical     Comment: hyst      Social Determinants of Health     Financial Resource Strain: Low Risk     Difficulty of Paying Living Expenses: Not very hard   Food Insecurity: No Food Insecurity    Worried About Running Out of Food in the Last Year: Never true    Ran Out of Food in the Last Year: Never true   Transportation Needs: No Transportation Needs    Lack of Transportation (Medical): No    Lack of Transportation (Non-Medical): No   Physical Activity: Sufficiently Active    Days of Exercise per Week: 3 days    Minutes of Exercise per Session: 60 min   Stress: Stress Concern Present    Feeling of Stress : To some extent   Social Connections: Unknown    Frequency of Communication with Friends and Family: Three times a week    Frequency of Social Gatherings with Friends and Family: Once a week    Active Member of Clubs or Organizations: No    Attends Club or Organization Meetings: Patient refused    Marital Status:    Housing Stability: Low Risk     Unable to Pay for Housing in the Last Year: No    Number of Places Lived in the Last Year: 1    Unstable Housing in the Last Year: No     Lives in     FAMILY HISTORY:  Family History   Problem Relation Age of Onset    Cancer Mother         origin? ovarian? metastasized    Cancer Father         throat?    Heart disease Maternal Grandmother        PHYSICAL EXAMINATION:  Constitutional: well  "appearing, no acute distress, ECOG 0 - Fully Active  Vitals:    /88   Pulse 71   Temp 97.7 °F (36.5 °C)   Resp 18   Ht 5' 4" (1.626 m)   Wt 80.4 kg (177 lb 4.8 oz)   LMP  (LMP Unknown)   SpO2 98%   BMI 30.43 kg/m²   Eyes: sclera anicteric, EOMI, pupils equal, round and reactive to light  ENT: oral cavity without lesions, moist mucous membranes  Neck: trachea midline, neck supple  Lymphatic: no cervical, supraclavicular or axillary adenopathy  Breast: deferred today  Cardiovascular: regular rate, no edema of the upper or lower extremities, radial pulse 2+  Respiratory: unlabored effort, clear to auscultation, no wheezes  Abdomen: soft, non-tender, no rigidity, no masses, no hepatomegaly  Neuro: Cranial nerves III-XII intact, speech not slurred, gait non-ataxic, no dysdiadochokinesia, strength 5/5 upper and lower extremities  Spine: non-tender to percussion cervical, thoracic and lumbosacral spine    IMAGING AND LABORATORY FINDINGS: As per HPI; images reviewed personally.    ASSESSMENT: 46 y.o. female with L breast invasive ductal carcinoma, triple negative, grade 3, [id number]    PLAN: Today we discussed the risks, benefits, and potential acute/late toxicities of radiation in the breast conservation and post-mastectomy setting.   She will most likely have neoadjuvant systemic therapy followed by surgery then back for radiation.  I will likely plan to give 40Gy/15fx with 10Gy/5fx tumor bed boost; using DIBH to reduce heart/lung dose.  Comprehensive kobe coverage may be required, pending final surgical pathology.    Potential acute toxicities include fatigue, skin irritation, and breast edema. Potential late toxicities include breast edema, lymphedema, hyperpigmentation, poor cosmetic outcome, radiation pneumonitis, or very low risk of damage to heart.     We will have her return in ~4 weeks after surgery to allow for adequate healing and surgical pathology results to confirm our final plan. We can likely " perform CT simulation at that time.    We discussed the techniques, toxicities and indications of radiation and I answered the patient's questions to their apparent satisfaction.    I spent approximately 60 minutes reviewing the available records and evaluating the patient, out of which over 50% of the time was spent face to face with the patient in counseling and coordinating this patient's care.    Zack Lee III, M.D.  Radiation Oncology  [facility] [address] [place] [address]  Ph: [phone]  [email or web address]

## 2022-06-27 ENCOUNTER — HOSPITAL ENCOUNTER (OUTPATIENT)
Dept: CARDIOLOGY | Facility: HOSPITAL | Age: 47
Discharge: HOME | End: 2022-06-27
Attending: SURGERY
Payer: COMMERCIAL

## 2022-06-27 ENCOUNTER — DOCUMENTATION ONLY (OUTPATIENT)
Dept: PREADMISSION TESTING | Facility: HOSPITAL | Age: 47
End: 2022-06-27
Payer: COMMERCIAL

## 2022-06-27 ENCOUNTER — OFFICE VISIT (OUTPATIENT)
Dept: SURGERY | Facility: CLINIC | Age: 47
End: 2022-06-27
Payer: COMMERCIAL

## 2022-06-27 ENCOUNTER — PATIENT MESSAGE (OUTPATIENT)
Dept: FAMILY MEDICINE | Facility: CLINIC | Age: 47
End: 2022-06-27
Payer: COMMERCIAL

## 2022-06-27 ENCOUNTER — TELEPHONE (OUTPATIENT)
Dept: GENETICS | Facility: CLINIC | Age: 47
End: 2022-06-27
Payer: COMMERCIAL

## 2022-06-27 VITALS
WEIGHT: 176.13 LBS | SYSTOLIC BLOOD PRESSURE: 129 MMHG | BODY MASS INDEX: 30.24 KG/M2 | DIASTOLIC BLOOD PRESSURE: 82 MMHG | TEMPERATURE: 97 F | HEART RATE: 67 BPM

## 2022-06-27 DIAGNOSIS — C50.412 MALIGNANT NEOPLASM OF UPPER-OUTER QUADRANT OF LEFT BREAST IN FEMALE, ESTROGEN RECEPTOR NEGATIVE: ICD-10-CM

## 2022-06-27 DIAGNOSIS — Z17.1 MALIGNANT NEOPLASM OF UPPER-OUTER QUADRANT OF LEFT BREAST IN FEMALE, ESTROGEN RECEPTOR NEGATIVE: ICD-10-CM

## 2022-06-27 DIAGNOSIS — R92.8 ABNORMAL MAMMOGRAM OF RIGHT BREAST: Primary | ICD-10-CM

## 2022-06-27 DIAGNOSIS — R92.8 ABNORMAL MAMMOGRAM OF LEFT BREAST: ICD-10-CM

## 2022-06-27 PROCEDURE — 93005 ELECTROCARDIOGRAM TRACING: CPT

## 2022-06-27 PROCEDURE — 93010 ELECTROCARDIOGRAM REPORT: CPT | Mod: ,,, | Performed by: INTERNAL MEDICINE

## 2022-06-27 PROCEDURE — 99999 PR PBB SHADOW E&M-EST. PATIENT-LVL IV: ICD-10-PCS | Mod: PBBFAC,,, | Performed by: SURGERY

## 2022-06-27 PROCEDURE — 93010 EKG 12-LEAD: ICD-10-PCS | Mod: ,,, | Performed by: INTERNAL MEDICINE

## 2022-06-27 PROCEDURE — 99204 PR OFFICE/OUTPT VISIT, NEW, LEVL IV, 45-59 MIN: ICD-10-PCS | Mod: S$GLB,,, | Performed by: SURGERY

## 2022-06-27 PROCEDURE — 99999 PR PBB SHADOW E&M-EST. PATIENT-LVL IV: CPT | Mod: PBBFAC,,, | Performed by: SURGERY

## 2022-06-27 PROCEDURE — 99204 OFFICE O/P NEW MOD 45 MIN: CPT | Mod: S$GLB,,, | Performed by: SURGERY

## 2022-06-27 RX ORDER — LIDOCAINE HYDROCHLORIDE 10 MG/ML
1 INJECTION, SOLUTION EPIDURAL; INFILTRATION; INTRACAUDAL; PERINEURAL ONCE
Status: CANCELLED | OUTPATIENT
Start: 2022-06-27 | End: 2022-06-27

## 2022-06-27 RX ORDER — SODIUM CHLORIDE 9 MG/ML
INJECTION, SOLUTION INTRAVENOUS CONTINUOUS
Status: CANCELLED | OUTPATIENT
Start: 2022-06-27

## 2022-06-27 NOTE — PROGRESS NOTES
Pre op instructions reviewed with patient per phone.    Spoke about pre op process and surgery instructions, verbalized understanding.    To confirm, Your surgeon has instructed you:  Surgery is scheduled on July 1, 2022 pending Cardiology clearance with Dr. Fernández on Thursday, June 30.       Please report to [facility], 1st floor.    The [place] is [address] [place]  [address]       The Pre Admissions will call you the day prior to surgery with your arrival time.        INSTRUCTIONS IMPORTANT!!!  Do Not Eat, Drink, or Smoke after 12 midnight! NO WATER after midnight! OK to brush teeth, no gum, candy or mints!        *Take only these medicines with a small swallow of water-morning of surgery.    Verapamil         ____  Do Not wear makeup, mascara nail polish or artificial nails  ____  NO powder, lotions, deodorants or creams to surgical area.  ____  Please remove all jewelry, including piercings and leave at home.  SURGERY WILL BE CANCELLED IF PIERCINGS ARE PRESENT!!!  ____  Dentures, Hearing Aids and Contact Lens will need to be removed prior to the start of surgery.  ____  Please bring photo ID and insurance information to hospital (Leave Valuables at Home)  ____  If going home the same day, arrange for a ride home. You will not be able to            drive if Anesthesia was used.    ____  Wear clean, loose fitting clothing. Allow for dressings, bandages.  ____  Stop Aspirin, Ibuprofen, Motrin and Aleve at least 5-7 days before surgery, unless otherwise instructed by your doctor, or the nurse. You MAY use Tylenol/acetaminophen until day of               surgery.  ____  If you take diabetic medication, do NOT take morning of surgery unless instructed by            Doctor. Metformin to be stopped 24 hrs prior to surgery time.   ____ Stop taking any Fish Oil supplements or Vitamins at least 5 days prior to surgery, unless instructed otherwise by your Doctor.          Bathing Instructions: The night before surgery and the morning prior to coming to the hospital:              -Do not shave your face.  -Do not shave pubic hair 7 days prior to surgery (gyn pt's).  -Do not shave legs/underarms 3 days prior to surgery.              -Shower & Rinse your body as usual with anti-bacterial Soap (Dial, Lever 2000, or Hibiclens)              -Do not use hibiclens on your head, face, or genitals.              -Do not wash with anti-bacterial soap after you use the hibiclens.              -Rinse your body thoroughly.       Pediatric patients do not need to use anti-bacterial soap or Hibiclens.            [facility] Visitor/Ride Policy:  Only 1 adult allowed (over the age of 18) to accompany you into Pre-op/Recovery Surgery Dept and must stay through the entire length of admission.    Must have a ride home from a responsible adult that you know and trust.   Pediatric Patients are allowed 2 adult visitors.    Medical Transport, Uber or Lyft can only be used if patient has a responsible adult to accompany them during ride home.     Post-Op Instructions: You will receive surgery post-op instructions by your Discharge Nurse prior to going home.     Surgical Site Infection:     Prevention of surgical site infections:                 -Keep incisions clean and dry.              -Do not soak/submerge incisions in water until completely healed.              -Do not apply lotions, powders, creams, or deodorants to site.              -Always make sure hands are cleaned with antibacterial soap/ alcohol-based   prior to touching the surgical site.       Signs and symptoms:              -Redness and pain around the area where you had surgery              -Drainage of cloudy fluid from your surgical wound              -Fever over 100.4 or chills  >>>Call Surgeon office/on-call Surgeon if you experience any of these signs & symptoms post-surgery.        *Please Call [facility] Pre-Admissions Department  with surgery instruction questions at [phone].     *Insurance Questions, please call [phone].    Pre admit office to call afternoon prior to surgery with final arrival time.

## 2022-06-27 NOTE — TELEPHONE ENCOUNTER
Attempted to contact patient to disclose genetic testing results. No answer, left VM. Requested to return call.

## 2022-06-27 NOTE — TELEPHONE ENCOUNTER
Contacted patient to disclose negative genetic testing results with patient. Explained that a negative result means that the 47 genes tested are working properly. Unfortunately, there is no hereditary explanation for her personal and family history of cancer. She may still be at risk for cancer in the future based on family history and/or other factors. Therefore, she should continue with the plan for her breast cancer, as well as, regular cancer screenings as recommended by her physicians.     Her relatives are not recommended to have genetic testing based on her negative results but are still recommended to continue regular cancer screenings. Typically breast cancer screenings begin at age 40. However, based on the patient's age of diagnosis, it is recommended for her relatives to begin breast cancer screenings 10 years prior to the earliest age of diagnosis in the family (age 35 for patient's family). Results were released to patient in [facility] portal.     Patient expressed understanding. Encouraged to contact genetic counselor if she has any questions.

## 2022-06-28 ENCOUNTER — TELEPHONE (OUTPATIENT)
Dept: HEMATOLOGY/ONCOLOGY | Facility: CLINIC | Age: 47
End: 2022-06-28
Payer: COMMERCIAL

## 2022-06-28 ENCOUNTER — ANESTHESIA EVENT (OUTPATIENT)
Dept: SURGERY | Facility: HOSPITAL | Age: 47
End: 2022-06-28
Payer: COMMERCIAL

## 2022-06-28 NOTE — H&P (VIEW-ONLY)
History & Physical    SUBJECTIVE:     History of Present Illness:  Patient is a 46 y.o. female referred for left breast invasive ductal carcinoma ER-/NC- Her2-. She denies any breast mass, prior breast surgery.  Menarche age 12,  1st at 16, prior hysterectomy, history of ovarian cancer in her mother    Chief Complaint   Patient presents with    Consult     Multi D and Mediport placement       Review of patient's allergies indicates:   Allergen Reactions    Sumatriptan Other (See Comments)     Chest tightness that moved into her throat       Current Outpatient Medications   Medication Sig Dispense Refill    verapamiL (CALAN) 120 MG tablet TAKE 1 TABLET(120 MG) BY MOUTH EVERY DAY 90 tablet 3     No current facility-administered medications for this visit.       Past Medical History:   Diagnosis Date    Abnormal Pap smear     Anemia     Hypertension     Hypokalemia     in pregnancy    Hypothyroidism (acquired) 2018    Malignant neoplasm of upper-outer quadrant of left breast in female, estrogen receptor negative 2022     Past Surgical History:   Procedure Laterality Date    CHOLECYSTECTOMY      COLONOSCOPY N/A 2022    Procedure: COLONOSCOPY;  Surgeon: Rosaline Meyer MD;  Location: [facility];  Service: Endoscopy;  Laterality: N/A;    COSMETIC SURGERY      tummy tuck    DIAGNOSTIC LAPAROSCOPY N/A 2019    Procedure: LAPAROSCOPY, DIAGNOSTIC;  Surgeon: Pia Aguila MD;  Location: [facility];  Service: OB/GYN;  Laterality: N/A;    FULGURATION OF ENDOMETRIOSIS N/A 2019    Procedure: FULGURATION, ENDOMETRIOSIS;  Surgeon: Pia Aguila MD;  Location: [facility] OR;  Service: OB/GYN;  Laterality: N/A;    HYSTERECTOMY      HYSTEROSCOPY WITH HYDROTHERMAL ABLATION OF ENDOMETRIUM WITH DILATION AND CURETTAGE N/A 2019    Procedure: HYSTEROSCOPY, WITH DILATION AND CURETTAGE OF UTERUS AND HYDROTHERMAL ENDOMETRIAL ABLATION;  Surgeon: Pia Aguila MD;   Location: [facility] OR;  Service: OB/GYN;  Laterality: N/A;    LAPAROSCOPIC DRAINAGE OF CYST OF OVARY Left 2/26/2019    Procedure: DRAINAGE, CYST, OVARY, LAPAROSCOPIC;  Surgeon: Pia Aguila MD;  Location: [facility] OR;  Service: OB/GYN;  Laterality: Left;    ROBOT-ASSISTED LAPAROSCOPIC ABDOMINAL HYSTERECTOMY USING DA BEAN XI N/A 11/5/2019    Procedure: XI ROBOTIC HYSTERECTOMY;  Surgeon: Pia Aguila MD;  Location: [facility] OR;  Service: OB/GYN;  Laterality: N/A;    ROBOT-ASSISTED LAPAROSCOPIC SALPINGO-OOPHORECTOMY USING DA BEAN XI Bilateral 11/5/2019    Procedure: XI ROBOTIC SALPINGO-OOPHORECTOMY;  Surgeon: Pia Aguila MD;  Location: [facility] OR;  Service: OB/GYN;  Laterality: Bilateral;    TUBAL LIGATION      Tummy Tuck       Family History   Problem Relation Age of Onset    Cancer Mother         origin? ovarian? metastasized    Cancer Father         throat?    No Known Problems Sister     Heart disease Maternal Grandmother     No Known Problems Maternal Grandfather     No Known Problems Daughter     No Known Problems Daughter     No Known Problems Son     No Known Problems Son     No Known Problems Son     No Known Problems Son     No Known Problems Brother     No Known Problems Other     No Known Problems Other     No Known Problems Other     No Known Problems Other     No Known Problems Other     No Known Problems Maternal Aunt     No Known Problems Sister      Social History     Tobacco Use    Smoking status: Never Smoker    Smokeless tobacco: Never Used   Substance Use Topics    Alcohol use: Yes     Alcohol/week: 0.0 standard drinks     Comment: socially;  No alcohol 72h prior to sx    Drug use: No        Review of Systems:  Review of Systems   Constitutional: Negative for activity change, appetite change, chills, diaphoresis, fatigue, fever and unexpected weight change.   HENT: Negative for congestion, dental problem, hearing loss, rhinorrhea, sore throat and  trouble swallowing.    Eyes: Negative for discharge and visual disturbance.   Respiratory: Negative for cough, chest tightness, shortness of breath and wheezing.    Cardiovascular: Negative for chest pain, palpitations and leg swelling.   Gastrointestinal: Negative for abdominal distention, abdominal pain, blood in stool, constipation, diarrhea, nausea and vomiting.   Endocrine: Negative for cold intolerance, heat intolerance, polydipsia, polyphagia and polyuria.   Genitourinary: Negative for difficulty urinating, dysuria, frequency, hematuria, menstrual problem and urgency.   Musculoskeletal: Negative for arthralgias, gait problem, joint swelling, myalgias and neck pain.   Skin: Negative for color change, pallor, rash and wound.   Neurological: Negative for dizziness, syncope, weakness, light-headedness, numbness and headaches.   Hematological: Negative for adenopathy. Does not bruise/bleed easily.   Psychiatric/Behavioral: Negative for confusion, decreased concentration, dysphoric mood and sleep disturbance. The patient is not nervous/anxious.        OBJECTIVE:     Vital Signs (Most Recent)  Temp: 97.1 °F (36.2 °C) (06/27/22 0900)  Pulse: 67 (06/27/22 0900)  BP: 129/82 (06/27/22 0900)     79.9 kg (176 lb 2.4 oz)     Physical Exam:  Physical Exam  Vitals reviewed.   Constitutional:       General: She is not in acute distress.     Appearance: She is well-developed. She is not diaphoretic.   HENT:      Head: Normocephalic and atraumatic.      Right Ear: External ear normal.      Left Ear: External ear normal.   Eyes:      General: No scleral icterus.     Conjunctiva/sclera: Conjunctivae normal.      Pupils: Pupils are equal, round, and reactive to light.   Neck:      Thyroid: No thyromegaly.      Trachea: No tracheal deviation.   Cardiovascular:      Rate and Rhythm: Normal rate and regular rhythm.      Heart sounds: Normal heart sounds. No murmur heard.    No friction rub. No gallop.   Pulmonary:      Effort:  Pulmonary effort is normal. No respiratory distress.      Breath sounds: Normal breath sounds. No wheezing or rales.   Chest:      Chest wall: No tenderness.   Breasts:      Right: No inverted nipple, mass, nipple discharge, skin change or tenderness.      Left: No inverted nipple, mass, nipple discharge, skin change or tenderness.       Abdominal:      General: Bowel sounds are normal. There is no distension.      Palpations: Abdomen is soft.      Tenderness: There is no abdominal tenderness.      Hernia: No hernia is present.   Musculoskeletal:         General: No tenderness or deformity. Normal range of motion.      Cervical back: Normal range of motion and neck supple.   Lymphadenopathy:      Cervical: No cervical adenopathy.   Skin:     General: Skin is warm and dry.      Coloration: Skin is not pale.      Findings: No erythema or rash.   Neurological:      Mental Status: She is alert and oriented to person, place, and time.   Psychiatric:         Behavior: Behavior normal.         Thought Content: Thought content normal.         Judgment: Judgment normal.            Diagnostic Results:  Result:   Mammo Digital Diagnostic Bilat with Pablito  US Breast Bilateral Limited     History:  Patient is 46 y.o. and is seen for diagnostic imaging.     Films Compared:  Prior images (if available) were compared.     Findings:  This procedure was performed using tomosynthesis. Computer-aided detection was utilized in the interpretation of this examination.  The breasts have scattered areas of fibroglandular density.      Right breast: No detrimental mammographic change. Well-circumscribed lesion within the lower outer posterior breast is unchanged mammographically. 8.6 mm complicated cyst present at 08:00 o'clock, 12 cm from the nipple and corresponds to the stable mammographic finding.       Follow-up imaging of the previously noted central lesions demonstrate no detrimental change.  8 mm complicated cyst noted at 04:00  o'clock, 4 cm from the nipple.         Left breast: Interval increase in size of an upper outer quadrant mass on mammogram. On ultrasound there is an enlarging complicated cystic lesion measuring 1.3 cm at 02:00 o'clock, 7 cm from the nipple. Other complicated cysts within the upper outer breast demonstrate no detrimental change.  No suspicious left axillary lymph nodes.        Impression:  Suspicious left breast lesion, 1.3 cm 2:00, warranting ultrasound guided biopsy.      Follow-up recommended of the complicated cyst within the right breast as well as within the upper outer left breast in 6 months.      BI-RADS Category:   Overall: 4 - Suspicious     Recommendation:  Ultrasound Biopsy is recommended.        Your estimated lifetime risk of breast cancer (to age 85) based on Tyrer-Cuzick risk assessment model is Tyrer-Cuzick: 5.9 %. According to the American Cancer Society, patients with a lifetime breast cancer risk of 20% or higher might benefit from supplemental screening tests.    PET CT:  Impression:  FDG avid left breast lesion consistent with the known malignancy.  No other sites of suspicious uptake identified.    Final Pathologic Diagnosis 1. Left breast mass (2 o'clock position), biopsy:       -  High-grade invasive carcinoma of no special type (ductal) with   associated tumor necrosis,          see comment     SYNOPTIC REPORT   PROCEDURE:  Biopsy   SPECIMEN LATERALITY:  Left breast   TUMOR SITE:  2 o'clock position   HISTOLOGIC TYPE:  Invasive carcinoma of no special type (ductal)   HISTOLOGIC GRADE:  Grade 3 of 3          TUBULE FORMATION:  3          PLEOMORPHISM:  3          MITOTIC RATE:  3   TUMOR SIZE:   At least 10 mm in greatest linear dimension   DUCTAL CARCINOMA IN SITU (DCIS):  Not identified   LYMPHOVASCULAR INVASION:  Not identified   MICROCALCIFICATIONS:  Not identified   BREAST BIOMARKERS (PERFORMED WITH APPROPRIATE CONTROLS):           ER:   Negative (0)           AR:   Negative (0)            HER2:  Negative (1+)           KI67:  Greater than 95%          ASSESSMENT/PLAN:     46-year-old female with left breast invasive ductal carcinoma ER-/SD- Her2-    PLAN:Plan     Port placement 06/01/2022  Preop: CBC, BMP; EKG  Risks and benefits discussed with patient including: Pain, bleeding, infection, injury to vessels, pneumothorax, need for further procedure    Discuss other complex cyst with Radiology for potential biopsy    Following neoadjuvant chemotherapy Patient is interested in a left lumpectomy with SLNbx possible ALND should her genetic testing come back negative; discussed alternative surgical options including mastectomy with and without reconstruction; potential need for further treatment including adjuvant XRT, hormone therapy  Risks and benefits discussed with patient including but not limited to: pain, bleeding, infection, positive margins, injury to underlying nerves or vessels, lymphedema,parathesias, need for further surgery  45 minutes of total time spent on the encounter, which includes face to face time and non-face to face time preparing to see the patient (eg, review of tests), Obtaining and/or reviewing separately obtained history, Documenting clinical information in the electronic or other health record, Independently interpreting results (not separately reported) and communicating results to the patient/family/caregiver, or Care coordination (not separately reported).

## 2022-06-28 NOTE — PROGRESS NOTES
History & Physical    SUBJECTIVE:     History of Present Illness:  Patient is a 46 y.o. female referred for left breast invasive ductal carcinoma ER-/MA- Her2-. She denies any breast mass, prior breast surgery.  Menarche age 12,  1st at 16, prior hysterectomy, history of ovarian cancer in her mother    Chief Complaint   Patient presents with    Consult     Multi D and Mediport placement       Review of patient's allergies indicates:   Allergen Reactions    Sumatriptan Other (See Comments)     Chest tightness that moved into her throat       Current Outpatient Medications   Medication Sig Dispense Refill    verapamiL (CALAN) 120 MG tablet TAKE 1 TABLET(120 MG) BY MOUTH EVERY DAY 90 tablet 3     No current facility-administered medications for this visit.       Past Medical History:   Diagnosis Date    Abnormal Pap smear     Anemia     Hypertension     Hypokalemia     in pregnancy    Hypothyroidism (acquired) 2018    Malignant neoplasm of upper-outer quadrant of left breast in female, estrogen receptor negative 2022     Past Surgical History:   Procedure Laterality Date    CHOLECYSTECTOMY      COLONOSCOPY N/A 2022    Procedure: COLONOSCOPY;  Surgeon: Rosaline Meyer MD;  Location: [facility];  Service: Endoscopy;  Laterality: N/A;    COSMETIC SURGERY      tummy tuck    DIAGNOSTIC LAPAROSCOPY N/A 2019    Procedure: LAPAROSCOPY, DIAGNOSTIC;  Surgeon: Pia Aguila MD;  Location: [facility];  Service: OB/GYN;  Laterality: N/A;    FULGURATION OF ENDOMETRIOSIS N/A 2019    Procedure: FULGURATION, ENDOMETRIOSIS;  Surgeon: Pia Aguila MD;  Location: [facility] OR;  Service: OB/GYN;  Laterality: N/A;    HYSTERECTOMY      HYSTEROSCOPY WITH HYDROTHERMAL ABLATION OF ENDOMETRIUM WITH DILATION AND CURETTAGE N/A 2019    Procedure: HYSTEROSCOPY, WITH DILATION AND CURETTAGE OF UTERUS AND HYDROTHERMAL ENDOMETRIAL ABLATION;  Surgeon: Pia Aguila MD;   Location: [facility] OR;  Service: OB/GYN;  Laterality: N/A;    LAPAROSCOPIC DRAINAGE OF CYST OF OVARY Left 2/26/2019    Procedure: DRAINAGE, CYST, OVARY, LAPAROSCOPIC;  Surgeon: Pia Aguila MD;  Location: [facility] OR;  Service: OB/GYN;  Laterality: Left;    ROBOT-ASSISTED LAPAROSCOPIC ABDOMINAL HYSTERECTOMY USING DA BEAN XI N/A 11/5/2019    Procedure: XI ROBOTIC HYSTERECTOMY;  Surgeon: Pia Aguila MD;  Location: [facility] OR;  Service: OB/GYN;  Laterality: N/A;    ROBOT-ASSISTED LAPAROSCOPIC SALPINGO-OOPHORECTOMY USING DA BEAN XI Bilateral 11/5/2019    Procedure: XI ROBOTIC SALPINGO-OOPHORECTOMY;  Surgeon: Pia Aguila MD;  Location: [facility] OR;  Service: OB/GYN;  Laterality: Bilateral;    TUBAL LIGATION      Tummy Tuck       Family History   Problem Relation Age of Onset    Cancer Mother         origin? ovarian? metastasized    Cancer Father         throat?    No Known Problems Sister     Heart disease Maternal Grandmother     No Known Problems Maternal Grandfather     No Known Problems Daughter     No Known Problems Daughter     No Known Problems Son     No Known Problems Son     No Known Problems Son     No Known Problems Son     No Known Problems Brother     No Known Problems Other     No Known Problems Other     No Known Problems Other     No Known Problems Other     No Known Problems Other     No Known Problems Maternal Aunt     No Known Problems Sister      Social History     Tobacco Use    Smoking status: Never Smoker    Smokeless tobacco: Never Used   Substance Use Topics    Alcohol use: Yes     Alcohol/week: 0.0 standard drinks     Comment: socially;  No alcohol 72h prior to sx    Drug use: No        Review of Systems:  Review of Systems   Constitutional: Negative for activity change, appetite change, chills, diaphoresis, fatigue, fever and unexpected weight change.   HENT: Negative for congestion, dental problem, hearing loss, rhinorrhea, sore throat and  trouble swallowing.    Eyes: Negative for discharge and visual disturbance.   Respiratory: Negative for cough, chest tightness, shortness of breath and wheezing.    Cardiovascular: Negative for chest pain, palpitations and leg swelling.   Gastrointestinal: Negative for abdominal distention, abdominal pain, blood in stool, constipation, diarrhea, nausea and vomiting.   Endocrine: Negative for cold intolerance, heat intolerance, polydipsia, polyphagia and polyuria.   Genitourinary: Negative for difficulty urinating, dysuria, frequency, hematuria, menstrual problem and urgency.   Musculoskeletal: Negative for arthralgias, gait problem, joint swelling, myalgias and neck pain.   Skin: Negative for color change, pallor, rash and wound.   Neurological: Negative for dizziness, syncope, weakness, light-headedness, numbness and headaches.   Hematological: Negative for adenopathy. Does not bruise/bleed easily.   Psychiatric/Behavioral: Negative for confusion, decreased concentration, dysphoric mood and sleep disturbance. The patient is not nervous/anxious.        OBJECTIVE:     Vital Signs (Most Recent)  Temp: 97.1 °F (36.2 °C) (06/27/22 0900)  Pulse: 67 (06/27/22 0900)  BP: 129/82 (06/27/22 0900)     79.9 kg (176 lb 2.4 oz)     Physical Exam:  Physical Exam  Vitals reviewed.   Constitutional:       General: She is not in acute distress.     Appearance: She is well-developed. She is not diaphoretic.   HENT:      Head: Normocephalic and atraumatic.      Right Ear: External ear normal.      Left Ear: External ear normal.   Eyes:      General: No scleral icterus.     Conjunctiva/sclera: Conjunctivae normal.      Pupils: Pupils are equal, round, and reactive to light.   Neck:      Thyroid: No thyromegaly.      Trachea: No tracheal deviation.   Cardiovascular:      Rate and Rhythm: Normal rate and regular rhythm.      Heart sounds: Normal heart sounds. No murmur heard.    No friction rub. No gallop.   Pulmonary:      Effort:  Pulmonary effort is normal. No respiratory distress.      Breath sounds: Normal breath sounds. No wheezing or rales.   Chest:      Chest wall: No tenderness.   Breasts:      Right: No inverted nipple, mass, nipple discharge, skin change or tenderness.      Left: No inverted nipple, mass, nipple discharge, skin change or tenderness.       Abdominal:      General: Bowel sounds are normal. There is no distension.      Palpations: Abdomen is soft.      Tenderness: There is no abdominal tenderness.      Hernia: No hernia is present.   Musculoskeletal:         General: No tenderness or deformity. Normal range of motion.      Cervical back: Normal range of motion and neck supple.   Lymphadenopathy:      Cervical: No cervical adenopathy.   Skin:     General: Skin is warm and dry.      Coloration: Skin is not pale.      Findings: No erythema or rash.   Neurological:      Mental Status: She is alert and oriented to person, place, and time.   Psychiatric:         Behavior: Behavior normal.         Thought Content: Thought content normal.         Judgment: Judgment normal.            Diagnostic Results:  Result:   Mammo Digital Diagnostic Bilat with Pablito  US Breast Bilateral Limited     History:  Patient is 46 y.o. and is seen for diagnostic imaging.     Films Compared:  Prior images (if available) were compared.     Findings:  This procedure was performed using tomosynthesis. Computer-aided detection was utilized in the interpretation of this examination.  The breasts have scattered areas of fibroglandular density.      Right breast: No detrimental mammographic change. Well-circumscribed lesion within the lower outer posterior breast is unchanged mammographically. 8.6 mm complicated cyst present at 08:00 o'clock, 12 cm from the nipple and corresponds to the stable mammographic finding.       Follow-up imaging of the previously noted central lesions demonstrate no detrimental change.  8 mm complicated cyst noted at 04:00  o'clock, 4 cm from the nipple.         Left breast: Interval increase in size of an upper outer quadrant mass on mammogram. On ultrasound there is an enlarging complicated cystic lesion measuring 1.3 cm at 02:00 o'clock, 7 cm from the nipple. Other complicated cysts within the upper outer breast demonstrate no detrimental change.  No suspicious left axillary lymph nodes.        Impression:  Suspicious left breast lesion, 1.3 cm 2:00, warranting ultrasound guided biopsy.      Follow-up recommended of the complicated cyst within the right breast as well as within the upper outer left breast in 6 months.      BI-RADS Category:   Overall: 4 - Suspicious     Recommendation:  Ultrasound Biopsy is recommended.        Your estimated lifetime risk of breast cancer (to age 85) based on Tyrer-Cuzick risk assessment model is Tyrer-Cuzick: 5.9 %. According to the American Cancer Society, patients with a lifetime breast cancer risk of 20% or higher might benefit from supplemental screening tests.    PET CT:  Impression:  FDG avid left breast lesion consistent with the known malignancy.  No other sites of suspicious uptake identified.    Final Pathologic Diagnosis 1. Left breast mass (2 o'clock position), biopsy:       -  High-grade invasive carcinoma of no special type (ductal) with   associated tumor necrosis,          see comment     SYNOPTIC REPORT   PROCEDURE:  Biopsy   SPECIMEN LATERALITY:  Left breast   TUMOR SITE:  2 o'clock position   HISTOLOGIC TYPE:  Invasive carcinoma of no special type (ductal)   HISTOLOGIC GRADE:  Grade 3 of 3          TUBULE FORMATION:  3          PLEOMORPHISM:  3          MITOTIC RATE:  3   TUMOR SIZE:   At least 10 mm in greatest linear dimension   DUCTAL CARCINOMA IN SITU (DCIS):  Not identified   LYMPHOVASCULAR INVASION:  Not identified   MICROCALCIFICATIONS:  Not identified   BREAST BIOMARKERS (PERFORMED WITH APPROPRIATE CONTROLS):           ER:   Negative (0)           NV:   Negative (0)            HER2:  Negative (1+)           KI67:  Greater than 95%          ASSESSMENT/PLAN:     46-year-old female with left breast invasive ductal carcinoma ER-/MD- Her2-    PLAN:Plan     Port placement 06/01/2022  Preop: CBC, BMP; EKG  Risks and benefits discussed with patient including: Pain, bleeding, infection, injury to vessels, pneumothorax, need for further procedure    Discuss other complex cyst with Radiology for potential biopsy    Following neoadjuvant chemotherapy Patient is interested in a left lumpectomy with SLNbx possible ALND should her genetic testing come back negative; discussed alternative surgical options including mastectomy with and without reconstruction; potential need for further treatment including adjuvant XRT, hormone therapy  Risks and benefits discussed with patient including but not limited to: pain, bleeding, infection, positive margins, injury to underlying nerves or vessels, lymphedema,parathesias, need for further surgery  45 minutes of total time spent on the encounter, which includes face to face time and non-face to face time preparing to see the patient (eg, review of tests), Obtaining and/or reviewing separately obtained history, Documenting clinical information in the electronic or other health record, Independently interpreting results (not separately reported) and communicating results to the patient/family/caregiver, or Care coordination (not separately reported).

## 2022-06-28 NOTE — NURSING
1004am: Outgoing call to pt regarding NP chemo set-up. Spoke with pt. Introduced myself again and gave pt my direct contact info. Pt scheduled for chemo teaching on 6/30 at 1 pm at [place], ECHO on 7/1 at 930 am at [place], and for mediport placement on 7/1 at 12 pm at [facility]. Pt informed that as soon as chemo auth approved will call pt to get her scheduled for chemo. Pt inquired about at home prescriptions that Dr. Hathaway had mentioned during their visit. Msg sent to Dr. Hathaway regarding prescriptions for dex, zofran, and zyprexa to be sent to pt preferred pharmacy. Msg sent to pre-service regarding pt chemo auth status bc pt received approval info. Pt verbalized understanding. Pt plan to report to appts as scheduled.   Oncology Navigation   Intake  Date of Diagnosis: 6/9/2022  Cancer Type: Breast  Internal / External Referral: Internal  Referral Source: Chitra Gloria NP  Date of Referral: 6/14/2022  Initial Nurse Navigator Contact: 6/15/2022  Referral to Initial Contact Timeline (days): 1  Date Worked: 6/28/2022  Appointment Date: 6/22/2022  Schedule to Appointment Timeline (days): 7  Multiple appointments: Yes     Treatment  Current Status: Active  Date Presented to Tumor Board: 6/20/2022    Surgical Oncologist: Dr. Beau Quiles  Consult Date: 6/27/2022    Medical Oncologist: Dr. Maris Hathaway  Consult Date: 6/22/2022  Chemotherapy: Planned  Chemotherapy Regimen: Carboplatin Taxol Adriamycin Cytoxan  Immunotherapy: Planned  Immunotherapy Name: Keytruda    Radiation Oncologist: Dr. Zack Lee    Procedures: Port / PICC; Echo  Biopsy Schedule Date: 6/9/2022  Echo Schedule Date: 7/1/2022  PET Scan Schedule Date: 6/20/2022  Port / PICC Schedule Date: 7/1/2022       ER: Negative  ID: Negative  Her2: Negative    Radiation Oncologist: Dr. Zack Lee        Acuity  Stage: 1  Systemic Treatment - predicted or initiated: Chemotherapy regimen with multiple drugs (+1)  Treatment Tolerability: Has not started treatment  yet/treatment fully completed and side effects resolved  Comorbidities in Medical History: 2  Hospitalization Within the Past Month: 0   Needed: 0  Transportation: 0  History of noncompliance/frequent no shows and cancellations: 0  Verbalizes the need for more education: 1  Navigation Acuity: 6     Follow Up  No follow-ups on file.

## 2022-06-28 NOTE — ANESTHESIA PREPROCEDURE EVALUATION
06/28/2022  Florecita Noel is a 46 y.o., female.  Past Medical History:   Diagnosis Date    Abnormal Pap smear 1996    Anemia     Hypertension     Hypokalemia     in pregnancy    Hypothyroidism (acquired) 12/28/2018    Malignant neoplasm of upper-outer quadrant of left breast in female, estrogen receptor negative 6/22/2022     Past Surgical History:   Procedure Laterality Date    CHOLECYSTECTOMY      COLONOSCOPY N/A 6/8/2022    Procedure: COLONOSCOPY;  Surgeon: Rosaline Meyer MD;  Location: [facility];  Service: Endoscopy;  Laterality: N/A;    [facility]    DIAGNOSTIC LAPAROSCOPY N/A 2/26/2019    Procedure: LAPAROSCOPY, DIAGNOSTIC;  Surgeon: Pia Aguila MD;  Location: [facility];  Service: OB/GYN;  Laterality: N/A;    FULGURATION OF ENDOMETRIOSIS N/A 2/26/2019    Procedure: FULGURATION, ENDOMETRIOSIS;  Surgeon: Pia Aguila MD;  Location: [facility] OR;  Service: OB/GYN;  Laterality: N/A;    HYSTERECTOMY      HYSTEROSCOPY WITH HYDROTHERMAL ABLATION OF ENDOMETRIUM WITH DILATION AND CURETTAGE N/A 2/26/2019    Procedure: HYSTEROSCOPY, WITH DILATION AND CURETTAGE OF UTERUS AND HYDROTHERMAL ENDOMETRIAL ABLATION;  Surgeon: Pia Aguila MD;  Location: [facility];  Service: OB/GYN;  Laterality: N/A;    LAPAROSCOPIC DRAINAGE OF CYST OF OVARY Left 2/26/2019    Procedure: DRAINAGE, CYST, OVARY, LAPAROSCOPIC;  Surgeon: Pia Aguila MD;  Location: [facility] OR;  Service: OB/GYN;  Laterality: Left;    ROBOT-ASSISTED LAPAROSCOPIC ABDOMINAL HYSTERECTOMY USING DA BEAN XI N/A 11/5/2019    Procedure: XI ROBOTIC HYSTERECTOMY;  Surgeon: Pia Aguila MD;  Location: [facility];  Service: OB/GYN;  Laterality: N/A;    ROBOT-ASSISTED LAPAROSCOPIC SALPINGO-OOPHORECTOMY USING DA BEAN XI Bilateral 11/5/2019    Procedure: XI ROBOTIC SALPINGO-OOPHORECTOMY;   Surgeon: Pia Aguila MD;  Location: [facility];  Service: OB/GYN;  Laterality: Bilateral;    TUBAL LIGATION      Tummy Tuck         Sinus tachycardia   Voltage criteria for left ventricular hypertrophy   Cannot rule out Septal infarct ,age undetermined   T wave abnormality, consider inferior ischemia   Abnormal ECG     Pre-op Assessment    I have reviewed the Patient Summary Reports.     I have reviewed the Nursing Notes. I have reviewed the NPO Status.   I have reviewed the Medications.     Review of Systems  Anesthesia Hx:  No problems with previous Anesthesia  Denies Family Hx of Anesthesia complications.   Denies Personal Hx of Anesthesia complications.   Social:  Non-Smoker, No Alcohol Use    Hematology/Oncology:         -- Anemia: Hematology Comments: hypokalemia Current/Recent Cancer. Breast   EENT/Dental:EENT/Dental Normal   Cardiovascular:   Exercise tolerance: good Hypertension    Pulmonary:  Pulmonary Normal    Renal/:  Renal/ Normal     Hepatic/GI:   Bowel Prep.    Musculoskeletal:  Musculoskeletal Normal    Neurological:   Headaches    Endocrine:   Hypothyroidism  Obesity / BMI > 30  Dermatological:  Skin Normal    Psych:  Psychiatric Normal           Physical Exam  General: Well nourished, Cooperative, Alert and Oriented    Airway:  Mallampati: II / II  Mouth Opening: Normal  TM Distance: Normal  Tongue: Normal  Neck ROM: Normal ROM    Dental:  Intact    Chest/Lungs:  Normal Respiratory Rate, Clear to auscultation    Heart:  Rate: Normal  Rhythm: Regular Rhythm  Sounds: Normal        Anesthesia Plan  Type of Anesthesia, risks & benefits discussed:    Anesthesia Type: MAC  Intra-op Monitoring Plan: Standard ASA Monitors  Post Op Pain Control Plan: multimodal analgesia  Induction:  IV  Informed Consent: Informed consent signed with the Patient and all parties understand the risks and agree with anesthesia plan.  All questions answered.   ASA Score: 2  Day of Surgery Review of History &  Physical: H&P Update referred to the surgeon/provider.    Ready For Surgery From Anesthesia Perspective.     .

## 2022-06-29 ENCOUNTER — DOCUMENTATION ONLY (OUTPATIENT)
Dept: HEMATOLOGY/ONCOLOGY | Facility: CLINIC | Age: 47
End: 2022-06-29
Payer: COMMERCIAL

## 2022-06-29 ENCOUNTER — TELEPHONE (OUTPATIENT)
Dept: FAMILY MEDICINE | Facility: CLINIC | Age: 47
End: 2022-06-29
Payer: COMMERCIAL

## 2022-06-29 RX ORDER — ONDANSETRON 8 MG/1
8 TABLET, ORALLY DISINTEGRATING ORAL EVERY 8 HOURS PRN
Qty: 60 TABLET | Refills: 5 | Status: SHIPPED | OUTPATIENT
Start: 2022-07-05 | End: 2022-06-30 | Stop reason: SDUPTHER

## 2022-06-29 RX ORDER — BUTALBITAL, ACETAMINOPHEN AND CAFFEINE 50; 325; 40 MG/1; MG/1; MG/1
1 TABLET ORAL EVERY 6 HOURS PRN
Qty: 30 TABLET | Refills: 0 | Status: SHIPPED | OUTPATIENT
Start: 2022-06-29 | End: 2022-07-29

## 2022-06-29 RX ORDER — DEXAMETHASONE 4 MG/1
8 TABLET ORAL DAILY
Qty: 24 TABLET | Refills: 2 | Status: SHIPPED | OUTPATIENT
Start: 2022-07-06 | End: 2022-06-30 | Stop reason: SDUPTHER

## 2022-06-29 RX ORDER — OLANZAPINE 5 MG/1
5 TABLET ORAL NIGHTLY
Qty: 30 TABLET | Refills: 1 | Status: SHIPPED | OUTPATIENT
Start: 2022-07-05 | End: 2022-06-30 | Stop reason: SDUPTHER

## 2022-06-29 NOTE — TELEPHONE ENCOUNTER
Per pt Advice     Yes kurtis Soto ok, just the same headaches that I have occasionally. I try to refrain from taking goodys headache powder.

## 2022-06-29 NOTE — NURSING
1410pm: Pt [facility] genetic test results have reported. Pt results have been scanned into media and updated in Oncology history. Dr. Hathaway and Dr. Quiles notified via in-basket msg.   Oncology Navigation   Intake  Date of Diagnosis: 6/9/2022  Cancer Type: Breast  Internal / External Referral: Internal  Referral Source: Chitra Gloria NP  Date of Referral: 6/14/2022  Initial Nurse Navigator Contact: 6/15/2022  Referral to Initial Contact Timeline (days): 1  Date Worked: 6/29/2022  Appointment Date: 6/22/2022  Schedule to Appointment Timeline (days): 7  Multiple appointments: Yes     Treatment  Current Status: Active  Date Presented to Tumor Board: 6/20/2022    Surgical Oncologist: Dr. Beau Quiles  Consult Date: 6/27/2022    Medical Oncologist: Dr. Maris Hathaway  Consult Date: 6/22/2022  Chemotherapy: Planned  Chemotherapy Regimen: Carboplatin Taxol Adriamycin Cytoxan  Immunotherapy: Planned  Immunotherapy Name: Keytruda    Radiation Oncologist: Dr. Zack Lee    Procedures: Genetic test  Biopsy Schedule Date: 6/9/2022  Echo Schedule Date: 7/1/2022  Genetic Testing Date Sent: 6/17/2022  PET Scan Schedule Date: 6/20/2022  Port / PICC Schedule Date: 7/1/2022       ER: Negative  ME: Negative  Her2: Negative    Radiation Oncologist: Dr. Zack Lee        Acuity  Stage: 1  Systemic Treatment - predicted or initiated: Chemotherapy regimen with multiple drugs (+1)  Treatment Tolerability: Has not started treatment yet/treatment fully completed and side effects resolved  Comorbidities in Medical History: 2  Hospitalization Within the Past Month: 0   Needed: 0  Transportation: 0  History of noncompliance/frequent no shows and cancellations: 0  Verbalizes the need for more education: 1  Navigation Acuity: 6     Follow Up  No follow-ups on file.

## 2022-06-30 ENCOUNTER — TELEPHONE (OUTPATIENT)
Dept: PREADMISSION TESTING | Facility: HOSPITAL | Age: 47
End: 2022-06-30
Payer: COMMERCIAL

## 2022-06-30 ENCOUNTER — CLINICAL SUPPORT (OUTPATIENT)
Dept: HEMATOLOGY/ONCOLOGY | Facility: CLINIC | Age: 47
End: 2022-06-30
Payer: COMMERCIAL

## 2022-06-30 ENCOUNTER — TELEPHONE (OUTPATIENT)
Dept: HEMATOLOGY/ONCOLOGY | Facility: CLINIC | Age: 47
End: 2022-06-30
Payer: COMMERCIAL

## 2022-06-30 ENCOUNTER — OFFICE VISIT (OUTPATIENT)
Dept: CARDIOLOGY | Facility: CLINIC | Age: 47
End: 2022-06-30
Payer: COMMERCIAL

## 2022-06-30 VITALS
WEIGHT: 177.25 LBS | DIASTOLIC BLOOD PRESSURE: 80 MMHG | OXYGEN SATURATION: 100 % | HEART RATE: 62 BPM | SYSTOLIC BLOOD PRESSURE: 120 MMHG | BODY MASS INDEX: 30.42 KG/M2

## 2022-06-30 DIAGNOSIS — C50.412 MALIGNANT NEOPLASM OF UPPER-OUTER QUADRANT OF LEFT BREAST IN FEMALE, ESTROGEN RECEPTOR NEGATIVE: ICD-10-CM

## 2022-06-30 DIAGNOSIS — I10 ESSENTIAL HYPERTENSION: ICD-10-CM

## 2022-06-30 DIAGNOSIS — Z01.810 PREOP CARDIOVASCULAR EXAM: ICD-10-CM

## 2022-06-30 DIAGNOSIS — Z17.1 MALIGNANT NEOPLASM OF UPPER-OUTER QUADRANT OF LEFT BREAST IN FEMALE, ESTROGEN RECEPTOR NEGATIVE: ICD-10-CM

## 2022-06-30 DIAGNOSIS — R94.31 ABNORMAL EKG: ICD-10-CM

## 2022-06-30 DIAGNOSIS — E03.9 HYPOTHYROIDISM (ACQUIRED): ICD-10-CM

## 2022-06-30 DIAGNOSIS — E78.49 OTHER HYPERLIPIDEMIA: Primary | ICD-10-CM

## 2022-06-30 PROCEDURE — 99999 PR PBB SHADOW E&M-EST. PATIENT-LVL III: ICD-10-PCS | Mod: PBBFAC,,, | Performed by: INTERNAL MEDICINE

## 2022-06-30 PROCEDURE — 99204 OFFICE O/P NEW MOD 45 MIN: CPT | Mod: S$GLB,,, | Performed by: INTERNAL MEDICINE

## 2022-06-30 PROCEDURE — 99999 PR PBB SHADOW E&M-EST. PATIENT-LVL III: CPT | Mod: PBBFAC,,, | Performed by: INTERNAL MEDICINE

## 2022-06-30 PROCEDURE — 99204 PR OFFICE/OUTPT VISIT, NEW, LEVL IV, 45-59 MIN: ICD-10-PCS | Mod: S$GLB,,, | Performed by: INTERNAL MEDICINE

## 2022-06-30 RX ORDER — ONDANSETRON 8 MG/1
8 TABLET, ORALLY DISINTEGRATING ORAL EVERY 8 HOURS PRN
Qty: 60 TABLET | Refills: 5 | Status: SHIPPED | OUTPATIENT
Start: 2022-07-05 | End: 2023-10-06 | Stop reason: ALTCHOICE

## 2022-06-30 RX ORDER — DEXAMETHASONE 4 MG/1
8 TABLET ORAL DAILY
Qty: 24 TABLET | Refills: 2 | Status: SHIPPED | OUTPATIENT
Start: 2022-07-06 | End: 2023-10-06 | Stop reason: ALTCHOICE

## 2022-06-30 RX ORDER — OLANZAPINE 5 MG/1
5 TABLET ORAL NIGHTLY
Qty: 30 TABLET | Refills: 1 | Status: SHIPPED | OUTPATIENT
Start: 2022-07-05 | End: 2023-10-06 | Stop reason: ALTCHOICE

## 2022-06-30 NOTE — NURSING
1547pm: Incoming call from pt regarding missed calls and  from pt earlier. Spoke with pt. Pt informed that at home prescriptions were resent to pt preferred pharmacy and I called pharmacy to ensure they received the prescriptions. Discussed at home prescriptions in detail on when to take and chemo start date with pt. Pt scheduled for labs on 7/14 at 10 am at , for Dr. Hathaway on 7/14 at 11 am at , and for chemo on 7/15 at 9 am at . Pt encouraged to contact myself directly if have nay further questions and/or concerns. Pt verbalized understanding. Pt plan to report to appts as scheduled. SW and FC notified via msg of pt chemo start date.  Oncology Navigation   Intake  Date of Diagnosis: 6/9/2022  Cancer Type: Breast  Internal / External Referral: Internal  Referral Source: Chitra Gloria NP  Date of Referral: 6/14/2022  Initial Nurse Navigator Contact: 6/15/2022  Referral to Initial Contact Timeline (days): 1  Date Worked: 6/30/2022  Appointment Date: 6/22/2022  Schedule to Appointment Timeline (days): 7  Multiple appointments: Yes  Start of Treatment: 7/15/2022     Treatment  Current Status: Active  Date Presented to Tumor Board: 6/20/2022    Surgical Oncologist: Dr. Beau Quiles  Consult Date: 6/27/2022    Medical Oncologist: Dr. Maris Hathaway  Consult Date: 6/22/2022  Chemotherapy: Planned  Chemotherapy Regimen: Carboplatin Taxol Adriamycin Cytoxan  Immunotherapy: Planned  Immunotherapy Name: Keytruda    Radiation Oncologist: Dr. Zack Lee    Procedures: Genetic test  Biopsy Schedule Date: 6/9/2022  Echo Schedule Date: 7/1/2022  Genetic Testing Date Sent: 6/17/2022  PET Scan Schedule Date: 6/20/2022  Port / PICC Schedule Date: 7/1/2022    General Referrals: Social work  Social Work: notified via [employer]  Social Work Referral Date: 6/30/2022    ER: Negative  IL: Negative  Her2: Negative    Radiation Oncologist: Dr. Zack Lee    Support Systems: Spouse/significant other     Acuity  Stage:  1  Systemic Treatment - predicted or initiated: Chemotherapy regimen with multiple drugs (+1)  Treatment Tolerability: Has not started treatment yet/treatment fully completed and side effects resolved  Comorbidities in Medical History: 2  Hospitalization Within the Past Month: 0   Needed: 0  Transportation: 0  History of noncompliance/frequent no shows and cancellations: 0  Verbalizes the need for more education: 1  Navigation Acuity: 6     Follow Up  No follow-ups on file.

## 2022-06-30 NOTE — TELEPHONE ENCOUNTER
Called and spoke with the pt  about the following:    Your Surgery arrival time is 9:30 AM  on 7/1/2022 at [facility], pending Cardiology clearance.    The address is [id number].  [place]  [id number].     Only one adult (over 18) is to accompany you to surgery, unless it is a Pediatric patient, then 2 adults are encouraged to accompany them to the surgery center.    Your ride MUST STAY the entire time until you are discharged.      Please come in the main lobby (located on the 1st floor).     Be prepared to show your photo ID and insurance card.     Nothing to eat or drink after after midnight, unless you were instructed to take specific medications discussed with the Pre-admit Nurse.     Please call [phone] with any questions or concerns.     Thanks.

## 2022-06-30 NOTE — PROGRESS NOTES
Met with patient  in person____) to discuss upcoming systemic therapy initiation. Discussed patient diagnosis including staging information. Also discussed that therapy regimen prescribed involves the following drugs: _Taxol, Carboplatin, Keytruda, Adriamycin, and Cytoxan_, and timeline of therapy administration. Went over what to expect on first day of treatment, including what to bring with you, visitor policy, and infusion suite guidelines. Also discussed supportive and shared services available to patient, including social work, financial counseling, oncology nutrition, and oncology psychology.      Covered with patient potential side effects and symptom management. Reviewed supportive medications that will be given before, during, and after treatment and provided written instructions for all.  Also stressed that other side effects are possible outside of those listed. Spent additional time on signs of infection, infection prevention, and proper nutrition/hydration.    Education provided to patient via [facility] specific drug information sheets and chemotherapy education binder___). Also provided contact information for clinic and information related to [facility] communication. Discussed communication process for after-hours needs and some common scenarios in which patient should call provider for guidance vs. immediately report to the emergency room.     Finally, patient was given my contact information. Encouraged patient to call with any questions, concerns, or needs. Patient verbalized understanding of all above information.

## 2022-06-30 NOTE — PROGRESS NOTES
Subjective:   Patient ID:  Florecita Noel is a 46 y.o. female who presents for evaluation of No chief complaint on file.      HPI  45 yo female, never smoker, recent diagnosis of breast cancer, seen cards in the past more than 3 years ago with normal stress echo done due to abnormal ekg , however with normalization of ekg during stress test   Has chronic TWI possibly normal variant as after review of echo in 2018 , no noticeable LVH   No cardiac symptoms, see ros     Past Medical History:   Diagnosis Date    Abnormal Pap smear 1996    Anemia     Hypertension     Hypokalemia     in pregnancy    Hypothyroidism (acquired) 12/28/2018    Malignant neoplasm of upper-outer quadrant of left breast in female, estrogen receptor negative 6/22/2022       Past Surgical History:   Procedure Laterality Date    CHOLECYSTECTOMY      COLONOSCOPY N/A 6/8/2022    Procedure: COLONOSCOPY;  Surgeon: Rosaline Meyer MD;  Location: [facility];  Service: Endoscopy;  Laterality: N/A;    COSMETIC SURGERY      tummy tuck    DIAGNOSTIC LAPAROSCOPY N/A 2/26/2019    Procedure: LAPAROSCOPY, DIAGNOSTIC;  Surgeon: Pia Aguila MD;  Location: [facility];  Service: OB/GYN;  Laterality: N/A;    FULGURATION OF ENDOMETRIOSIS N/A 2/26/2019    Procedure: FULGURATION, ENDOMETRIOSIS;  Surgeon: Pia Aguila MD;  Location: [facility];  Service: OB/GYN;  Laterality: N/A;    HYSTERECTOMY      HYSTEROSCOPY WITH HYDROTHERMAL ABLATION OF ENDOMETRIUM WITH DILATION AND CURETTAGE N/A 2/26/2019    Procedure: HYSTEROSCOPY, WITH DILATION AND CURETTAGE OF UTERUS AND HYDROTHERMAL ENDOMETRIAL ABLATION;  Surgeon: Pia Aguila MD;  Location: [facility];  Service: OB/GYN;  Laterality: N/A;    LAPAROSCOPIC DRAINAGE OF CYST OF OVARY Left 2/26/2019    Procedure: DRAINAGE, CYST, OVARY, LAPAROSCOPIC;  Surgeon: Pia Aguila MD;  Location: [facility];  Service: OB/GYN;  Laterality: Left;    ROBOT-ASSISTED LAPAROSCOPIC ABDOMINAL  HYSTERECTOMY USING DA BEAN XI N/A 11/5/2019    Procedure: XI ROBOTIC HYSTERECTOMY;  Surgeon: Pia Aguila MD;  Location: [facility] OR;  Service: OB/GYN;  Laterality: N/A;    ROBOT-ASSISTED LAPAROSCOPIC SALPINGO-OOPHORECTOMY USING DA BEAN XI Bilateral 11/5/2019    Procedure: XI ROBOTIC SALPINGO-OOPHORECTOMY;  Surgeon: Pia Aguila MD;  Location: [facility] OR;  Service: OB/GYN;  Laterality: Bilateral;    TUBAL LIGATION      Tummy Tuck         Social History     Tobacco Use    Smoking status: Never Smoker    Smokeless tobacco: Never Used   Substance Use Topics    Alcohol use: Yes     Alcohol/week: 0.0 standard drinks     Comment: socially;  No alcohol 72h prior to sx    Drug use: No       Family History   Problem Relation Age of Onset    Cancer Mother         origin? ovarian? metastasized    Cancer Father         throat?    No Known Problems Sister     Heart disease Maternal Grandmother     No Known Problems Maternal Grandfather     No Known Problems Daughter     No Known Problems Daughter     No Known Problems Son     No Known Problems Son     No Known Problems Son     No Known Problems Son     No Known Problems Brother     No Known Problems Other     No Known Problems Other     No Known Problems Other     No Known Problems Other     No Known Problems Other     No Known Problems Maternal Aunt     No Known Problems Sister        Review of Systems   Constitutional: Negative for fever and malaise/fatigue.   HENT: Negative for sore throat.    Eyes: Negative for blurred vision.   Cardiovascular: Negative for chest pain, claudication, cyanosis, dyspnea on exertion, irregular heartbeat, leg swelling, near-syncope, orthopnea, palpitations, paroxysmal nocturnal dyspnea and syncope.   Respiratory: Negative for cough and hemoptysis.    Hematologic/Lymphatic: Negative for bleeding problem.   Skin: Negative for rash.   Musculoskeletal: Negative for falls.   Gastrointestinal: Negative for  abdominal pain.   Genitourinary: Negative.    Neurological: Negative.    Psychiatric/Behavioral: Negative for altered mental status and substance abuse.       Current Outpatient Medications on File Prior to Visit   Medication Sig    butalbital-acetaminophen-caffeine -40 mg (FIORICET, ESGIC) -40 mg per tablet Take 1 tablet by mouth every 6 (six) hours as needed for Pain or Headaches.    verapamiL (CALAN) 120 MG tablet TAKE 1 TABLET(120 MG) BY MOUTH EVERY DAY    [DISCONTINUED] dexAMETHasone (DECADRON) 4 MG Tab Take 2 tablets (8 mg total) by mouth once daily. Take 2 tablets (8 mg) by mouth once daily on days 2,3,4 following chemotherapy.    [DISCONTINUED] OLANZapine (ZYPREXA) 5 MG tablet Take 1 tablet (5 mg total) by mouth every evening. Take 1 tablet by mouth every evening on days 1-4 of each chemotherapy cycle    [DISCONTINUED] ondansetron (ZOFRAN-ODT) 8 MG TbDL Take 1 tablet (8 mg total) by mouth every 8 (eight) hours as needed (nausea/vomiting). Take 1 tablet (8 mg) by mouth every 8 hours as needed for nausea/vomiting.     No current facility-administered medications on file prior to visit.       Objective:   Objective:  Wt Readings from Last 3 Encounters:   06/30/22 80.4 kg (177 lb 4 oz)   06/27/22 79.9 kg (176 lb 2.4 oz)   06/23/22 80.4 kg (177 lb 4.8 oz)     Temp Readings from Last 3 Encounters:   06/27/22 97.1 °F (36.2 °C) (Tympanic)   06/23/22 97.7 °F (36.5 °C)   06/22/22 97.9 °F (36.6 °C)     BP Readings from Last 3 Encounters:   06/30/22 120/80   06/27/22 129/82   06/23/22 138/88     Pulse Readings from Last 3 Encounters:   06/30/22 62   06/27/22 67   06/23/22 71       Physical Exam  Vitals reviewed.   Constitutional:       Appearance: She is well-developed.   HENT:      Head: Normocephalic and atraumatic.   Eyes:      General: No scleral icterus.     Conjunctiva/sclera: Conjunctivae normal.   Cardiovascular:      Rate and Rhythm: Normal rate and regular rhythm.      Pulses: Intact distal  pulses.      Heart sounds: Normal heart sounds. No murmur heard.  Pulmonary:      Effort: No respiratory distress.      Breath sounds: No wheezing or rales.   Chest:      Chest wall: No tenderness.   Abdominal:      General: Bowel sounds are normal. There is no distension.      Palpations: Abdomen is soft.      Tenderness: There is no guarding.   Musculoskeletal:         General: Normal range of motion.      Cervical back: Normal range of motion and neck supple.   Skin:     General: Skin is warm.   Neurological:      Mental Status: She is alert and oriented to person, place, and time.         Lab Results   Component Value Date    CHOL 301 (H) 11/26/2021    CHOL 272 (H) 10/16/2020    CHOL 248 (H) 01/27/2017     Lab Results   Component Value Date    HDL 72 11/26/2021    HDL 73 10/16/2020    HDL 75 01/27/2017     Lab Results   Component Value Date    LDLCALC 207.4 (H) 11/26/2021    LDLCALC 174.4 (H) 10/16/2020    LDLCALC 157.0 01/27/2017     Lab Results   Component Value Date    TRIG 108 11/26/2021    TRIG 123 10/16/2020    TRIG 80 01/27/2017     Lab Results   Component Value Date    CHOLHDL 23.9 11/26/2021    CHOLHDL 26.8 10/16/2020    CHOLHDL 30.2 01/27/2017       Chemistry        Component Value Date/Time     06/27/2022 1023    K 3.8 06/27/2022 1023     06/27/2022 1023    CO2 25 06/27/2022 1023    BUN 7 06/27/2022 1023    CREATININE 0.8 06/27/2022 1023    GLU 89 06/27/2022 1023        Component Value Date/Time    CALCIUM 9.6 06/27/2022 1023    ALKPHOS 81 06/27/2022 1023    AST 18 06/27/2022 1023    ALT 19 06/27/2022 1023    BILITOT 0.4 06/27/2022 1023    ESTGFRAFRICA >60.0 06/27/2022 1023    EGFRNONAA >60.0 06/27/2022 1023          Lab Results   Component Value Date    TSH 2.225 11/26/2021     No results found for: INR, PROTIME  Lab Results   Component Value Date    WBC 9.50 06/27/2022    HGB 13.7 06/27/2022    HCT 43.9 06/27/2022    MCV 84 06/27/2022     06/27/2022      BNP  @LABRCNTIP(BNP,BNPTRIAGEBLO)@  Estimated Creatinine Clearance: 91.1 mL/min (based on SCr of 0.8 mg/dL).     Imaging:  ======  No results found for this or any previous visit.    No results found for this or any previous visit.    No results found for this or any previous visit.    Results for orders placed during the hospital encounter of 04/22/19    X-Ray Chest PA And Lateral    Narrative  EXAMINATION:  XR CHEST PA AND LATERAL    CLINICAL HISTORY:  chest pain;    TECHNIQUE:  PA and lateral views of the chest were performed.    COMPARISON:  None    FINDINGS:  The lungs are clear, with normal appearance of pulmonary vasculature and no pleural effusion or pneumothorax.    The cardiac silhouette is normal in size. The hilar and mediastinal contours are unremarkable.    Bones are intact.    IMPRESSION:    No acute abnormality.      Electronically signed by: Theodore Grijalva  Date:    04/22/2019  Time:    09:21    No results found for this or any previous visit.    No valid procedures specified.    Diagnostic Results:  ECG: Reviewed    The 10-year ASCVD risk score (Haines DC Jr., et al., 2013) is: 1.5%    Values used to calculate the score:      Age: 46 years      Sex: Female      Is Non- : Yes      Diabetic: No      Tobacco smoker: No      Systolic Blood Pressure: 120 mmHg      Is BP treated: Yes      HDL Cholesterol: 72 mg/dL      Total Cholesterol: 301 mg/dL    Assessment and Plan:   Other hyperlipidemia  -     Lipid Panel; Future; Expected date: 06/30/2022    Essential hypertension    Malignant neoplasm of upper-outer quadrant of left breast in female, estrogen receptor negative    Abnormal EKG    Hypothyroidism (acquired)    Preop cardiovascular exam    CW VERAPAMIL , HTN CONTROLLED   CHECK BP MORE OFTEN AT HOME  DECREASE SALT INTAKE  EKG UNCHANGED FROM BASELINE, POSSIBLE NORMAL VARIAN , NORMAL STRESS ECHO 3 YEARS AGO  She is also pending repeat echo by hemonc  Good functional status,  METS>4   No active cardiac issues, no chest pain   Normal cardiac exam   Ok and stable to proceed with non cardiac intermediate risk surgery ,port a cath and breast surgery       Follow up in 6 MONTHS

## 2022-07-01 ENCOUNTER — ANESTHESIA (OUTPATIENT)
Dept: SURGERY | Facility: HOSPITAL | Age: 47
End: 2022-07-01
Payer: COMMERCIAL

## 2022-07-01 ENCOUNTER — HOSPITAL ENCOUNTER (OUTPATIENT)
Dept: RADIOLOGY | Facility: HOSPITAL | Age: 47
Discharge: HOME | End: 2022-07-01
Attending: SURGERY | Admitting: SURGERY
Payer: COMMERCIAL

## 2022-07-01 ENCOUNTER — HOSPITAL ENCOUNTER (OUTPATIENT)
Dept: CARDIOLOGY | Facility: HOSPITAL | Age: 47
Discharge: HOME | End: 2022-07-01
Attending: INTERNAL MEDICINE | Admitting: SURGERY
Payer: COMMERCIAL

## 2022-07-01 ENCOUNTER — HOSPITAL ENCOUNTER (OUTPATIENT)
Facility: HOSPITAL | Age: 47
Discharge: HOME | End: 2022-07-01
Attending: SURGERY | Admitting: SURGERY
Payer: COMMERCIAL

## 2022-07-01 VITALS — HEIGHT: 64 IN | BODY MASS INDEX: 30.05 KG/M2 | WEIGHT: 176 LBS

## 2022-07-01 VITALS
WEIGHT: 176.13 LBS | RESPIRATION RATE: 16 BRPM | DIASTOLIC BLOOD PRESSURE: 98 MMHG | BODY MASS INDEX: 30.07 KG/M2 | HEIGHT: 64 IN | TEMPERATURE: 98 F | HEART RATE: 70 BPM | SYSTOLIC BLOOD PRESSURE: 156 MMHG | OXYGEN SATURATION: 100 %

## 2022-07-01 DIAGNOSIS — C50.412 MALIGNANT NEOPLASM OF UPPER-OUTER QUADRANT OF LEFT BREAST IN FEMALE, ESTROGEN RECEPTOR NEGATIVE: ICD-10-CM

## 2022-07-01 DIAGNOSIS — Z17.1 MALIGNANT NEOPLASM OF UPPER-OUTER QUADRANT OF LEFT BREAST IN FEMALE, ESTROGEN RECEPTOR NEGATIVE: ICD-10-CM

## 2022-07-01 LAB
AV INDEX (PROSTH): 0.68
AV MEAN GRADIENT: 5 MMHG
AV PEAK GRADIENT: 10 MMHG
AV VALVE AREA: 2.39 CM2
AV VELOCITY RATIO: 0.65
BSA FOR ECHO PROCEDURE: 1.9 M2
CV ECHO LV RWT: 0.4 CM
DOP CALC AO PEAK VEL: 1.58 M/S
DOP CALC AO VTI: 33.2 CM
DOP CALC LVOT AREA: 3.5 CM2
DOP CALC LVOT DIAMETER: 2.11 CM
DOP CALC LVOT PEAK VEL: 1.02 M/S
DOP CALC LVOT STROKE VOLUME: 79.33 CM3
DOP CALC RVOT PEAK VEL: 0.72 M/S
DOP CALC RVOT VTI: 20.1 CM
DOP CALCLVOT PEAK VEL VTI: 22.7 CM
E WAVE DECELERATION TIME: 224.76 MSEC
E/A RATIO: 1.08
E/E' RATIO: 8.33 M/S
ECHO LV POSTERIOR WALL: 1.02 CM (ref 0.6–1.1)
EJECTION FRACTION: 60 %
FRACTIONAL SHORTENING: 38 % (ref 28–44)
INTERVENTRICULAR SEPTUM: 1.05 CM (ref 0.6–1.1)
IVRT: 70.41 MSEC
LA MAJOR: 5.56 CM
LA MINOR: 5.85 CM
LA WIDTH: 3.9 CM
LEFT ATRIUM SIZE: 4.54 CM
LEFT ATRIUM VOLUME INDEX MOD: 28.3 ML/M2
LEFT ATRIUM VOLUME INDEX: 46.4 ML/M2
LEFT ATRIUM VOLUME MOD: 52.31 CM3
LEFT ATRIUM VOLUME: 85.81 CM3
LEFT INTERNAL DIMENSION IN SYSTOLE: 3.16 CM (ref 2.1–4)
LEFT VENTRICLE DIASTOLIC VOLUME INDEX: 67.36 ML/M2
LEFT VENTRICLE DIASTOLIC VOLUME: 124.62 ML
LEFT VENTRICLE MASS INDEX: 107 G/M2
LEFT VENTRICLE SYSTOLIC VOLUME INDEX: 21.5 ML/M2
LEFT VENTRICLE SYSTOLIC VOLUME: 39.74 ML
LEFT VENTRICULAR INTERNAL DIMENSION IN DIASTOLE: 5.11 CM (ref 3.5–6)
LEFT VENTRICULAR MASS: 197.55 G
LV LATERAL E/E' RATIO: 7.14 M/S
LV SEPTAL E/E' RATIO: 10 M/S
LVOT MG: 2.46 MMHG
LVOT MV: 0.75 CM/S
MV PEAK A VEL: 0.93 M/S
MV PEAK E VEL: 1 M/S
PISA TR MAX VEL: 2.12 M/S
PULM VEIN S/D RATIO: 1.26
PV MEAN GRADIENT: 1.44 MMHG
PV PEAK D VEL: 0.46 M/S
PV PEAK S VEL: 0.58 M/S
PV PEAK VELOCITY: 1.11 CM/S
QEF: 64 %
RA MAJOR: 5.25 CM
RA PRESSURE: 3 MMHG
RA WIDTH: 3.56 CM
RIGHT VENTRICULAR END-DIASTOLIC DIMENSION: 3.33 CM
SINUS: 2.91 CM
STJ: 2.55 CM
TDI LATERAL: 0.14 M/S
TDI SEPTAL: 0.1 M/S
TDI: 0.12 M/S
TR MAX PG: 18 MMHG
TRICUSPID ANNULAR PLANE SYSTOLIC EXCURSION: 2.43 CM
TV REST PULMONARY ARTERY PRESSURE: 21 MMHG

## 2022-07-01 PROCEDURE — 63600175 PHARM REV CODE 636 W HCPCS: Performed by: SURGERY

## 2022-07-01 PROCEDURE — D9220A PRA ANESTHESIA: ICD-10-PCS | Mod: CRNA,,, | Performed by: NURSE ANESTHETIST, CERTIFIED REGISTERED

## 2022-07-01 PROCEDURE — 37000008 HC ANESTHESIA 1ST 15 MINUTES: Performed by: SURGERY

## 2022-07-01 PROCEDURE — 71045 XR CHEST AP PORTABLE: ICD-10-PCS | Mod: 26,,, | Performed by: RADIOLOGY

## 2022-07-01 PROCEDURE — 36561 PR INSERT TUNNELED CV CATH WITH PORT: ICD-10-PCS | Mod: RT,,, | Performed by: SURGERY

## 2022-07-01 PROCEDURE — 93306 TTE W/DOPPLER COMPLETE: CPT | Mod: 26,,, | Performed by: INTERNAL MEDICINE

## 2022-07-01 PROCEDURE — D9220A PRA ANESTHESIA: Mod: CRNA,,, | Performed by: NURSE ANESTHETIST, CERTIFIED REGISTERED

## 2022-07-01 PROCEDURE — 71000015 HC POSTOP RECOV 1ST HR: Performed by: SURGERY

## 2022-07-01 PROCEDURE — 71045 X-RAY EXAM CHEST 1 VIEW: CPT | Mod: 26,,, | Performed by: RADIOLOGY

## 2022-07-01 PROCEDURE — 25000003 PHARM REV CODE 250: Performed by: SURGERY

## 2022-07-01 PROCEDURE — 63600175 PHARM REV CODE 636 W HCPCS: Performed by: NURSE ANESTHETIST, CERTIFIED REGISTERED

## 2022-07-01 PROCEDURE — D9220A PRA ANESTHESIA: Mod: ANES,,, | Performed by: ANESTHESIOLOGY

## 2022-07-01 PROCEDURE — 25000003 PHARM REV CODE 250: Performed by: NURSE ANESTHETIST, CERTIFIED REGISTERED

## 2022-07-01 PROCEDURE — 77001 CHG FLUOROGUIDE CNTRL VEN ACCESS,PLACE,REPLACE,REMOVE: ICD-10-PCS | Mod: 26,,, | Performed by: SURGERY

## 2022-07-01 PROCEDURE — 71000033 HC RECOVERY, INTIAL HOUR: Performed by: SURGERY

## 2022-07-01 PROCEDURE — 93356 ECHO (CUPID ONLY): ICD-10-PCS | Mod: ,,, | Performed by: INTERNAL MEDICINE

## 2022-07-01 PROCEDURE — 27200651 HC AIRWAY, LMA: Performed by: ANESTHESIOLOGY

## 2022-07-01 PROCEDURE — 63600175 PHARM REV CODE 636 W HCPCS: Performed by: ANESTHESIOLOGY

## 2022-07-01 PROCEDURE — 93356 MYOCRD STRAIN IMG SPCKL TRCK: CPT | Mod: ,,, | Performed by: INTERNAL MEDICINE

## 2022-07-01 PROCEDURE — 37000009 HC ANESTHESIA EA ADD 15 MINS: Performed by: SURGERY

## 2022-07-01 PROCEDURE — 36000706: Performed by: SURGERY

## 2022-07-01 PROCEDURE — D9220A PRA ANESTHESIA: ICD-10-PCS | Mod: ANES,,, | Performed by: ANESTHESIOLOGY

## 2022-07-01 PROCEDURE — 93356 MYOCRD STRAIN IMG SPCKL TRCK: CPT

## 2022-07-01 PROCEDURE — 71045 X-RAY EXAM CHEST 1 VIEW: CPT | Mod: TC

## 2022-07-01 PROCEDURE — 36000707: Performed by: SURGERY

## 2022-07-01 PROCEDURE — 77001 FLUOROGUIDE FOR VEIN DEVICE: CPT | Mod: 26,,, | Performed by: SURGERY

## 2022-07-01 PROCEDURE — 36561 INSERT TUNNELED CV CATH: CPT | Mod: RT,,, | Performed by: SURGERY

## 2022-07-01 PROCEDURE — 93306 ECHO (CUPID ONLY): ICD-10-PCS | Mod: 26,,, | Performed by: INTERNAL MEDICINE

## 2022-07-01 PROCEDURE — C1788 PORT, INDWELLING, IMP: HCPCS | Performed by: SURGERY

## 2022-07-01 DEVICE — PORT POWER CLEAR VIEW: Type: IMPLANTABLE DEVICE | Site: SUBCLAVIAN | Status: FUNCTIONAL

## 2022-07-01 RX ORDER — ACETAMINOPHEN 10 MG/ML
INJECTION, SOLUTION INTRAVENOUS
Status: DISCONTINUED | OUTPATIENT
Start: 2022-07-01 | End: 2022-07-01

## 2022-07-01 RX ORDER — LIDOCAINE HYDROCHLORIDE 20 MG/ML
INJECTION, SOLUTION EPIDURAL; INFILTRATION; INTRACAUDAL; PERINEURAL
Status: DISCONTINUED | OUTPATIENT
Start: 2022-07-01 | End: 2022-07-01

## 2022-07-01 RX ORDER — BUPIVACAINE HYDROCHLORIDE 2.5 MG/ML
INJECTION, SOLUTION EPIDURAL; INFILTRATION; INTRACAUDAL
Status: DISCONTINUED
Start: 2022-07-01 | End: 2022-07-01 | Stop reason: HOSPADM

## 2022-07-01 RX ORDER — LIDOCAINE HYDROCHLORIDE 10 MG/ML
INJECTION, SOLUTION EPIDURAL; INFILTRATION; INTRACAUDAL; PERINEURAL
Status: DISCONTINUED | OUTPATIENT
Start: 2022-07-01 | End: 2022-07-01 | Stop reason: HOSPADM

## 2022-07-01 RX ORDER — HEPARIN 100 UNIT/ML
SYRINGE INTRAVENOUS
Status: DISCONTINUED | OUTPATIENT
Start: 2022-07-01 | End: 2022-07-01 | Stop reason: HOSPADM

## 2022-07-01 RX ORDER — DEXAMETHASONE SODIUM PHOSPHATE 4 MG/ML
INJECTION, SOLUTION INTRA-ARTICULAR; INTRALESIONAL; INTRAMUSCULAR; INTRAVENOUS; SOFT TISSUE
Status: DISCONTINUED | OUTPATIENT
Start: 2022-07-01 | End: 2022-07-01

## 2022-07-01 RX ORDER — SODIUM CHLORIDE, SODIUM LACTATE, POTASSIUM CHLORIDE, CALCIUM CHLORIDE 600; 310; 30; 20 MG/100ML; MG/100ML; MG/100ML; MG/100ML
INJECTION, SOLUTION INTRAVENOUS CONTINUOUS
Status: ACTIVE | OUTPATIENT
Start: 2022-07-01

## 2022-07-01 RX ORDER — FENTANYL CITRATE 50 UG/ML
INJECTION, SOLUTION INTRAMUSCULAR; INTRAVENOUS
Status: DISCONTINUED | OUTPATIENT
Start: 2022-07-01 | End: 2022-07-01

## 2022-07-01 RX ORDER — ONDANSETRON 2 MG/ML
INJECTION INTRAMUSCULAR; INTRAVENOUS
Status: DISCONTINUED | OUTPATIENT
Start: 2022-07-01 | End: 2022-07-01

## 2022-07-01 RX ORDER — PROPOFOL 10 MG/ML
VIAL (ML) INTRAVENOUS
Status: DISCONTINUED | OUTPATIENT
Start: 2022-07-01 | End: 2022-07-01

## 2022-07-01 RX ORDER — HEPARIN 100 UNIT/ML
SYRINGE INTRAVENOUS
Status: DISCONTINUED
Start: 2022-07-01 | End: 2022-07-01 | Stop reason: HOSPADM

## 2022-07-01 RX ORDER — SODIUM CHLORIDE 9 MG/ML
INJECTION, SOLUTION INTRAVENOUS CONTINUOUS
Status: DISCONTINUED | OUTPATIENT
Start: 2022-07-01 | End: 2022-07-01 | Stop reason: HOSPADM

## 2022-07-01 RX ORDER — EPHEDRINE SULFATE 50 MG/ML
INJECTION, SOLUTION INTRAVENOUS
Status: DISCONTINUED | OUTPATIENT
Start: 2022-07-01 | End: 2022-07-01

## 2022-07-01 RX ORDER — CEFAZOLIN SODIUM 2 G/50ML
2 SOLUTION INTRAVENOUS
Status: COMPLETED | OUTPATIENT
Start: 2022-07-01 | End: 2022-07-01

## 2022-07-01 RX ORDER — LIDOCAINE HYDROCHLORIDE 10 MG/ML
INJECTION, SOLUTION EPIDURAL; INFILTRATION; INTRACAUDAL; PERINEURAL
Status: DISCONTINUED
Start: 2022-07-01 | End: 2022-07-01 | Stop reason: HOSPADM

## 2022-07-01 RX ORDER — ONDANSETRON 2 MG/ML
4 INJECTION INTRAMUSCULAR; INTRAVENOUS EVERY 12 HOURS PRN
Status: DISCONTINUED | OUTPATIENT
Start: 2022-07-01 | End: 2022-07-01 | Stop reason: HOSPADM

## 2022-07-01 RX ORDER — MIDAZOLAM HYDROCHLORIDE 1 MG/ML
INJECTION, SOLUTION INTRAMUSCULAR; INTRAVENOUS
Status: DISCONTINUED | OUTPATIENT
Start: 2022-07-01 | End: 2022-07-01

## 2022-07-01 RX ORDER — LIDOCAINE HYDROCHLORIDE 10 MG/ML
1 INJECTION, SOLUTION EPIDURAL; INFILTRATION; INTRACAUDAL; PERINEURAL ONCE
Status: DISCONTINUED | OUTPATIENT
Start: 2022-07-01 | End: 2022-07-01 | Stop reason: HOSPADM

## 2022-07-01 RX ORDER — HYDROCODONE BITARTRATE AND ACETAMINOPHEN 5; 325 MG/1; MG/1
1 TABLET ORAL EVERY 6 HOURS PRN
Qty: 20 TABLET | Refills: 0 | Status: SHIPPED | OUTPATIENT
Start: 2022-07-01 | End: 2022-07-11

## 2022-07-01 RX ORDER — HYDROCODONE BITARTRATE AND ACETAMINOPHEN 5; 325 MG/1; MG/1
1 TABLET ORAL EVERY 4 HOURS PRN
Status: DISCONTINUED | OUTPATIENT
Start: 2022-07-01 | End: 2022-07-01 | Stop reason: HOSPADM

## 2022-07-01 RX ORDER — BUPIVACAINE HYDROCHLORIDE 2.5 MG/ML
INJECTION, SOLUTION EPIDURAL; INFILTRATION; INTRACAUDAL
Status: DISCONTINUED | OUTPATIENT
Start: 2022-07-01 | End: 2022-07-01 | Stop reason: HOSPADM

## 2022-07-01 RX ORDER — HYDROCODONE BITARTRATE AND ACETAMINOPHEN 10; 325 MG/1; MG/1
1 TABLET ORAL EVERY 4 HOURS PRN
Status: DISCONTINUED | OUTPATIENT
Start: 2022-07-01 | End: 2022-07-01 | Stop reason: HOSPADM

## 2022-07-01 RX ADMIN — SODIUM CHLORIDE, SODIUM LACTATE, POTASSIUM CHLORIDE, AND CALCIUM CHLORIDE: 600; 310; 30; 20 INJECTION, SOLUTION INTRAVENOUS at 10:07

## 2022-07-01 RX ADMIN — HYDROCODONE BITARTRATE AND ACETAMINOPHEN 1 TABLET: 5; 325 TABLET ORAL at 12:07

## 2022-07-01 RX ADMIN — PROPOFOL 160 MG: 10 INJECTION, EMULSION INTRAVENOUS at 10:07

## 2022-07-01 RX ADMIN — LIDOCAINE HYDROCHLORIDE 80 MG: 20 INJECTION, SOLUTION EPIDURAL; INFILTRATION; INTRACAUDAL; PERINEURAL at 10:07

## 2022-07-01 RX ADMIN — DEXAMETHASONE SODIUM PHOSPHATE 8 MG: 4 INJECTION, SOLUTION INTRA-ARTICULAR; INTRALESIONAL; INTRAMUSCULAR; INTRAVENOUS; SOFT TISSUE at 10:07

## 2022-07-01 RX ADMIN — EPHEDRINE SULFATE 10 MG: 50 INJECTION INTRAVENOUS at 11:07

## 2022-07-01 RX ADMIN — ACETAMINOPHEN 1000 MG: 10 INJECTION, SOLUTION INTRAVENOUS at 11:07

## 2022-07-01 RX ADMIN — FENTANYL CITRATE 50 MCG: 50 INJECTION, SOLUTION INTRAMUSCULAR; INTRAVENOUS at 11:07

## 2022-07-01 RX ADMIN — CEFAZOLIN SODIUM 2 G: 2 SOLUTION INTRAVENOUS at 10:07

## 2022-07-01 RX ADMIN — ONDANSETRON 4 MG: 2 INJECTION INTRAMUSCULAR; INTRAVENOUS at 10:07

## 2022-07-01 RX ADMIN — ONDANSETRON 4 MG: 2 INJECTION INTRAMUSCULAR; INTRAVENOUS at 12:07

## 2022-07-01 RX ADMIN — FENTANYL CITRATE 50 MCG: 50 INJECTION, SOLUTION INTRAMUSCULAR; INTRAVENOUS at 10:07

## 2022-07-01 RX ADMIN — MIDAZOLAM 2 MG: 1 INJECTION INTRAMUSCULAR; INTRAVENOUS at 10:07

## 2022-07-01 NOTE — ANESTHESIA POSTPROCEDURE EVALUATION
Anesthesia Post Evaluation    Patient: Florecita Noel    Procedure(s) Performed: Procedure(s) (LRB):  [id number]-P-CATH (N/A)    Final Anesthesia Type: general      Patient location during evaluation: PACU  Patient participation: Yes- Able to Participate  Level of consciousness: awake and alert  Post-procedure vital signs: reviewed and stable  Pain management: adequate  Airway patency: patent    PONV status at discharge: No PONV  Anesthetic complications: no      Cardiovascular status: blood pressure returned to baseline  Respiratory status: spontaneous ventilation  Hydration status: euvolemic  Follow-up not needed.          Vitals Value Taken Time   /98 07/01/22 1245   Temp 36.7 °C (98 °F) 07/01/22 1245   Pulse 70 07/01/22 1245   Resp 16 07/01/22 1245   SpO2 100 % 07/01/22 1245         No case tracking events are documented in the log.      Pain/Eduarda Score: Pain Rating Prior to Med Admin: 5 (7/1/2022 12:02 PM)  Eduarda Score: 10 (7/1/2022 12:45 PM)

## 2022-07-01 NOTE — PLAN OF CARE
Discharged order received and discharge instructions reviewed with patient and spouse. Patient instructed when to take each medication & next dose. Prescriptions received. IV removed, monitoring devices removed. Patient belongings such as clothing, shoes, etc. returned to patient. Patient assisted with dressing by staff. Patient transported to front lobby via wheelchair by staff for spouse to transport home. Patient forget information packet from port company. Sent to  to be delivered to home address on file.

## 2022-07-01 NOTE — ANESTHESIA PROCEDURE NOTES
Intubation    Date/Time: 7/1/2022 10:54 AM  Performed by: Honey Becker CRNA  Authorized by: Te Aranda III, MD     Intubation:     Induction:  Intravenous    Intubated:  Postinduction    Mask Ventilation:  Easy mask    Attempts:  1    Attempted By:  CRNA    Difficult Airway Encountered?: No      Complications:  None    Airway Device:  Supraglottic airway/LMA    Airway Device Size:  4.0    Secured at:  The lips    Placement Verified By:  Capnometry    Complicating Factors:  None    Findings Post-Intubation:  Atraumatic/condition of teeth unchanged

## 2022-07-01 NOTE — BRIEF OP NOTE
[facility] - Periop Services  Brief Operative Note    Surgery Date: 7/1/2022     Surgeon(s) and Role:     * Beau Quiles MD - Primary    Assisting Surgeon: None    Pre-op Diagnosis:  Malignant neoplasm of upper-outer quadrant of left breast in female, estrogen receptor negative [C50.412, Z17.1]    Post-op Diagnosis:  Post-Op Diagnosis Codes:     * Malignant neoplasm of upper-outer quadrant of left breast in female, estrogen receptor negative [C50.412, Z17.1]    Procedure(s) (LRB):  [id number] (N/A)    Anesthesia: General    Operative Findings: see op note    Estimated Blood Loss: * No values recorded between 7/1/2022 11:07 AM and 7/1/2022 11:36 AM *         Specimens:   Specimen (24h ago, onward)            None            Discharge Note    OUTCOME: Patient tolerated treatment/procedure well without complication and is now ready for discharge.    DISPOSITION: Home or Self Care    FINAL DIAGNOSIS:  Breast cancer    FOLLOWUP: In clinic    DISCHARGE INSTRUCTIONS:    Discharge Procedure Orders   Diet general     Call MD for:  temperature >100.4     Call MD for:  persistent nausea and vomiting     Call MD for:  severe uncontrolled pain     Call MD for:  difficulty breathing, headache or visual disturbances     Call MD for:  redness, tenderness, or signs of infection (pain, swelling, redness, odor or green/yellow discharge around incision site)     Call MD for:  hives     Call MD for:  persistent dizziness or light-headedness     Call MD for:  extreme fatigue     Remove dressing in 48 hours     Activity as tolerated     Shower on day dressing removed (No bath)        Clinical Reference Documents Added to Patient Instructions       Document    [facility] DISCHARGE INSTRUCTIONS (ENGLISH)

## 2022-07-01 NOTE — PLAN OF CARE
Plan of care & discharge instructions reviewed with patient and spouse. No questions at this time.

## 2022-07-01 NOTE — TRANSFER OF CARE
"Anesthesia Transfer of Care Note    Patient: Florecita Noel    Procedure(s) Performed: Procedure(s) (LRB):  [id number] (N/A)    Patient location: PACU    Anesthesia Type: general    Transport from OR: Transported from OR on room air with adequate spontaneous ventilation    Post pain: adequate analgesia    Post assessment: no apparent anesthetic complications and tolerated procedure well    Post vital signs: stable    Level of consciousness: awake    Nausea/Vomiting: no nausea/vomiting    Complications: none    Transfer of care protocol was followed      Last vitals:   Visit Vitals  BP (!) 143/90 (BP Location: Right arm, Patient Position: Sitting)   Pulse 70   Temp 36.6 °C (97.9 °F) (Temporal)   Resp 16   Ht 5' 4" (1.626 m)   Wt 79.9 kg (176 lb 2.4 oz)   LMP  (LMP Unknown)   SpO2 99%   Breastfeeding No   BMI 30.24 kg/m²     "

## 2022-07-11 ENCOUNTER — HOSPITAL ENCOUNTER (OUTPATIENT)
Dept: RADIOLOGY | Facility: HOSPITAL | Age: 47
Discharge: HOME | End: 2022-07-11
Attending: SURGERY
Payer: COMMERCIAL

## 2022-07-11 ENCOUNTER — OFFICE VISIT (OUTPATIENT)
Dept: SURGERY | Facility: CLINIC | Age: 47
End: 2022-07-11
Payer: COMMERCIAL

## 2022-07-11 DIAGNOSIS — R92.8 ABNORMAL MAMMOGRAM OF RIGHT BREAST: ICD-10-CM

## 2022-07-11 DIAGNOSIS — C50.412 MALIGNANT NEOPLASM OF UPPER-OUTER QUADRANT OF LEFT BREAST IN FEMALE, ESTROGEN RECEPTOR NEGATIVE: ICD-10-CM

## 2022-07-11 DIAGNOSIS — C50.412 MALIGNANT NEOPLASM OF UPPER-OUTER QUADRANT OF LEFT BREAST IN FEMALE, ESTROGEN RECEPTOR NEGATIVE: Primary | ICD-10-CM

## 2022-07-11 DIAGNOSIS — R92.8 ABNORMAL MAMMOGRAM OF LEFT BREAST: ICD-10-CM

## 2022-07-11 DIAGNOSIS — Z17.1 MALIGNANT NEOPLASM OF UPPER-OUTER QUADRANT OF LEFT BREAST IN FEMALE, ESTROGEN RECEPTOR NEGATIVE: ICD-10-CM

## 2022-07-11 DIAGNOSIS — Z17.1 MALIGNANT NEOPLASM OF UPPER-OUTER QUADRANT OF LEFT BREAST IN FEMALE, ESTROGEN RECEPTOR NEGATIVE: Primary | ICD-10-CM

## 2022-07-11 PROCEDURE — 88305 TISSUE EXAM BY PATHOLOGIST: CPT | Mod: 26,,, | Performed by: STUDENT IN AN ORGANIZED HEALTH CARE EDUCATION/TRAINING PROGRAM

## 2022-07-11 PROCEDURE — 77066 DX MAMMO INCL CAD BI: CPT | Mod: 26,,, | Performed by: RADIOLOGY

## 2022-07-11 PROCEDURE — 19083 BX BREAST 1ST LESION US IMAG: CPT | Mod: RT,,, | Performed by: RADIOLOGY

## 2022-07-11 PROCEDURE — 19083 US BREAST BIOPSY WITH IMAGING 1ST SITE RIGHT: ICD-10-PCS | Mod: RT,,, | Performed by: RADIOLOGY

## 2022-07-11 PROCEDURE — 19285 PERQ DEV BREAST 1ST US IMAG: CPT | Mod: 59,LT,, | Performed by: RADIOLOGY

## 2022-07-11 PROCEDURE — 99213 PR OFFICE/OUTPT VISIT, EST, LEVL III, 20-29 MIN: ICD-10-PCS | Mod: 24,S$GLB,, | Performed by: SURGERY

## 2022-07-11 PROCEDURE — 77066 MAMMO DIGITAL DIAGNOSTIC BILAT: ICD-10-PCS | Mod: 26,,, | Performed by: RADIOLOGY

## 2022-07-11 PROCEDURE — 99999 PR PBB SHADOW E&M-EST. PATIENT-LVL II: CPT | Mod: PBBFAC,,, | Performed by: SURGERY

## 2022-07-11 PROCEDURE — 88305 TISSUE EXAM BY PATHOLOGIST: ICD-10-PCS | Mod: 26,,, | Performed by: STUDENT IN AN ORGANIZED HEALTH CARE EDUCATION/TRAINING PROGRAM

## 2022-07-11 PROCEDURE — 19083 BX BREAST 1ST LESION US IMAG: CPT | Mod: RT

## 2022-07-11 PROCEDURE — 77066 DX MAMMO INCL CAD BI: CPT | Mod: TC

## 2022-07-11 PROCEDURE — 19285 US BREAST RADAR REFLECTOR LOCALIZATION W/GUIDANCE, 1ST LESION, LEFT: ICD-10-PCS | Mod: 59,LT,, | Performed by: RADIOLOGY

## 2022-07-11 PROCEDURE — 19083 BX BREAST 1ST LESION US IMAG: CPT | Mod: LT

## 2022-07-11 PROCEDURE — 19083 US BREAST BIOPSY WITH IMAGING 1ST SITE LEFT: ICD-10-PCS | Mod: LT,,, | Performed by: RADIOLOGY

## 2022-07-11 PROCEDURE — 99213 OFFICE O/P EST LOW 20 MIN: CPT | Mod: 24,S$GLB,, | Performed by: SURGERY

## 2022-07-11 PROCEDURE — A4648 IMPLANTABLE TISSUE MARKER: HCPCS

## 2022-07-11 PROCEDURE — 19083 BX BREAST 1ST LESION US IMAG: CPT | Mod: LT,,, | Performed by: RADIOLOGY

## 2022-07-11 PROCEDURE — 88305 TISSUE EXAM BY PATHOLOGIST: CPT | Mod: 59 | Performed by: STUDENT IN AN ORGANIZED HEALTH CARE EDUCATION/TRAINING PROGRAM

## 2022-07-11 PROCEDURE — 99999 PR PBB SHADOW E&M-EST. PATIENT-LVL II: ICD-10-PCS | Mod: PBBFAC,,, | Performed by: SURGERY

## 2022-07-11 RX ORDER — HYDROCODONE BITARTRATE AND ACETAMINOPHEN 10; 325 MG/1; MG/1
1 TABLET ORAL EVERY 6 HOURS PRN
Qty: 20 TABLET | Refills: 0 | Status: SHIPPED | OUTPATIENT
Start: 2022-07-11 | End: 2023-10-06 | Stop reason: ALTCHOICE

## 2022-07-11 NOTE — NURSING
Pressure held on right and left breast biopsy site and left breast radar site for 10 mins, hemostasis was achieved, steri strips were applied, and wound was covered with 4x4 guaze and a tegaderm.  Dressing clean, dry and intact with no drainage noted.  Discharge instructions given verbally and in writing, patient voiced understandings.  Patient discharged and accompanied by family member. Radar audible with probe.

## 2022-07-12 ENCOUNTER — TELEPHONE (OUTPATIENT)
Dept: CARDIOLOGY | Facility: CLINIC | Age: 47
End: 2022-07-12
Payer: COMMERCIAL

## 2022-07-12 ENCOUNTER — PATIENT MESSAGE (OUTPATIENT)
Dept: CARDIOLOGY | Facility: CLINIC | Age: 47
End: 2022-07-12
Payer: COMMERCIAL

## 2022-07-12 DIAGNOSIS — E78.49 OTHER HYPERLIPIDEMIA: Primary | ICD-10-CM

## 2022-07-12 RX ORDER — ATORVASTATIN CALCIUM 40 MG/1
40 TABLET, FILM COATED ORAL DAILY
Qty: 30 TABLET | Refills: 11 | Status: SHIPPED | OUTPATIENT
Start: 2022-07-12 | End: 2023-10-06 | Stop reason: ALTCHOICE

## 2022-07-12 NOTE — TELEPHONE ENCOUNTER
Pt was contacted about results:     Her lipid profile show elevated cholesterol levels in a range where statin is recommended   Will need to start lipitor 40 mg qhs   Rx is in   Thanks     Pt verbalized understanding with no questions or concerns.        ----- Message from Todd Fernández MD sent at 7/12/2022  1:10 PM CDT -----  Her lipid profile show elevated cholesterol levels in a range where statin is recommended   Will need to start lipitor 40 mg qhs   Rx is in   Thanks

## 2022-07-14 ENCOUNTER — OFFICE VISIT (OUTPATIENT)
Dept: HEMATOLOGY/ONCOLOGY | Facility: CLINIC | Age: 47
End: 2022-07-14
Payer: COMMERCIAL

## 2022-07-14 ENCOUNTER — LAB VISIT (OUTPATIENT)
Dept: LAB | Facility: HOSPITAL | Age: 47
End: 2022-07-14
Attending: INTERNAL MEDICINE
Payer: COMMERCIAL

## 2022-07-14 VITALS
HEART RATE: 79 BPM | SYSTOLIC BLOOD PRESSURE: 128 MMHG | DIASTOLIC BLOOD PRESSURE: 85 MMHG | OXYGEN SATURATION: 99 % | WEIGHT: 174.38 LBS | HEIGHT: 64 IN | TEMPERATURE: 97 F | BODY MASS INDEX: 29.77 KG/M2

## 2022-07-14 DIAGNOSIS — Z17.1 MALIGNANT NEOPLASM OF UPPER-OUTER QUADRANT OF LEFT BREAST IN FEMALE, ESTROGEN RECEPTOR NEGATIVE: ICD-10-CM

## 2022-07-14 DIAGNOSIS — C50.412 MALIGNANT NEOPLASM OF UPPER-OUTER QUADRANT OF LEFT BREAST IN FEMALE, ESTROGEN RECEPTOR NEGATIVE: Primary | ICD-10-CM

## 2022-07-14 DIAGNOSIS — C50.412 MALIGNANT NEOPLASM OF UPPER-OUTER QUADRANT OF LEFT BREAST IN FEMALE, ESTROGEN RECEPTOR NEGATIVE: ICD-10-CM

## 2022-07-14 DIAGNOSIS — Z17.1 MALIGNANT NEOPLASM OF UPPER-OUTER QUADRANT OF LEFT BREAST IN FEMALE, ESTROGEN RECEPTOR NEGATIVE: Primary | ICD-10-CM

## 2022-07-14 LAB
ALBUMIN SERPL BCP-MCNC: 4 G/DL (ref 3.5–5.2)
ALP SERPL-CCNC: 94 U/L (ref 55–135)
ALT SERPL W/O P-5'-P-CCNC: 33 U/L (ref 10–44)
ANION GAP SERPL CALC-SCNC: 12 MMOL/L (ref 8–16)
AST SERPL-CCNC: 23 U/L (ref 10–40)
BASOPHILS # BLD AUTO: 0.04 K/UL (ref 0–0.2)
BASOPHILS NFR BLD: 0.4 % (ref 0–1.9)
BILIRUB SERPL-MCNC: 0.4 MG/DL (ref 0.1–1)
BUN SERPL-MCNC: 10 MG/DL (ref 6–20)
CALCIUM SERPL-MCNC: 9.9 MG/DL (ref 8.7–10.5)
CHLORIDE SERPL-SCNC: 107 MMOL/L (ref 95–110)
CO2 SERPL-SCNC: 23 MMOL/L (ref 23–29)
CREAT SERPL-MCNC: 0.8 MG/DL (ref 0.5–1.4)
DIFFERENTIAL METHOD: ABNORMAL
EOSINOPHIL # BLD AUTO: 0.2 K/UL (ref 0–0.5)
EOSINOPHIL NFR BLD: 1.6 % (ref 0–8)
ERYTHROCYTE [DISTWIDTH] IN BLOOD BY AUTOMATED COUNT: 13.4 % (ref 11.5–14.5)
EST. GFR  (AFRICAN AMERICAN): >60 ML/MIN/1.73 M^2
EST. GFR  (NON AFRICAN AMERICAN): >60 ML/MIN/1.73 M^2
GLUCOSE SERPL-MCNC: 99 MG/DL (ref 70–110)
HCT VFR BLD AUTO: 41 % (ref 37–48.5)
HGB BLD-MCNC: 13.1 G/DL (ref 12–16)
IMM GRANULOCYTES # BLD AUTO: 0.04 K/UL (ref 0–0.04)
IMM GRANULOCYTES NFR BLD AUTO: 0.4 % (ref 0–0.5)
LYMPHOCYTES # BLD AUTO: 3.4 K/UL (ref 1–4.8)
LYMPHOCYTES NFR BLD: 29.8 % (ref 18–48)
MCH RBC QN AUTO: 26.1 PG (ref 27–31)
MCHC RBC AUTO-ENTMCNC: 32 G/DL (ref 32–36)
MCV RBC AUTO: 82 FL (ref 82–98)
MONOCYTES # BLD AUTO: 0.7 K/UL (ref 0.3–1)
MONOCYTES NFR BLD: 6.3 % (ref 4–15)
NEUTROPHILS # BLD AUTO: 7 K/UL (ref 1.8–7.7)
NEUTROPHILS NFR BLD: 61.5 % (ref 38–73)
NRBC BLD-RTO: 0 /100 WBC
PLATELET # BLD AUTO: 292 K/UL (ref 150–450)
PMV BLD AUTO: 11.2 FL (ref 9.2–12.9)
POTASSIUM SERPL-SCNC: 3.6 MMOL/L (ref 3.5–5.1)
PROT SERPL-MCNC: 8.3 G/DL (ref 6–8.4)
RBC # BLD AUTO: 5.01 M/UL (ref 4–5.4)
SODIUM SERPL-SCNC: 142 MMOL/L (ref 136–145)
TSH SERPL DL<=0.005 MIU/L-ACNC: 1.54 UIU/ML (ref 0.4–4)
WBC # BLD AUTO: 11.39 K/UL (ref 3.9–12.7)

## 2022-07-14 PROCEDURE — 85025 COMPLETE CBC W/AUTO DIFF WBC: CPT | Performed by: INTERNAL MEDICINE

## 2022-07-14 PROCEDURE — 99999 PR PBB SHADOW E&M-EST. PATIENT-LVL III: ICD-10-PCS | Mod: PBBFAC,,, | Performed by: INTERNAL MEDICINE

## 2022-07-14 PROCEDURE — 99999 PR PBB SHADOW E&M-EST. PATIENT-LVL III: CPT | Mod: PBBFAC,,, | Performed by: INTERNAL MEDICINE

## 2022-07-14 PROCEDURE — 84443 ASSAY THYROID STIM HORMONE: CPT | Performed by: INTERNAL MEDICINE

## 2022-07-14 PROCEDURE — 99214 PR OFFICE/OUTPT VISIT, EST, LEVL IV, 30-39 MIN: ICD-10-PCS | Mod: S$GLB,,, | Performed by: INTERNAL MEDICINE

## 2022-07-14 PROCEDURE — 36415 COLL VENOUS BLD VENIPUNCTURE: CPT | Performed by: INTERNAL MEDICINE

## 2022-07-14 PROCEDURE — 80053 COMPREHEN METABOLIC PANEL: CPT | Performed by: INTERNAL MEDICINE

## 2022-07-14 PROCEDURE — 99214 OFFICE O/P EST MOD 30 MIN: CPT | Mod: S$GLB,,, | Performed by: INTERNAL MEDICINE

## 2022-07-14 RX ORDER — HEPARIN 100 UNIT/ML
500 SYRINGE INTRAVENOUS
Status: CANCELLED | OUTPATIENT
Start: 2022-07-14

## 2022-07-14 RX ORDER — FAMOTIDINE 10 MG/ML
20 INJECTION INTRAVENOUS
Status: CANCELLED | OUTPATIENT
Start: 2022-07-14

## 2022-07-14 RX ORDER — EPINEPHRINE 0.3 MG/.3ML
0.3 INJECTION SUBCUTANEOUS ONCE AS NEEDED
Status: CANCELLED | OUTPATIENT
Start: 2022-07-14

## 2022-07-14 RX ORDER — SODIUM CHLORIDE 0.9 % (FLUSH) 0.9 %
10 SYRINGE (ML) INJECTION
Status: CANCELLED | OUTPATIENT
Start: 2022-07-14

## 2022-07-14 RX ORDER — DIPHENHYDRAMINE HYDROCHLORIDE 50 MG/ML
50 INJECTION INTRAMUSCULAR; INTRAVENOUS ONCE AS NEEDED
Status: CANCELLED | OUTPATIENT
Start: 2022-07-14

## 2022-07-14 NOTE — PROGRESS NOTES
Subjective:      DATE OF VISIT: 7/14/22     ?  Patient ID:Jaison Noel is a 46 y.o. female.?? MR#: [id number]   ?   REFERRING PROVIDER: No referring provider defined for this encounter.     ? Primary Care Providers:  Alesha Alonso MD, MD (General)     PRIMARY ONCOLOGIST: Dr. Hathaway    CHIEF COMPLAINT:  Plan for chemotherapy tomorrow??   ?   ONCOLOGIC DIAGNOSIS:  Stage IB, cT1c, cN0, cM0 left breast upper outer quadrant 2:00 a.m. invasive ductal carcinoma grade 3 ER/NH negative, HER2 negative  ?   CURRENT TREATMENT:  neoadjuvant chemotherapy, carboplatin paclitaxel pembrolizumab followed by doxorubicin cyclophosphamide pembrolizumab    PAST TREATMENT:  Biopsy only  ?   INTERVAL EVENTS:    She presents for evaluation prior to her 1st cycle of chemotherapy.  Review chemotherapy risks and benefits clinical trial of chemo immunotherapy and she is interested in proceeding with this treatment regimen.  She has had chemotherapy teaching and we review highlights and signed consent today.  She has antiemetics at home for use as needed.  She is in stable medical condition feeling well today.  She did have difficult 2nd biopsy of contralateral lesion, pathology pending.    ONCOLOGIC HISTORY:     Ms. Noel is a 46-year-old woman with hypothyroidism and hypertension.  She presented for routine screening mammogram 11/30/2021 showing left breast mass upper outer posterior position.    12/03/2021 diagnostic mammogram and ultrasound  Left  Mammo Digital Diagnostic Bilat with Pablito  There is a mass seen in the upper outer quadrant of the left breast in the posterior depth.      US Breast Bilateral Limited  There are 3 similar complicated cysts seen in the upper outer quadrant of the left breast in the posterior depth. The largest cyst is thought to be the correlate and measures 0.7 x 0.5 x 0.5 cm.  There are 2 additional complex cysts also seen in the upper outer quadrant of the left breast that measure 0.4 x 0.2 x 0.3 cm and 0.6 x  0.3 x 0.4 cm.      Right  Mammo Digital Diagnostic Bilat with Pablito  There is a mass seen in the lower central region of the right breast in the posterior depth. The posterior margin of this lesion is not seen on mammography.  The lesion appears to abut and overlie the pectoralis musculature.  The lesion appears to measure at least 9 mm in size.      US Breast Bilateral Limited  There is a mass seen in the lower central region of the right breast. A definitive correlate is not definitely seen on ultrasound.  There is a 6 mm hypoechoic lesion likely representing a complex cyst within the central portion of the breast that appears to be too small to be the correlate for the mammographic finding.  There is an additional 1.0 x 0.4 x 0.6 cm hypoechoic area that is located within a ridge of tissue and also does not definitively correlate to the lesion.  It is possible this lesion was present on prior studies although only space visualized on the most recent study due to increased tissue included on the MLO view.  The visualized margins of the lesion also appear to be fairly smooth and therefore the lesion can be followed.      Impression:  Left  Mass: Left breast mass at the upper outer posterior position. Assessment: 3 - Probably benign. Short Interval Follow-Up in 6 Months is recommended.      Right  Mass: Right breast mass at the lower central posterior position. Assessment: 3 - Probably benign. Short Interval Follow-Up in 6 Months is recommended.      BI-RADS Category:   Overall: 3 - Probably Benign    Repeat evaluation with diagnostic mammogram and ultrasound on 05/24/2022 notable for a suspicious left breast lesion 1.3 cm 2:00 a.m.    Right breast: No detrimental mammographic change. Well-circumscribed lesion within the lower outer posterior breast is unchanged mammographically. 8.6 mm complicated cyst present at 08:00 o'clock, 12 cm from the nipple and corresponds to the stable mammographic finding.       Follow-up  imaging of the previously noted central lesions demonstrate no detrimental change.  8 mm complicated cyst noted at 04:00 o'clock, 4 cm from the nipple.         Left breast: Interval increase in size of an upper outer quadrant mass on mammogram. On ultrasound there is an enlarging complicated cystic lesion measuring 1.3 cm at 02:00 o'clock, 7 cm from the nipple. Other complicated cysts within the upper outer breast demonstrate no detrimental change.  No suspicious left axillary lymph nodes.    Ultrasound-guided biopsy left breast lesion on 2022  Pathology:  Final Pathologic Diagnosis 1. Left breast mass (2 o'clock position), biopsy:       -  High-grade invasive carcinoma of no special type (ductal) with   associated tumor necrosis,          see comment     SYNOPTIC REPORT   PROCEDURE:  Biopsy   SPECIMEN LATERALITY:  Left breast   TUMOR SITE:  2 o'clock position   HISTOLOGIC TYPE:  Invasive carcinoma of no special type (ductal)   HISTOLOGIC GRADE:  Grade 3 of 3          TUBULE FORMATION:  3          PLEOMORPHISM:  3          MITOTIC RATE:  3   TUMOR SIZE:   At least 10 mm in greatest linear dimension   DUCTAL CARCINOMA IN SITU (DCIS):  Not identified   LYMPHOVASCULAR INVASION:  Not identified   MICROCALCIFICATIONS:  Not identified   BREAST BIOMARKERS (PERFORMED WITH APPROPRIATE CONTROLS):           ER:   Negative (0)           ND:   Negative (0)           HER2:  Negative (1+)           KI67:  Greater than 95%       2022 PET-CT without evidence of distant metastatic disease.     FDG avid left breast lesion consistent with the known malignancy.  No other sites of suspicious uptake identified.    Menarche 12 years old  ; 1 miscarriage, age of 1st pregnancy 16 years old  Postmenopausal last menstrual period at 44 years old s/p PIERRE/BSO  Exogenous estrogen for about 1 year last when 45yo.  No history of prior radiation  No prior breast biopsy    Family history notable for mother with either breast or ovarian  cancer diagnosis 64 years old.    Cycle 1 day 1 neoadjuvant chemotherapy 07/15/2022        Review of Systems    ?   A comprehensive 14-point review of systems was reviewed with patient and was negative other than as specified above.   ?     Objective:      Physical Exam      ?   Vitals:    07/14/22 1057   BP: 128/85   Pulse: 79   Temp: 97.4 °F (36.3 °C)      ?   ECOG:?0   General appearance: Generally well appearing, in no acute distress.   Head, eyes, ears, nose, and throat: moist mucous membranes.   Respiratory:  Normal work of breathing  Abdomen: nontender, nondistended.   Extremities: Warm, without edema.   Neurologic: Alert and oriented. Grossly normal strength, coordination, and gait.   Skin: No rashes, ecchymoses or petechial lesion.   Psychiatric:  Normal mood and affect.    Labs    Lab Results   Component Value Date    WBC 11.39 07/14/2022    HGB 13.1 07/14/2022    HCT 41.0 07/14/2022    MCV 82 07/14/2022     07/14/2022         Chemistry        Component Value Date/Time     07/14/2022 1004    K 3.6 07/14/2022 1004     07/14/2022 1004    CO2 23 07/14/2022 1004    BUN 10 07/14/2022 1004    CREATININE 0.8 07/14/2022 1004    GLU 99 07/14/2022 1004        Component Value Date/Time    CALCIUM 9.9 07/14/2022 1004    ALKPHOS 94 07/14/2022 1004    AST 23 07/14/2022 1004    ALT 33 07/14/2022 1004    BILITOT 0.4 07/14/2022 1004    ESTGFRAFRICA >60 07/14/2022 1004    EGFRNONAA >60 07/14/2022 1004        Lab Results   Component Value Date    TSH 1.543 07/14/2022       Imaging:  ?    No results found for this or any previous visit (from the past 2160 hour(s)).  No results found for this or any previous visit (from the past 2160 hour(s)).  Results for orders placed or performed during the hospital encounter of 06/20/22 (from the past 2160 hour(s))   NM PET CT Routine Skull to Mid Thigh    Impression    FDG avid left breast lesion consistent with the known malignancy.  No other sites of suspicious uptake  "identified.      Electronically signed by: Zay Dickerson DO  Date:    06/20/2022  Time:    13:58         Pathology:    No visits with results within 1 Day(s) from this visit.   Latest known visit with results is:   Hospital Outpatient Visit on 06/09/2022   Component Date Value Ref Range Status    Final Pathologic Diagnosis 06/09/2022    Final                    Value:1. Left breast mass (2 o'clock position), biopsy:      -  High-grade invasive carcinoma of no special type (ductal) with  associated tumor necrosis,         see comment    SYNOPTIC REPORT  PROCEDURE:  Biopsy  SPECIMEN LATERALITY:  Left breast  TUMOR SITE:  2 o'clock position  HISTOLOGIC TYPE:  Invasive carcinoma of no special type (ductal)  HISTOLOGIC GRADE:  Grade 3 of 3         TUBULE FORMATION:  3         PLEOMORPHISM:  3         MITOTIC RATE:  3  TUMOR SIZE:   At least 10 mm in greatest linear dimension  DUCTAL CARCINOMA IN SITU (DCIS):  Not identified  LYMPHOVASCULAR INVASION:  Not identified  MICROCALCIFICATIONS:  Not identified  BREAST BIOMARKERS (PERFORMED WITH APPROPRIATE CONTROLS):          ER:   Negative (0)          VA:   Negative (0)          HER2:  Negative (1+)          KI67:  Greater than 95%      Gross 06/09/2022    Final                    Value:Patient ID/Pathology ID [id number]  In formalin, labeled "left breast 02:00 o'clock, in formalin at 08:40", is a  2.5 x 2.5 cm aggregate of pink-yellow needle biopsy cores.  Entirely  submitted in cassette RJC--1-A  Ischemic time is not provided.  The fixation time is greater than 6 hrs and  less than 72 hrs.  A gross picture of the container and cassette is taken and  added to file.  Charly HOLGUIN (ASCP) cm      Comment 06/09/2022    Final                    Value:Immunohistochemical stain with appropriate control for p63 was performed and  shows loss of myoepithelial cells throughout the lesion, with no evidence of  ductal carcinoma in situ.  The patient's history of ovarian carcinoma " is  noted.  Immunohistochemical stains for GATA3 and PAX8 appropriate controls  show tumor cell positivity for GATA3 and negativity for [place] 8, confirming  ductal (breast) origin.  This case seen in consultation with Dr. Montesinos who agrees with the above  diagnosis.      Disclaimer 06/09/2022    Final                    Value:Unless the case is a 'gross only' or additional testing only, the final  diagnosis for each specimen is based on a microscopic examination of  appropriate tissue sections.  ER immunohistochemical staining (clone SP1, DAB detection method) is  performed on formalin-fixed, paraffin embedded tissue sections. The  percentage of cell nuclei stained and the strength of staining is reported  (weak, moderate, strong), using the 2010 ACSO/CAP scoring guidelines. Tumors  used for determing predictive markers are fixed in10% neutral buffered  formalin for 6-72 hours. It has been cleared by the U.S. FDA for use as an  IVD test. This assay has not been validated on decalcified tissues. Results  should be interpreted with caution given the likelihood of false negativity  on decalcified specimens.  HER-2/aston IHC (4B5) clone, DAB detection method) is done on 10% buffered  formalin-fixed (for 6-72 hrs), paraffin embedded tissue sections. The scoring  is completed on a 4-tiered scoring system of membran                          e staining using the 2014  ASCO/CAP scoring guidelines. It has been cleared by the U.S. FDA for use as  an IVD test. This assay has not been validated on decalcified tissues.  Results should be interpreted with caution given the likelihood of false  negativity on decalcified specimens.  PgR immunohistochemical staining (clone 1E2, DAB detection method) is  performed on formalin-fixed, paraffin embedded tissue sections. The  percentage of cell nuclei stained and the strength of staining is report  (weak, moderate, strong), using 2010 ASCO/CAP scoring guidelines. Tumors used  for determing  predictive markers are fixed in 10% neutral buffered formalin  for 6-72 hours. It has been cleared by the U.S. FDA for use as an IVD test.  This assay has not been validated on decalcified tissues. Results should be  interpreted with caution given the likelihood of false negativity on  decalcified specimens.            ?   Assessment/Plan:   Malignant neoplasm of upper-outer quadrant of left breast in female, estrogen receptor negative    Other orders  -     pembrolizumab (KEYTRUDA) 200 mg in sodium chloride 0.9% 108 mL infusion  -     aprepitant (CINVANTI) injection 130 mg  -     palonosetron (ALOXI) 0.25 mg with Dexamethasone (DECADRON) 12 mg in NS 50 mL IVPB  -     diphenhydrAMINE (BENADRYL) 50 mg in sodium chloride 0.9% 50 mL IVPB  -     famotidine (PF) injection 20 mg  -     PACLitaxeL (TAXOL) 80 mg/m2 = 150 mg in sodium chloride 0.9% 250 mL chemo infusion  -     CARBOplatin (PARAPLATIN) 685 mg in sodium chloride 0.9% 250 mL chemo infusion  -     EPINEPHrine (EPIPEN) 0.3 mg/0.3 mL pen injection 0.3 mg  -     diphenhydrAMINE injection 50 mg  -     hydrocortisone sodium succinate injection 100 mg  -     sodium chloride 0.9% 250 mL flush bag  -     sodium chloride 0.9% flush 10 mL  -     heparin, porcine (PF) 100 unit/mL injection flush 500 Units  -     alteplase injection 2 mg       1. Malignant neoplasm of upper-outer quadrant of left breast in female, estrogen receptor negative          Plan:     Problem List Items Addressed This Visit        Oncology    Malignant neoplasm of upper-outer quadrant of left breast in female, estrogen receptor negative - Primary        Stage IB, cT1c cN0 cM0 left breast upper outer quadrant 2:00 a.m. invasive ductal carcinoma grade 3 ER/AL negative, HER2 negative:  - [facility] germline genetic testing negative   Clinical exam and mammogram/ultrasound without axillary adenopathy.  PET scan without evidence of distant metastatic disease.   We discussed that while early stage and T1c,  higher risk features given triple negative and high grade 3 and high ki-67 >95% with noted growth over just 6 month interval recommendation for neoadjuavant approach with chemo immunotherapy per Guadalupe et al. NEJ 2020. She is healthy aside from prior hypothyroidism now not on supplement.  We did discuss risks and benefits of therapy and importance of re-evaluation with plan for surgery, radiation and further adjuvant therapy as indicated.  She expressed good understanding and agree with the plan for follow-up per below.    Follow-Up:   Labs reviewed okay to proceed with cycle 1 day 1 planned tomorrow, 07/15/2022 have notified infusion nursing.  Recommend 1 week follow-up with labs and consideration of day 8, npok  - patholgy results pending from contralateral biopsy, will follow-up

## 2022-07-15 ENCOUNTER — INFUSION (OUTPATIENT)
Dept: INFUSION THERAPY | Facility: HOSPITAL | Age: 47
End: 2022-07-15
Attending: INTERNAL MEDICINE
Payer: COMMERCIAL

## 2022-07-15 ENCOUNTER — DOCUMENTATION ONLY (OUTPATIENT)
Dept: HEMATOLOGY/ONCOLOGY | Facility: CLINIC | Age: 47
End: 2022-07-15
Payer: COMMERCIAL

## 2022-07-15 VITALS
OXYGEN SATURATION: 98 % | HEIGHT: 64 IN | HEART RATE: 78 BPM | BODY MASS INDEX: 29.77 KG/M2 | SYSTOLIC BLOOD PRESSURE: 140 MMHG | TEMPERATURE: 97 F | DIASTOLIC BLOOD PRESSURE: 89 MMHG | WEIGHT: 174.38 LBS | RESPIRATION RATE: 18 BRPM

## 2022-07-15 DIAGNOSIS — C50.412 MALIGNANT NEOPLASM OF UPPER-OUTER QUADRANT OF LEFT BREAST IN FEMALE, ESTROGEN RECEPTOR NEGATIVE: Primary | ICD-10-CM

## 2022-07-15 DIAGNOSIS — Z17.1 MALIGNANT NEOPLASM OF UPPER-OUTER QUADRANT OF LEFT BREAST IN FEMALE, ESTROGEN RECEPTOR NEGATIVE: Primary | ICD-10-CM

## 2022-07-15 LAB
FINAL PATHOLOGIC DIAGNOSIS: NORMAL
GROSS: NORMAL
Lab: NORMAL

## 2022-07-15 PROCEDURE — 96375 TX/PRO/DX INJ NEW DRUG ADDON: CPT

## 2022-07-15 PROCEDURE — 96367 TX/PROPH/DG ADDL SEQ IV INF: CPT

## 2022-07-15 PROCEDURE — 25000003 PHARM REV CODE 250: Performed by: INTERNAL MEDICINE

## 2022-07-15 PROCEDURE — 96413 CHEMO IV INFUSION 1 HR: CPT

## 2022-07-15 PROCEDURE — 63600175 PHARM REV CODE 636 W HCPCS: Performed by: INTERNAL MEDICINE

## 2022-07-15 PROCEDURE — 96417 CHEMO IV INFUS EACH ADDL SEQ: CPT

## 2022-07-15 RX ORDER — HEPARIN 100 UNIT/ML
500 SYRINGE INTRAVENOUS
Status: DISCONTINUED | OUTPATIENT
Start: 2022-07-15 | End: 2022-07-15 | Stop reason: HOSPADM

## 2022-07-15 RX ORDER — FAMOTIDINE 10 MG/ML
20 INJECTION INTRAVENOUS
Status: COMPLETED | OUTPATIENT
Start: 2022-07-15 | End: 2022-07-15

## 2022-07-15 RX ADMIN — PALONOSETRON HYDROCHLORIDE 0.25 MG: 0.25 INJECTION, SOLUTION INTRAVENOUS at 10:07

## 2022-07-15 RX ADMIN — APREPITANT 130 MG: 130 INJECTION, EMULSION INTRAVENOUS at 10:07

## 2022-07-15 RX ADMIN — DIPHENHYDRAMINE HYDROCHLORIDE 50 MG: 50 INJECTION, SOLUTION INTRAMUSCULAR; INTRAVENOUS at 10:07

## 2022-07-15 RX ADMIN — HEPARIN 500 UNITS: 100 SYRINGE at 01:07

## 2022-07-15 RX ADMIN — FAMOTIDINE 20 MG: 10 INJECTION INTRAVENOUS at 10:07

## 2022-07-15 RX ADMIN — SODIUM CHLORIDE: 0.9 INJECTION, SOLUTION INTRAVENOUS at 10:07

## 2022-07-15 RX ADMIN — PACLITAXEL 150 MG: 6 INJECTION, SOLUTION INTRAVENOUS at 11:07

## 2022-07-15 RX ADMIN — SODIUM CHLORIDE 200 MG: 9 INJECTION, SOLUTION INTRAVENOUS at 09:07

## 2022-07-15 RX ADMIN — CARBOPLATIN 685 MG: 10 INJECTION, SOLUTION INTRAVENOUS at 01:07

## 2022-07-15 NOTE — DISCHARGE INSTRUCTIONS
.[facility]  [address] [facility]  [address]  [phone] phone     [phone] fax  Hours of Operation: Monday- Friday 8:00am- 5:00pm  After hours phone  [phone]  Hematology / Oncology Physicians on call    Dr. Harshil Singer      Nurse Practitioners:    Chitra Gloria, JAYMIE Lechuga, JAYMIE Storm, JAYMIE Newton, JAYMIE Alcocer, PA      Please don't hesitate to call if you have any concerns.     .WAYS TO HELP PREVENT INFECTION        WASH YOUR HANDS OFTEN DURING THE DAY, ESPECIALLY BEFORE YOU EAT, AFTER USING THE BATHROOM, AND AFTER TOUCHING ANIMALS    STAY AWAY FROM PEOPLE WHO HAVE ILLNESSES YOU CAN CATCH; SUCH AS COLDS, FLU, CHICKEN POX    TRY TO AVOID CROWDS    STAY AWAY FROM CHILDREN WHO RECENTLY HAVE RECEIVED LIVE VIRUS VACCINES    MAINTAIN GOOD MOUTH CARE    DO NOT SQUEEZE OR SCRATCH PIMPLES    CLEAN CUTS & SCRAPES RIGHT AWAY AND DAILY UNTIL HEALED WITH WARM WATER, SOAP & AN ANTISEPTIC    AVOID CONTACT WITH LITTER BOXES, BIRD CAGES, & FISH TANKS    AVOID STANDING WATER, IE., BIRD BATHS, FLOWER POTS/VASES, OR HUMIDIFIERS    WEAR GLOVES WHEN GARDENING OR CLEANING UP AFTER OTHERS, ESPECIALLY BABIES & SMALL CHILDREN    DO NOT EAT RAW FISH, SEAFOOD, MEAT, OR EGGS     .FALL PREVENTION   Falls often occur due to slipping, tripping or losing your balance. Here are ways to reduce your risk of falling again.   Was there anything that caused your fall that can be fixed, removed or replaced?   Make your home safe by keeping walkways clear of objects you may trip over.   Use non-slip pads under rugs.   Do not walk in poorly lit areas.   Do not stand on chairs or wobbly ladders.   Use caution when reaching overhead or looking upward. This position can cause a loss of balance.   Be sure your shoes fit properly, have non-slip bottoms and are in good condition.   Be cautious when going up and down stairs, curbs,  and when walking on uneven sidewalks.   If your balance is poor, consider using a cane or walker.   If your fall was related to alcohol use, stop or limit alcohol intake.   If your fall was related to use of sleeping medicines, talk to your doctor about this. You may need to reduce your dosage at bedtime if you awaken during the night to go to the bathroom.   To reduce the need for nighttime bathroom trips:   Avoid drinking fluids for several hours before going to bed   Empty your bladder before going to bed   Men can keep a urinal at the bedside   © 8315-1851 [facility], [address] [place] [address]. All rights reserved. This information is not intended as a substitute for professional medical care. Always follow your healthcare professional's instructions.

## 2022-07-15 NOTE — PLAN OF CARE
Patient reports no complaints at this time. Patient tolerated first chemo infusion of keytruda/taxol/carbo well with no adverse reactions. Patient to return to clinic next Friday, 07/22, for taxol #2.

## 2022-07-15 NOTE — NURSING
1130am: Visited pt while in chemo infusion at chairside alongside . Pt doing well so far. Pt had no concerns, compliants, needs, and/or questions noted at this time. Pt encouraged to contact me directly if any further issues arise. Pt informed that I'll visit pt next week for 2nd chemo infusion then monthly going forward. Pt verbalized understanding.  Oncology Navigation   Intake  Date of Diagnosis: 6/9/2022  Cancer Type: Breast  Internal / External Referral: Internal  Referral Source: Chitra Gloria NP  Date of Referral: 6/14/2022  Initial Nurse Navigator Contact: 6/15/2022  Referral to Initial Contact Timeline (days): 1  Date Worked: 7/15/2022  Appointment Date: 6/22/2022  Schedule to Appointment Timeline (days): 7  Multiple appointments: Yes  Start of Treatment: 7/15/2022     Treatment  Current Status: Active  Date Presented to Tumor Board: 6/20/2022    Surgical Oncologist: Dr. Beau Quiles  Consult Date: 6/27/2022    Medical Oncologist: Dr. Maris Hathaway  Consult Date: 6/22/2022  Chemotherapy: Planned  Chemotherapy Regimen: Carboplatin Taxol Adriamycin Cytoxan  Immunotherapy: Planned  Immunotherapy Name: Keytruda    Radiation Oncologist: Dr. Zack Lee    Procedures: Genetic test  Biopsy Schedule Date: 6/9/2022  Echo Schedule Date: 7/1/2022  Genetic Testing Date Sent: 6/17/2022  PET Scan Schedule Date: 6/20/2022  Port / PICC Schedule Date: 7/1/2022    General Referrals: Social work  Social Work: notified via [facility] [facility] [place]  Social Work Referral Date: 6/30/2022    ER: Negative  [place]: Negative  Her2: Negative    Radiation Oncologist: Dr. Zack Lee    Support Systems: Spouse/significant other     Acuity  Stage: 1  Systemic Treatment - predicted or initiated: Chemotherapy regimen with multiple drugs (+1)  Treatment Tolerability: Has not started treatment yet/treatment fully completed and side effects resolved  Comorbidities in Medical History: 2  Hospitalization Within the Past Month: 0    Needed: 0  Transportation: 0  History of noncompliance/frequent no shows and cancellations: 0  Verbalizes the need for more education: 1  Navigation Acuity: 6     Follow Up  No follow-ups on file.

## 2022-07-18 ENCOUNTER — PATIENT MESSAGE (OUTPATIENT)
Dept: SURGERY | Facility: HOSPITAL | Age: 47
End: 2022-07-18
Payer: COMMERCIAL

## 2022-07-18 ENCOUNTER — PATIENT MESSAGE (OUTPATIENT)
Dept: HEMATOLOGY/ONCOLOGY | Facility: CLINIC | Age: 47
End: 2022-07-18
Payer: COMMERCIAL

## 2022-07-18 DIAGNOSIS — C50.412 MALIGNANT NEOPLASM OF UPPER-OUTER QUADRANT OF LEFT BREAST IN FEMALE, ESTROGEN RECEPTOR NEGATIVE: Primary | ICD-10-CM

## 2022-07-18 DIAGNOSIS — Z17.1 MALIGNANT NEOPLASM OF UPPER-OUTER QUADRANT OF LEFT BREAST IN FEMALE, ESTROGEN RECEPTOR NEGATIVE: Primary | ICD-10-CM

## 2022-07-18 RX ORDER — LIDOCAINE AND PRILOCAINE 25; 25 MG/G; MG/G
CREAM TOPICAL
Qty: 5 G | Refills: 0 | Status: SHIPPED | OUTPATIENT
Start: 2022-07-18 | End: 2023-10-06 | Stop reason: ALTCHOICE

## 2022-07-22 ENCOUNTER — INFUSION (OUTPATIENT)
Dept: INFUSION THERAPY | Facility: HOSPITAL | Age: 47
End: 2022-07-22
Attending: INTERNAL MEDICINE
Payer: COMMERCIAL

## 2022-07-22 ENCOUNTER — LAB VISIT (OUTPATIENT)
Dept: LAB | Facility: HOSPITAL | Age: 47
End: 2022-07-22
Attending: INTERNAL MEDICINE
Payer: COMMERCIAL

## 2022-07-22 ENCOUNTER — OFFICE VISIT (OUTPATIENT)
Dept: HEMATOLOGY/ONCOLOGY | Facility: CLINIC | Age: 47
End: 2022-07-22
Payer: COMMERCIAL

## 2022-07-22 ENCOUNTER — DOCUMENTATION ONLY (OUTPATIENT)
Dept: HEMATOLOGY/ONCOLOGY | Facility: CLINIC | Age: 47
End: 2022-07-22

## 2022-07-22 VITALS
HEART RATE: 74 BPM | DIASTOLIC BLOOD PRESSURE: 92 MMHG | TEMPERATURE: 98 F | RESPIRATION RATE: 16 BRPM | OXYGEN SATURATION: 98 % | SYSTOLIC BLOOD PRESSURE: 155 MMHG

## 2022-07-22 VITALS
OXYGEN SATURATION: 98 % | TEMPERATURE: 97 F | DIASTOLIC BLOOD PRESSURE: 91 MMHG | WEIGHT: 179.69 LBS | BODY MASS INDEX: 30.68 KG/M2 | HEART RATE: 74 BPM | SYSTOLIC BLOOD PRESSURE: 123 MMHG | HEIGHT: 64 IN

## 2022-07-22 DIAGNOSIS — R63.0 ANOREXIA: ICD-10-CM

## 2022-07-22 DIAGNOSIS — C50.412 MALIGNANT NEOPLASM OF UPPER-OUTER QUADRANT OF LEFT BREAST IN FEMALE, ESTROGEN RECEPTOR NEGATIVE: Primary | ICD-10-CM

## 2022-07-22 DIAGNOSIS — Z17.1 MALIGNANT NEOPLASM OF UPPER-OUTER QUADRANT OF LEFT BREAST IN FEMALE, ESTROGEN RECEPTOR NEGATIVE: Primary | ICD-10-CM

## 2022-07-22 DIAGNOSIS — C50.412 MALIGNANT NEOPLASM OF UPPER-OUTER QUADRANT OF LEFT BREAST IN FEMALE, ESTROGEN RECEPTOR NEGATIVE: ICD-10-CM

## 2022-07-22 DIAGNOSIS — D63.0 ANEMIA IN NEOPLASTIC DISEASE: ICD-10-CM

## 2022-07-22 DIAGNOSIS — R74.01 TRANSAMINITIS: ICD-10-CM

## 2022-07-22 DIAGNOSIS — Z17.1 MALIGNANT NEOPLASM OF UPPER-OUTER QUADRANT OF LEFT BREAST IN FEMALE, ESTROGEN RECEPTOR NEGATIVE: ICD-10-CM

## 2022-07-22 LAB
ALBUMIN SERPL BCP-MCNC: 3.6 G/DL (ref 3.5–5.2)
ALP SERPL-CCNC: 87 U/L (ref 55–135)
ALT SERPL W/O P-5'-P-CCNC: 114 U/L (ref 10–44)
ANION GAP SERPL CALC-SCNC: 8 MMOL/L (ref 8–16)
AST SERPL-CCNC: 112 U/L (ref 10–40)
BASOPHILS # BLD AUTO: 0.03 K/UL (ref 0–0.2)
BASOPHILS NFR BLD: 0.3 % (ref 0–1.9)
BILIRUB SERPL-MCNC: 0.3 MG/DL (ref 0.1–1)
BUN SERPL-MCNC: 9 MG/DL (ref 6–20)
CALCIUM SERPL-MCNC: 9.4 MG/DL (ref 8.7–10.5)
CHLORIDE SERPL-SCNC: 105 MMOL/L (ref 95–110)
CO2 SERPL-SCNC: 28 MMOL/L (ref 23–29)
CREAT SERPL-MCNC: 0.7 MG/DL (ref 0.5–1.4)
DIFFERENTIAL METHOD: ABNORMAL
EOSINOPHIL # BLD AUTO: 0.2 K/UL (ref 0–0.5)
EOSINOPHIL NFR BLD: 2.6 % (ref 0–8)
ERYTHROCYTE [DISTWIDTH] IN BLOOD BY AUTOMATED COUNT: 13.1 % (ref 11.5–14.5)
EST. GFR  (AFRICAN AMERICAN): >60 ML/MIN/1.73 M^2
EST. GFR  (NON AFRICAN AMERICAN): >60 ML/MIN/1.73 M^2
GLUCOSE SERPL-MCNC: 89 MG/DL (ref 70–110)
HCT VFR BLD AUTO: 35.2 % (ref 37–48.5)
HGB BLD-MCNC: 11.2 G/DL (ref 12–16)
IMM GRANULOCYTES # BLD AUTO: 0.06 K/UL (ref 0–0.04)
IMM GRANULOCYTES NFR BLD AUTO: 0.7 % (ref 0–0.5)
LYMPHOCYTES # BLD AUTO: 2.9 K/UL (ref 1–4.8)
LYMPHOCYTES NFR BLD: 33.2 % (ref 18–48)
MCH RBC QN AUTO: 26.2 PG (ref 27–31)
MCHC RBC AUTO-ENTMCNC: 31.8 G/DL (ref 32–36)
MCV RBC AUTO: 82 FL (ref 82–98)
MONOCYTES # BLD AUTO: 0.7 K/UL (ref 0.3–1)
MONOCYTES NFR BLD: 7.8 % (ref 4–15)
NEUTROPHILS # BLD AUTO: 4.8 K/UL (ref 1.8–7.7)
NEUTROPHILS NFR BLD: 55.4 % (ref 38–73)
NRBC BLD-RTO: 0 /100 WBC
PLATELET # BLD AUTO: 317 K/UL (ref 150–450)
PMV BLD AUTO: 10.2 FL (ref 9.2–12.9)
POTASSIUM SERPL-SCNC: 3.7 MMOL/L (ref 3.5–5.1)
PROT SERPL-MCNC: 7.1 G/DL (ref 6–8.4)
RBC # BLD AUTO: 4.27 M/UL (ref 4–5.4)
SODIUM SERPL-SCNC: 141 MMOL/L (ref 136–145)
WBC # BLD AUTO: 8.73 K/UL (ref 3.9–12.7)

## 2022-07-22 PROCEDURE — 36415 COLL VENOUS BLD VENIPUNCTURE: CPT | Performed by: INTERNAL MEDICINE

## 2022-07-22 PROCEDURE — 96367 TX/PROPH/DG ADDL SEQ IV INF: CPT

## 2022-07-22 PROCEDURE — 25000003 PHARM REV CODE 250: Performed by: INTERNAL MEDICINE

## 2022-07-22 PROCEDURE — 99215 PR OFFICE/OUTPT VISIT, EST, LEVL V, 40-54 MIN: ICD-10-PCS | Mod: S$GLB,,, | Performed by: INTERNAL MEDICINE

## 2022-07-22 PROCEDURE — 85025 COMPLETE CBC W/AUTO DIFF WBC: CPT | Performed by: INTERNAL MEDICINE

## 2022-07-22 PROCEDURE — 96375 TX/PRO/DX INJ NEW DRUG ADDON: CPT

## 2022-07-22 PROCEDURE — 80053 COMPREHEN METABOLIC PANEL: CPT | Performed by: INTERNAL MEDICINE

## 2022-07-22 PROCEDURE — 63600175 PHARM REV CODE 636 W HCPCS: Performed by: INTERNAL MEDICINE

## 2022-07-22 PROCEDURE — 99999 PR PBB SHADOW E&M-EST. PATIENT-LVL III: CPT | Mod: PBBFAC,,, | Performed by: INTERNAL MEDICINE

## 2022-07-22 PROCEDURE — 96413 CHEMO IV INFUSION 1 HR: CPT

## 2022-07-22 PROCEDURE — 99215 OFFICE O/P EST HI 40 MIN: CPT | Mod: S$GLB,,, | Performed by: INTERNAL MEDICINE

## 2022-07-22 PROCEDURE — 99999 PR PBB SHADOW E&M-EST. PATIENT-LVL III: ICD-10-PCS | Mod: PBBFAC,,, | Performed by: INTERNAL MEDICINE

## 2022-07-22 RX ORDER — FAMOTIDINE 10 MG/ML
20 INJECTION INTRAVENOUS
Status: CANCELLED | OUTPATIENT
Start: 2022-07-22

## 2022-07-22 RX ORDER — FAMOTIDINE 10 MG/ML
20 INJECTION INTRAVENOUS
Status: COMPLETED | OUTPATIENT
Start: 2022-07-22 | End: 2022-07-22

## 2022-07-22 RX ORDER — HEPARIN 100 UNIT/ML
500 SYRINGE INTRAVENOUS
Status: DISCONTINUED | OUTPATIENT
Start: 2022-07-22 | End: 2022-07-22 | Stop reason: HOSPADM

## 2022-07-22 RX ORDER — SODIUM CHLORIDE 0.9 % (FLUSH) 0.9 %
10 SYRINGE (ML) INJECTION
Status: CANCELLED | OUTPATIENT
Start: 2022-07-22

## 2022-07-22 RX ORDER — EPINEPHRINE 0.3 MG/.3ML
0.3 INJECTION SUBCUTANEOUS ONCE AS NEEDED
Status: CANCELLED | OUTPATIENT
Start: 2022-07-22

## 2022-07-22 RX ORDER — HEPARIN 100 UNIT/ML
500 SYRINGE INTRAVENOUS
Status: CANCELLED | OUTPATIENT
Start: 2022-07-22

## 2022-07-22 RX ORDER — DIPHENHYDRAMINE HYDROCHLORIDE 50 MG/ML
50 INJECTION INTRAMUSCULAR; INTRAVENOUS ONCE AS NEEDED
Status: CANCELLED | OUTPATIENT
Start: 2022-07-22

## 2022-07-22 RX ADMIN — DEXAMETHASONE SODIUM PHOSPHATE 10 MG: 4 INJECTION, SOLUTION INTRA-ARTICULAR; INTRALESIONAL; INTRAMUSCULAR; INTRAVENOUS; SOFT TISSUE at 02:07

## 2022-07-22 RX ADMIN — FAMOTIDINE 20 MG: 10 INJECTION INTRAVENOUS at 02:07

## 2022-07-22 RX ADMIN — HEPARIN 500 UNITS: 100 SYRINGE at 04:07

## 2022-07-22 RX ADMIN — SODIUM CHLORIDE: 9 INJECTION, SOLUTION INTRAVENOUS at 02:07

## 2022-07-22 RX ADMIN — PACLITAXEL 150 MG: 6 INJECTION, SOLUTION INTRAVENOUS at 03:07

## 2022-07-22 RX ADMIN — DIPHENHYDRAMINE HYDROCHLORIDE 50 MG: 50 INJECTION, SOLUTION INTRAMUSCULAR; INTRAVENOUS at 02:07

## 2022-07-22 NOTE — PROGRESS NOTES
Subjective:      DATE OF VISIT: 7/22/22     ?  Patient ID:Jaison Noel is a 46 y.o. female.?? MR#: [id number]   ?   PRIMARY ONCOLOGIST: Dr. Hathaway    ? Primary Care Providers:  Alesha Alonso MD, MD (General)     PRIMARY ONCOLOGIST: Dr. Hathaway    CHIEF COMPLAINT:  Plan for chemotherapy tomorrow??   ?   ONCOLOGIC DIAGNOSIS:  Stage IB, cT1c, cN0, cM0 left breast upper outer quadrant 2:00 a.m. invasive ductal carcinoma grade 3 ER/MS negative, HER2 negative  ?   CURRENT TREATMENT:  neoadjuvant chemotherapy, carboplatin paclitaxel pembrolizumab followed by doxorubicin cyclophosphamide pembrolizumab    PAST TREATMENT:  Biopsy only  ?   INTERVAL EVENTS:    She returns for day 8 of her chemotherapy started last week cycle 1 day 1.  Couple days after developed some nausea using home antiemetics to good effect with improvement at this point.  She does have fatigue and some lightheadedness.  She notes decline in p.o. intake.  No pain.  She has not use Tylenol or alcohol.    ONCOLOGIC HISTORY:     Ms. Noel is a 46-year-old woman with hypothyroidism and hypertension.  She presented for routine screening mammogram 11/30/2021 showing left breast mass upper outer posterior position.    12/03/2021 diagnostic mammogram and ultrasound  Left  Mammo Digital Diagnostic Bilat with Pablito  There is a mass seen in the upper outer quadrant of the left breast in the posterior depth.      US Breast Bilateral Limited  There are 3 similar complicated cysts seen in the upper outer quadrant of the left breast in the posterior depth. The largest cyst is thought to be the correlate and measures 0.7 x 0.5 x 0.5 cm.  There are 2 additional complex cysts also seen in the upper outer quadrant of the left breast that measure 0.4 x 0.2 x 0.3 cm and 0.6 x 0.3 x 0.4 cm.      Right  Mammo Digital Diagnostic Bilat with Pablito  There is a mass seen in the lower central region of the right breast in the posterior depth. The posterior margin of this lesion is  not seen on mammography.  The lesion appears to abut and overlie the pectoralis musculature.  The lesion appears to measure at least 9 mm in size.      US Breast Bilateral Limited  There is a mass seen in the lower central region of the right breast. A definitive correlate is not definitely seen on ultrasound.  There is a 6 mm hypoechoic lesion likely representing a complex cyst within the central portion of the breast that appears to be too small to be the correlate for the mammographic finding.  There is an additional 1.0 x 0.4 x 0.6 cm hypoechoic area that is located within a ridge of tissue and also does not definitively correlate to the lesion.  It is possible this lesion was present on prior studies although only space visualized on the most recent study due to increased tissue included on the MLO view.  The visualized margins of the lesion also appear to be fairly smooth and therefore the lesion can be followed.      Impression:  Left  Mass: Left breast mass at the upper outer posterior position. Assessment: 3 - Probably benign. Short Interval Follow-Up in 6 Months is recommended.      Right  Mass: Right breast mass at the lower central posterior position. Assessment: 3 - Probably benign. Short Interval Follow-Up in 6 Months is recommended.      BI-RADS Category:   Overall: 3 - Probably Benign    Repeat evaluation with diagnostic mammogram and ultrasound on 05/24/2022 notable for a suspicious left breast lesion 1.3 cm 2:00 a.m.    Right breast: No detrimental mammographic change. Well-circumscribed lesion within the lower outer posterior breast is unchanged mammographically. 8.6 mm complicated cyst present at 08:00 o'clock, 12 cm from the nipple and corresponds to the stable mammographic finding.       Follow-up imaging of the previously noted central lesions demonstrate no detrimental change.  8 mm complicated cyst noted at 04:00 o'clock, 4 cm from the nipple.         Left breast: Interval increase in size  of an upper outer quadrant mass on mammogram. On ultrasound there is an enlarging complicated cystic lesion measuring 1.3 cm at 02:00 o'clock, 7 cm from the nipple. Other complicated cysts within the upper outer breast demonstrate no detrimental change.  No suspicious left axillary lymph nodes.    Ultrasound-guided biopsy left breast lesion on 2022  Pathology:  Final Pathologic Diagnosis 1. Left breast mass (2 o'clock position), biopsy:       -  High-grade invasive carcinoma of no special type (ductal) with   associated tumor necrosis,          see comment     SYNOPTIC REPORT   PROCEDURE:  Biopsy   SPECIMEN LATERALITY:  Left breast   TUMOR SITE:  2 o'clock position   HISTOLOGIC TYPE:  Invasive carcinoma of no special type (ductal)   HISTOLOGIC GRADE:  Grade 3 of 3          TUBULE FORMATION:  3          PLEOMORPHISM:  3          MITOTIC RATE:  3   TUMOR SIZE:   At least 10 mm in greatest linear dimension   DUCTAL CARCINOMA IN SITU (DCIS):  Not identified   LYMPHOVASCULAR INVASION:  Not identified   MICROCALCIFICATIONS:  Not identified   BREAST BIOMARKERS (PERFORMED WITH APPROPRIATE CONTROLS):           ER:   Negative (0)           MI:   Negative (0)           HER2:  Negative (1+)           KI67:  Greater than 95%       2022 PET-CT without evidence of distant metastatic disease.     FDG avid left breast lesion consistent with the known malignancy.  No other sites of suspicious uptake identified.    Menarche 12 years old  ; 1 miscarriage, age of 1st pregnancy 16 years old  Postmenopausal last menstrual period at 44 years old s/p PIERRE/BSO  Exogenous estrogen for about 1 year last when 45yo.  No history of prior radiation  No prior breast biopsy    Family history notable for mother with either breast or ovarian cancer diagnosis 64 years old.    Cycle 1 day 1 neoadjuvant chemotherapy 07/15/2022        Review of Systems    ?   A comprehensive 14-point review of systems was reviewed with patient and was  negative other than as specified above.   ?     Objective:      Physical Exam      ?   Vitals:    07/22/22 1258   BP: (!) 123/91   Pulse: 74   Temp: 97.1 °F (36.2 °C)      ?   ECOG:?0   General appearance: Generally well appearing, in no acute distress.   Head, eyes, ears, nose, and throat: moist mucous membranes.   Respiratory:  Normal work of breathing  Abdomen: nontender, nondistended.   Extremities: Warm, without edema.   Neurologic: Alert and oriented. Grossly normal strength, coordination, and gait.   Skin: No rashes, ecchymoses or petechial lesion.   Psychiatric:  Normal mood and affect.    Labs    Lab Results   Component Value Date    WBC 8.73 07/22/2022    HGB 11.2 (L) 07/22/2022    HCT 35.2 (L) 07/22/2022    MCV 82 07/22/2022     07/22/2022         Chemistry        Component Value Date/Time     07/14/2022 1004    K 3.6 07/14/2022 1004     07/14/2022 1004    CO2 23 07/14/2022 1004    BUN 10 07/14/2022 1004    CREATININE 0.8 07/14/2022 1004    GLU 99 07/14/2022 1004        Component Value Date/Time    CALCIUM 9.9 07/14/2022 1004    ALKPHOS 94 07/14/2022 1004    AST 23 07/14/2022 1004    ALT 33 07/14/2022 1004    BILITOT 0.4 07/14/2022 1004    ESTGFRAFRICA >60 07/14/2022 1004    EGFRNONAA >60 07/14/2022 1004        Lab Results   Component Value Date    TSH 1.543 07/14/2022       Imaging:  ?    No results found for this or any previous visit (from the past 2160 hour(s)).  No results found for this or any previous visit (from the past 2160 hour(s)).  Results for orders placed or performed during the hospital encounter of 06/20/22 (from the past 2160 hour(s))   NM PET CT Routine Skull to Mid Thigh    Impression    FDG avid left breast lesion consistent with the known malignancy.  No other sites of suspicious uptake identified.      Electronically signed by: Zay Dickerson DO  Date:    06/20/2022  Time:    13:58         Pathology:    No visits with results within 1 Day(s) from this visit.  "  Latest known visit with results is:   Hospital Outpatient Visit on 06/09/2022   Component Date Value Ref Range Status    Final Pathologic Diagnosis 06/09/2022    Final                    Value:1. Left breast mass (2 o'clock position), biopsy:      -  High-grade invasive carcinoma of no special type (ductal) with  associated tumor necrosis,         see comment    SYNOPTIC REPORT  PROCEDURE:  Biopsy  SPECIMEN LATERALITY:  Left breast  TUMOR SITE:  2 o'clock position  HISTOLOGIC TYPE:  Invasive carcinoma of no special type (ductal)  HISTOLOGIC GRADE:  Grade 3 of 3         TUBULE FORMATION:  3         PLEOMORPHISM:  3         MITOTIC RATE:  3  TUMOR SIZE:   At least 10 mm in greatest linear dimension  DUCTAL CARCINOMA IN SITU (DCIS):  Not identified  LYMPHOVASCULAR INVASION:  Not identified  MICROCALCIFICATIONS:  Not identified  BREAST BIOMARKERS (PERFORMED WITH APPROPRIATE CONTROLS):          ER:   Negative (0)          MT:   Negative (0)          HER2:  Negative (1+)          KI67:  Greater than 95%      Gross 06/09/2022    Final                    Value:Patient ID/Pathology ID [id number]  In formalin, labeled "left breast 02:00 o'clock, in formalin at 08:40", is a  2.5 x 2.5 cm aggregate of pink-yellow needle biopsy cores.  Entirely  submitted in cassette [id number]  Ischemic time is not provided.  The fixation time is greater than 6 hrs and  less than 72 hrs.  A gross picture of the container and cassette is taken and  added to file.  Charly HOLGUIN (ASCP) cm      Comment 06/09/2022    Final                    Value:Immunohistochemical stain with appropriate control for p63 was performed and  shows loss of myoepithelial cells throughout the lesion, with no evidence of  ductal carcinoma in situ.  The patient's history of ovarian carcinoma is  noted.  Immunohistochemical stains for GATA3 and PAX8 appropriate controls  show tumor cell positivity for GATA3 and negativity for [place] 8, confirming  ductal (breast) " origin.  This case seen in consultation with Dr. Montesinos who agrees with the above  diagnosis.      Disclaimer 06/09/2022    Final                    Value:Unless the case is a 'gross only' or additional testing only, the final  diagnosis for each specimen is based on a microscopic examination of  appropriate tissue sections.  ER immunohistochemical staining (clone SP1, DAB detection method) is  performed on formalin-fixed, paraffin embedded tissue sections. The  percentage of cell nuclei stained and the strength of staining is reported  (weak, moderate, strong), using the 2010 ACSO/CAP scoring guidelines. Tumors  used for determing predictive markers are fixed in10% neutral buffered  formalin for 6-72 hours. It has been cleared by the U.S. FDA for use as an  IVD test. This assay has not been validated on decalcified tissues. Results  should be interpreted with caution given the likelihood of false negativity  on decalcified specimens.  HER-2/aston IHC (4B5) clone, DAB detection method) is done on 10% buffered  formalin-fixed (for 6-72 hrs), paraffin embedded tissue sections. The scoring  is completed on a 4-tiered scoring system of membran                          e staining using the 2014  ASCO/CAP scoring guidelines. It has been cleared by the U.S. FDA for use as  an IVD test. This assay has not been validated on decalcified tissues.  Results should be interpreted with caution given the likelihood of false  negativity on decalcified specimens.  PgR immunohistochemical staining (clone 1E2, DAB detection method) is  performed on formalin-fixed, paraffin embedded tissue sections. The  percentage of cell nuclei stained and the strength of staining is report  (weak, moderate, strong), using 2010 ASCO/CAP scoring guidelines. Tumors used  for determing predictive markers are fixed in 10% neutral buffered formalin  for 6-72 hours. It has been cleared by the U.S. FDA for use as an IVD test.  This assay has not been validated  on decalcified tissues. Results should be  interpreted with caution given the likelihood of false negativity on  decalcified specimens.            ?   Assessment/Plan:   Malignant neoplasm of upper-outer quadrant of left breast in female, estrogen receptor negative    Anemia in neoplastic disease       1. Malignant neoplasm of upper-outer quadrant of left breast in female, estrogen receptor negative    2. Anemia in neoplastic disease          Plan:     Problem List Items Addressed This Visit        Oncology    Malignant neoplasm of upper-outer quadrant of left breast in female, estrogen receptor negative - Primary      Other Visit Diagnoses     Anemia in neoplastic disease            Stage IB, cT1c cN0 cM0 left breast upper outer quadrant 2:00 a.m. invasive ductal carcinoma grade 3 ER/CA negative, HER2 negative:  - [facility] germline genetic testing negative   Clinical exam and mammogram/ultrasound without axillary adenopathy.  PET scan without evidence of distant metastatic disease.   We discussed that while early stage and T1c, higher risk features given triple negative and high grade 3 and high ki-67 >95% with noted growth over just 6 month interval recommendation for neoadjuavant approach with chemo immunotherapy per Guadalupe et al. NEJ 2020. She is healthy aside from prior hypothyroidism now not on supplement.  We did discuss risks and benefits of therapy and importance of re-evaluation with plan for surgery, radiation and further adjuvant therapy as indicated.  She expressed good understanding and agree with the plan for follow-up per below.    Contralateral breast biopsy benign    Cycle 1 day 8 today.  Labs notable for mild decline in hemoglobin mild anemia likely chemotherapy effect.  She does have notable new transaminitis.  Discussed differential including chemotherapy and/or immunotherapy effective will need close monitoring.  Will proceed with treatment today with close monitoring of transaminitis on day 8  before proceeding.    Follow-Up:   Labs reviewed okay to proceed with cycle 1 day 8 planned today  Revisit 1 week with CBC, CMP and day 18 planned, attention to follow-up on transaminitis of note

## 2022-07-22 NOTE — NURSING
"1545pm: Visited pt while receiving 2nd chemo infusion today at chairside alongside pt . Pt stated,"She was feeling fine she had some rough days post 1st treatment but was ok." Asked if there was anything pt needed at this time. Pt asked about financial update. Pt stated she didn't hear anything back regarding the request financial documents she submitted to [employer]GUY[employer] Soriano. I informed pt that she should have received a letter in the mail regarding denial. I retrieved the financial denial letter from [employer]GUY[employer] Soriano that was mailed, emailed, and sent via pt portal to pt. I showed pt letter and address at which it was sent pt didn't agree or deny correct address. I explained to pt she has reached the cap for financial status of bad/high unpaid debt and must be referred out to [facility] bc [facility] does not provide free care. Pt is not eligbile to receive [facility] FA or [place] Medicaid due to income. Pt has been on payment plan for over 6 mths for past balances but pt still doesn't pay. Dr. Hathaway, Stephie Lopez, Whit Hills, and [facility] are aware. Pt was asked to pay $200 today and pt stated,"She couldn't pay that amount today." Pt stated,"If I hadn't mentioned the finances would she had to pay $200 today. I explained to pt yes bc a comment for pt to speak to NGUYEN but pt avoid that by checking in car. Pt told me I was being the messenger and that [employer] Soriano wasn't doing their job. I explained to pt that at the beginning of conversation today pt told me that she had friction with NGUYEN Soriano over the phone so I told her to avoid a chance of pt/employee conflict amongst them in infusion suite in front of other pts, that's why I'm having the conversation with the pt as your assigned Oncology RN Navigator. I asked pt if she was willing to speak with NGUYEN Soriano in person at the moment. Pt stated,"Hell no I don't want to talk to her." I gave pt [employer]GUY[employer] Soriano, card. I informed pt we are aware of no payments made and the calls/attempts to " reach pt with no response/reply back.Pt asked if we can end this conversation. I told pt I'll have SWs to contact her. Pt asked that not be done. I told pt I'll have to speak with and update the Manager of our dept, Stephie. Latonia Card, or Arvind will handle going forward. My manager, Whit Hills has been aware of situation from the beginning and has continued to be updated.  Oncology Navigation   Intake  Date of Diagnosis: 6/9/2022  Cancer Type: Breast  Internal / External Referral: Internal  Referral Source: Chitra Gloria NP  Date of Referral: 6/14/2022  Initial Nurse Navigator Contact: 6/15/2022  Referral to Initial Contact Timeline (days): 1  Date Worked: 7/22/2022  Appointment Date: 6/22/2022  Schedule to Appointment Timeline (days): 7  Multiple appointments: Yes  Start of Treatment: 7/15/2022     Treatment  Current Status: Active  Date Presented to Tumor Board: 6/20/2022    Surgical Oncologist: Dr. Beau Quiles  Consult Date: 6/27/2022    Medical Oncologist: Dr. Maris Hathaway  Consult Date: 6/22/2022  Chemotherapy: Planned  Chemotherapy Regimen: Carboplatin Taxol Adriamycin Cytoxan  Immunotherapy: Planned  Immunotherapy Name: Keytruda    Radiation Oncologist: Dr. Zack Lee    Procedures: Genetic test  Biopsy Schedule Date: 6/9/2022  Echo Schedule Date: 7/1/2022  Genetic Testing Date Sent: 6/17/2022  PET Scan Schedule Date: 6/20/2022  Port / PICC Schedule Date: 7/1/2022    General Referrals: Social work  Social Work[place] [facility] [place]  Social Work Referral Date: 6/30/2022    ER: Negative  [place]: Negative  Her2: Negative    Radiation Oncologist: Dr. Zack Lee    Support Systems: Spouse/significant other     Acuity  Stage: 1  Systemic Treatment - predicted or initiated: Chemotherapy regimen with multiple drugs (+1)  Treatment Tolerability: Has not started treatment yet/treatment fully completed and side effects resolved  Comorbidities in Medical History: 2  Hospitalization Within the Past  Month: 0   Needed: 0  Transportation: 0  History of noncompliance/frequent no shows and cancellations: 0  Verbalizes the need for more education: 1  Navigation Acuity: 6     Follow Up  No follow-ups on file.

## 2022-07-25 ENCOUNTER — DOCUMENTATION ONLY (OUTPATIENT)
Dept: HEMATOLOGY/ONCOLOGY | Facility: CLINIC | Age: 47
End: 2022-07-25
Payer: COMMERCIAL

## 2022-07-25 NOTE — NURSING
1045am: Spoke with Director of Cancer Service Line, Latonia, regarding this pt and situation regarding pt financial status here at [facility]. Pt must be referred out. SWs requested to assist pt in transition of pt care outside of [facility] since pt will not commit to any payment plans. Email was sent in regards to this. Treating provider aware.   Oncology Navigation   Intake  Date of Diagnosis: 6/9/2022  Cancer Type: Breast  Internal / External Referral: Internal  Referral Source: Chitra Gloria NP  Date of Referral: 6/14/2022  Initial Nurse Navigator Contact: 6/15/2022  Referral to Initial Contact Timeline (days): 1  Date Worked: 7/25/2022  Appointment Date: 6/22/2022  Schedule to Appointment Timeline (days): 7  Multiple appointments: Yes  Start of Treatment: 7/15/2022     Treatment  Current Status: Active  Date Presented to Tumor Board: 6/20/2022    Surgical Oncologist: Dr. Beau Quiles  Consult Date: 6/27/2022    Medical Oncologist: Dr. Maris Hathaway  Consult Date: 6/22/2022  Chemotherapy: Planned  Chemotherapy Regimen: Carboplatin Taxol Adriamycin Cytoxan  Immunotherapy: Planned  Immunotherapy Name: Keytruda    Radiation Oncologist: Dr. Zack Lee    Procedures: Genetic test  Biopsy Schedule Date: 6/9/2022  Echo Schedule Date: 7/1/2022  Genetic Testing Date Sent: 6/17/2022  PET Scan Schedule Date: 6/20/2022  Port / PICC Schedule Date: 7/1/2022    General Referrals: Social work  Social Work[place] [email or web address] [facility] [place]  [place] Work Referral Date: 6/30/2022    ER: Negative  [place]: Negative  Her2: Negative    Radiation Oncologist: Dr. Zack Lee    Support Systems: Spouse/significant other     Acuity  Stage: 1  Systemic Treatment - predicted or initiated: Chemotherapy regimen with multiple drugs (+1)  Treatment Tolerability: Has not started treatment yet/treatment fully completed and side effects resolved  Comorbidities in Medical History: 2  Hospitalization Within the Past Month: 0   Needed:  0  Transportation: 0  History of noncompliance/frequent no shows and cancellations: 0  Verbalizes the need for more education: 1  Navigation Acuity: 6     Follow Up  No follow-ups on file.

## 2022-07-26 ENCOUNTER — TELEPHONE (OUTPATIENT)
Dept: HEMATOLOGY/ONCOLOGY | Facility: CLINIC | Age: 47
End: 2022-07-26
Payer: COMMERCIAL

## 2022-07-26 NOTE — TELEPHONE ENCOUNTER
Swer was consulted to assist with referring pt. out to another facility for treatment of her condition. Swer addressed the need to refer out. Swer inquired about pt. preference for referral. Pt. had several questions regarding the decision for referral and events that have lead up to my call. Swer was unable to provide answers to patient's questions and provided supportive listening. Pt. requested to speak with a supervisor regarding the referral and events leading up to today's phone call. Pt. did not disclose preference for referral and reported she is not interested in being referred out. Swer emailed supervisors regarding pt. request. Swer will remain available.

## 2022-07-26 NOTE — PROGRESS NOTES
History & Physical    SUBJECTIVE:     History of Present Illness:  Patient is a 46 y.o. female s/p port placement presents following stereotactic breast biopsy presents for pain from biopsy site. She reports some bruising from biopsy and feels may have a hematoma from increasing swelling but is concerned about the pain.    Initially referred for left breast invasive ductal carcinoma ER-/MS- Her2-. She denies any breast mass, prior breast surgery.  Menarche age 12,  1st at 16, prior hysterectomy, history of ovarian cancer in her mother    Chief Complaint   Patient presents with    Post-op Evaluation       Review of patient's allergies indicates:   Allergen Reactions    Sumatriptan Other (See Comments)     Chest tightness that moved into her throat       Current Outpatient Medications   Medication Sig Dispense Refill    butalbital-acetaminophen-caffeine -40 mg (FIORICET, ESGIC) -40 mg per tablet Take 1 tablet by mouth every 6 (six) hours as needed for Pain or Headaches. 30 tablet 0    dexAMETHasone (DECADRON) 4 MG Tab Take 2 tablets (8 mg total) by mouth once daily. Take 2 tablets (8 mg) by mouth once daily on days 2,3,4 following chemotherapy. 24 tablet 2    HYDROcodone-acetaminophen (NORCO)  mg per tablet Take 1 tablet by mouth every 6 (six) hours as needed (Pain). 20 tablet 0    OLANZapine (ZYPREXA) 5 MG tablet Take 1 tablet (5 mg total) by mouth every evening. Take 1 tablet by mouth every evening on days 1-4 of each chemotherapy cycle 30 tablet 1    ondansetron (ZOFRAN-ODT) 8 MG TbDL Take 1 tablet (8 mg total) by mouth every 8 (eight) hours as needed (nausea/vomiting). Take 1 tablet (8 mg) by mouth every 8 hours as needed for nausea/vomiting. 60 tablet 5    verapamiL (CALAN) 120 MG tablet TAKE 1 TABLET(120 MG) BY MOUTH EVERY DAY 90 tablet 3    atorvastatin (LIPITOR) 40 MG tablet Take 1 tablet (40 mg total) by mouth once daily. 30 tablet 11    LIDOcaine-prilocaine (EMLA) cream Apply  topically as needed. 5 g 0     No current facility-administered medications for this visit.     Facility-Administered Medications Ordered in Other Visits   Medication Dose Route Frequency Provider Last Rate Last Admin    lactated ringers infusion   Intravenous Continuous Li Pierson MD   Stopped at 07/01/22 4143       Past Medical History:   Diagnosis Date    Abnormal Pap smear 1996    Anemia     Hypertension     Hypokalemia     in pregnancy    Hypothyroidism (acquired) 12/28/2018    Malignant neoplasm of upper-outer quadrant of left breast in female, estrogen receptor negative 6/22/2022     Past Surgical History:   Procedure Laterality Date    CHOLECYSTECTOMY      COLONOSCOPY N/A 6/8/2022    Procedure: COLONOSCOPY;  Surgeon: Rosaline Meyer MD;  Location: [facility];  Service: Endoscopy;  Laterality: N/A;    COSMETIC SURGERY      tummy tuck    DIAGNOSTIC LAPAROSCOPY N/A 2/26/2019    Procedure: LAPAROSCOPY, DIAGNOSTIC;  Surgeon: Pia Aguila MD;  Location: [facility];  Service: OB/GYN;  Laterality: N/A;    FULGURATION OF ENDOMETRIOSIS N/A 2/26/2019    Procedure: FULGURATION, ENDOMETRIOSIS;  Surgeon: Pia Aguila MD;  Location: [facility] OR;  Service: OB/GYN;  Laterality: N/A;    HYSTERECTOMY      HYSTEROSCOPY WITH HYDROTHERMAL ABLATION OF ENDOMETRIUM WITH DILATION AND CURETTAGE N/A 2/26/2019    Procedure: HYSTEROSCOPY, WITH DILATION AND CURETTAGE OF UTERUS AND HYDROTHERMAL ENDOMETRIAL ABLATION;  Surgeon: Pia Aguila MD;  Location: [facility] OR;  Service: OB/GYN;  Laterality: N/A;    INSERTION OF TUNNELED CENTRAL VENOUS CATHETER (CVC) WITH SUBCUTANEOUS PORT N/A 7/1/2022    Procedure: [id number];  Surgeon: Beau Quiles MD;  Location: [facility] OR;  Service: General;  Laterality: N/A;    LAPAROSCOPIC DRAINAGE OF CYST OF OVARY Left 2/26/2019    Procedure: DRAINAGE, CYST, OVARY, LAPAROSCOPIC;  Surgeon: Pia Aguila MD;  Location: [facility];  Service:  OB/GYN;  Laterality: Left;    ROBOT-ASSISTED LAPAROSCOPIC ABDOMINAL HYSTERECTOMY USING DA BEAN XI N/A 11/5/2019    Procedure: XI ROBOTIC HYSTERECTOMY;  Surgeon: Pia Aguila MD;  Location: [facility] OR;  Service: OB/GYN;  Laterality: N/A;    ROBOT-ASSISTED LAPAROSCOPIC SALPINGO-OOPHORECTOMY USING DA BEAN XI Bilateral 11/5/2019    Procedure: XI ROBOTIC SALPINGO-OOPHORECTOMY;  Surgeon: Pia Aguila MD;  Location: [facility] OR;  Service: OB/GYN;  Laterality: Bilateral;    TUBAL LIGATION      Tummy Tuck       Family History   Problem Relation Age of Onset    Cancer Mother         origin? ovarian? metastasized    Cancer Father         throat?    No Known Problems Sister     Heart disease Maternal Grandmother     No Known Problems Maternal Grandfather     No Known Problems Daughter     No Known Problems Daughter     No Known Problems Son     No Known Problems Son     No Known Problems Son     No Known Problems Son     No Known Problems Brother     No Known Problems Other     No Known Problems Other     No Known Problems Other     No Known Problems Other     No Known Problems Other     No Known Problems Maternal Aunt     No Known Problems Sister      Social History     Tobacco Use    Smoking status: Never Smoker    Smokeless tobacco: Never Used   Substance Use Topics    Alcohol use: Not Currently     Alcohol/week: 5.0 standard drinks     Types: 5 Cans of beer per week     Comment: socially;  last 06/30/2022    Drug use: No        Review of Systems:  Review of Systems   Constitutional: Negative for activity change, appetite change, chills, diaphoresis, fatigue, fever and unexpected weight change.   HENT: Negative for congestion, dental problem, hearing loss, rhinorrhea, sore throat and trouble swallowing.    Eyes: Negative for discharge and visual disturbance.   Respiratory: Negative for cough, chest tightness, shortness of breath and wheezing.    Cardiovascular: Negative for chest  pain, palpitations and leg swelling.   Gastrointestinal: Negative for abdominal distention, abdominal pain, blood in stool, constipation, diarrhea, nausea and vomiting.   Endocrine: Negative for cold intolerance, heat intolerance, polydipsia, polyphagia and polyuria.   Genitourinary: Negative for difficulty urinating, dysuria, frequency, hematuria, menstrual problem and urgency.   Musculoskeletal: Negative for arthralgias, gait problem, joint swelling, myalgias and neck pain.   Skin: Negative for color change, pallor, rash and wound.   Neurological: Negative for dizziness, syncope, weakness, light-headedness, numbness and headaches.   Hematological: Negative for adenopathy. Does not bruise/bleed easily.   Psychiatric/Behavioral: Negative for confusion, decreased concentration, dysphoric mood and sleep disturbance. The patient is not nervous/anxious.        OBJECTIVE:     Vital Signs (Most Recent)              Physical Exam:  Physical Exam  Vitals reviewed.   Constitutional:       General: She is not in acute distress.     Appearance: She is well-developed. She is not diaphoretic.   HENT:      Head: Normocephalic and atraumatic.      Right Ear: External ear normal.      Left Ear: External ear normal.   Eyes:      General: No scleral icterus.     Conjunctiva/sclera: Conjunctivae normal.      Pupils: Pupils are equal, round, and reactive to light.   Neck:      Thyroid: No thyromegaly.      Trachea: No tracheal deviation.   Cardiovascular:      Rate and Rhythm: Normal rate and regular rhythm.      Heart sounds: Normal heart sounds. No murmur heard.    No friction rub. No gallop.   Pulmonary:      Effort: Pulmonary effort is normal. No respiratory distress.      Breath sounds: Normal breath sounds. No wheezing or rales.   Chest:      Chest wall: No tenderness.   Breasts:      Right: No inverted nipple, mass, nipple discharge, skin change or tenderness.      Left: No inverted nipple, mass, nipple discharge, skin change or  tenderness.         Abdominal:      General: Bowel sounds are normal. There is no distension.      Palpations: Abdomen is soft.      Tenderness: There is no abdominal tenderness.      Hernia: No hernia is present.   Musculoskeletal:         General: No tenderness or deformity. Normal range of motion.      Cervical back: Normal range of motion and neck supple.   Lymphadenopathy:      Cervical: No cervical adenopathy.   Skin:     General: Skin is warm and dry.      Coloration: Skin is not pale.      Findings: No erythema or rash.   Neurological:      Mental Status: She is alert and oriented to person, place, and time.   Psychiatric:         Behavior: Behavior normal.         Thought Content: Thought content normal.         Judgment: Judgment normal.            Diagnostic Results:  Result:   Mammo Digital Diagnostic Bilat with Pablito  US Breast Bilateral Limited     History:  Patient is 46 y.o. and is seen for diagnostic imaging.     Films Compared:  Prior images (if available) were compared.     Findings:  This procedure was performed using tomosynthesis. Computer-aided detection was utilized in the interpretation of this examination.  The breasts have scattered areas of fibroglandular density.      Right breast: No detrimental mammographic change. Well-circumscribed lesion within the lower outer posterior breast is unchanged mammographically. 8.6 mm complicated cyst present at 08:00 o'clock, 12 cm from the nipple and corresponds to the stable mammographic finding.       Follow-up imaging of the previously noted central lesions demonstrate no detrimental change.  8 mm complicated cyst noted at 04:00 o'clock, 4 cm from the nipple.         Left breast: Interval increase in size of an upper outer quadrant mass on mammogram. On ultrasound there is an enlarging complicated cystic lesion measuring 1.3 cm at 02:00 o'clock, 7 cm from the nipple. Other complicated cysts within the upper outer breast demonstrate no detrimental  change.  No suspicious left axillary lymph nodes.        Impression:  Suspicious left breast lesion, 1.3 cm 2:00, warranting ultrasound guided biopsy.      Follow-up recommended of the complicated cyst within the right breast as well as within the upper outer left breast in 6 months.      BI-RADS Category:   Overall: 4 - Suspicious     Recommendation:  Ultrasound Biopsy is recommended.        Your estimated lifetime risk of breast cancer (to age 85) based on Tyrer-Cuzick risk assessment model is Tyrer-Cuzick: 5.9 %. According to the [employer], patients with a lifetime breast cancer risk of 20% or higher might benefit from supplemental screening tests.    PET CT:  Impression:  FDG avid left breast lesion consistent with the known malignancy.  No other sites of suspicious uptake identified.    Final Pathologic Diagnosis 1. Left breast mass (2 o'clock position), biopsy:       -  High-grade invasive carcinoma of no special type (ductal) with   associated tumor necrosis,          see comment     SYNOPTIC REPORT   PROCEDURE:  Biopsy   SPECIMEN LATERALITY:  Left breast   TUMOR SITE:  2 o'clock position   HISTOLOGIC TYPE:  Invasive carcinoma of no special type (ductal)   HISTOLOGIC GRADE:  Grade 3 of 3          TUBULE FORMATION:  3          PLEOMORPHISM:  3          MITOTIC RATE:  3   TUMOR SIZE:   At least 10 mm in greatest linear dimension   DUCTAL CARCINOMA IN SITU (DCIS):  Not identified   LYMPHOVASCULAR INVASION:  Not identified   MICROCALCIFICATIONS:  Not identified   BREAST BIOMARKERS (PERFORMED WITH APPROPRIATE CONTROLS):           ER:   Negative (0)           AZ:   Negative (0)           HER2:  Negative (1+)           KI67:  Greater than 95%          ASSESSMENT/PLAN:     46-year-old female with left breast invasive ductal carcinoma ER-/AZ- Her2-  S/p port placement  Left breast pain following biopsy    PLAN:Plan     Left breast pain related to stereotactic core biopsy likely small underlying hematoma  associated with site  Rx pain meds, compression  Should improve with conservative mgmt  Patient will follow up if worsens  Call with biopsy results    Following neoadjuvant chemotherapy Patient is interested in a left lumpectomy with SLNbx possible ALND should her genetic testing come back negative; discussed alternative surgical options including mastectomy with and without reconstruction; potential need for further treatment including adjuvant XRT, hormone therapy  Risks and benefits discussed with patient including but not limited to: pain, bleeding, infection, positive margins, injury to underlying nerves or vessels, lymphedema,parathesias, need for further surgery  45 minutes of total time spent on the encounter, which includes face to face time and non-face to face time preparing to see the patient (eg, review of tests), Obtaining and/or reviewing separately obtained history, Documenting clinical information in the electronic or other health record, Independently interpreting results (not separately reported) and communicating results to the patient/family/caregiver, or Care coordination (not separately reported).

## 2022-07-27 ENCOUNTER — PATIENT MESSAGE (OUTPATIENT)
Dept: SURGERY | Facility: CLINIC | Age: 47
End: 2022-07-27
Payer: COMMERCIAL

## 2022-07-29 ENCOUNTER — OFFICE VISIT (OUTPATIENT)
Dept: HEMATOLOGY/ONCOLOGY | Facility: CLINIC | Age: 47
End: 2022-07-29
Payer: COMMERCIAL

## 2022-07-29 ENCOUNTER — HOSPITAL ENCOUNTER (OUTPATIENT)
Dept: RADIOLOGY | Facility: HOSPITAL | Age: 47
Discharge: HOME | End: 2022-07-29
Attending: INTERNAL MEDICINE
Payer: COMMERCIAL

## 2022-07-29 VITALS
HEART RATE: 79 BPM | WEIGHT: 182.13 LBS | BODY MASS INDEX: 31.09 KG/M2 | DIASTOLIC BLOOD PRESSURE: 79 MMHG | OXYGEN SATURATION: 100 % | HEIGHT: 64 IN | TEMPERATURE: 98 F | SYSTOLIC BLOOD PRESSURE: 129 MMHG

## 2022-07-29 DIAGNOSIS — C50.412 MALIGNANT NEOPLASM OF UPPER-OUTER QUADRANT OF LEFT BREAST IN FEMALE, ESTROGEN RECEPTOR NEGATIVE: ICD-10-CM

## 2022-07-29 DIAGNOSIS — Z17.1 MALIGNANT NEOPLASM OF UPPER-OUTER QUADRANT OF LEFT BREAST IN FEMALE, ESTROGEN RECEPTOR NEGATIVE: ICD-10-CM

## 2022-07-29 DIAGNOSIS — R74.01 TRANSAMINITIS: ICD-10-CM

## 2022-07-29 DIAGNOSIS — R74.01 TRANSAMINITIS: Primary | ICD-10-CM

## 2022-07-29 DIAGNOSIS — D63.0 ANEMIA IN NEOPLASTIC DISEASE: ICD-10-CM

## 2022-07-29 PROCEDURE — 99215 OFFICE O/P EST HI 40 MIN: CPT | Mod: S$GLB,,, | Performed by: INTERNAL MEDICINE

## 2022-07-29 PROCEDURE — 76705 ECHO EXAM OF ABDOMEN: CPT | Mod: 26,,, | Performed by: RADIOLOGY

## 2022-07-29 PROCEDURE — 99215 PR OFFICE/OUTPT VISIT, EST, LEVL V, 40-54 MIN: ICD-10-PCS | Mod: S$GLB,,, | Performed by: INTERNAL MEDICINE

## 2022-07-29 PROCEDURE — 99999 PR PBB SHADOW E&M-EST. PATIENT-LVL IV: CPT | Mod: PBBFAC,,, | Performed by: INTERNAL MEDICINE

## 2022-07-29 PROCEDURE — 76705 US ABDOMEN LIMITED_LIVER: ICD-10-PCS | Mod: 26,,, | Performed by: RADIOLOGY

## 2022-07-29 PROCEDURE — 99999 PR PBB SHADOW E&M-EST. PATIENT-LVL IV: ICD-10-PCS | Mod: PBBFAC,,, | Performed by: INTERNAL MEDICINE

## 2022-07-29 PROCEDURE — 76705 ECHO EXAM OF ABDOMEN: CPT | Mod: TC

## 2022-07-29 NOTE — PATIENT INSTRUCTIONS
Holding treatment today  Revisit 1 week with CBC CMP and tentative cycle 1 day 15 rescheduled  Liver ultrasound today  Hepatology consult

## 2022-07-29 NOTE — PROGRESS NOTES
Subjective:      DATE OF VISIT: 7/29/22     ?  Patient ID:Jaison Noel is a 46 y.o. female.?? MR#: [id number]   ?   PRIMARY ONCOLOGIST: Dr. Hathaway    ? Primary Care Providers:  Alesha Alonso MD, MD (General)     PRIMARY ONCOLOGIST: Dr. Hathaway    CHIEF COMPLAINT:  Plan for chemotherapy tomorrow??   ?   ONCOLOGIC DIAGNOSIS:  Stage IB, cT1c, cN0, cM0 left breast upper outer quadrant 2:00 a.m. invasive ductal carcinoma grade 3 ER/AR negative, HER2 negative  ?   CURRENT TREATMENT:  neoadjuvant chemotherapy, carboplatin paclitaxel pembrolizumab followed by doxorubicin cyclophosphamide pembrolizumab    PAST TREATMENT:  Biopsy only  ?   INTERVAL EVENTS:    She has been doing better with p.o. intake.  Minimal nausea.  She does have a headache today.  No other new symptoms or concerns.    ONCOLOGIC HISTORY:     Ms. Noel is a 46-year-old woman with hypothyroidism and hypertension.  She presented for routine screening mammogram 11/30/2021 showing left breast mass upper outer posterior position.    12/03/2021 diagnostic mammogram and ultrasound  Left  Mammo Digital Diagnostic Bilat with [place]  There is a mass seen in the upper outer quadrant of the left breast in the posterior depth.      US Breast Bilateral Limited  There are 3 similar complicated cysts seen in the upper outer quadrant of the left breast in the posterior depth. The largest cyst is thought to be the correlate and measures 0.7 x 0.5 x 0.5 cm.  There are 2 additional complex cysts also seen in the upper outer quadrant of the left breast that measure 0.4 x 0.2 x 0.3 cm and 0.6 x 0.3 x 0.4 cm.      Right  Mammo Digital Diagnostic Bilat with [place]  There is a mass seen in the lower central region of the right breast in the posterior depth. The posterior margin of this lesion is not seen on mammography.  The lesion appears to abut and overlie the pectoralis musculature.  The lesion appears to measure at least 9 mm in size.      US Breast Bilateral Limited  There is  a mass seen in the lower central region of the right breast. A definitive correlate is not definitely seen on ultrasound.  There is a 6 mm hypoechoic lesion likely representing a complex cyst within the central portion of the breast that appears to be too small to be the correlate for the mammographic finding.  There is an additional 1.0 x 0.4 x 0.6 cm hypoechoic area that is located within a ridge of tissue and also does not definitively correlate to the lesion.  It is possible this lesion was present on prior studies although only space visualized on the most recent study due to increased tissue included on the MLO view.  The visualized margins of the lesion also appear to be fairly smooth and therefore the lesion can be followed.      Impression:  Left  Mass: Left breast mass at the upper outer posterior position. Assessment: 3 - Probably benign. Short Interval Follow-Up in 6 Months is recommended.      Right  Mass: Right breast mass at the lower central posterior position. Assessment: 3 - Probably benign. Short Interval Follow-Up in 6 Months is recommended.      BI-RADS Category:   Overall: 3 - Probably Benign    Repeat evaluation with diagnostic mammogram and ultrasound on 05/24/2022 notable for a suspicious left breast lesion 1.3 cm 2:00 a.m.    Right breast: No detrimental mammographic change. Well-circumscribed lesion within the lower outer posterior breast is unchanged mammographically. 8.6 mm complicated cyst present at 08:00 o'clock, 12 cm from the nipple and corresponds to the stable mammographic finding.       Follow-up imaging of the previously noted central lesions demonstrate no detrimental change.  8 mm complicated cyst noted at 04:00 o'clock, 4 cm from the nipple.         Left breast: Interval increase in size of an upper outer quadrant mass on mammogram. On ultrasound there is an enlarging complicated cystic lesion measuring 1.3 cm at 02:00 o'clock, 7 cm from the nipple. Other complicated cysts  within the upper outer breast demonstrate no detrimental change.  No suspicious left axillary lymph nodes.    Ultrasound-guided biopsy left breast lesion on 2022  Pathology:  Final Pathologic Diagnosis 1. Left breast mass (2 o'clock position), biopsy:       -  High-grade invasive carcinoma of no special type (ductal) with   associated tumor necrosis,          see comment     SYNOPTIC REPORT   PROCEDURE:  Biopsy   SPECIMEN LATERALITY:  Left breast   TUMOR SITE:  2 o'clock position   HISTOLOGIC TYPE:  Invasive carcinoma of no special type (ductal)   HISTOLOGIC GRADE:  Grade 3 of 3          TUBULE FORMATION:  3          PLEOMORPHISM:  3          MITOTIC RATE:  3   TUMOR SIZE:   At least 10 mm in greatest linear dimension   DUCTAL CARCINOMA IN SITU (DCIS):  Not identified   LYMPHOVASCULAR INVASION:  Not identified   MICROCALCIFICATIONS:  Not identified   BREAST BIOMARKERS (PERFORMED WITH APPROPRIATE CONTROLS):           ER:   Negative (0)           OH:   Negative (0)           HER2:  Negative (1+)           KI67:  Greater than 95%       2022 PET-CT without evidence of distant metastatic disease.     FDG avid left breast lesion consistent with the known malignancy.  No other sites of suspicious uptake identified.    Menarche 12 years old  ; 1 miscarriage, age of 1st pregnancy 16 years old  Postmenopausal last menstrual period at 44 years old s/p PIERRE/BSO  Exogenous estrogen for about 1 year last when 43yo.  No history of prior radiation  No prior breast biopsy    Family history notable for mother with either breast or ovarian cancer diagnosis 64 years old.    Cycle 1 day 1 neoadjuvant chemotherapy 07/15/2022        Review of Systems    ?   A comprehensive 14-point review of systems was reviewed with patient and was negative other than as specified above.   ?     Objective:      Physical Exam      ?   Vitals:    22 1031   BP: 129/79   Pulse: 79   Temp: 97.7 °F (36.5 °C)      ?   General appearance:  Generally well appearing, in no acute distress.   Head, eyes, ears, nose, and throat: Oropharynx clear with moist mucous membranes.   Cardiovascular: Regular rate and rhythm, S1, S2, no audible murmurs.   Respiratory: Lungs clear to auscultation bilaterally.   Abdomen: nontender, nondistended.   Extremities: Warm, without edema.   Neurologic: Alert and oriented.  Skin: No rashes, ecchymoses or petechial lesion.   Psychiatric: normal mood and affect, conversant and appropriate    Labs    Lab Results   Component Value Date    WBC 9.74 07/29/2022    HGB 10.5 (L) 07/29/2022    HCT 32.7 (L) 07/29/2022    MCV 82 07/29/2022     07/29/2022         Chemistry        Component Value Date/Time     07/29/2022 1006    K 3.5 07/29/2022 1006     07/29/2022 1006    CO2 27 07/29/2022 1006    BUN 6 07/29/2022 1006    CREATININE 0.7 07/29/2022 1006    GLU 87 07/29/2022 1006        Component Value Date/Time    CALCIUM 9.0 07/29/2022 1006    ALKPHOS 110 07/29/2022 1006     (H) 07/29/2022 1006     (H) 07/29/2022 1006    BILITOT 0.3 07/29/2022 1006    ESTGFRAFRICA >60 07/29/2022 1006    EGFRNONAA >60 07/29/2022 1006        Lab Results   Component Value Date    TSH 1.543 07/14/2022       Imaging:  ?    No results found for this or any previous visit (from the past 2160 hour(s)).  No results found for this or any previous visit (from the past 2160 hour(s)).  Results for orders placed or performed during the hospital encounter of 06/20/22 (from the past 2160 hour(s))   NM PET CT Routine Skull to Mid Thigh    Impression    FDG avid left breast lesion consistent with the known malignancy.  No other sites of suspicious uptake identified.      Electronically signed by: Zay Dickerson DO  Date:    06/20/2022  Time:    13:58         Pathology:    No visits with results within 1 Day(s) from this visit.   Latest known visit with results is:   Hospital Outpatient Visit on 06/09/2022   Component Date Value Ref Range  "Status    Final Pathologic Diagnosis 06/09/2022    Final                    Value:1. Left breast mass (2 o'clock position), biopsy:      -  High-grade invasive carcinoma of no special type (ductal) with  associated tumor necrosis,         see comment    SYNOPTIC REPORT  PROCEDURE:  Biopsy  SPECIMEN LATERALITY:  Left breast  TUMOR SITE:  2 o'clock position  HISTOLOGIC TYPE:  Invasive carcinoma of no special type (ductal)  HISTOLOGIC GRADE:  Grade 3 of 3         TUBULE FORMATION:  3         PLEOMORPHISM:  3         MITOTIC RATE:  3  TUMOR SIZE:   At least 10 mm in greatest linear dimension  DUCTAL CARCINOMA IN SITU (DCIS):  Not identified  LYMPHOVASCULAR INVASION:  Not identified  MICROCALCIFICATIONS:  Not identified  BREAST BIOMARKERS (PERFORMED WITH APPROPRIATE CONTROLS):          ER:   Negative (0)          RI:   Negative (0)          HER2:  Negative (1+)          KI67:  Greater than 95%      Gross 06/09/2022    Final                    Value:Patient ID/Pathology ID [id number]  In formalin, labeled "left breast 02:00 o'clock, in formalin at 08:40", is a  2.5 x 2.5 cm aggregate of pink-yellow needle biopsy cores.  Entirely  submitted in cassette RVU--1-A  Ischemic time is not provided.  The fixation time is greater than 6 hrs and  less than 72 hrs.  A gross picture of the container and cassette is taken and  added to file.  Charly HOLGUIN (ASCP) cm      Comment 06/09/2022    Final                    Value:Immunohistochemical stain with appropriate control for p63 was performed and  shows loss of myoepithelial cells throughout the lesion, with no evidence of  ductal carcinoma in situ.  The patient's history of ovarian carcinoma is  noted.  Immunohistochemical stains for GATA3 and PAX8 appropriate controls  show tumor cell positivity for GATA3 and negativity for [place] 8, confirming  ductal (breast) origin.  This case seen in consultation with Dr. Montesinos who agrees with the above  diagnosis.      Disclaimer " 06/09/2022    Final                    Value:Unless the case is a 'gross only' or additional testing only, the final  diagnosis for each specimen is based on a microscopic examination of  appropriate tissue sections.  ER immunohistochemical staining (clone SP1, DAB detection method) is  performed on formalin-fixed, paraffin embedded tissue sections. The  percentage of cell nuclei stained and the strength of staining is reported  (weak, moderate, strong), using the 2010 ACSO/CAP scoring guidelines. Tumors  used for determing predictive markers are fixed in10% neutral buffered  formalin for 6-72 hours. It has been cleared by the [place] FDA for use as an  IVD test. This assay has not been validated on decalcified tissues. Results  should be interpreted with caution given the likelihood of false negativity  on decalcified specimens.  HER-2/aston IHC (4B5) clone, DAB detection method) is done on 10% buffered  formalin-fixed (for 6-72 hrs), paraffin embedded tissue sections. The scoring  is completed on a 4-tiered scoring system of membran                          e staining using the 2014  ASCO/CAP scoring guidelines. It has been cleared by the [place] FDA for use as  an IVD test. This assay has not been validated on decalcified tissues.  Results should be interpreted with caution given the likelihood of false  negativity on decalcified specimens.  PgR immunohistochemical staining (clone 1E2, DAB detection method) is  performed on formalin-fixed, paraffin embedded tissue sections. The  percentage of cell nuclei stained and the strength of staining is report  (weak, moderate, strong), using 2010 ASCO/CAP scoring guidelines. Tumors used  for determing predictive markers are fixed in 10% neutral buffered formalin  for 6-72 hours. It has been cleared by the [place] FDA for use as an IVD test.  This assay has not been validated on decalcified tissues. Results should be  interpreted with caution given the likelihood of false negativity  on  decalcified specimens.            ?   Assessment/Plan:   Transaminitis  -     US Abdomen Limited_Liver; Future; Expected date: 07/29/2022    Malignant neoplasm of upper-outer quadrant of left breast in female, estrogen receptor negative    Anemia in neoplastic disease       1. Transaminitis    2. Malignant neoplasm of upper-outer quadrant of left breast in female, estrogen receptor negative    3. Anemia in neoplastic disease          Plan:     Problem List Items Addressed This Visit        Oncology    Malignant neoplasm of upper-outer quadrant of left breast in female, estrogen receptor negative      Other Visit Diagnoses     Transaminitis    -  Primary    Anemia in neoplastic disease            Stage IB, cT1c cN0 cM0 left breast upper outer quadrant 2:00 a.m. invasive ductal carcinoma grade 3 ER/TN negative, HER2 negative:  - [facility] germline genetic testing negative   Clinical exam and mammogram/ultrasound without axillary adenopathy.  PET scan without evidence of distant metastatic disease.   We discussed that while early stage and T1c, higher risk features given triple negative and high grade 3 and high ki-67 >95% with noted growth over just 6 month interval recommendation for neoadjuavant approach with chemo immunotherapy per Guadalupe et al. [facility] 2020. She is healthy aside from prior hypothyroidism now not on supplement.  We did discuss risks and benefits of therapy and importance of re-evaluation with plan for surgery, radiation and further adjuvant therapy as indicated.  She expressed good understanding and agree with the plan for follow-up per below.    Contralateral breast biopsy benign    Cycle 1 day 15 plan today however labs show progressive transaminitis and will hold therapy, liver ultrasound, hepatology consult.  We discussed potential chemo/immunotherapy toxicity and will need to hold treatment follow-up labs next week and potential treatment if immunotherapy toxicity may consider steroid  treatment.  May require alteration regimen.    Anemia progressive likely chemotherapy effect.  Continue monitor.      Follow-Up:   Holding treatment today  Revisit 1 week with CBC CMP and tentative cycle 1 day 15 rescheduled  Liver ultrasound today  Hepatology consult

## 2022-08-01 ENCOUNTER — OFFICE VISIT (OUTPATIENT)
Dept: SURGERY | Facility: CLINIC | Age: 47
End: 2022-08-01
Payer: COMMERCIAL

## 2022-08-01 VITALS
HEART RATE: 75 BPM | BODY MASS INDEX: 31.18 KG/M2 | TEMPERATURE: 97 F | SYSTOLIC BLOOD PRESSURE: 138 MMHG | WEIGHT: 181.69 LBS | DIASTOLIC BLOOD PRESSURE: 91 MMHG

## 2022-08-01 DIAGNOSIS — C50.412 MALIGNANT NEOPLASM OF UPPER-OUTER QUADRANT OF LEFT BREAST IN FEMALE, ESTROGEN RECEPTOR NEGATIVE: Primary | ICD-10-CM

## 2022-08-01 DIAGNOSIS — Z17.1 MALIGNANT NEOPLASM OF UPPER-OUTER QUADRANT OF LEFT BREAST IN FEMALE, ESTROGEN RECEPTOR NEGATIVE: Primary | ICD-10-CM

## 2022-08-01 PROCEDURE — 99999 PR PBB SHADOW E&M-EST. PATIENT-LVL III: CPT | Mod: PBBFAC,,, | Performed by: SURGERY

## 2022-08-01 PROCEDURE — 99212 OFFICE O/P EST SF 10 MIN: CPT | Mod: 24,S$GLB,, | Performed by: SURGERY

## 2022-08-01 PROCEDURE — 99999 PR PBB SHADOW E&M-EST. PATIENT-LVL III: ICD-10-PCS | Mod: PBBFAC,,, | Performed by: SURGERY

## 2022-08-01 PROCEDURE — 99212 PR OFFICE/OUTPT VISIT, EST, LEVL II, 10-19 MIN: ICD-10-PCS | Mod: 24,S$GLB,, | Performed by: SURGERY

## 2022-08-05 ENCOUNTER — INFUSION (OUTPATIENT)
Dept: INFUSION THERAPY | Facility: HOSPITAL | Age: 47
End: 2022-08-05
Attending: INTERNAL MEDICINE
Payer: COMMERCIAL

## 2022-08-05 ENCOUNTER — OFFICE VISIT (OUTPATIENT)
Dept: HEMATOLOGY/ONCOLOGY | Facility: CLINIC | Age: 47
End: 2022-08-05
Payer: COMMERCIAL

## 2022-08-05 VITALS
SYSTOLIC BLOOD PRESSURE: 132 MMHG | DIASTOLIC BLOOD PRESSURE: 90 MMHG | RESPIRATION RATE: 16 BRPM | OXYGEN SATURATION: 98 % | TEMPERATURE: 98 F | HEART RATE: 82 BPM

## 2022-08-05 VITALS
DIASTOLIC BLOOD PRESSURE: 95 MMHG | WEIGHT: 177.25 LBS | SYSTOLIC BLOOD PRESSURE: 136 MMHG | HEIGHT: 64 IN | OXYGEN SATURATION: 99 % | HEART RATE: 77 BPM | BODY MASS INDEX: 30.26 KG/M2 | TEMPERATURE: 98 F

## 2022-08-05 DIAGNOSIS — C50.412 MALIGNANT NEOPLASM OF UPPER-OUTER QUADRANT OF LEFT BREAST IN FEMALE, ESTROGEN RECEPTOR NEGATIVE: Primary | ICD-10-CM

## 2022-08-05 DIAGNOSIS — D63.0 ANEMIA IN NEOPLASTIC DISEASE: ICD-10-CM

## 2022-08-05 DIAGNOSIS — R74.01 TRANSAMINITIS: ICD-10-CM

## 2022-08-05 DIAGNOSIS — Z17.1 MALIGNANT NEOPLASM OF UPPER-OUTER QUADRANT OF LEFT BREAST IN FEMALE, ESTROGEN RECEPTOR NEGATIVE: Primary | ICD-10-CM

## 2022-08-05 PROCEDURE — 96367 TX/PROPH/DG ADDL SEQ IV INF: CPT

## 2022-08-05 PROCEDURE — 99215 OFFICE O/P EST HI 40 MIN: CPT | Mod: S$GLB,,, | Performed by: INTERNAL MEDICINE

## 2022-08-05 PROCEDURE — 99215 PR OFFICE/OUTPT VISIT, EST, LEVL V, 40-54 MIN: ICD-10-PCS | Mod: S$GLB,,, | Performed by: INTERNAL MEDICINE

## 2022-08-05 PROCEDURE — 63600175 PHARM REV CODE 636 W HCPCS: Performed by: INTERNAL MEDICINE

## 2022-08-05 PROCEDURE — 99999 PR PBB SHADOW E&M-EST. PATIENT-LVL III: CPT | Mod: PBBFAC,,, | Performed by: INTERNAL MEDICINE

## 2022-08-05 PROCEDURE — 96413 CHEMO IV INFUSION 1 HR: CPT

## 2022-08-05 PROCEDURE — 25000003 PHARM REV CODE 250: Performed by: INTERNAL MEDICINE

## 2022-08-05 PROCEDURE — 96375 TX/PRO/DX INJ NEW DRUG ADDON: CPT

## 2022-08-05 PROCEDURE — 99999 PR PBB SHADOW E&M-EST. PATIENT-LVL III: ICD-10-PCS | Mod: PBBFAC,,, | Performed by: INTERNAL MEDICINE

## 2022-08-05 RX ORDER — HEPARIN 100 UNIT/ML
500 SYRINGE INTRAVENOUS
Status: DISCONTINUED | OUTPATIENT
Start: 2022-08-05 | End: 2022-08-05 | Stop reason: HOSPADM

## 2022-08-05 RX ORDER — FAMOTIDINE 10 MG/ML
20 INJECTION INTRAVENOUS
Status: CANCELLED | OUTPATIENT
Start: 2022-08-05

## 2022-08-05 RX ORDER — SODIUM CHLORIDE 0.9 % (FLUSH) 0.9 %
10 SYRINGE (ML) INJECTION
Status: CANCELLED | OUTPATIENT
Start: 2022-08-05

## 2022-08-05 RX ORDER — HEPARIN 100 UNIT/ML
500 SYRINGE INTRAVENOUS
Status: CANCELLED | OUTPATIENT
Start: 2022-08-05

## 2022-08-05 RX ORDER — SODIUM CHLORIDE 0.9 % (FLUSH) 0.9 %
10 SYRINGE (ML) INJECTION
Status: DISCONTINUED | OUTPATIENT
Start: 2022-08-05 | End: 2022-08-05 | Stop reason: HOSPADM

## 2022-08-05 RX ORDER — FAMOTIDINE 10 MG/ML
20 INJECTION INTRAVENOUS
Status: COMPLETED | OUTPATIENT
Start: 2022-08-05 | End: 2022-08-05

## 2022-08-05 RX ORDER — EPINEPHRINE 0.3 MG/.3ML
0.3 INJECTION SUBCUTANEOUS ONCE AS NEEDED
Status: CANCELLED | OUTPATIENT
Start: 2022-08-05

## 2022-08-05 RX ORDER — DIPHENHYDRAMINE HYDROCHLORIDE 50 MG/ML
50 INJECTION INTRAMUSCULAR; INTRAVENOUS ONCE AS NEEDED
Status: CANCELLED | OUTPATIENT
Start: 2022-08-05

## 2022-08-05 RX ADMIN — HEPARIN 500 UNITS: 100 SYRINGE at 11:08

## 2022-08-05 RX ADMIN — DEXAMETHASONE SODIUM PHOSPHATE 10 MG: 4 INJECTION, SOLUTION INTRA-ARTICULAR; INTRALESIONAL; INTRAMUSCULAR; INTRAVENOUS; SOFT TISSUE at 09:08

## 2022-08-05 RX ADMIN — SODIUM CHLORIDE: 0.9 INJECTION, SOLUTION INTRAVENOUS at 09:08

## 2022-08-05 RX ADMIN — PACLITAXEL 150 MG: 6 INJECTION, SOLUTION INTRAVENOUS at 10:08

## 2022-08-05 RX ADMIN — FAMOTIDINE 20 MG: 10 INJECTION INTRAVENOUS at 09:08

## 2022-08-05 RX ADMIN — DIPHENHYDRAMINE HYDROCHLORIDE 50 MG: 50 INJECTION, SOLUTION INTRAMUSCULAR; INTRAVENOUS at 09:08
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
## 2022-08-05 NOTE — DISCHARGE INSTRUCTIONS
.[facility]  [address] [facility]  [address]  [phone] phone     [phone] fax  Hours of Operation: Monday- Friday 8:00am- 5:00pm  After hours phone  [phone]  Hematology / Oncology Physicians on call    Dr. Harshil Last      Nurse Practitioners:    Chitra Gloria, JAYMIE Lechuga, JAYMIE Storm, JAYMIE Newton, JAYMIE Pena, JAYMIE Alcocer, PA      Please don't hesitate to call if you have any concerns.     .WAYS TO HELP PREVENT INFECTION        WASH YOUR HANDS OFTEN DURING THE DAY, ESPECIALLY BEFORE YOU EAT, AFTER USING THE BATHROOM, AND AFTER TOUCHING ANIMALS    STAY AWAY FROM PEOPLE WHO HAVE ILLNESSES YOU CAN CATCH; SUCH AS COLDS, FLU, CHICKEN POX    TRY TO AVOID CROWDS    STAY AWAY FROM CHILDREN WHO RECENTLY HAVE RECEIVED LIVE VIRUS VACCINES    MAINTAIN GOOD MOUTH CARE    DO NOT SQUEEZE OR SCRATCH PIMPLES    CLEAN CUTS & SCRAPES RIGHT AWAY AND DAILY UNTIL HEALED WITH WARM WATER, SOAP & AN ANTISEPTIC    AVOID CONTACT WITH LITTER BOXES, BIRD CAGES, & FISH TANKS    AVOID STANDING WATER, IE., BIRD BATHS, FLOWER POTS/VASES, OR HUMIDIFIERS    WEAR GLOVES WHEN GARDENING OR CLEANING UP AFTER OTHERS, ESPECIALLY BABIES & SMALL CHILDREN    DO NOT EAT RAW FISH, SEAFOOD, MEAT, OR EGGS     .FALL PREVENTION   Falls often occur due to slipping, tripping or losing your balance. Here are ways to reduce your risk of falling again.   Was there anything that caused your fall that can be fixed, removed or replaced?   Make your home safe by keeping walkways clear of objects you may trip over.   Use non-slip pads under rugs.   Do not walk in poorly lit areas.   Do not stand on chairs or wobbly ladders.   Use caution when reaching overhead or looking upward. This position can cause a loss of balance.   Be sure your shoes fit properly, have non-slip bottoms and are in good condition.   Be cautious when  going up and down stairs, curbs, and when walking on uneven sidewalks.   If your balance is poor, consider using a cane or walker.   If your fall was related to alcohol use, stop or limit alcohol intake.   If your fall was related to use of sleeping medicines, talk to your doctor about this. You may need to reduce your dosage at bedtime if you awaken during the night to go to the bathroom.   To reduce the need for nighttime bathroom trips:   Avoid drinking fluids for several hours before going to bed   Empty your bladder before going to bed   Men can keep a urinal at the bedside   © 9154-1919 [facility], [address] [place] [address], [place] [address]. All rights reserved. This information is not intended as a substitute for professional medical care. Always follow your healthcare professional's instructions.
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
## 2022-08-15 ENCOUNTER — LAB VISIT (OUTPATIENT)
Dept: LAB | Facility: HOSPITAL | Age: 47
End: 2022-08-15
Attending: INTERNAL MEDICINE
Payer: COMMERCIAL

## 2022-08-15 ENCOUNTER — OFFICE VISIT (OUTPATIENT)
Dept: HEMATOLOGY/ONCOLOGY | Facility: CLINIC | Age: 47
End: 2022-08-15
Payer: COMMERCIAL

## 2022-08-15 ENCOUNTER — INFUSION (OUTPATIENT)
Dept: INFUSION THERAPY | Facility: HOSPITAL | Age: 47
End: 2022-08-15
Attending: INTERNAL MEDICINE
Payer: COMMERCIAL

## 2022-08-15 VITALS
HEIGHT: 64 IN | OXYGEN SATURATION: 98 % | SYSTOLIC BLOOD PRESSURE: 133 MMHG | DIASTOLIC BLOOD PRESSURE: 90 MMHG | HEART RATE: 79 BPM | BODY MASS INDEX: 30.75 KG/M2 | TEMPERATURE: 98 F | WEIGHT: 180.13 LBS

## 2022-08-15 VITALS
DIASTOLIC BLOOD PRESSURE: 94 MMHG | HEART RATE: 87 BPM | OXYGEN SATURATION: 100 % | TEMPERATURE: 97 F | RESPIRATION RATE: 16 BRPM | SYSTOLIC BLOOD PRESSURE: 136 MMHG

## 2022-08-15 DIAGNOSIS — Z17.1 MALIGNANT NEOPLASM OF UPPER-OUTER QUADRANT OF LEFT BREAST IN FEMALE, ESTROGEN RECEPTOR NEGATIVE: ICD-10-CM

## 2022-08-15 DIAGNOSIS — Z17.1 MALIGNANT NEOPLASM OF UPPER-OUTER QUADRANT OF LEFT BREAST IN FEMALE, ESTROGEN RECEPTOR NEGATIVE: Primary | ICD-10-CM

## 2022-08-15 DIAGNOSIS — C50.412 MALIGNANT NEOPLASM OF UPPER-OUTER QUADRANT OF LEFT BREAST IN FEMALE, ESTROGEN RECEPTOR NEGATIVE: ICD-10-CM

## 2022-08-15 DIAGNOSIS — C50.412 MALIGNANT NEOPLASM OF UPPER-OUTER QUADRANT OF LEFT BREAST IN FEMALE, ESTROGEN RECEPTOR NEGATIVE: Primary | ICD-10-CM

## 2022-08-15 DIAGNOSIS — R74.01 TRANSAMINITIS: Primary | ICD-10-CM

## 2022-08-15 DIAGNOSIS — D63.0 ANEMIA IN NEOPLASTIC DISEASE: ICD-10-CM

## 2022-08-15 LAB
ALBUMIN SERPL BCP-MCNC: 3.6 G/DL (ref 3.5–5.2)
ALP SERPL-CCNC: 107 U/L (ref 55–135)
ALT SERPL W/O P-5'-P-CCNC: 27 U/L (ref 10–44)
ANION GAP SERPL CALC-SCNC: 8 MMOL/L (ref 8–16)
AST SERPL-CCNC: 14 U/L (ref 10–40)
BASOPHILS # BLD AUTO: 0.02 K/UL (ref 0–0.2)
BASOPHILS NFR BLD: 0.2 % (ref 0–1.9)
BILIRUB SERPL-MCNC: 0.3 MG/DL (ref 0.1–1)
BUN SERPL-MCNC: 11 MG/DL (ref 6–20)
CALCIUM SERPL-MCNC: 9.1 MG/DL (ref 8.7–10.5)
CHLORIDE SERPL-SCNC: 107 MMOL/L (ref 95–110)
CO2 SERPL-SCNC: 27 MMOL/L (ref 23–29)
CREAT SERPL-MCNC: 0.8 MG/DL (ref 0.5–1.4)
DIFFERENTIAL METHOD: ABNORMAL
EOSINOPHIL # BLD AUTO: 0.1 K/UL (ref 0–0.5)
EOSINOPHIL NFR BLD: 1 % (ref 0–8)
ERYTHROCYTE [DISTWIDTH] IN BLOOD BY AUTOMATED COUNT: 14.6 % (ref 11.5–14.5)
EST. GFR  (NO RACE VARIABLE): >60 ML/MIN/1.73 M^2
GLUCOSE SERPL-MCNC: 98 MG/DL (ref 70–110)
HCT VFR BLD AUTO: 36.1 % (ref 37–48.5)
HGB BLD-MCNC: 11.6 G/DL (ref 12–16)
IMM GRANULOCYTES # BLD AUTO: 0.29 K/UL (ref 0–0.04)
IMM GRANULOCYTES NFR BLD AUTO: 3 % (ref 0–0.5)
LYMPHOCYTES # BLD AUTO: 3.4 K/UL (ref 1–4.8)
LYMPHOCYTES NFR BLD: 35.3 % (ref 18–48)
MCH RBC QN AUTO: 26.9 PG (ref 27–31)
MCHC RBC AUTO-ENTMCNC: 32.1 G/DL (ref 32–36)
MCV RBC AUTO: 84 FL (ref 82–98)
MONOCYTES # BLD AUTO: 1.1 K/UL (ref 0.3–1)
MONOCYTES NFR BLD: 11.1 % (ref 4–15)
NEUTROPHILS # BLD AUTO: 4.7 K/UL (ref 1.8–7.7)
NEUTROPHILS NFR BLD: 49.4 % (ref 38–73)
NRBC BLD-RTO: 0 /100 WBC
PLATELET # BLD AUTO: 302 K/UL (ref 150–450)
PMV BLD AUTO: 10.4 FL (ref 9.2–12.9)
POTASSIUM SERPL-SCNC: 3.8 MMOL/L (ref 3.5–5.1)
PROT SERPL-MCNC: 7.2 G/DL (ref 6–8.4)
RBC # BLD AUTO: 4.32 M/UL (ref 4–5.4)
SODIUM SERPL-SCNC: 142 MMOL/L (ref 136–145)
TSH SERPL DL<=0.005 MIU/L-ACNC: 1.04 UIU/ML (ref 0.4–4)
WBC # BLD AUTO: 9.62 K/UL (ref 3.9–12.7)

## 2022-08-15 PROCEDURE — 63600175 PHARM REV CODE 636 W HCPCS: Performed by: INTERNAL MEDICINE

## 2022-08-15 PROCEDURE — 36415 COLL VENOUS BLD VENIPUNCTURE: CPT | Performed by: INTERNAL MEDICINE

## 2022-08-15 PROCEDURE — 85025 COMPLETE CBC W/AUTO DIFF WBC: CPT | Performed by: INTERNAL MEDICINE

## 2022-08-15 PROCEDURE — 96375 TX/PRO/DX INJ NEW DRUG ADDON: CPT

## 2022-08-15 PROCEDURE — 96413 CHEMO IV INFUSION 1 HR: CPT

## 2022-08-15 PROCEDURE — 99999 PR PBB SHADOW E&M-EST. PATIENT-LVL III: ICD-10-PCS | Mod: PBBFAC,,, | Performed by: INTERNAL MEDICINE

## 2022-08-15 PROCEDURE — 84443 ASSAY THYROID STIM HORMONE: CPT | Performed by: INTERNAL MEDICINE

## 2022-08-15 PROCEDURE — 80053 COMPREHEN METABOLIC PANEL: CPT | Performed by: INTERNAL MEDICINE

## 2022-08-15 PROCEDURE — 99999 PR PBB SHADOW E&M-EST. PATIENT-LVL III: CPT | Mod: PBBFAC,,, | Performed by: INTERNAL MEDICINE

## 2022-08-15 PROCEDURE — 99215 PR OFFICE/OUTPT VISIT, EST, LEVL V, 40-54 MIN: ICD-10-PCS | Mod: S$GLB,,, | Performed by: INTERNAL MEDICINE

## 2022-08-15 PROCEDURE — 99215 OFFICE O/P EST HI 40 MIN: CPT | Mod: S$GLB,,, | Performed by: INTERNAL MEDICINE

## 2022-08-15 PROCEDURE — 96367 TX/PROPH/DG ADDL SEQ IV INF: CPT

## 2022-08-15 PROCEDURE — 25000003 PHARM REV CODE 250: Performed by: INTERNAL MEDICINE

## 2022-08-15 PROCEDURE — 96417 CHEMO IV INFUS EACH ADDL SEQ: CPT

## 2022-08-15 RX ORDER — FAMOTIDINE 10 MG/ML
20 INJECTION INTRAVENOUS
Status: CANCELLED | OUTPATIENT
Start: 2022-08-15

## 2022-08-15 RX ORDER — DIPHENHYDRAMINE HYDROCHLORIDE 50 MG/ML
50 INJECTION INTRAMUSCULAR; INTRAVENOUS ONCE AS NEEDED
Status: DISCONTINUED | OUTPATIENT
Start: 2022-08-15 | End: 2022-08-15 | Stop reason: HOSPADM

## 2022-08-15 RX ORDER — FAMOTIDINE 10 MG/ML
20 INJECTION INTRAVENOUS
Status: COMPLETED | OUTPATIENT
Start: 2022-08-15 | End: 2022-08-15

## 2022-08-15 RX ORDER — EPINEPHRINE 1 MG/ML
0.3 INJECTION, SOLUTION INTRACARDIAC; INTRAMUSCULAR; INTRAVENOUS; SUBCUTANEOUS ONCE AS NEEDED
Status: DISCONTINUED | OUTPATIENT
Start: 2022-08-15 | End: 2022-08-15 | Stop reason: HOSPADM

## 2022-08-15 RX ORDER — DIPHENHYDRAMINE HYDROCHLORIDE 50 MG/ML
50 INJECTION INTRAMUSCULAR; INTRAVENOUS ONCE AS NEEDED
Status: CANCELLED | OUTPATIENT
Start: 2022-08-15

## 2022-08-15 RX ORDER — HEPARIN 100 UNIT/ML
500 SYRINGE INTRAVENOUS
Status: DISCONTINUED | OUTPATIENT
Start: 2022-08-15 | End: 2022-08-15 | Stop reason: HOSPADM

## 2022-08-15 RX ORDER — HEPARIN 100 UNIT/ML
500 SYRINGE INTRAVENOUS
Status: CANCELLED | OUTPATIENT
Start: 2022-08-15

## 2022-08-15 RX ORDER — SODIUM CHLORIDE 0.9 % (FLUSH) 0.9 %
10 SYRINGE (ML) INJECTION
Status: CANCELLED | OUTPATIENT
Start: 2022-08-15

## 2022-08-15 RX ORDER — EPINEPHRINE 0.3 MG/.3ML
0.3 INJECTION SUBCUTANEOUS ONCE AS NEEDED
Status: CANCELLED | OUTPATIENT
Start: 2022-08-15

## 2022-08-15 RX ORDER — SODIUM CHLORIDE 0.9 % (FLUSH) 0.9 %
10 SYRINGE (ML) INJECTION
Status: DISCONTINUED | OUTPATIENT
Start: 2022-08-15 | End: 2022-08-15 | Stop reason: HOSPADM

## 2022-08-15 RX ADMIN — PACLITAXEL 150 MG: 6 INJECTION, SOLUTION INTRAVENOUS at 11:08

## 2022-08-15 RX ADMIN — DIPHENHYDRAMINE HYDROCHLORIDE 50 MG: 50 INJECTION INTRAMUSCULAR; INTRAVENOUS at 11:08

## 2022-08-15 RX ADMIN — PALONOSETRON HYDROCHLORIDE 0.25 MG: 0.25 INJECTION, SOLUTION INTRAVENOUS at 10:08

## 2022-08-15 RX ADMIN — SODIUM CHLORIDE: 0.9 INJECTION, SOLUTION INTRAVENOUS at 10:08

## 2022-08-15 RX ADMIN — APREPITANT 130 MG: 130 INJECTION, EMULSION INTRAVENOUS at 10:08

## 2022-08-15 RX ADMIN — SODIUM CHLORIDE 200 MG: 9 INJECTION, SOLUTION INTRAVENOUS at 10:08

## 2022-08-15 RX ADMIN — CARBOPLATIN 685 MG: 10 INJECTION, SOLUTION INTRAVENOUS at 12:08

## 2022-08-15 RX ADMIN — FAMOTIDINE 20 MG: 10 INJECTION INTRAVENOUS at 10:08

## 2022-08-15 RX ADMIN — HEPARIN 500 UNITS: 100 SYRINGE at 01:08

## 2022-08-15 NOTE — PLAN OF CARE
Problem: Adult Inpatient Plan of Care  Goal: Plan of Care Review  Description: Patient likes feet elevated, pillow, apple juice  Outcome: Ongoing, Progressing  Flowsheets (Taken 8/15/2022 1023)  Plan of Care Reviewed With:   patient   family  Goal: Patient-Specific Goal (Individualized)  Description: Patient tolerated chemo infusion well today with no adverse reactions  Outcome: Ongoing, Progressing  Flowsheets (Taken 8/15/2022 1023)  Anxieties, Fears or Concerns: denies  Individualized Care Needs: feet elevated, pillow and blanket provided  Patient-Specific Goals (Include Timeframe): tolerate chemo today     Problem: Anemia (Chemotherapy Effects)  Goal: Anemia Symptom Improvement  Outcome: Ongoing, Progressing  Intervention: Monitor and Manage Anemia  Flowsheets (Taken 8/15/2022 1023)  Safety Promotion/Fall Prevention:   assistive device/personal item within reach   Fall Risk reviewed with patient/family   medications reviewed   in recliner, wheels locked   instructed to call staff for mobility  Fatigue Management: frequent rest breaks encouraged     Problem: Nausea and Vomiting (Chemotherapy Effects)  Goal: Fluid and Electrolyte Balance  Outcome: Ongoing, Progressing  Intervention: Prevent and Manage Nausea and Vomiting  Flowsheets (Taken 8/15/2022 1023)  Nausea/Vomiting Interventions: (premedicated) other (see comments)  Environmental Support: calm environment promoted     Problem: Neurotoxicity (Chemotherapy Effects)  Goal: Neurotoxicity Symptom Control  Outcome: Ongoing, Progressing  Intervention: Prevent or Manage Neurotoxicity Effects  Flowsheets (Taken 8/15/2022 1023)  Sensation Impairment Protection: external pressure sources minimized     Problem: Neutropenia (Chemotherapy Effects)  Goal: Absence of Infection  Outcome: Ongoing, Progressing  Intervention: Prevent Infection and Maximize Resistance  Flowsheets (Taken 8/15/2022 1023)  Infection Prevention:   environmental surveillance performed   equipment  surfaces disinfected   hand hygiene promoted   personal protective equipment utilized     Problem: Thrombocytopenia Bleeding Risk (Chemotherapy Effects)  Goal: Absence of Bleeding  Outcome: Ongoing, Progressing  Intervention: Minimize Bleeding Risk  Flowsheets (Taken 8/15/2022 1023)  Bleeding Precautions:   blood pressure closely monitored   monitored for signs of bleeding

## 2022-08-15 NOTE — Clinical Note
Paelse add cortisol to todays labs if possinle if not next wks labs,  Please cancel hepatology appt tomorrow

## 2022-08-15 NOTE — PROGRESS NOTES
Subjective:      DATE OF VISIT: 8/15/22     ?  Patient ID:Jaison Noel is a 46 y.o. female.?? MR#: [id number]   ?   PRIMARY ONCOLOGIST: Dr. Hathaway    ? Primary Care Providers:  Alesha Alonso MD, MD (General)     PRIMARY ONCOLOGIST: Dr. Hathaway    CHIEF COMPLAINT:  Assessment for possible chemotherapy today delay 1 week due to transaminitis  ?   ONCOLOGIC DIAGNOSIS:  Stage IB, cT1c, cN0, cM0 left breast upper outer quadrant 2:00 a.m. invasive ductal carcinoma grade 3 ER/HI negative, HER2 negative  ?   CURRENT TREATMENT:  neoadjuvant chemotherapy, carboplatin paclitaxel pembrolizumab followed by doxorubicin cyclophosphamide pembrolizumab    PAST TREATMENT:  Biopsy only  ?   INTERVAL EVENTS:    Services for her son were held last Friday and she is coping as can be expected.  Sister companies her to her visit today.  Mild neuropathy in fingertips a couple days after infusion with resolution thereafter.  No new symptoms or concerns.    ONCOLOGIC HISTORY:     Ms. Noel is a 46-year-old woman with hypothyroidism and hypertension.  She presented for routine screening mammogram 11/30/2021 showing left breast mass upper outer posterior position.    12/03/2021 diagnostic mammogram and ultrasound  Left  Mammo Digital Diagnostic Bilat with P[place]ito  There is a mass seen in the upper outer quadrant of the left breast in the posterior depth.      US Breast Bilateral Limited  There are 3 similar complicated cysts seen in the upper outer quadrant of the left breast in the posterior depth. The largest cyst is thought to be the correlate and measures 0.7 x 0.5 x 0.5 cm.  There are 2 additional complex cysts also seen in the upper outer quadrant of the left breast that measure 0.4 x 0.2 x 0.3 cm and 0.6 x 0.3 x 0.4 cm.      Right  Mammo Digital Diagnostic Bilat with Pablito  There is a mass seen in the lower central region of the right breast in the posterior depth. The posterior margin of this lesion is not seen on mammography.  The  lesion appears to abut and overlie the pectoralis musculature.  The lesion appears to measure at least 9 mm in size.      US Breast Bilateral Limited  There is a mass seen in the lower central region of the right breast. A definitive correlate is not definitely seen on ultrasound.  There is a 6 mm hypoechoic lesion likely representing a complex cyst within the central portion of the breast that appears to be too small to be the correlate for the mammographic finding.  There is an additional 1.0 x 0.4 x 0.6 cm hypoechoic area that is located within a ridge of tissue and also does not definitively correlate to the lesion.  It is possible this lesion was present on prior studies although only space visualized on the most recent study due to increased tissue included on the MLO view.  The visualized margins of the lesion also appear to be fairly smooth and therefore the lesion can be followed.      Impression:  Left  Mass: Left breast mass at the upper outer posterior position. Assessment: 3 - Probably benign. Short Interval Follow-Up in 6 Months is recommended.      Right  Mass: Right breast mass at the lower central posterior position. Assessment: 3 - Probably benign. Short Interval Follow-Up in 6 Months is recommended.      BI-RADS Category:   Overall: 3 - Probably Benign    Repeat evaluation with diagnostic mammogram and ultrasound on 05/24/2022 notable for a suspicious left breast lesion 1.3 cm 2:00 a.m.    Right breast: No detrimental mammographic change. Well-circumscribed lesion within the lower outer posterior breast is unchanged mammographically. 8.6 mm complicated cyst present at 08:00 o'clock, 12 cm from the nipple and corresponds to the stable mammographic finding.       Follow-up imaging of the previously noted central lesions demonstrate no detrimental change.  8 mm complicated cyst noted at 04:00 o'clock, 4 cm from the nipple.         Left breast: Interval increase in size of an upper outer quadrant mass  on mammogram. On ultrasound there is an enlarging complicated cystic lesion measuring 1.3 cm at 02:00 o'clock, 7 cm from the nipple. Other complicated cysts within the upper outer breast demonstrate no detrimental change.  No suspicious left axillary lymph nodes.    Ultrasound-guided biopsy left breast lesion on 2022  Pathology:  Final Pathologic Diagnosis 1. Left breast mass (2 o'clock position), biopsy:       -  High-grade invasive carcinoma of no special type (ductal) with   associated tumor necrosis,          see comment     SYNOPTIC REPORT   PROCEDURE:  Biopsy   SPECIMEN LATERALITY:  Left breast   TUMOR SITE:  2 o'clock position   HISTOLOGIC TYPE:  Invasive carcinoma of no special type (ductal)   HISTOLOGIC GRADE:  Grade 3 of 3          TUBULE FORMATION:  3          PLEOMORPHISM:  3          MITOTIC RATE:  3   TUMOR SIZE:   At least 10 mm in greatest linear dimension   DUCTAL CARCINOMA IN SITU (DCIS):  Not identified   LYMPHOVASCULAR INVASION:  Not identified   MICROCALCIFICATIONS:  Not identified   BREAST BIOMARKERS (PERFORMED WITH APPROPRIATE CONTROLS):           ER:   Negative (0)           WA:   Negative (0)           HER2:  Negative (1+)           KI67:  Greater than 95%       2022 PET-CT without evidence of distant metastatic disease.     FDG avid left breast lesion consistent with the known malignancy.  No other sites of suspicious uptake identified.    Menarche 12 years old  ; 1 miscarriage, age of 1st pregnancy 16 years old  Postmenopausal last menstrual period at 44 years old s/p PIERRE/BSO  Exogenous estrogen for about 1 year last when 45yo.  No history of prior radiation  No prior breast biopsy    Family history notable for mother with either breast or ovarian cancer diagnosis 64 years old.    Cycle 1 day 1 neoadjuvant chemotherapy 07/15/2022        Review of Systems    ?   A comprehensive 14-point review of systems was reviewed with patient and was negative other than as specified  above.   ?     Objective:      Physical Exam      ?   Vitals:    08/15/22 0832   BP: (!) 133/90   Pulse: 79   Temp: 97.7 °F (36.5 °C)      ?   ECOG:?0   General appearance: Generally well appearing, in no acute distress.   Head, eyes, ears, nose, and throat: moist mucous membranes.   Respiratory:  Normal work of breathing  Abdomen: nontender, nondistended.   Extremities: Warm, without edema.   Neurologic: Alert and oriented. Grossly normal strength, coordination, and gait.   Skin: No rashes, ecchymoses or petechial lesion.   Psychiatric:  Normal mood and affect.      Labs    Lab Results   Component Value Date    WBC 9.62 08/15/2022    HGB 11.6 (L) 08/15/2022    HCT 36.1 (L) 08/15/2022    MCV 84 08/15/2022     08/15/2022         Chemistry        Component Value Date/Time     08/15/2022 0823    K 3.8 08/15/2022 0823     08/15/2022 0823    CO2 27 08/15/2022 0823    BUN 11 08/15/2022 0823    CREATININE 0.8 08/15/2022 0823    GLU 98 08/15/2022 0823        Component Value Date/Time    CALCIUM 9.1 08/15/2022 0823    ALKPHOS 107 08/15/2022 0823    AST 14 08/15/2022 0823    ALT 27 08/15/2022 0823    BILITOT 0.3 08/15/2022 0823    ESTGFRAFRICA >60 07/29/2022 1006    EGFRNONAA >60 07/29/2022 1006        Lab Results   Component Value Date    TSH 1.042 08/15/2022       Imaging:  ?    No results found for this or any previous visit (from the past 2160 hour(s)).  No results found for this or any previous visit (from the past 2160 hour(s)).  Results for orders placed or performed during the hospital encounter of 06/20/22 (from the past 2160 hour(s))   NM PET CT Routine Skull to Mid Thigh    Impression    FDG avid left breast lesion consistent with the known malignancy.  No other sites of suspicious uptake identified.      Electronically signed by: Zay Dickerson DO  Date:    06/20/2022  Time:    13:58         Pathology:    No visits with results within 1 Day(s) from this visit.   Latest known visit with results  "is:   Hospital Outpatient Visit on 06/09/2022   Component Date Value Ref Range Status    Final Pathologic Diagnosis 06/09/2022    Final                    Value:1. Left breast mass (2 o'clock position), biopsy:      -  High-grade invasive carcinoma of no special type (ductal) with  associated tumor necrosis,         see comment    SYNOPTIC REPORT  PROCEDURE:  Biopsy  SPECIMEN LATERALITY:  Left breast  TUMOR SITE:  2 o'clock position  HISTOLOGIC TYPE:  Invasive carcinoma of no special type (ductal)  HISTOLOGIC GRADE:  Grade 3 of 3         TUBULE FORMATION:  3         PLEOMORPHISM:  3         MITOTIC RATE:  3  TUMOR SIZE:   At least 10 mm in greatest linear dimension  DUCTAL CARCINOMA IN SITU (DCIS):  Not identified  LYMPHOVASCULAR INVASION:  Not identified  MICROCALCIFICATIONS:  Not identified  BREAST BIOMARKERS (PERFORMED WITH APPROPRIATE CONTROLS):          ER:   Negative (0)          RI:   Negative (0)          HER2:  Negative (1+)          KI67:  Greater than 95%      Gross 06/09/2022    Final                    Value:Patient ID/Pathology ID [id number]  In formalin, labeled "left breast 02:00 o'clock, in formalin at 08:40", is a  2.5 x 2.5 cm aggregate of pink-yellow needle biopsy cores.  Entirely  submitted in cassette JNG--1-A  Ischemic time is not provided.  The fixation time is greater than 6 hrs and  less than 72 hrs.  A gross picture of the container and cassette is taken and  added to file.  Charly HOLGUIN (ASCP) cm      Comment 06/09/2022    Final                    Value:Immunohistochemical stain with appropriate control for p63 was performed and  shows loss of myoepithelial cells throughout the lesion, with no evidence of  ductal carcinoma in situ.  The patient's history of ovarian carcinoma is  noted.  Immunohistochemical stains for GATA3 and PAX8 appropriate controls  show tumor cell positivity for GATA3 and negativity for Th[place]xton 8, confirming  ductal (breast) origin.  This case seen in " consultation with Dr. Montesinos who agrees with the above  diagnosis.      Disclaimer 06/09/2022    Final                    Value:Unless the case is a 'gross only' or additional testing only, the final  diagnosis for each specimen is based on a microscopic examination of  appropriate tissue sections.  ER immunohistochemical staining (clone SP1, DAB detection method) is  performed on formalin-fixed, paraffin embedded tissue sections. The  percentage of cell nuclei stained and the strength of staining is reported  (weak, moderate, strong), using the 2010 ACSO/CAP scoring guidelines. Tumors  used for determing predictive markers are fixed in10% neutral buffered  formalin for 6-72 hours. It has been cleared by the [place] FDA for use as an  IVD test. This assay has not been validated on decalcified tissues. Results  should be interpreted with caution given the likelihood of false negativity  on decalcified specimens.  HER-2/aston IHC (4B5) clone, DAB detection method) is done on 10% buffered  formalin-fixed (for 6-72 hrs), paraffin embedded tissue sections. The scoring  is completed on a 4-tiered scoring system of membran                          e staining using the 2014  ASCO/CAP scoring guidelines. It has been cleared by the [place] FDA for use as  an IVD test. This assay has not been validated on decalcified tissues.  Results should be interpreted with caution given the likelihood of false  negativity on decalcified specimens.  PgR immunohistochemical staining (clone 1E2, DAB detection method) is  performed on formalin-fixed, paraffin embedded tissue sections. The  percentage of cell nuclei stained and the strength of staining is report  (weak, moderate, strong), using 2010 ASCO/CAP scoring guidelines. Tumors used  for determing predictive markers are fixed in 10% neutral buffered formalin  for 6-72 hours. It has been cleared by the [place] FDA for use as an IVD test.  This assay has not been validated on decalcified tissues.  Results should be  interpreted with caution given the likelihood of false negativity on  decalcified specimens.            ?   Assessment/Plan:   Transaminitis    Malignant neoplasm of upper-outer quadrant of left breast in female, estrogen receptor negative    Anemia in neoplastic disease    Other orders  -     pembrolizumab (KEYTRUDA) 200 mg in sodium chloride 0.9% 108 mL infusion  -     aprepitant (CINVANTI) injection 130 mg  -     palonosetron (ALOXI) 0.25 mg with Dexamethasone (DECADRON) 12 mg in NS 50 mL IVPB  -     diphenhydrAMINE (BENADRYL) 50 mg in sodium chloride 0.9% 50 mL IVPB  -     famotidine (PF) injection 20 mg  -     PACLitaxeL (TAXOL) 80 mg/m2 = 150 mg in sodium chloride 0.9% 250 mL chemo infusion  -     CARBOplatin (PARAPLATIN) 685 mg in sodium chloride 0.9% 250 mL chemo infusion  -     EPINEPHrine (EPIPEN) 0.3 mg/0.3 mL pen injection 0.3 mg  -     diphenhydrAMINE injection 50 mg  -     hydrocortisone sodium succinate injection 100 mg  -     sodium chloride 0.9% 250 mL flush bag  -     sodium chloride 0.9% flush 10 mL  -     heparin, porcine (PF) 100 unit/mL injection flush 500 Units  -     alteplase injection 2 mg       1. Transaminitis    2. Malignant neoplasm of upper-outer quadrant of left breast in female, estrogen receptor negative    3. Anemia in neoplastic disease          Plan:     Problem List Items Addressed This Visit        Oncology    Malignant neoplasm of upper-outer quadrant of left breast in female, estrogen receptor negative      Other Visit Diagnoses     Transaminitis    -  Primary    Anemia in neoplastic disease            Stage IB, cT1c cN0 cM0 left breast upper outer quadrant 2:00 a.m. invasive ductal carcinoma grade 3 ER/UT negative, HER2 negative:  - [facility] germline genetic testing negative   Clinical exam and mammogram/ultrasound without axillary adenopathy.  PET scan without evidence of distant metastatic disease.   We discussed that while early stage and T1c, higher  risk features given triple negative and high grade 3 and high ki-67 >95% with noted growth over just 6 month interval recommendation for neoadjuavant approach with chemo immunotherapy per Guadalupe et al. NEJ 2020. She is healthy aside from prior hypothyroidism now not on supplement.  We did discuss risks and benefits of therapy and importance of re-evaluation with plan for surgery, radiation and further adjuvant therapy as indicated.  She expressed good understanding and agree with the plan for follow-up per below.    Contralateral breast biopsy benign    Prior transaminitis resolved.  Continue to monitor.  Okay to proceed with treatment today    Anemia progressive likely chemotherapy effect.  Continue monitor. Stable 11.6 hgb      Follow-Up:   C2D1 today  RV in 1 wk cbc, cmp D8 planned, of to move later by 1-2 days pt would like to ultimately get back to Friday infusions; do not make earlier than 1 wk after last infusion.

## 2022-08-17 NOTE — PROGRESS NOTES
History & Physical    SUBJECTIVE:     History of Present Illness:  Patient is a 46 y.o. female s/p port placement presents following stereotactic breast biopsy presents breast biopsy site check. She reports the pain and swelling is improving. She is concerned about tolerating neoadjuvant chemotherapy.    Initially referred for left breast invasive ductal carcinoma ER-/[place]- Her2-. She denies any breast mass, prior breast surgery.  Menarche age 12,  1st at 16, prior hysterectomy, history of ovarian cancer in her mother    No chief complaint on file.      Review of patient's allergies indicates:   Allergen Reactions    Sumatriptan Other (See Comments)     Chest tightness that moved into her throat       Current Outpatient Medications   Medication Sig Dispense Refill    atorvastatin (LIPITOR) 40 MG tablet Take 1 tablet (40 mg total) by mouth once daily. 30 tablet 11    dexAMETHasone (DECADRON) 4 MG Tab Take 2 tablets (8 mg total) by mouth once daily. Take 2 tablets (8 mg) by mouth once daily on days 2,3,4 following chemotherapy. 24 tablet 2    HYDROcodone-acetaminophen (NORCO)  mg per tablet Take 1 tablet by mouth every 6 (six) hours as needed (Pain). 20 tablet 0    LIDOcaine-prilocaine (EMLA) cream Apply topically as needed. 5 g 0    OLANZapine (ZYPREXA) 5 MG tablet Take 1 tablet (5 mg total) by mouth every evening. Take 1 tablet by mouth every evening on days 1-4 of each chemotherapy cycle 30 tablet 1    ondansetron (ZOFRAN-ODT) 8 MG TbDL Take 1 tablet (8 mg total) by mouth every 8 (eight) hours as needed (nausea/vomiting). Take 1 tablet (8 mg) by mouth every 8 hours as needed for nausea/vomiting. 60 tablet 5    verapamiL (CALAN) 120 MG tablet TAKE 1 TABLET(120 MG) BY MOUTH EVERY DAY 90 tablet 3     No current facility-administered medications for this visit.     Facility-Administered Medications Ordered in Other Visits   Medication Dose Route Frequency Provider Last Rate Last Admin    lactated  ringers infusion   Intravenous Continuous Li Pierson MD   Stopped at 07/01/22 1137       Past Medical History:   Diagnosis Date    Abnormal Pap smear 1996    Anemia     Hypertension     Hypokalemia     in pregnancy    Hypothyroidism (acquired) 12/28/2018    Malignant neoplasm of upper-outer quadrant of left breast in female, estrogen receptor negative 6/22/2022     Past Surgical History:   Procedure Laterality Date    CHOLECYSTECTOMY      COLONOSCOPY N/A 6/8/2022    Procedure: COLONOSCOPY;  Surgeon: Rosaline eMyer MD;  Location[facility];  Service: Endoscopy;  Laterality: N/A;    COSMETIC SURGERY      tummy [facility]    DIAGNOSTIC LAPAROSCOPY N/A 2/26/2019    Procedure: LAPAROSCOPY, DIAGNOSTIC;  Surgeon: Pia Aguila MD;  Location: [facility] OR;  Service: OB/GYN;  Laterality: N/A;    FULGURATION OF ENDOMETRIOSIS N/A 2/26/2019    Procedure: FULGURATION, ENDOMETRIOSIS;  Surgeon: Pia Aguila MD;  Location: [facility] OR;  Service: OB/GYN;  Laterality: N/A;    HYSTERECTOMY      HYSTEROSCOPY WITH HYDROTHERMAL ABLATION OF ENDOMETRIUM WITH DILATION AND CURETTAGE N/A 2/26/2019    Procedure: HYSTEROSCOPY, WITH DILATION AND CURETTAGE OF UTERUS AND HYDROTHERMAL ENDOMETRIAL ABLATION;  Surgeon: Pia Aguila MD;  Location: [facility] OR;  Service: OB/GYN;  Laterality: N/A;    INSERTION OF TUNNELED CENTRAL VENOUS CATHETER (CVC) WITH SUBCUTANEOUS PORT N/A 7/1/2022    Procedure: [id number];  Surgeon: Beau Quiles MD;  Location: [facility] OR;  Service: General;  Laterality: N/A;    LAPAROSCOPIC DRAINAGE OF CYST OF OVARY Left 2/26/2019    Procedure: DRAINAGE, CYST, OVARY, LAPAROSCOPIC;  Surgeon: Pia Aguila MD;  Location: [facility] OR;  Service: OB/GYN;  Laterality: Left;    ROBOT-ASSISTED LAPAROSCOPIC ABDOMINAL HYSTERECTOMY USING DA BEAN XI N/A 11/5/2019    Procedure: XI ROBOTIC HYSTERECTOMY;  Surgeon: Pia Aguila MD;  Location: [facility] OR;  Service: OB/GYN;   Laterality: N/A;    ROBOT-ASSISTED LAPAROSCOPIC SALPINGO-OOPHORECTOMY USING DA BEAN XI Bilateral 11/5/2019    Procedure: XI ROBOTIC SALPINGO-OOPHORECTOMY;  Surgeon: Pia Aguila MD;  Location: [facility];  Service: OB/GYN;  Laterality: Bilateral;    TUBAL LIGATION      José Miguel Davila       Family History   Problem Relation Age of Onset    Cancer Mother         origin? ovarian? metastasized    Cancer Father         throat?    No Known Problems Sister     Heart disease Maternal Grandmother     No Known Problems Maternal Grandfather     No Known Problems Daughter     No Known Problems Daughter     No Known Problems Son     No Known Problems Son     No Known Problems Son     No Known Problems Son     No Known Problems Brother     No Known Problems Other     No Known Problems Other     No Known Problems Other     No Known Problems Other     No Known Problems Other     No Known Problems Maternal Aunt     No Known Problems Sister      Social History     Tobacco Use    Smoking status: Never Smoker    Smokeless tobacco: Never Used   Substance Use Topics    Alcohol use: Not Currently     Alcohol/week: 5.0 standard drinks     Types: 5 Cans of beer per week     Comment: socially;  last 06/30/2022    Drug use: No        Review of Systems:  Review of Systems   Constitutional: Negative for activity change, appetite change, chills, diaphoresis, fatigue, fever and unexpected weight change.   HENT: Negative for congestion, dental problem, hearing loss, rhinorrhea, sore throat and trouble swallowing.    Eyes: Negative for discharge and visual disturbance.   Respiratory: Negative for cough, chest tightness, shortness of breath and wheezing.    Cardiovascular: Negative for chest pain, palpitations and leg swelling.   Gastrointestinal: Negative for abdominal distention, abdominal pain, blood in stool, constipation, diarrhea, nausea and vomiting.   Endocrine: Negative for cold intolerance, heat intolerance,  polydipsia, polyphagia and polyuria.   Genitourinary: Negative for difficulty urinating, dysuria, frequency, hematuria, menstrual problem and urgency.   Musculoskeletal: Negative for arthralgias, gait problem, joint swelling, myalgias and neck pain.   Skin: Negative for color change, pallor, rash and wound.   Neurological: Negative for dizziness, syncope, weakness, light-headedness, numbness and headaches.   Hematological: Negative for adenopathy. Does not bruise/bleed easily.   Psychiatric/Behavioral: Negative for confusion, decreased concentration, dysphoric mood and sleep disturbance. The patient is not nervous/anxious.        OBJECTIVE:     Vital Signs (Most Recent)  Temp: 97.4 °F (36.3 °C) (08/01/22 1003)  Pulse: 75 (08/01/22 1003)  BP: (!) 138/91 (08/01/22 1003)     82.4 kg (181 lb 10.5 oz)     Physical Exam:  Physical Exam  Vitals reviewed.   Constitutional:       General: She is not in acute distress.     Appearance: She is well-developed. She is not diaphoretic.   HENT:      Head: Normocephalic and atraumatic.      Right Ear: External ear normal.      Left Ear: External ear normal.   Eyes:      General: No scleral icterus.     Conjunctiva/sclera: Conjunctivae normal.      Pupils: Pupils are equal, round, and reactive to light.   Neck:      Thyroid: No thyromegaly.      Trachea: No tracheal deviation.   Cardiovascular:      Rate and Rhythm: Normal rate and regular rhythm.      Heart sounds: Normal heart sounds. No murmur heard.    No friction rub. No gallop.   Pulmonary:      Effort: Pulmonary effort is normal. No respiratory distress.      Breath sounds: Normal breath sounds. No wheezing or rales.   Chest:      Chest wall: No tenderness.   Breasts:      Right: No inverted nipple, mass, nipple discharge, skin change or tenderness.      Left: No inverted nipple, mass, nipple discharge, skin change or tenderness.         Abdominal:      General: Bowel sounds are normal. There is no distension.      Palpations:  Abdomen is soft.      Tenderness: There is no abdominal tenderness.      Hernia: No hernia is present.   Musculoskeletal:         General: No tenderness or deformity. Normal range of motion.      Cervical back: Normal range of motion and neck supple.   Lymphadenopathy:      Cervical: No cervical adenopathy.   Skin:     General: Skin is warm and dry.      Coloration: Skin is not pale.      Findings: No erythema or rash.   Neurological:      Mental Status: She is alert and oriented to person, place, and time.   Psychiatric:         Behavior: Behavior normal.         Thought Content: Thought content normal.         Judgment: Judgment normal.            Diagnostic Results:  Result:   Mammo Digital Diagnostic Bilat with Pablito  US Breast Bilateral Limited     History:  Patient is 46 y.o. and is seen for diagnostic imaging.     Films Compared:  Prior images (if available) were compared.     Findings:  This procedure was performed using tomosynthesis. Computer-aided detection was utilized in the interpretation of this examination.  The breasts have scattered areas of fibroglandular density.      Right breast: No detrimental mammographic change. Well-circumscribed lesion within the lower outer posterior breast is unchanged mammographically. 8.6 mm complicated cyst present at 08:00 o'clock, 12 cm from the nipple and corresponds to the stable mammographic finding.       Follow-up imaging of the previously noted central lesions demonstrate no detrimental change.  8 mm complicated cyst noted at 04:00 o'clock, 4 cm from the nipple.         Left breast: Interval increase in size of an upper outer quadrant mass on mammogram. On ultrasound there is an enlarging complicated cystic lesion measuring 1.3 cm at 02:00 o'clock, 7 cm from the nipple. Other complicated cysts within the upper outer breast demonstrate no detrimental change.  No suspicious left axillary lymph nodes.        Impression:  Suspicious left breast lesion, 1.3 cm 2:00,  warranting ultrasound guided biopsy.      Follow-up recommended of the complicated cyst within the right breast as well as within the upper outer left breast in 6 months.      BI-RADS Category:   Overall: 4 - Suspicious     Recommendation:  Ultrasound Biopsy is recommended.        Your estimated lifetime risk of breast cancer (to age 85) based on Tyrer-Cuzick risk assessment model is Tyrer-Cuzick: 5.9 %. According to the [employer], patients with a lifetime breast cancer risk of 20% or higher might benefit from supplemental screening tests.    PET CT:  Impression:  FDG avid left breast lesion consistent with the known malignancy.  No other sites of suspicious uptake identified.    Final Pathologic Diagnosis 1. Left breast mass (2 o'clock position), biopsy:       -  High-grade invasive carcinoma of no special type (ductal) with   associated tumor necrosis,          see comment     SYNOPTIC REPORT   PROCEDURE:  Biopsy   SPECIMEN LATERALITY:  Left breast   TUMOR SITE:  2 o'clock position   HISTOLOGIC TYPE:  Invasive carcinoma of no special type (ductal)   HISTOLOGIC GRADE:  Grade 3 of 3          TUBULE FORMATION:  3          PLEOMORPHISM:  3          MITOTIC RATE:  3   TUMOR SIZE:   At least 10 mm in greatest linear dimension   DUCTAL CARCINOMA IN SITU (DCIS):  Not identified   LYMPHOVASCULAR INVASION:  Not identified   MICROCALCIFICATIONS:  Not identified   BREAST BIOMARKERS (PERFORMED WITH APPROPRIATE CONTROLS):           ER:   Negative (0)           NH:   Negative (0)           HER2:  Negative (1+)           KI67:  Greater than 95%      Final Pathologic Diagnosis Part 1   Left breast, 1:00, ultrasound-guided core biopsy:   Benign breast parenchyma with focal apocrine metaplasia and periductal   lymphocytic infiltrate   Negative for atypia or malignancy in the submitted material   Part 2   Right breast, 8:00, ultrasound-guided core biopsy:   Benign breast parenchyma, negative for atypia or malignancy in  the submitted   material   This case was reviewed by Dr. ELVIN Alejandro who concurs with the above diagnoses         ASSESSMENT/PLAN:     46-year-old female with left breast invasive ductal carcinoma ER-/AL- Her2-  S/p port placement  Left breast pain following biopsy    PLAN:Plan     Left breast pain/swelling improving related to stereotactic core biopsy  Continue conservative therapy  Pathology reviewed  Continue neoadjuvant chemo as scheduled    Following neoadjuvant chemotherapy Patient is interested in a left lumpectomy with SLNbx possible ALND should her genetic testing come back negative; discussed alternative surgical options including mastectomy with and without reconstruction; potential need for further treatment including adjuvant XRT, hormone therapy  Risks and benefits discussed with patient including but not limited to: pain, bleeding, infection, positive margins, injury to underlying nerves or vessels, lymphedema,parathesias, need for further surgery  45 minutes of total time spent on the encounter, which includes face to face time and non-face to face time preparing to see the patient (eg, review of tests), Obtaining and/or reviewing separately obtained history, Documenting clinical information in the electronic or other health record, Independently interpreting results (not separately reported) and communicating results to the patient/family/caregiver, or Care coordination (not separately reported).

## 2022-08-18 ENCOUNTER — HOSPITAL ENCOUNTER (OUTPATIENT)
Facility: HOSPITAL | Age: 47
Discharge: HOME | End: 2022-08-19
Attending: FAMILY MEDICINE | Admitting: FAMILY MEDICINE
Payer: COMMERCIAL

## 2022-08-18 DIAGNOSIS — I82.621 ACUTE DEEP VEIN THROMBOSIS (DVT) OF RIGHT UPPER EXTREMITY, UNSPECIFIED VEIN: Primary | ICD-10-CM

## 2022-08-18 DIAGNOSIS — I82.621 ACUTE DEEP VEIN THROMBOSIS (DVT) OF OTHER VEIN OF RIGHT UPPER EXTREMITY: ICD-10-CM

## 2022-08-18 LAB
ALBUMIN SERPL BCP-MCNC: 4.1 G/DL (ref 3.5–5.2)
ALP SERPL-CCNC: 97 U/L (ref 55–135)
ALT SERPL W/O P-5'-P-CCNC: 29 U/L (ref 10–44)
ANION GAP SERPL CALC-SCNC: 18 MMOL/L (ref 8–16)
APTT BLDCRRT: 25.6 SEC (ref 21–32)
AST SERPL-CCNC: 22 U/L (ref 10–40)
BASOPHILS # BLD AUTO: 0.04 K/UL (ref 0–0.2)
BASOPHILS NFR BLD: 0.2 % (ref 0–1.9)
BILIRUB SERPL-MCNC: 0.4 MG/DL (ref 0.1–1)
BUN SERPL-MCNC: 13 MG/DL (ref 6–20)
CALCIUM SERPL-MCNC: 9.5 MG/DL (ref 8.7–10.5)
CHLORIDE SERPL-SCNC: 104 MMOL/L (ref 95–110)
CO2 SERPL-SCNC: 20 MMOL/L (ref 23–29)
CREAT SERPL-MCNC: 0.8 MG/DL (ref 0.5–1.4)
DIFFERENTIAL METHOD: ABNORMAL
EOSINOPHIL # BLD AUTO: 0.1 K/UL (ref 0–0.5)
EOSINOPHIL NFR BLD: 0.4 % (ref 0–8)
ERYTHROCYTE [DISTWIDTH] IN BLOOD BY AUTOMATED COUNT: 14.9 % (ref 11.5–14.5)
EST. GFR  (NO RACE VARIABLE): >60 ML/MIN/1.73 M^2
GLUCOSE SERPL-MCNC: 92 MG/DL (ref 70–110)
HCT VFR BLD AUTO: 37.9 % (ref 37–48.5)
HGB BLD-MCNC: 12.3 G/DL (ref 12–16)
IMM GRANULOCYTES # BLD AUTO: 0.08 K/UL (ref 0–0.04)
IMM GRANULOCYTES NFR BLD AUTO: 0.5 % (ref 0–0.5)
INR PPP: 0.9 (ref 0.8–1.2)
LYMPHOCYTES # BLD AUTO: 3.7 K/UL (ref 1–4.8)
LYMPHOCYTES NFR BLD: 22.7 % (ref 18–48)
MCH RBC QN AUTO: 26.9 PG (ref 27–31)
MCHC RBC AUTO-ENTMCNC: 32.5 G/DL (ref 32–36)
MCV RBC AUTO: 83 FL (ref 82–98)
MONOCYTES # BLD AUTO: 0.7 K/UL (ref 0.3–1)
MONOCYTES NFR BLD: 4.3 % (ref 4–15)
NEUTROPHILS # BLD AUTO: 11.6 K/UL (ref 1.8–7.7)
NEUTROPHILS NFR BLD: 71.9 % (ref 38–73)
NRBC BLD-RTO: 0 /100 WBC
PLATELET # BLD AUTO: 285 K/UL (ref 150–450)
PMV BLD AUTO: 10.6 FL (ref 9.2–12.9)
POTASSIUM SERPL-SCNC: 3.2 MMOL/L (ref 3.5–5.1)
PROT SERPL-MCNC: 8 G/DL (ref 6–8.4)
PROTHROMBIN TIME: 9.9 SEC (ref 9–12.5)
RBC # BLD AUTO: 4.57 M/UL (ref 4–5.4)
SODIUM SERPL-SCNC: 142 MMOL/L (ref 136–145)
WBC # BLD AUTO: 16.15 K/UL (ref 3.9–12.7)

## 2022-08-18 PROCEDURE — U0002 COVID-19 LAB TEST NON-CDC: HCPCS | Performed by: FAMILY MEDICINE

## 2022-08-18 PROCEDURE — G0378 HOSPITAL OBSERVATION PER HR: HCPCS

## 2022-08-18 PROCEDURE — 80053 COMPREHEN METABOLIC PANEL: CPT | Performed by: REGISTERED NURSE

## 2022-08-18 PROCEDURE — 85025 COMPLETE CBC W/AUTO DIFF WBC: CPT | Performed by: REGISTERED NURSE

## 2022-08-18 PROCEDURE — 85610 PROTHROMBIN TIME: CPT | Performed by: REGISTERED NURSE

## 2022-08-18 PROCEDURE — 85730 THROMBOPLASTIN TIME PARTIAL: CPT | Performed by: REGISTERED NURSE

## 2022-08-18 PROCEDURE — 96365 THER/PROPH/DIAG IV INF INIT: CPT

## 2022-08-18 PROCEDURE — 99285 EMERGENCY DEPT VISIT HI MDM: CPT | Mod: 25

## 2022-08-18 RX ORDER — HEPARIN SODIUM,PORCINE/D5W 25000/250
0-40 INTRAVENOUS SOLUTION INTRAVENOUS CONTINUOUS
Status: DISCONTINUED | OUTPATIENT
Start: 2022-08-18 | End: 2022-08-19

## 2022-08-18 RX ORDER — APIXABAN 5 MG (74)
KIT ORAL
Qty: 74 TABLET | Refills: 0 | Status: SHIPPED | OUTPATIENT
Start: 2022-08-18 | End: 2022-08-19 | Stop reason: SDUPTHER

## 2022-08-19 VITALS
DIASTOLIC BLOOD PRESSURE: 95 MMHG | OXYGEN SATURATION: 98 % | HEART RATE: 83 BPM | BODY MASS INDEX: 29.77 KG/M2 | WEIGHT: 174.38 LBS | HEIGHT: 64 IN | SYSTOLIC BLOOD PRESSURE: 171 MMHG | TEMPERATURE: 98 F | RESPIRATION RATE: 16 BRPM

## 2022-08-19 PROBLEM — I82.621 ACUTE DEEP VEIN THROMBOSIS (DVT) OF RIGHT UPPER EXTREMITY: Status: ACTIVE | Noted: 2022-08-19

## 2022-08-19 LAB
ANION GAP SERPL CALC-SCNC: 10 MMOL/L (ref 8–16)
APTT BLDCRRT: 23.7 SEC (ref 21–32)
APTT BLDCRRT: 86.5 SEC (ref 21–32)
BASOPHILS # BLD AUTO: 0.02 K/UL (ref 0–0.2)
BASOPHILS # BLD AUTO: 0.02 K/UL (ref 0–0.2)
BASOPHILS NFR BLD: 0.1 % (ref 0–1.9)
BASOPHILS NFR BLD: 0.2 % (ref 0–1.9)
BUN SERPL-MCNC: 9 MG/DL (ref 6–20)
CALCIUM SERPL-MCNC: 8.9 MG/DL (ref 8.7–10.5)
CHLORIDE SERPL-SCNC: 106 MMOL/L (ref 95–110)
CO2 SERPL-SCNC: 25 MMOL/L (ref 23–29)
CREAT SERPL-MCNC: 0.7 MG/DL (ref 0.5–1.4)
DIFFERENTIAL METHOD: ABNORMAL
DIFFERENTIAL METHOD: ABNORMAL
EOSINOPHIL # BLD AUTO: 0.1 K/UL (ref 0–0.5)
EOSINOPHIL # BLD AUTO: 0.1 K/UL (ref 0–0.5)
EOSINOPHIL NFR BLD: 0.4 % (ref 0–8)
EOSINOPHIL NFR BLD: 0.8 % (ref 0–8)
ERYTHROCYTE [DISTWIDTH] IN BLOOD BY AUTOMATED COUNT: 14.5 % (ref 11.5–14.5)
ERYTHROCYTE [DISTWIDTH] IN BLOOD BY AUTOMATED COUNT: 14.6 % (ref 11.5–14.5)
EST. GFR  (NO RACE VARIABLE): >60 ML/MIN/1.73 M^2
GLUCOSE SERPL-MCNC: 91 MG/DL (ref 70–110)
HCT VFR BLD AUTO: 35.5 % (ref 37–48.5)
HCT VFR BLD AUTO: 37.8 % (ref 37–48.5)
HGB BLD-MCNC: 11.4 G/DL (ref 12–16)
HGB BLD-MCNC: 11.6 G/DL (ref 12–16)
IMM GRANULOCYTES # BLD AUTO: 0.05 K/UL (ref 0–0.04)
IMM GRANULOCYTES # BLD AUTO: 0.05 K/UL (ref 0–0.04)
IMM GRANULOCYTES NFR BLD AUTO: 0.4 % (ref 0–0.5)
IMM GRANULOCYTES NFR BLD AUTO: 0.4 % (ref 0–0.5)
INR PPP: 0.9 (ref 0.8–1.2)
LYMPHOCYTES # BLD AUTO: 3.3 K/UL (ref 1–4.8)
LYMPHOCYTES # BLD AUTO: 3.7 K/UL (ref 1–4.8)
LYMPHOCYTES NFR BLD: 24.2 % (ref 18–48)
LYMPHOCYTES NFR BLD: 32.3 % (ref 18–48)
MAGNESIUM SERPL-MCNC: 2 MG/DL (ref 1.6–2.6)
MCH RBC QN AUTO: 26.1 PG (ref 27–31)
MCH RBC QN AUTO: 26.9 PG (ref 27–31)
MCHC RBC AUTO-ENTMCNC: 30.2 G/DL (ref 32–36)
MCHC RBC AUTO-ENTMCNC: 32.7 G/DL (ref 32–36)
MCV RBC AUTO: 82 FL (ref 82–98)
MCV RBC AUTO: 87 FL (ref 82–98)
MONOCYTES # BLD AUTO: 0.6 K/UL (ref 0.3–1)
MONOCYTES # BLD AUTO: 0.7 K/UL (ref 0.3–1)
MONOCYTES NFR BLD: 4.9 % (ref 4–15)
MONOCYTES NFR BLD: 5.3 % (ref 4–15)
NEUTROPHILS # BLD AUTO: 7 K/UL (ref 1.8–7.7)
NEUTROPHILS # BLD AUTO: 9.6 K/UL (ref 1.8–7.7)
NEUTROPHILS NFR BLD: 61 % (ref 38–73)
NEUTROPHILS NFR BLD: 70 % (ref 38–73)
NRBC BLD-RTO: 0 /100 WBC
NRBC BLD-RTO: 0 /100 WBC
PLATELET # BLD AUTO: 258 K/UL (ref 150–450)
PLATELET # BLD AUTO: 283 K/UL (ref 150–450)
PMV BLD AUTO: 10.6 FL (ref 9.2–12.9)
PMV BLD AUTO: 11.2 FL (ref 9.2–12.9)
POTASSIUM SERPL-SCNC: 4.2 MMOL/L (ref 3.5–5.1)
PROTHROMBIN TIME: 10 SEC (ref 9–12.5)
RBC # BLD AUTO: 4.32 M/UL (ref 4–5.4)
RBC # BLD AUTO: 4.37 M/UL (ref 4–5.4)
SARS-COV-2 RDRP RESP QL NAA+PROBE: NEGATIVE
SODIUM SERPL-SCNC: 141 MMOL/L (ref 136–145)
WBC # BLD AUTO: 11.41 K/UL (ref 3.9–12.7)
WBC # BLD AUTO: 13.65 K/UL (ref 3.9–12.7)

## 2022-08-19 PROCEDURE — 99215 PR OFFICE/OUTPT VISIT, EST, LEVL V, 40-54 MIN: ICD-10-PCS | Mod: ,,, | Performed by: INTERNAL MEDICINE

## 2022-08-19 PROCEDURE — G0378 HOSPITAL OBSERVATION PER HR: HCPCS

## 2022-08-19 PROCEDURE — 85610 PROTHROMBIN TIME: CPT | Performed by: FAMILY MEDICINE

## 2022-08-19 PROCEDURE — 80048 BASIC METABOLIC PNL TOTAL CA: CPT | Performed by: NURSE PRACTITIONER

## 2022-08-19 PROCEDURE — 63600175 PHARM REV CODE 636 W HCPCS: Performed by: FAMILY MEDICINE

## 2022-08-19 PROCEDURE — 85025 COMPLETE CBC W/AUTO DIFF WBC: CPT | Mod: 91 | Performed by: NURSE PRACTITIONER

## 2022-08-19 PROCEDURE — 85025 COMPLETE CBC W/AUTO DIFF WBC: CPT | Performed by: FAMILY MEDICINE

## 2022-08-19 PROCEDURE — 99215 OFFICE O/P EST HI 40 MIN: CPT | Mod: ,,, | Performed by: INTERNAL MEDICINE

## 2022-08-19 PROCEDURE — 83735 ASSAY OF MAGNESIUM: CPT | Performed by: NURSE PRACTITIONER

## 2022-08-19 PROCEDURE — 25000003 PHARM REV CODE 250: Performed by: NURSE PRACTITIONER

## 2022-08-19 PROCEDURE — 25000003 PHARM REV CODE 250: Performed by: FAMILY MEDICINE

## 2022-08-19 PROCEDURE — 96366 THER/PROPH/DIAG IV INF ADDON: CPT | Performed by: FAMILY MEDICINE

## 2022-08-19 PROCEDURE — 85730 THROMBOPLASTIN TIME PARTIAL: CPT | Performed by: FAMILY MEDICINE

## 2022-08-19 PROCEDURE — 85730 THROMBOPLASTIN TIME PARTIAL: CPT | Mod: 91 | Performed by: NURSE PRACTITIONER

## 2022-08-19 RX ORDER — NALOXONE HCL 0.4 MG/ML
0.02 VIAL (ML) INJECTION
Status: DISCONTINUED | OUTPATIENT
Start: 2022-08-19 | End: 2022-08-19 | Stop reason: HOSPADM

## 2022-08-19 RX ORDER — APIXABAN 5 MG (74)
KIT ORAL
Qty: 74 TABLET | Refills: 0 | Status: SHIPPED | OUTPATIENT
Start: 2022-08-19 | End: 2022-10-13

## 2022-08-19 RX ORDER — BISACODYL 10 MG
10 SUPPOSITORY, RECTAL RECTAL DAILY PRN
Status: DISCONTINUED | OUTPATIENT
Start: 2022-08-19 | End: 2022-08-19 | Stop reason: HOSPADM

## 2022-08-19 RX ORDER — ACETAMINOPHEN 325 MG/1
650 TABLET ORAL EVERY 6 HOURS PRN
Status: DISCONTINUED | OUTPATIENT
Start: 2022-08-19 | End: 2022-08-19 | Stop reason: HOSPADM

## 2022-08-19 RX ORDER — ATORVASTATIN CALCIUM 40 MG/1
40 TABLET, FILM COATED ORAL DAILY
Status: DISCONTINUED | OUTPATIENT
Start: 2022-08-19 | End: 2022-08-19 | Stop reason: HOSPADM

## 2022-08-19 RX ORDER — POTASSIUM CHLORIDE 20 MEQ/1
40 TABLET, EXTENDED RELEASE ORAL ONCE
Status: COMPLETED | OUTPATIENT
Start: 2022-08-19 | End: 2022-08-19

## 2022-08-19 RX ORDER — ONDANSETRON 2 MG/ML
4 INJECTION INTRAMUSCULAR; INTRAVENOUS EVERY 8 HOURS PRN
Status: DISCONTINUED | OUTPATIENT
Start: 2022-08-19 | End: 2022-08-19 | Stop reason: HOSPADM

## 2022-08-19 RX ORDER — PROMETHAZINE HYDROCHLORIDE 25 MG/1
25 TABLET ORAL EVERY 6 HOURS PRN
Status: DISCONTINUED | OUTPATIENT
Start: 2022-08-19 | End: 2022-08-19 | Stop reason: HOSPADM

## 2022-08-19 RX ORDER — MAG HYDROX/ALUMINUM HYD/SIMETH 200-200-20
30 SUSPENSION, ORAL (FINAL DOSE FORM) ORAL 4 TIMES DAILY PRN
Status: DISCONTINUED | OUTPATIENT
Start: 2022-08-19 | End: 2022-08-19 | Stop reason: HOSPADM

## 2022-08-19 RX ORDER — TALC
6 POWDER (GRAM) TOPICAL NIGHTLY PRN
Status: DISCONTINUED | OUTPATIENT
Start: 2022-08-19 | End: 2022-08-19 | Stop reason: HOSPADM

## 2022-08-19 RX ORDER — HYDROCODONE BITARTRATE AND ACETAMINOPHEN 10; 325 MG/1; MG/1
1 TABLET ORAL EVERY 6 HOURS PRN
Status: DISCONTINUED | OUTPATIENT
Start: 2022-08-19 | End: 2022-08-19 | Stop reason: HOSPADM

## 2022-08-19 RX ORDER — VERAPAMIL HYDROCHLORIDE 40 MG/1
120 TABLET ORAL DAILY
Status: DISCONTINUED | OUTPATIENT
Start: 2022-08-19 | End: 2022-08-19 | Stop reason: HOSPADM

## 2022-08-19 RX ORDER — SODIUM CHLORIDE 0.9 % (FLUSH) 0.9 %
10 SYRINGE (ML) INJECTION EVERY 12 HOURS PRN
Status: DISCONTINUED | OUTPATIENT
Start: 2022-08-19 | End: 2022-08-19 | Stop reason: HOSPADM

## 2022-08-19 RX ADMIN — VERAPAMIL HYDROCHLORIDE 120 MG: 40 TABLET ORAL at 08:08

## 2022-08-19 RX ADMIN — HYDROCODONE BITARTRATE AND ACETAMINOPHEN 1 TABLET: 10; 325 TABLET ORAL at 01:08

## 2022-08-19 RX ADMIN — APIXABAN 10 MG: 2.5 TABLET, FILM COATED ORAL at 12:08

## 2022-08-19 RX ADMIN — HYDROCODONE BITARTRATE AND ACETAMINOPHEN 1 TABLET: 10; 325 TABLET ORAL at 08:08

## 2022-08-19 RX ADMIN — HEPARIN SODIUM 18 UNITS/KG/HR: 10000 INJECTION, SOLUTION INTRAVENOUS at 12:08

## 2022-08-19 RX ADMIN — POTASSIUM CHLORIDE 40 MEQ: 1500 TABLET, EXTENDED RELEASE ORAL at 01:08

## 2022-08-19 RX ADMIN — ATORVASTATIN CALCIUM 40 MG: 40 TABLET, FILM COATED ORAL at 08:08

## 2022-08-19 NOTE — FIRST PROVIDER EVALUATION
"Medical screening exam completed.  I have conducted a focused provider triage encounter, findings are as follows:    Brief history of present illness:  Swelling right upper extremity that began this morning    Vitals:    08/18/22 2042   BP: (!) 164/105   Pulse: 101   Resp: 18   Temp: 99.2 °F (37.3 °C)   TempSrc: Oral   SpO2: 100%   Weight: 81.8 kg (180 lb 7.1 oz)   Height: 5' 4" (1.626 m)       Pertinent physical exam:  No acute distress    Brief workup plan:  Ultrasound and labs    Preliminary workup initiated; this workup will be continued and followed by the physician or advanced practice provider that is assigned to the patient when roomed.  "

## 2022-08-19 NOTE — PLAN OF CARE
Reviewed POC with patient. Patient verbalized understanding. POC progressing.  Problem: Adult Inpatient Plan of Care  Goal: Plan of Care Review  Outcome: Ongoing, Progressing  Goal: Patient-Specific Goal (Individualized)  Outcome: Ongoing, Progressing  Goal: Absence of Hospital-Acquired Illness or Injury  Outcome: Ongoing, Progressing  Goal: Optimal Comfort and Wellbeing  Outcome: Ongoing, Progressing  Goal: Readiness for Transition of Care  Outcome: Ongoing, Progressing     Problem: Fall Injury Risk  Goal: Absence of Fall and Fall-Related Injury  Outcome: Ongoing, Progressing

## 2022-08-19 NOTE — HOSPITAL COURSE
Heparin infusion initiated. Interventional Radiology consulted regarding possible thrombectomy. Pt was seen by PATO Berumen, who discussed treatment options with the patient and final decision was to discharge with Eliquis. The option for an outpatient thrombectomy if no improvement was discussed with patient. Education regarding Eliquis and bleeding precautions explained. Pt was cleared by Hematology/Oncology for discharge - she is to follow up with them due to treatment for breast cancer. Also, pt to follow up with Dr. Quiles in the outpatient setting for mediport removal. Pt was seen and examined and determined to be safe and stable for discharge. Pt's home meds were resumed.

## 2022-08-19 NOTE — H&P
"[facility] Medicine  History & Physical    Patient Name: Florecita Noel  MRN: [id number]  Patient Class: OP- Observation  Admission Date: 8/18/2022  Attending Physician: Junior Thompson MD   Primary Care Provider: Alesha Alonso MD         Patient information was obtained from patient, past medical records and ER records.     Subjective:     Principal Problem:Acute deep vein thrombosis (DVT) of right upper extremity    Chief Complaint:   Chief Complaint   Patient presents with    Arm Pain     Pt reports that when she woke up this morning it felt like she slept on her right arm. Pt now experiencing swelling and pain to arm. Pt also received chemotherapy on Monday for breast cancer. Pt has port to right side of chest.         HPI: Florecita Noel is a 46 y.o. female with a PMHx of anemia, breast cancer currently on chemotherapy, HTN, and hypothyroidism who presented to the Emergency Department with c/o right upper extremity pain x 1 day. No aggravating or alleviating factors. Associated right upper extremity swelling and weakness/"heaviness". Denies any numbness/tingling, decreased ROM, injury/trauma, joint swelling, CP, SOB/ROSE, palpitations, ABD pain, N/V/D, lightheadedness, dizziness, syncope, fever or chills. Patient recently had right subclavian MediPort placed on 7/1 for chemotherapy. Last chemo treatment was on 8/15. In the ED, venous US of RUE revealed a clot in the right subclavian vein, right axillary vein, right brachial vein, and right basilic vein consistent with DVT. Case was discussed with Hem/Onc per the ED, who recommend IR consult for possible thrombectomy. Patient was placed in Observation under Hospital Medicine services.         Past Medical History:   Diagnosis Date    Abnormal Pap smear 1996    Anemia     Hypertension     Hypokalemia     in pregnancy    Hypothyroidism (acquired) 12/28/2018    Malignant neoplasm of upper-outer quadrant of left breast in female, estrogen " receptor negative 6/22/2022       Past Surgical History:   Procedure Laterality Date    CHOLECYSTECTOMY      COLONOSCOPY N/A 6/8/2022    Procedure: COLONOSCOPY;  Surgeon: Rosaline Meyer MD;  Location: [facility];  Service: Endoscopy;  Laterality: N/A;    COSMETIC SURGERY      tummy tuck    DIAGNOSTIC LAPAROSCOPY N/A 2/26/2019    Procedure: LAPAROSCOPY, DIAGNOSTIC;  Surgeon: Pia Aguila MD;  Location: [facility] OR;  Service: OB/GYN;  Laterality: N/A;    FULGURATION OF ENDOMETRIOSIS N/A 2/26/2019    Procedure: FULGURATION, ENDOMETRIOSIS;  Surgeon: Pia Aguila MD;  Location: [facility] OR;  Service: OB/GYN;  Laterality: N/A;    HYSTERECTOMY      HYSTEROSCOPY WITH HYDROTHERMAL ABLATION OF ENDOMETRIUM WITH DILATION AND CURETTAGE N/A 2/26/2019    Procedure: HYSTEROSCOPY, WITH DILATION AND CURETTAGE OF UTERUS AND HYDROTHERMAL ENDOMETRIAL ABLATION;  Surgeon: Pia Aguila MD;  Location: [facility] OR;  Service: OB/GYN;  Laterality: N/A;    INSERTION OF TUNNELED CENTRAL VENOUS CATHETER (CVC) WITH SUBCUTANEOUS PORT N/A 7/1/2022    Procedure: [id number];  Surgeon: Beau Quiles MD;  Location: [facility] OR;  Service: General;  Laterality: N/A;    LAPAROSCOPIC DRAINAGE OF CYST OF OVARY Left 2/26/2019    Procedure: DRAINAGE, CYST, OVARY, LAPAROSCOPIC;  Surgeon: Pia Aguila MD;  Location: [facility] OR;  Service: OB/GYN;  Laterality: Left;    ROBOT-ASSISTED LAPAROSCOPIC ABDOMINAL HYSTERECTOMY USING DA BEAN XI N/A 11/5/2019    Procedure: XI ROBOTIC HYSTERECTOMY;  Surgeon: Pia Aguila MD;  Location: [facility] OR;  Service: OB/GYN;  Laterality: N/A;    ROBOT-ASSISTED LAPAROSCOPIC SALPINGO-OOPHORECTOMY USING DA BEAN XI Bilateral 11/5/2019    Procedure: XI ROBOTIC SALPINGO-OOPHORECTOMY;  Surgeon: Pia Aguila MD;  Location: [facility] OR;  Service: OB/GYN;  Laterality: Bilateral;    TUBAL LIGATION      Tummy Tuck         Review of patient's allergies indicates:    Allergen Reactions    Sumatriptan Other (See Comments)     Chest tightness that moved into her throat       Current Facility-Administered Medications on File Prior to Encounter   Medication    lactated ringers infusion     Current Outpatient Medications on File Prior to Encounter   Medication Sig    atorvastatin (LIPITOR) 40 MG tablet Take 1 tablet (40 mg total) by mouth once daily.    dexAMETHasone (DECADRON) 4 MG Tab Take 2 tablets (8 mg total) by mouth once daily. Take 2 tablets (8 mg) by mouth once daily on days 2,3,4 following chemotherapy.    HYDROcodone-acetaminophen (NORCO)  mg per tablet Take 1 tablet by mouth every 6 (six) hours as needed (Pain).    LIDOcaine-prilocaine (EMLA) cream Apply topically as needed.    OLANZapine (ZYPREXA) 5 MG tablet Take 1 tablet (5 mg total) by mouth every evening. Take 1 tablet by mouth every evening on days 1-4 of each chemotherapy cycle    ondansetron (ZOFRAN-ODT) 8 MG TbDL Take 1 tablet (8 mg total) by mouth every 8 (eight) hours as needed (nausea/vomiting). Take 1 tablet (8 mg) by mouth every 8 hours as needed for nausea/vomiting.    verapamiL (CALAN) 120 MG tablet TAKE 1 TABLET(120 MG) BY MOUTH EVERY DAY     Family History       Problem Relation (Age of Onset)    Cancer Mother, Father    Heart disease Maternal Grandmother    No Known Problems Sister, Maternal Grandfather, Daughter, Daughter, Son, Son, Son, Son, Brother, Other, Other, Other, Other, Other, Maternal Aunt, Sister          Tobacco Use    Smoking status: Never Smoker    Smokeless tobacco: Never Used   Substance and Sexual Activity    Alcohol use: Not Currently     Alcohol/week: 5.0 standard drinks     Types: 5 Cans of beer per week     Comment: socially;  last 06/30/2022    Drug use: No    Sexual activity: Yes     Partners: Male     Birth control/protection: Surgical     Comment: hyst      Review of Systems   Constitutional:  Negative for chills, diaphoresis, fatigue and fever.    Respiratory:  Negative for cough, shortness of breath and wheezing.    Cardiovascular:  Negative for chest pain, palpitations and leg swelling.   Gastrointestinal:  Negative for abdominal pain, constipation, diarrhea, nausea and vomiting.   Musculoskeletal:  Positive for myalgias (RUE). Negative for arthralgias and back pain.        (+) RUE swelling, pain, and weakness.    Skin:  Negative for pallor and rash.   Neurological:  Negative for dizziness, syncope, weakness, light-headedness, numbness and headaches.   Psychiatric/Behavioral:  The patient is not nervous/anxious.    All other systems reviewed and are negative.  Objective:     Vital Signs (Most Recent):  Temp: 99.2 °F (37.3 °C) (08/18/22 2042)  Pulse: 79 (08/19/22 0002)  Resp: 19 (08/19/22 0002)  BP: (!) 151/80 (08/19/22 0002)  SpO2: 100 % (08/19/22 0002)   Vital Signs (24h Range):  Temp:  [99.2 °F (37.3 °C)] 99.2 °F (37.3 °C)  Pulse:  [] 79  Resp:  [13-27] 19  SpO2:  [98 %-100 %] 100 %  BP: (141-164)/() 151/80     Weight: 81.8 kg (180 lb 7.1 oz)  Body mass index is 30.97 kg/m².    Physical Exam  Vitals and nursing note reviewed.   Constitutional:       General: She is awake. She is not in acute distress.     Appearance: Normal appearance. She is well-developed. She is not diaphoretic.   HENT:      Head: Normocephalic and atraumatic.   Eyes:      Conjunctiva/sclera: Conjunctivae normal.      Comments: PERRL; EOM intact.   Cardiovascular:      Rate and Rhythm: Normal rate and regular rhythm. No extrasystoles are present.     Pulses:           Radial pulses are 2+ on the right side and 2+ on the left side.      Heart sounds: S1 normal and S2 normal. No murmur heard.  Pulmonary:      Effort: Pulmonary effort is normal. No tachypnea.      Breath sounds: Normal breath sounds and air entry. No wheezing, rhonchi or rales.   Chest:      Comments: Right upper chest wall port site, well-healed.  Abdominal:      General: Bowel sounds are normal. There is  no distension.      Palpations: Abdomen is soft.      Tenderness: There is no abdominal tenderness.   Musculoskeletal:         General: No tenderness or deformity. Normal range of motion.      Cervical back: Normal range of motion and neck supple.      Right lower leg: No edema.      Left lower leg: No edema.      Comments: Swelling and tenderness from right shoulder to lower arm. Neurovascularly intact.    Skin:     General: Skin is warm and dry.      Capillary Refill: Capillary refill takes less than 2 seconds.      Findings: No erythema or rash.   Neurological:      General: No focal deficit present.      Mental Status: She is alert and oriented to person, place, and time.   Psychiatric:         Mood and Affect: Mood and affect normal.         Behavior: Behavior normal. Behavior is cooperative.           Significant Labs:  Results for orders placed or performed during the hospital encounter of 08/18/22   CBC auto differential   Result Value Ref Range    WBC 16.15 (H) 3.90 - 12.70 K/uL    RBC 4.57 4.00 - 5.40 M/uL    Hemoglobin 12.3 12.0 - 16.0 g/dL    Hematocrit 37.9 37.0 - 48.5 %    MCV 83 82 - 98 fL    MCH 26.9 (L) 27.0 - 31.0 pg    MCHC 32.5 32.0 - 36.0 g/dL    RDW 14.9 (H) 11.5 - 14.5 %    Platelets 285 150 - 450 K/uL    MPV 10.6 9.2 - 12.9 fL    Immature Granulocytes 0.5 0.0 - 0.5 %    Gran # (ANC) 11.6 (H) 1.8 - 7.7 K/uL    Immature Grans (Abs) 0.08 (H) 0.00 - 0.04 K/uL    Lymph # 3.7 1.0 - 4.8 K/uL    Mono # 0.7 0.3 - 1.0 K/uL    Eos # 0.1 0.0 - 0.5 K/uL    Baso # 0.04 0.00 - 0.20 K/uL    nRBC 0 0 /100 WBC    Gran % 71.9 38.0 - 73.0 %    Lymph % 22.7 18.0 - 48.0 %    Mono % 4.3 4.0 - 15.0 %    Eosinophil % 0.4 0.0 - 8.0 %    Basophil % 0.2 0.0 - 1.9 %    Differential Method Automated    Comprehensive metabolic panel   Result Value Ref Range    Sodium 142 136 - 145 mmol/L    Potassium 3.2 (L) 3.5 - 5.1 mmol/L    Chloride 104 95 - 110 mmol/L    CO2 20 (L) 23 - 29 mmol/L    Glucose 92 70 - 110 mg/dL    BUN 13  6 - 20 mg/dL    Creatinine 0.8 0.5 - 1.4 mg/dL    Calcium 9.5 8.7 - 10.5 mg/dL    Total Protein 8.0 6.0 - 8.4 g/dL    Albumin 4.1 3.5 - 5.2 g/dL    Total Bilirubin 0.4 0.1 - 1.0 mg/dL    Alkaline Phosphatase 97 55 - 135 U/L    AST 22 10 - 40 U/L    ALT 29 10 - 44 U/L    Anion Gap 18 (H) 8 - 16 mmol/L    eGFR >60 >60 mL/min/1.73 m^2   Protime-INR   Result Value Ref Range    Prothrombin Time 9.9 9.0 - 12.5 sec    INR 0.9 0.8 - 1.2   APTT   Result Value Ref Range    aPTT 25.6 21.0 - 32.0 sec   COVID-19 Rapid Screening   Result Value Ref Range    SARS-CoV-2 RNA, Amplification, Qual Negative Negative   APTT   Result Value Ref Range    aPTT 23.7 21.0 - 32.0 sec   Protime-INR   Result Value Ref Range    Prothrombin Time 10.0 9.0 - 12.5 sec    INR 0.9 0.8 - 1.2   CBC auto differential   Result Value Ref Range    WBC 13.65 (H) 3.90 - 12.70 K/uL    RBC 4.32 4.00 - 5.40 M/uL    Hemoglobin 11.6 (L) 12.0 - 16.0 g/dL    Hematocrit 35.5 (L) 37.0 - 48.5 %    MCV 82 82 - 98 fL    MCH 26.9 (L) 27.0 - 31.0 pg    MCHC 32.7 32.0 - 36.0 g/dL    RDW 14.5 11.5 - 14.5 %    Platelets 258 150 - 450 K/uL    MPV 10.6 9.2 - 12.9 fL    Immature Granulocytes 0.4 0.0 - 0.5 %    Gran # (ANC) 9.6 (H) 1.8 - 7.7 K/uL    Immature Grans (Abs) 0.05 (H) 0.00 - 0.04 K/uL    Lymph # 3.3 1.0 - 4.8 K/uL    Mono # 0.7 0.3 - 1.0 K/uL    Eos # 0.1 0.0 - 0.5 K/uL    Baso # 0.02 0.00 - 0.20 K/uL    nRBC 0 0 /100 WBC    Gran % 70.0 38.0 - 73.0 %    Lymph % 24.2 18.0 - 48.0 %    Mono % 4.9 4.0 - 15.0 %    Eosinophil % 0.4 0.0 - 8.0 %    Basophil % 0.1 0.0 - 1.9 %    Differential Method Automated       All pertinent labs within the past 24 hours have been reviewed.    Significant Imaging:  Imaging Results              US Upper Extremity Veins Right (Final result)  Result time 08/18/22 22:39:55      Final result by Jaylyn Childress MD (08/18/22 22:39:55)                   Impression:      There is a clot in the right subclavian vein, right axillary vein, right brachial  vein, right basilic vein consistent with DVT. Sonographer reported critical findings to Dr. Thao at 22:20.      Electronically signed by: Monroe Jaylyn  Date:    08/18/2022  Time:    22:39               Narrative:    EXAMINATION:  US UPPER EXTREMITY VEINS RIGHT    CLINICAL HISTORY:  Right upper arm swelling;    TECHNIQUE:  Duplex and color flow Doppler evaluation and dynamic compression was performed of the right upper extremity veins.    COMPARISON:  None    FINDINGS:  Central veins: The right internal jugular vein is patent.  There is a clot in the right subclavian vein, right axillary vein, right brachial vein, right basilic vein consistent with DVT.  Sonographer reported critical findings to Dr. Thao at 22:20.    Arm veins: Cephalic vein is patent on the right    Contralateral subclavian/internal jugular veins: The left subclavian and internal jugular veins are patent and free of thrombus.    Other findings: None.                                     I have reviewed all pertinent imaging results/findings within the past 24 hours.              Assessment/Plan:     * Acute deep vein thrombosis (DVT) of right upper extremity  - Start IV heparin drip per protocol.   - IR consult for possible thrombectomy. If unable to perform thrombectomy, will need General Surgery consult for new port placement.      Malignant neoplasm of upper-outer quadrant of left breast in female, estrogen receptor negative  - Hem/Onc consult.   - DVT to right subclavian port site. If IR unable to perform thrombectomy, will need General Surgery consult for new port placement.         VTE Risk Mitigation (From admission, onward)         Ordered     IP VTE HIGH RISK PATIENT  Once         08/19/22 0053     Place sequential compression device  Until discontinued         08/19/22 0053     heparin 25,000 units in dextrose 5% (100 units/ml) IV bolus from bag - ADDITIONAL PRN BOLUS - 60 units/kg  As needed (PRN)        Question:  Heparin Infusion  Adjustment (DO NOT MODIFY ANSWER)  Answer:  [email or web address]\epic\Images\Pharmacy\HeparinInfusions\heparin HIGH INTENSITY nomogram for [facility] [id number]    08/18/22 2336     heparin 25,000 units in dextrose 5% (100 units/ml) IV bolus from bag - ADDITIONAL PRN BOLUS - 30 units/kg  As needed (PRN)        Question:  Heparin Infusion Adjustment (DO NOT MODIFY ANSWER)  Answer:  [email or web address]\epic\Images\Pharmacy\HeparinInfusions\heparin HIGH INTENSITY nomogram for [facility] [id number]    08/18/22 2336     heparin 25,000 units in dextrose 5% 250 mL (100 units/mL) infusion HIGH INTENSITY nomogram - [facility]  Continuous        Question Answer Comment   Heparin Infusion Adjustment (DO NOT MODIFY ANSWER) [email or web address]\epic\Images\Pharmacy\HeparinInfusions\heparin HIGH INTENSITY nomogram for [facility] [id number]    Begin at (in units/kg/hr) 18        08/18/22 5686                   Darya Mary NP  Department of Hospital Medicine   [facility] - Telemetry ([facility])

## 2022-08-19 NOTE — CONSULTS
[facility] - Telemetry ([facility])  Hematology/Oncology  Consult Note    Patient Name: Florecita Noel  MRN: [id number]  Admission Date: 8/18/2022  Hospital Length of Stay: 0 days  Code Status: Full Code   Attending Provider: Dank Brown MD  Consulting Provider: Molly Storm NP  Primary Care Physician: Alesha Alonso MD  Principal Problem:Acute deep vein thrombosis (DVT) of right upper extremity    Inpatient consult to Hematology/Oncology  Consult performed by: Molly Storm NP  Consult ordered by: Darya Mary NP        Subjective:     HPI:  46 y.o female with h/o hypothyroidism, HTN, newly diagnosed breast cancer on active combined chemo immunotherapy (Carboplatin/Taxol/Keytruda) last tx 8/15/2022. Patient presented to [facility] ER for evaluation of right arm discomfort. Associated symptoms include swelling, warmth. RUE US revealed a clot in the right subclavian vein, right axillary vein, right brachial vein, right basilic vein consistent with DVT. In ER labs significant for WBC 16K. K+3.2. Patient initiated on Heparin gtt. IR consulted for consideration of thrombectomy given extensive clot.    Hematology/Oncology consulted to help assist with management       Oncology Treatment Plan:   OP PEMBROLIZUMAB CARBOPLATIN (AUC 5) WITH WEEKLY PACLITAXEL FOLLOWED BY PEMBROLIZUMAB DOXORUBICIN CYCLOPHOSPHAMIDE FOLLOWED BY PEMBROLIZUMAB 200 MG Q3W    Medications:  Continuous Infusions:  Scheduled Meds:   apixaban  10 mg Oral BID    atorvastatin  40 mg Oral Daily    verapamiL  120 mg Oral Daily     PRN Meds:acetaminophen, aluminum-magnesium hydroxide-simethicone, bisacodyL, HYDROcodone-acetaminophen, melatonin, naloxone, ondansetron, promethazine, sodium chloride 0.9%     Review of patient's allergies indicates:   Allergen Reactions    Sumatriptan Other (See Comments)     Chest tightness that moved into her throat        Past Medical History:   Diagnosis Date    Abnormal Pap smear 1996    Anemia      Hypertension     Hypokalemia     in pregnancy    Hypothyroidism (acquired) 12/28/2018    Malignant neoplasm of upper-outer quadrant of left breast in female, estrogen receptor negative 6/22/2022     Past Surgical History:   Procedure Laterality Date    CHOLECYSTECTOMY      COLONOSCOPY N/A 6/8/2022    Procedure: COLONOSCOPY;  Surgeon: Rosaline Meyer MD;  Location: [facility];  Service: Endoscopy;  Laterality: N/A;    COSMETIC SURGERY      tummy tuck    DIAGNOSTIC LAPAROSCOPY N/A 2/26/2019    Procedure: LAPAROSCOPY, DIAGNOSTIC;  Surgeon: Pia Aguila MD;  Location: [facility] OR;  Service: OB/GYN;  Laterality: N/A;    FULGURATION OF ENDOMETRIOSIS N/A 2/26/2019    Procedure: FULGURATION, ENDOMETRIOSIS;  Surgeon: Pia Aguila MD;  Location: [facility];  Service: OB/GYN;  Laterality: N/A;    HYSTERECTOMY      HYSTEROSCOPY WITH HYDROTHERMAL ABLATION OF ENDOMETRIUM WITH DILATION AND CURETTAGE N/A 2/26/2019    Procedure: HYSTEROSCOPY, WITH DILATION AND CURETTAGE OF UTERUS AND HYDROTHERMAL ENDOMETRIAL ABLATION;  Surgeon: Pia Aguila MD;  Location: [facility];  Service: OB/GYN;  Laterality: N/A;    INSERTION OF TUNNELED CENTRAL VENOUS CATHETER (CVC) WITH SUBCUTANEOUS PORT N/A 7/1/2022    Procedure: [id number];  Surgeon: Beau Quiles MD;  Location: [facility];  Service: General;  Laterality: N/A;    LAPAROSCOPIC DRAINAGE OF CYST OF OVARY Left 2/26/2019    Procedure: DRAINAGE, CYST, OVARY, LAPAROSCOPIC;  Surgeon: Pia Aguila MD;  Location: [facility];  Service: OB/GYN;  Laterality: Left;    ROBOT-ASSISTED LAPAROSCOPIC ABDOMINAL HYSTERECTOMY USING DA BEAN XI N/A 11/5/2019    Procedure: XI ROBOTIC HYSTERECTOMY;  Surgeon: Pia Aguila MD;  Location: [facility] OR;  Service: OB/GYN;  Laterality: N/A;    ROBOT-ASSISTED LAPAROSCOPIC SALPINGO-OOPHORECTOMY USING DA BEAN XI Bilateral 11/5/2019    Procedure: XI ROBOTIC SALPINGO-OOPHORECTOMY;  Surgeon: Pia Roberts  MD Ayla;  Location: [facility];  Service: OB/GYN;  Laterality: Bilateral;    TUBAL LIGATION      José Miguel Davila       Family History       Problem Relation (Age of Onset)    Cancer Mother, Father    Heart disease Maternal Grandmother    No Known Problems Sister, Maternal Grandfather, Daughter, Daughter, Son, Son, Son, Son, Brother, Other, Other, Other, Other, Other, Maternal Aunt, Sister          Tobacco Use    Smoking status: Never Smoker    Smokeless tobacco: Never Used   Substance and Sexual Activity    Alcohol use: Not Currently     Alcohol/week: 5.0 standard drinks     Types: 5 Cans of beer per week     Comment: socially;  last 06/30/2022    Drug use: No    Sexual activity: Yes     Partners: Male     Birth control/protection: Surgical     Comment: hyst        Review of Systems   Constitutional:  Positive for fatigue. Negative for activity change, appetite change, chills, fever and unexpected weight change.   HENT:  Negative for congestion, mouth sores, nosebleeds, sore throat, trouble swallowing and voice change.    Eyes:  Negative for photophobia and visual disturbance.   Respiratory:  Negative for cough, chest tightness, shortness of breath and wheezing.    Cardiovascular:  Negative for chest pain and leg swelling.   Gastrointestinal:  Negative for abdominal distention, abdominal pain, blood in stool, constipation, diarrhea, nausea and vomiting.   Genitourinary:  Negative for difficulty urinating, dysuria and hematuria.   Musculoskeletal:  Negative for arthralgias, back pain and myalgias.        Right arm discomfort/swelling   Skin:  Negative for pallor, rash and wound.   Neurological:  Negative for dizziness, syncope, weakness and headaches.   Hematological:  Negative for adenopathy. Does not bruise/bleed easily.   Psychiatric/Behavioral:  The patient is nervous/anxious.    Objective:     Vital Signs (Most Recent):  Temp: 97.9 °F (36.6 °C) (08/19/22 0752)  Pulse: 83 (08/19/22 0752)  Resp: 16 (08/19/22  0852)  BP: (!) 171/95 (08/19/22 0752)  SpO2: 98 % (08/19/22 0752)   Vital Signs (24h Range):  Temp:  [97.9 °F (36.6 °C)-99.2 °F (37.3 °C)] 97.9 °F (36.6 °C)  Pulse:  [] 83  Resp:  [13-27] 16  SpO2:  [96 %-100 %] 98 %  BP: (125-171)/() 171/95     Weight: 79.1 kg (174 lb 6.1 oz)  Body mass index is 29.93 kg/m².  Body surface area is 1.89 meters squared.      Intake/Output Summary (Last 24 hours) at 8/19/2022 1130  Last data filed at 8/19/2022 1000  Gross per 24 hour   Intake --   Output 0 ml   Net 0 ml       Physical Exam  Vitals reviewed.   Constitutional:       Appearance: She is well-developed.   HENT:      Head: Normocephalic.      Right Ear: External ear normal.      Left Ear: External ear normal.      Nose: Nose normal.   Eyes:      General: Lids are normal. No scleral icterus.        Right eye: No discharge.         Left eye: No discharge.      Conjunctiva/sclera: Conjunctivae normal.   Neck:      Thyroid: No thyroid mass.   Cardiovascular:      Rate and Rhythm: Normal rate and regular rhythm.      Heart sounds: Normal heart sounds.   Pulmonary:      Effort: Pulmonary effort is normal. No respiratory distress.      Breath sounds: Normal breath sounds. No wheezing or rales.   Abdominal:      General: Bowel sounds are normal. There is no distension.      Palpations: Abdomen is soft.      Tenderness: There is no abdominal tenderness.   Genitourinary:     Comments: deferred  Musculoskeletal:         General: Normal range of motion.      Right upper arm: Swelling present.      Cervical back: Normal range of motion.   Skin:     General: Skin is warm and dry.   Neurological:      Mental Status: She is alert and oriented to person, place, and time.   Psychiatric:         Speech: Speech normal.         Behavior: Behavior normal. Behavior is cooperative.         Thought Content: Thought content normal.       Significant Labs:   BMP:   Recent Labs   Lab 08/18/22  2203 08/19/22  0546   GLU 92 91    141    K 3.2* 4.2    106   CO2 20* 25   BUN 13 9   CREATININE 0.8 0.7   CALCIUM 9.5 8.9   MG  --  2.0   , CBC:   Recent Labs   Lab 08/18/22 2203 08/19/22  0002 08/19/22  0546   WBC 16.15* 13.65* 11.41   HGB 12.3 11.6* 11.4*   HCT 37.9 35.5* 37.8    258 283   , LDH: No results for input(s): LDHCSF, BFSOURCE in the last 48 hours., LFTs:   Recent Labs   Lab 08/18/22 2203   ALT 29   AST 22   ALKPHOS 97   BILITOT 0.4   PROT 8.0   ALBUMIN 4.1   , Urine Studies: No results for input(s): COLORU, APPEARANCEUA, PHUR, SPECGRAV, PROTEINUA, GLUCUA, KETONESU, BILIRUBINUA, OCCULTUA, NITRITE, UROBILINOGEN, LEUKOCYTESUR, RBCUA, WBCUA, BACTERIA, SQUAMEPITHEL, HYALINECASTS in the last 48 hours.    Invalid input(s): WRIGHTSUR, and All pertinent labs from the last 24 hours have been reviewed.    Diagnostic Results:  I have reviewed all pertinent imaging results/findings within the past 24 hours.    Assessment/Plan:     * Acute deep vein thrombosis (DVT) of right upper extremity  -see above    Malignant neoplasm of upper-outer quadrant of left breast in female, estrogen receptor negative  -Hold chemotherapy inpatient  -New onset DVT provoked secondary to mediport   -Patient evaluated by IR. NO plans for thrombectomy  -Transition to PO Eliquis 10 mg PO BID x 7 days followed by Eliquis 5 mg PO BID  -Arrange outpatient f/u with General surgery for mediport removal and replacement  -Arrange outpatient f/u with Primary Oncologist         Thank you for your consult. I will follow-up with patient. Please contact us if you have any additional questions.    Molly Storm NP  Hematology/Oncology  [facility] - Telemetry ([facility])    45 minutes of total time spent on the encounter, which includes face to face time and non-face to face time preparing to see the patient (eg, review of tests), Obtaining and/or reviewing separately obtained history, Documenting clinical information in the electronic or other health record, Independently  interpreting results (not separately reported) and communicating results to the patient/family/caregiver, or Care coordination (not separately reported).

## 2022-08-19 NOTE — ASSESSMENT & PLAN NOTE
- Start IV heparin drip per protocol.   - IR consult for possible thrombectomy. If unable to perform thrombectomy, will need General Surgery consult for new port placement.

## 2022-08-19 NOTE — HPI
46 y.o female with h/o hypothyroidism, HTN, newly diagnosed breast cancer on active combined chemo immunotherapy (Carboplatin/Taxol/Keytruda) last tx 8/15/2022. Patient presented to [facility] ER for evaluation of right arm discomfort. Associated symptoms include swelling, warmth. RUE US revealed a clot in the right subclavian vein, right axillary vein, right brachial vein, right basilic vein consistent with DVT. In ER labs significant for WBC 16K. K+3.2. Patient initiated on Heparin gtt. IR consulted for consideration of thrombectomy given extensive clot.    Hematology/Oncology consulted to help assist with management

## 2022-08-19 NOTE — PLAN OF CARE
[facility] - Telemetry (Hospital)  Discharge Final Note    Primary Care Provider: Alesha Alonso MD    Expected Discharge Date: 8/19/2022    Final Discharge Note (most recent)     Final Note - 08/19/22 1141        Final Note    Assessment Type Final Discharge Note     Anticipated Discharge Disposition Home or Self Care     Hospital Resources/Appts/Education Provided Appointments scheduled and added to AVS                 Important Message from Medicare             PCP f/u Aug 22nd.

## 2022-08-19 NOTE — ASSESSMENT & PLAN NOTE
-Hold chemotherapy inpatient  -New onset DVT provoked secondary to mediport   -Patient evaluated by IR. NO plans for thrombectomy  -Transition to PO Eliquis 10 mg PO BID x 7 days followed by Eliquis 5 mg PO BID  -Arrange outpatient f/u with General surgery for mediport removal and replacement  -Arrange outpatient f/u with Primary Oncologist

## 2022-08-19 NOTE — PLAN OF CARE
[facility] - Telemetry (Hospital)  Discharge Assessment    Primary Care Provider: Alesha Alonso MD     Discharge Assessment (most recent)     BRIEF DISCHARGE ASSESSMENT - 08/19/22 1140        Discharge Planning    Assessment Type Discharge Planning Brief Assessment     Resource/Environmental Concerns none     Support Systems Spouse/significant other     Current Living Arrangements home/apartment/condo     Patient/Family Anticipates Transition to home     Patient/Family Anticipated Services at Transition none     DME Needed Upon Discharge  none     Discharge Plan A Home

## 2022-08-19 NOTE — DISCHARGE SUMMARY
[facility] Hospitalist Progress Note      Subjective    Seen and examined this am  Received 2 units pRBC last night with hemoglobin 5.2  Agreeable to receive 2 more units today  Currently on 3L NC but denies dyspnea  Complains of some itching on her left leg    Objective    Vitals with min/max:    Vital Last Value 24 Hour Range   Temperature 97.5 °F (36.4 °C) (08/31/21 0629) Temp  Min: 97.5 °F (36.4 °C)  Max: 99.5 °F (37.5 °C)   Pulse 72 (08/31/21 0629) Pulse  Min: 66  Max: 91   Respiratory 16 (08/31/21 0629) Resp  Min: 14  Max: 22   Non-Invasive  Blood Pressure 123/82 (08/31/21 0629) BP  Min: 101/67  Max: 140/81   Pulse Oximetry 100 % (08/31/21 0629) SpO2  Min: 100 %  Max: 100 %   Arterial   Blood Pressure   No data recorded      Body mass index is 40.44 kg/m².      I/O's:    Intake/Output Summary (Last 24 hours) at 8/31/2021 0758  Last data filed at 8/31/2021 0315  Gross per 24 hour   Intake 282 ml   Output 1350 ml   Net -1068 ml         Physical Exam  General:  Appears uncomfortable. Somnolent and dyspneic. On O2 due to SOB  Head:  No signs of head trauma.  Eyes:  Pupils are equal.  Extraocular motions intact.    Ears:  Hearing grossly intact.  Mouth:  Oropharynx is normal.   Neck:  No adenopathy, no JVD.     Chest:  Chest with clear breath sounds bilaterally.  No wheezes, rales, or rhonchi.    Cardiac:  Regular rate and rhythm.  S1 and S2, without murmurs, gallops, or rubs.  Abdomen:  Soft, without detectable tenderness.  No sign of distention.  No   rebound or guarding, and no masses palpated.   Bowel Sounds normal.  Musculoskeletal:  LLE: Hematoma with increasing purple discoloration and expanding out of demarcation in the lower aspect of the leg, compartments are soft, not tender to palpation. Bulla of about 7 cm. Skin is intact. Compartments feel soft. Pulses are present and capilarry refill is also present. Limb has good temperature. --> appears similar  Extremities without clubbing, cyanosis or  "[facility] - Telemetry ([facility])  [facility] Medicine  Discharge Summary      Patient Name: Florecita Noel  MRN: [id number]  Patient Class: OP- Observation  Admission Date: 8/18/2022  Hospital Length of Stay: 0 days  Discharge Date and Time:  08/19/2022 1:15 PM  Attending Physician: Dank Brown MD   Discharging Provider: Nithya Leal NP  Primary Care Provider: Alesha Alonso MD      HPI:   Florecita Noel is a 46 y.o. female with a PMHx of anemia, breast cancer currently on chemotherapy, HTN, and hypothyroidism who presented to the Emergency Department with c/o right upper extremity pain x 1 day. No aggravating or alleviating factors. Associated right upper extremity swelling and weakness/"heaviness". Denies any numbness/tingling, decreased ROM, injury/trauma, joint swelling, CP, SOB/ROSE, palpitations, ABD pain, N/V/D, lightheadedness, dizziness, syncope, fever or chills. Patient recently had right subclavian MediPort placed on 7/1 for chemotherapy. Last chemo treatment was on 8/15. In the ED, venous US of RUE revealed a clot in the right subclavian vein, right axillary vein, right brachial vein, and right basilic vein consistent with DVT. Case was discussed with Hem/Onc per the ED, who recommend IR consult for possible thrombectomy. Patient was placed in Observation under Hospital Medicine services.       * No surgery found *      Hospital Course:   Heparin infusion initiated. Interventional Radiology consulted regarding possible thrombectomy. Pt was seen by Dr. Self, IR, who discussed treatment options with the patient and final decision was to discharge with Eliquis. The option for an outpatient thrombectomy if no improvement was discussed with patient. Education regarding Eliquis and bleeding precautions explained. Pt was cleared by Hematology/Oncology for discharge - she is to follow up with them due to treatment for breast cancer. Also, pt to follow up with Dr. Quiles in the outpatient setting for " edema.  Vascular:  Peripheral pulses normal and equal in all extremities.    Neuro:  Alert and oriented x 3.  Somnolent. No focal sensory or strength deficits.   Speech normal.  Follows commands.  Psychiatric:  Mood normal.  Skin:  No rash or lesions.        Current Medications:  • albuterol  2.5 mg Nebulization TID Resp   • furosemide  40 mg Intravenous Once   • iron sucrose (VENOFER) IVPB  200 mg Intravenous Once   • furosemide  40 mg Oral Daily   • sodium chloride (PF)  10 mL Injection 2 times per day   • sodium chloride       • iron sucrose (VENOFER) IVPB  200 mg Intravenous Once   • atorvastatin  40 mg Oral Nightly   • carvedilol  6.25 mg Oral 2 times per day   • fluticasone  1 spray Each Nare Daily   • gabapentin  300 mg Oral BID   • lidocaine  1 patch Transdermal Daily   • NIFEdipine XL  60 mg Oral Daily   • pantoprazole  40 mg Oral Daily   • polyethylene glycol  17 g Oral Daily   • venlafaxine XR  150 mg Oral Daily   • sodium chloride (PF)  2 mL Intracatheter 2 times per day   • ferrous sulfate  325 mg Oral Daily with breakfast      • sodium chloride 0.9% infusion     • sodium chloride 0.9% infusion        albuterol, sodium chloride, sodium chloride, furosemide, sodium chloride (PF), melatonin, acetaminophen, docusate sodium-sennosides, guaifenesin, hydrALAZINE, sodium chloride        Labs     Recent Results (from the past 24 hour(s))   Prepare Red Blood Cells: 2 Units    Collection Time: 08/30/21  8:39 AM   Result Value Ref Range    UNIT BLOOD TYPE O Pos     ISBT BLOOD TYPE 5100     BLOOD EXPIRATION DATE 20211001235900     UNIT NUMBER [id number]     DISPENSE STATUS Transfused     PRODUCT ID Red Blood Cells     PRODUCT CODE [id number]     PRODUCT DESCRIPTION RBC AS-3 LR     CROSSMATCH RESULT Compatible     ISSUE DATE/TIME [id number]     UNIT BLOOD TYPE O Pos     ISBT BLOOD TYPE 5100     BLOOD EXPIRATION DATE 20211001235900     UNIT NUMBER [id number]     DISPENSE STATUS Issued     PRODUCT ID Red Blood  mediport removal. Pt was seen and examined and determined to be safe and stable for discharge. Pt's home meds were resumed.        Goals of Care Treatment Preferences:  Code Status: Full Code      Consults:   Consults (From admission, onward)        Status Ordering Provider     Inpatient consult to Hematology/Oncology  Once        Provider:  Damon Almendarez MD    Completed LE, SHAWN     Inpatient consult to Interventional Radiology  Once        Provider:  Roslyn Gale MD    Completed LE, SHAWN          No new Assessment & Plan notes have been filed under this hospital service since the last note was generated.  Service: Hospital Medicine    Final Active Diagnoses:    Diagnosis Date Noted POA    PRINCIPAL PROBLEM:  Acute deep vein thrombosis (DVT) of right upper extremity [I82.621] 08/19/2022 Yes    Malignant neoplasm of upper-outer quadrant of left breast in female, estrogen receptor negative [C50.412, Z17.1] 06/22/2022 Not Applicable      Problems Resolved During this Admission:       Discharged Condition: stable    Disposition: Home or Self Care    Follow Up:   Follow-up Information     [facility].    Why: Call in reference to possible procedure to break up blood clot           Maris Hathaway MD Follow up.    Specialty: Hematology and Oncology  Why: At your next scheduled appointment  Contact information:  [id number]  [place] [address] [phone]                       Patient Instructions:      Activity as tolerated       Significant Diagnostic Studies: Labs:   CMP   Recent Labs   Lab 08/18/22 2203 08/19/22  0546    141   K 3.2* 4.2    106   CO2 20* 25   GLU 92 91   BUN 13 9   CREATININE 0.8 0.7   CALCIUM 9.5 8.9   PROT 8.0  --    ALBUMIN 4.1  --    BILITOT 0.4  --    ALKPHOS 97  --    AST 22  --    ALT 29  --    ANIONGAP 18* 10    and CBC   Recent Labs   Lab 08/18/22 2203 08/19/22  0002 08/19/22  0546   WBC 16.15* 13.65* 11.41   HGB 12.3  11.6* 11.4*   HCT 37.9 35.5* 37.8    258 283       Pending Diagnostic Studies:     Procedure Component Value Units Date/Time    APTT [[id number]]     Order Status: Sent Lab Status: No result     Specimen: Blood          Medications:  Reconciled Home Medications:      Medication List      START taking these medications    ELIQUIS DVT-PE TREAT 30D START 5 mg (74 tabs) Dspk  Generic drug: apixaban  For the first 7 days take two (5mg) tablets by mouth twice daily.  After 7 days take one (5 mg) tablet by mouth twice daily.        CONTINUE taking these medications    atorvastatin 40 MG tablet  Commonly known as: LIPITOR  Take 1 tablet (40 mg total) by mouth once daily.     dexAMETHasone 4 MG Tab  Commonly known as: DECADRON  Take 2 tablets (8 mg total) by mouth once daily. Take 2 tablets (8 mg) by mouth once daily on days 2,3,4 following chemotherapy.     HYDROcodone-acetaminophen  mg per tablet  Commonly known as: NORCO  Take 1 tablet by mouth every 6 (six) hours as needed (Pain).     LIDOcaine-prilocaine cream  Commonly known as: EMLA  Apply topically as needed.     OLANZapine 5 MG tablet  Commonly known as: ZyPREXA  Take 1 tablet (5 mg total) by mouth every evening. Take 1 tablet by mouth every evening on days 1-4 of each chemotherapy cycle     ondansetron 8 MG Tbdl  Commonly known as: ZOFRAN-ODT  Take 1 tablet (8 mg total) by mouth every 8 (eight) hours as needed (nausea/vomiting). Take 1 tablet (8 mg) by mouth every 8 hours as needed for nausea/vomiting.     verapamiL 120 MG tablet  Commonly known as: CALAN  TAKE 1 TABLET(120 MG) BY MOUTH EVERY DAY            Indwelling Lines/Drains at time of discharge:   Lines/Drains/Airways     Central Venous Catheter Line  Duration                PowerPort A Cath Single Lumen 07/01/22 1113 right subclavian 49 days                Time spent on the discharge of patient: > 30 minutes         Nithya Leal NP  Department of Hospital Medicine  [facility] - Telemetry  Cells     PRODUCT CODE [id number]     PRODUCT DESCRIPTION RBC AS-3 LR     CROSSMATCH RESULT Compatible     ISSUE DATE/TIME [id number]      Imaging    CXR personally reviewed:  XR CHEST PA OR AP 1 VIEW   Final Result   Impression:        Mild, bilateral interstitial opacities may reflect interstitial edema or   atypical pneumonia.      Electronically Signed by: MATTY OLIVIA M.D.    Signed on: 8/30/2021 6:31 AM          IR LOWER EXTREMITY ANGIOGRAM LEFT   Final Result   Impression:   Successful left lower extremity subselective embolization as above.      Electronically Signed by: EPHRAIM GOODWIN MD    Signed on: 8/27/2021 2:24 PM          CTA LOWER EXTREMITY LEFT W CONTRAST   Final Result   Impression:   Approximately 24 cm left calf hematoma with active arterial extravasation.   No arterial inflow abnormality identified.      Electronically Signed by: EPHRAIM GOODWIN MD    Signed on: 8/26/2021 7:22 PM          XR TIBIA FIBULA 2 VIEWS LEFT   Final Result   FINDINGS/IMPRESSION:   Diffuse osseous demineralization, which limits evaluation.  No acute   fracture or dislocation.  No destructive osseous lesions.  Severe   tibiotalar osteoarthrosis at the knee, partially visualized.  Mild/moderate   changes at the tibiotalar joints and at the midfoot.  Diffuse soft tissue   edema.      Electronically Signed by: DEBRA IVY MD    Signed on: 8/26/2021 4:38 PM          CTA LOWER EXTREMITY LEFT W CONTRAST    (Results Pending)       Cardiac studies:   ECG personally reviewed:   Encounter Date: 07/13/21   Electrocardiogram 12-Lead   Result Value    Ventricular Rate EKG/Min (BPM) 74    Atrial Rate (BPM) 74    KS-Interval (MSEC) 274    QRS-Interval (MSEC) 158    QT-Interval (MSEC) 418    QTc 464    P Axis (Degrees) 84    R Axis (Degrees) -30    T Axis (Degrees) 102    REPORT TEXT      Sinus rhythm  with 1st degree AV block  Left axis deviation  Left bundle branch block  Abnormal ECG  No previous ECGs available  Confirmed by  ([facility])   NATHALY VENEGAS, DHIRAJ ([id number]) on 7/13/2021 11:34:12 AM         Assessment and Plan    # Blunt trauma to L leg complicated with large hematoma  # S/p angiogram and embolization on LLE  # Injury of descending genicular artery s/p angiogram and embolization 8/26/21  trauma at NH with cheli leading to large hematoma  CTA with extravasation of contrast in skin layer of leg  Required IR embolization and coiling. Procedure was succesful  ACE wrapping with Local ice removed 8/29  Erythema and warmth on palpation initially now completely resolved, will discontinue empiric Cefazolin 8/29  CK WNL and compartments are soft  8/29 Hb 4.6 - patient refusing blood transfusion  8/30 Hgb 4.4 - agreed to 2 unit pRBC  9/1 hgb 5.2 - agreed to 2 units pRBC  Patient vitally stable and limb with good perfusion  Pain control: ONLY tylenol and ice packs. Refusing norco and tramadol. Do not give any opiates/sedatives  Gabapentin for neuropathic pain      # Acute blood loss anemia  2/2 large hematoma  8/28 thru 8/29 patient refused blood products, discussed risks of death, heart failure due to severe anemia  8/30 Hgb 4.4 - agreed to 2 unit pRBC  9/1 hgb 5.2 - agreed to 2 units pRBC  Hemeonc on board for alternative therapies if patient refuses blood    Acute Hypoxic Respiratory Failure 2/2 HF exacerbation vs. Pneumonia vs. anemia  - CXR from 8/30 personally reviewed. Noted to have mild, BL interstitial opacities which may reflect interstitial edema or atypical pneumonia  - NTBNP elevated to 2K. On daily lasix 40mg. May give an additional IV lasix today with blood transfusions  - Check white count and PCT  - Wean oxygen to maintain O2 sats > 92%     # HTN heart disease  Acute on Chronic Diastolic Heart Failure  Resume  Coreg, Nifedipine  8/29 gave dose of lasix due to wheexing and mild fluid overload  IV lasix today with blood transfusion    # GERD  Resume Protonix     # Depression  Resume Effexor. Discontinued trazodone as she does not want anything  to sedate her    # Insomnia  Patient requests only melatonin at night 6 mg.  No trazodone     # Hyperkalemia  Low potassium diet. Monitor for rhabdomyolysis  Suspect hemolyzed sample due to difficulty obtaining blood. Discontinued Kayexalate.    Patient refusing new blood draws     # Palliative care consultation  Goals of care --> now DNR  Patient did not want to designate POA today    DVT prophylaxis:SCD,      Diet: Cardiac diet    Code status:  Code Status Information     Code Status    Full Resuscitation        Discussed plan of care with RN and palliative care team    TIA Britton Hospitalist    8/31/2021 7:58 AM

## 2022-08-19 NOTE — HPI
"Florecita Noel is a 46 y.o. female with a PMHx of anemia, breast cancer currently on chemotherapy, HTN, and hypothyroidism who presented to the Emergency Department with c/o right upper extremity pain x 1 day. No aggravating or alleviating factors. Associated right upper extremity swelling and weakness/"heaviness". Denies any numbness/tingling, decreased ROM, injury/trauma, joint swelling, CP, SOB/ROSE, palpitations, ABD pain, N/V/D, lightheadedness, dizziness, syncope, fever or chills. Patient recently had right subclavian MediPort placed on 7/1 for chemotherapy. Last chemo treatment was on 8/15. In the ED, venous US of RUE revealed a clot in the right subclavian vein, right axillary vein, right brachial vein, and right basilic vein consistent with DVT. Case was discussed with Hem/Onc per the ED, who recommend IR consult for possible thrombectomy. Patient was placed in Observation under Hospital Medicine services.   "

## 2022-08-19 NOTE — ASSESSMENT & PLAN NOTE
- Hem/Onc consult.   - DVT to right subclavian port site. If IR unable to perform thrombectomy, will need General Surgery consult for new port placement.

## 2022-08-19 NOTE — SUBJECTIVE & OBJECTIVE
Oncology Treatment Plan:   OP PEMBROLIZUMAB CARBOPLATIN (AUC 5) WITH WEEKLY PACLITAXEL FOLLOWED BY PEMBROLIZUMAB DOXORUBICIN CYCLOPHOSPHAMIDE FOLLOWED BY PEMBROLIZUMAB 200 MG Q3W    Medications:  Continuous Infusions:  Scheduled Meds:   apixaban  10 mg Oral BID    atorvastatin  40 mg Oral Daily    verapamiL  120 mg Oral Daily     PRN Meds:acetaminophen, aluminum-magnesium hydroxide-simethicone, bisacodyL, HYDROcodone-acetaminophen, melatonin, naloxone, ondansetron, promethazine, sodium chloride 0.9%     Review of patient's allergies indicates:   Allergen Reactions    Sumatriptan Other (See Comments)     Chest tightness that moved into her throat        Past Medical History:   Diagnosis Date    Abnormal Pap smear 1996    Anemia     Hypertension     Hypokalemia     in pregnancy    Hypothyroidism (acquired) 12/28/2018    Malignant neoplasm of upper-outer quadrant of left breast in female, estrogen receptor negative 6/22/2022     Past Surgical History:   Procedure Laterality Date    CHOLECYSTECTOMY      COLONOSCOPY N/A 6/8/2022    Procedure: COLONOSCOPY;  Surgeon: Rosaline Meyer MD;  Location: [facility];  Service: Endoscopy;  Laterality: N/A;    [facility]    DIAGNOSTIC LAPAROSCOPY N/A 2/26/2019    Procedure: LAPAROSCOPY, DIAGNOSTIC;  Surgeon: Pia Aguila MD;  Location: [facility];  Service: OB/GYN;  Laterality: N/A;    FULGURATION OF ENDOMETRIOSIS N/A 2/26/2019    Procedure: FULGURATION, ENDOMETRIOSIS;  Surgeon: Pia Aguila MD;  Location: [facility] OR;  Service: OB/GYN;  Laterality: N/A;    HYSTERECTOMY      HYSTEROSCOPY WITH HYDROTHERMAL ABLATION OF ENDOMETRIUM WITH DILATION AND CURETTAGE N/A 2/26/2019    Procedure: HYSTEROSCOPY, WITH DILATION AND CURETTAGE OF UTERUS AND HYDROTHERMAL ENDOMETRIAL ABLATION;  Surgeon: Pia Aguila MD;  Location: [facility] OR;  Service: OB/GYN;  Laterality: N/A;    INSERTION OF TUNNELED CENTRAL VENOUS CATHETER (CVC) WITH SUBCUTANEOUS  PORT N/A 7/1/2022    Procedure: VWATURSXE-VHMI-A-CATH;  Surgeon: Beau Quiles MD;  Location: [facility] OR;  Service: General;  Laterality: N/A;    LAPAROSCOPIC DRAINAGE OF CYST OF OVARY Left 2/26/2019    Procedure: DRAINAGE, CYST, OVARY, LAPAROSCOPIC;  Surgeon: Pia Aguila MD;  Location: [facility] OR;  Service: OB/GYN;  Laterality: Left;    ROBOT-ASSISTED LAPAROSCOPIC ABDOMINAL HYSTERECTOMY USING DA BEAN XI N/A 11/5/2019    Procedure: XI ROBOTIC HYSTERECTOMY;  Surgeon: Pia Aguila MD;  Location: [facility] OR;  Service: OB/GYN;  Laterality: N/A;    ROBOT-ASSISTED LAPAROSCOPIC SALPINGO-OOPHORECTOMY USING DA BEAN XI Bilateral 11/5/2019    Procedure: XI ROBOTIC SALPINGO-OOPHORECTOMY;  Surgeon: Pia Aguila MD;  Location: [facility] OR;  Service: OB/GYN;  Laterality: Bilateral;    TUBAL LIGATION      Tummy Giovanni       Family History       Problem Relation (Age of Onset)    Cancer Mother, Father    Heart disease Maternal Grandmother    No Known Problems Sister, Maternal Grandfather, Daughter, Daughter, Son, Son, Son, Son, Brother, Other, Other, Other, Other, Other, Maternal Aunt, Sister          Tobacco Use    Smoking status: Never Smoker    Smokeless tobacco: Never Used   Substance and Sexual Activity    Alcohol use: Not Currently     Alcohol/week: 5.0 standard drinks     Types: 5 Cans of beer per week     Comment: socially;  last 06/30/2022    Drug use: No    Sexual activity: Yes     Partners: Male     Birth control/protection: Surgical     Comment: hyst        Review of Systems   Constitutional:  Positive for fatigue. Negative for activity change, appetite change, chills, fever and unexpected weight change.   HENT:  Negative for congestion, mouth sores, nosebleeds, sore throat, trouble swallowing and voice change.    Eyes:  Negative for photophobia and visual disturbance.   Respiratory:  Negative for cough, chest tightness, shortness of breath and wheezing.    Cardiovascular:  Negative for chest pain  and leg swelling.   Gastrointestinal:  Negative for abdominal distention, abdominal pain, blood in stool, constipation, diarrhea, nausea and vomiting.   Genitourinary:  Negative for difficulty urinating, dysuria and hematuria.   Musculoskeletal:  Negative for arthralgias, back pain and myalgias.        Right arm discomfort/swelling   Skin:  Negative for pallor, rash and wound.   Neurological:  Negative for dizziness, syncope, weakness and headaches.   Hematological:  Negative for adenopathy. Does not bruise/bleed easily.   Psychiatric/Behavioral:  The patient is nervous/anxious.    Objective:     Vital Signs (Most Recent):  Temp: 97.9 °F (36.6 °C) (08/19/22 0752)  Pulse: 83 (08/19/22 0752)  Resp: 16 (08/19/22 0852)  BP: (!) 171/95 (08/19/22 0752)  SpO2: 98 % (08/19/22 0752)   Vital Signs (24h Range):  Temp:  [97.9 °F (36.6 °C)-99.2 °F (37.3 °C)] 97.9 °F (36.6 °C)  Pulse:  [] 83  Resp:  [13-27] 16  SpO2:  [96 %-100 %] 98 %  BP: (125-171)/() 171/95     Weight: 79.1 kg (174 lb 6.1 oz)  Body mass index is 29.93 kg/m².  Body surface area is 1.89 meters squared.      Intake/Output Summary (Last 24 hours) at 8/19/2022 1130  Last data filed at 8/19/2022 1000  Gross per 24 hour   Intake --   Output 0 ml   Net 0 ml       Physical Exam  Vitals reviewed.   Constitutional:       Appearance: She is well-developed.   HENT:      Head: Normocephalic.      Right Ear: External ear normal.      Left Ear: External ear normal.      Nose: Nose normal.   Eyes:      General: Lids are normal. No scleral icterus.        Right eye: No discharge.         Left eye: No discharge.      Conjunctiva/sclera: Conjunctivae normal.   Neck:      Thyroid: No thyroid mass.   Cardiovascular:      Rate and Rhythm: Normal rate and regular rhythm.      Heart sounds: Normal heart sounds.   Pulmonary:      Effort: Pulmonary effort is normal. No respiratory distress.      Breath sounds: Normal breath sounds. No wheezing or rales.   Abdominal:       General: Bowel sounds are normal. There is no distension.      Palpations: Abdomen is soft.      Tenderness: There is no abdominal tenderness.   Genitourinary:     Comments: deferred  Musculoskeletal:         General: Normal range of motion.      Right upper arm: Swelling present.      Cervical back: Normal range of motion.   Skin:     General: Skin is warm and dry.   Neurological:      Mental Status: She is alert and oriented to person, place, and time.   Psychiatric:         Speech: Speech normal.         Behavior: Behavior normal. Behavior is cooperative.         Thought Content: Thought content normal.       Significant Labs:   BMP:   Recent Labs   Lab 08/18/22 2203 08/19/22  0546   GLU 92 91    141   K 3.2* 4.2    106   CO2 20* 25   BUN 13 9   CREATININE 0.8 0.7   CALCIUM 9.5 8.9   MG  --  2.0   , CBC:   Recent Labs   Lab 08/18/22 2203 08/19/22  0002 08/19/22  0546   WBC 16.15* 13.65* 11.41   HGB 12.3 11.6* 11.4*   HCT 37.9 35.5* 37.8    258 283   , LDH: No results for input(s): LDHCSF, BFSOURCE in the last 48 hours., LFTs:   Recent Labs   Lab 08/18/22 2203   ALT 29   AST 22   ALKPHOS 97   BILITOT 0.4   PROT 8.0   ALBUMIN 4.1   , Urine Studies: No results for input(s): COLORU, APPEARANCEUA, PHUR, SPECGRAV, PROTEINUA, GLUCUA, KETONESU, BILIRUBINUA, OCCULTUA, NITRITE, UROBILINOGEN, LEUKOCYTESUR, RBCUA, WBCUA, BACTERIA, SQUAMEPITHEL, HYALINECASTS in the last 48 hours.    Invalid input(s): WRIGHTSUR, and All pertinent labs from the last 24 hours have been reviewed.    Diagnostic Results:  I have reviewed all pertinent imaging results/findings within the past 24 hours.

## 2022-08-19 NOTE — SUBJECTIVE & OBJECTIVE
Past Medical History:   Diagnosis Date    Abnormal Pap smear 1996    Anemia     Hypertension     Hypokalemia     in pregnancy    Hypothyroidism (acquired) 12/28/2018    Malignant neoplasm of upper-outer quadrant of left breast in female, estrogen receptor negative 6/22/2022       Past Surgical History:   Procedure Laterality Date    CHOLECYSTECTOMY      COLONOSCOPY N/A 6/8/2022    Procedure: COLONOSCOPY;  Surgeon: Rosaline Meyre MD;  Location: [facility];  Service: Endoscopy;  Laterality: N/A;    COSMETIC SURGERY      tummy tuck    DIAGNOSTIC LAPAROSCOPY N/A 2/26/2019    Procedure: LAPAROSCOPY, DIAGNOSTIC;  Surgeon: Pia Aguila MD;  Location: [facility] OR;  Service: OB/GYN;  Laterality: N/A;    FULGURATION OF ENDOMETRIOSIS N/A 2/26/2019    Procedure: FULGURATION, ENDOMETRIOSIS;  Surgeon: Pia Aguila MD;  Location: [facility] OR;  Service: OB/GYN;  Laterality: N/A;    HYSTERECTOMY      HYSTEROSCOPY WITH HYDROTHERMAL ABLATION OF ENDOMETRIUM WITH DILATION AND CURETTAGE N/A 2/26/2019    Procedure: HYSTEROSCOPY, WITH DILATION AND CURETTAGE OF UTERUS AND HYDROTHERMAL ENDOMETRIAL ABLATION;  Surgeon: Pia Aguila MD;  Location: [facility];  Service: OB/GYN;  Laterality: N/A;    INSERTION OF TUNNELED CENTRAL VENOUS CATHETER (CVC) WITH SUBCUTANEOUS PORT N/A 7/1/2022    Procedure: [id number];  Surgeon: Beau Quiles MD;  Location: [facility];  Service: General;  Laterality: N/A;    LAPAROSCOPIC DRAINAGE OF CYST OF OVARY Left 2/26/2019    Procedure: DRAINAGE, CYST, OVARY, LAPAROSCOPIC;  Surgeon: Pia Aguila MD;  Location: [facility] OR;  Service: OB/GYN;  Laterality: Left;    ROBOT-ASSISTED LAPAROSCOPIC ABDOMINAL HYSTERECTOMY USING DA BEAN XI N/A 11/5/2019    Procedure: XI ROBOTIC HYSTERECTOMY;  Surgeon: Pia Aguila MD;  Location: [facility] OR;  Service: OB/GYN;  Laterality: N/A;    ROBOT-ASSISTED LAPAROSCOPIC SALPINGO-OOPHORECTOMY USING DA BEAN XI Bilateral 11/5/2019     Procedure: XI ROBOTIC SALPINGO-OOPHORECTOMY;  Surgeon: Pia Aguila MD;  Location: [facility] OR;  Service: OB/GYN;  Laterality: Bilateral;    TUBAL LIGATION      Tummy Tuck         Review of patient's allergies indicates:   Allergen Reactions    Sumatriptan Other (See Comments)     Chest tightness that moved into her throat       Current Facility-Administered Medications on File Prior to Encounter   Medication    lactated ringers infusion     Current Outpatient Medications on File Prior to Encounter   Medication Sig    atorvastatin (LIPITOR) 40 MG tablet Take 1 tablet (40 mg total) by mouth once daily.    dexAMETHasone (DECADRON) 4 MG Tab Take 2 tablets (8 mg total) by mouth once daily. Take 2 tablets (8 mg) by mouth once daily on days 2,3,4 following chemotherapy.    HYDROcodone-acetaminophen (NORCO)  mg per tablet Take 1 tablet by mouth every 6 (six) hours as needed (Pain).    LIDOcaine-prilocaine (EMLA) cream Apply topically as needed.    OLANZapine (ZYPREXA) 5 MG tablet Take 1 tablet (5 mg total) by mouth every evening. Take 1 tablet by mouth every evening on days 1-4 of each chemotherapy cycle    ondansetron (ZOFRAN-ODT) 8 MG TbDL Take 1 tablet (8 mg total) by mouth every 8 (eight) hours as needed (nausea/vomiting). Take 1 tablet (8 mg) by mouth every 8 hours as needed for nausea/vomiting.    verapamiL (CALAN) 120 MG tablet TAKE 1 TABLET(120 MG) BY MOUTH EVERY DAY     Family History       Problem Relation (Age of Onset)    Cancer Mother, Father    Heart disease Maternal Grandmother    No Known Problems Sister, Maternal Grandfather, Daughter, Daughter, Son, Son, Son, Son, Brother, Other, Other, Other, Other, Other, Maternal Aunt, Sister          Tobacco Use    Smoking status: Never Smoker    Smokeless tobacco: Never Used   Substance and Sexual Activity    Alcohol use: Not Currently     Alcohol/week: 5.0 standard drinks     Types: 5 Cans of beer per week     Comment: socially;  last 06/30/2022    Drug  use: No    Sexual activity: Yes     Partners: Male     Birth control/protection: Surgical     Comment: hyst      Review of Systems   Constitutional:  Negative for chills, diaphoresis, fatigue and fever.   Respiratory:  Negative for cough, shortness of breath and wheezing.    Cardiovascular:  Negative for chest pain, palpitations and leg swelling.   Gastrointestinal:  Negative for abdominal pain, constipation, diarrhea, nausea and vomiting.   Musculoskeletal:  Positive for myalgias (RUE). Negative for arthralgias and back pain.        (+) RUE swelling, pain, and weakness.    Skin:  Negative for pallor and rash.   Neurological:  Negative for dizziness, syncope, weakness, light-headedness, numbness and headaches.   Psychiatric/Behavioral:  The patient is not nervous/anxious.    All other systems reviewed and are negative.  Objective:     Vital Signs (Most Recent):  Temp: 99.2 °F (37.3 °C) (08/18/22 2042)  Pulse: 79 (08/19/22 0002)  Resp: 19 (08/19/22 0002)  BP: (!) 151/80 (08/19/22 0002)  SpO2: 100 % (08/19/22 0002)   Vital Signs (24h Range):  Temp:  [99.2 °F (37.3 °C)] 99.2 °F (37.3 °C)  Pulse:  [] 79  Resp:  [13-27] 19  SpO2:  [98 %-100 %] 100 %  BP: (141-164)/() 151/80     Weight: 81.8 kg (180 lb 7.1 oz)  Body mass index is 30.97 kg/m².    Physical Exam  Vitals and nursing note reviewed.   Constitutional:       General: She is awake. She is not in acute distress.     Appearance: Normal appearance. She is well-developed. She is not diaphoretic.   HENT:      Head: Normocephalic and atraumatic.   Eyes:      Conjunctiva/sclera: Conjunctivae normal.      Comments: PERRL; EOM intact.   Cardiovascular:      Rate and Rhythm: Normal rate and regular rhythm. No extrasystoles are present.     Pulses:           Radial pulses are 2+ on the right side and 2+ on the left side.      Heart sounds: S1 normal and S2 normal. No murmur heard.  Pulmonary:      Effort: Pulmonary effort is normal. No tachypnea.      Breath  sounds: Normal breath sounds and air entry. No wheezing, rhonchi or rales.   Chest:      Comments: Right upper chest wall port site, well-healed.  Abdominal:      General: Bowel sounds are normal. There is no distension.      Palpations: Abdomen is soft.      Tenderness: There is no abdominal tenderness.   Musculoskeletal:         General: No tenderness or deformity. Normal range of motion.      Cervical back: Normal range of motion and neck supple.      Right lower leg: No edema.      Left lower leg: No edema.      Comments: Swelling and tenderness from right shoulder to lower arm. Neurovascularly intact.    Skin:     General: Skin is warm and dry.      Capillary Refill: Capillary refill takes less than 2 seconds.      Findings: No erythema or rash.   Neurological:      General: No focal deficit present.      Mental Status: She is alert and oriented to person, place, and time.   Psychiatric:         Mood and Affect: Mood and affect normal.         Behavior: Behavior normal. Behavior is cooperative.           Significant Labs:  Results for orders placed or performed during the hospital encounter of 08/18/22   CBC auto differential   Result Value Ref Range    WBC 16.15 (H) 3.90 - 12.70 K/uL    RBC 4.57 4.00 - 5.40 M/uL    Hemoglobin 12.3 12.0 - 16.0 g/dL    Hematocrit 37.9 37.0 - 48.5 %    MCV 83 82 - 98 fL    MCH 26.9 (L) 27.0 - 31.0 pg    MCHC 32.5 32.0 - 36.0 g/dL    RDW 14.9 (H) 11.5 - 14.5 %    Platelets 285 150 - 450 K/uL    MPV 10.6 9.2 - 12.9 fL    Immature Granulocytes 0.5 0.0 - 0.5 %    Gran # (ANC) 11.6 (H) 1.8 - 7.7 K/uL    Immature Grans (Abs) 0.08 (H) 0.00 - 0.04 K/uL    Lymph # 3.7 1.0 - 4.8 K/uL    Mono # 0.7 0.3 - 1.0 K/uL    Eos # 0.1 0.0 - 0.5 K/uL    Baso # 0.04 0.00 - 0.20 K/uL    nRBC 0 0 /100 WBC    Gran % 71.9 38.0 - 73.0 %    Lymph % 22.7 18.0 - 48.0 %    Mono % 4.3 4.0 - 15.0 %    Eosinophil % 0.4 0.0 - 8.0 %    Basophil % 0.2 0.0 - 1.9 %    Differential Method Automated    Comprehensive  metabolic panel   Result Value Ref Range    Sodium 142 136 - 145 mmol/L    Potassium 3.2 (L) 3.5 - 5.1 mmol/L    Chloride 104 95 - 110 mmol/L    CO2 20 (L) 23 - 29 mmol/L    Glucose 92 70 - 110 mg/dL    BUN 13 6 - 20 mg/dL    Creatinine 0.8 0.5 - 1.4 mg/dL    Calcium 9.5 8.7 - 10.5 mg/dL    Total Protein 8.0 6.0 - 8.4 g/dL    Albumin 4.1 3.5 - 5.2 g/dL    Total Bilirubin 0.4 0.1 - 1.0 mg/dL    Alkaline Phosphatase 97 55 - 135 U/L    AST 22 10 - 40 U/L    ALT 29 10 - 44 U/L    Anion Gap 18 (H) 8 - 16 mmol/L    eGFR >60 >60 mL/min/1.73 m^2   Protime-INR   Result Value Ref Range    Prothrombin Time 9.9 9.0 - 12.5 sec    INR 0.9 0.8 - 1.2   APTT   Result Value Ref Range    aPTT 25.6 21.0 - 32.0 sec   COVID-19 Rapid Screening   Result Value Ref Range    SARS-CoV-2 RNA, Amplification, Qual Negative Negative   APTT   Result Value Ref Range    aPTT 23.7 21.0 - 32.0 sec   Protime-INR   Result Value Ref Range    Prothrombin Time 10.0 9.0 - 12.5 sec    INR 0.9 0.8 - 1.2   CBC auto differential   Result Value Ref Range    WBC 13.65 (H) 3.90 - 12.70 K/uL    RBC 4.32 4.00 - 5.40 M/uL    Hemoglobin 11.6 (L) 12.0 - 16.0 g/dL    Hematocrit 35.5 (L) 37.0 - 48.5 %    MCV 82 82 - 98 fL    MCH 26.9 (L) 27.0 - 31.0 pg    MCHC 32.7 32.0 - 36.0 g/dL    RDW 14.5 11.5 - 14.5 %    Platelets 258 150 - 450 K/uL    MPV 10.6 9.2 - 12.9 fL    Immature Granulocytes 0.4 0.0 - 0.5 %    Gran # (ANC) 9.6 (H) 1.8 - 7.7 K/uL    Immature Grans (Abs) 0.05 (H) 0.00 - 0.04 K/uL    Lymph # 3.3 1.0 - 4.8 K/uL    Mono # 0.7 0.3 - 1.0 K/uL    Eos # 0.1 0.0 - 0.5 K/uL    Baso # 0.02 0.00 - 0.20 K/uL    nRBC 0 0 /100 WBC    Gran % 70.0 38.0 - 73.0 %    Lymph % 24.2 18.0 - 48.0 %    Mono % 4.9 4.0 - 15.0 %    Eosinophil % 0.4 0.0 - 8.0 %    Basophil % 0.1 0.0 - 1.9 %    Differential Method Automated       All pertinent labs within the past 24 hours have been reviewed.    Significant Imaging:  Imaging Results              US Upper Extremity Veins Right (Final  result)  Result time 08/18/22 22:39:55      Final result by Jaylyn Childress MD (08/18/22 22:39:55)                   Impression:      There is a clot in the right subclavian vein, right axillary vein, right brachial vein, right basilic vein consistent with DVT. Sonographer reported critical findings to Dr. Thao at 22:20.      Electronically signed by: Monroe De La O  Date:    08/18/2022  Time:    22:39               Narrative:    EXAMINATION:  US UPPER EXTREMITY VEINS RIGHT    CLINICAL HISTORY:  Right upper arm swelling;    TECHNIQUE:  Duplex and color flow Doppler evaluation and dynamic compression was performed of the right upper extremity veins.    COMPARISON:  None    FINDINGS:  Central veins: The right internal jugular vein is patent.  There is a clot in the right subclavian vein, right axillary vein, right brachial vein, right basilic vein consistent with DVT.  Sonographer reported critical findings to Dr. Thao at 22:20.    Arm veins: Cephalic vein is patent on the right    Contralateral subclavian/internal jugular veins: The left subclavian and internal jugular veins are patent and free of thrombus.    Other findings: None.                                     I have reviewed all pertinent imaging results/findings within the past 24 hours.

## 2022-08-19 NOTE — ED PROVIDER NOTES
"SCRIBE #1 NOTE: I, Vinh Perez, am scribing for, and in the presence of, Alesha Story MD. I have scribed the entire note.       History     Chief Complaint   Patient presents with    Arm Pain     Pt reports that when she woke up this morning it felt like she slept on her right arm. Pt now experiencing swelling and pain to arm. Pt also received chemotherapy on Monday for breast cancer. Pt has port to right side of chest.      Review of patient's allergies indicates:   Allergen Reactions    Sumatriptan Other (See Comments)     Chest tightness that moved into her throat         History of Present Illness     HPI    8/18/2022, 10:59 PM  History obtained from the patient      History of Present Illness: Florecita Noel is a 46 y.o. female patient with a PMHx of HTN, hypokalemia, and malignant neoplasm who presents to the Emergency Department for evaluation of R side arm pain which onset gradually this morning. Per pt, she initially felt discomfort in her arm; this progressed to her arm feeling "heavy." Pt then noted that her R arm was more swollen than L arm and also noticed tenderness in R arm. Symptoms are constant and moderate in severity. No mitigating or exacerbating factors reported. Associated sxs include mild R arm weakness. Patient denies any CP, fever, n/v/d, SOB, dizziness, numbness, and all other sxs at this time. Pt is currently undergoing chemotherapy and last received it on Monday. Pt has port on upper R side chest. No prior Tx reported. No further complaints or concerns at this time.       Arrival mode: Personal Transportation    PCP: Alesha Alonso MD        Past Medical History:  Past Medical History:   Diagnosis Date    Abnormal Pap smear 1996    Anemia     Hypertension     Hypokalemia     in pregnancy    Hypothyroidism (acquired) 12/28/2018    Malignant neoplasm of upper-outer quadrant of left breast in female, estrogen receptor negative 6/22/2022       Past Surgical History:  Past " Pt. And mother back from [place]   Surgical History:   Procedure Laterality Date    CHOLECYSTECTOMY      COLONOSCOPY N/A 6/8/2022    Procedure: COLONOSCOPY;  Surgeon: Rosaline Meyer MD;  Location: [facility];  Service: Endoscopy;  Laterality: N/A;    COSMETIC SURGERY      tummy tuck    DIAGNOSTIC LAPAROSCOPY N/A 2/26/2019    Procedure: LAPAROSCOPY, DIAGNOSTIC;  Surgeon: Pia Aguila MD;  Location: [facility] OR;  Service: OB/GYN;  Laterality: N/A;    FULGURATION OF ENDOMETRIOSIS N/A 2/26/2019    Procedure: FULGURATION, ENDOMETRIOSIS;  Surgeon: Pia Aguila MD;  Location: [facility] OR;  Service: OB/GYN;  Laterality: N/A;    HYSTERECTOMY      HYSTEROSCOPY WITH HYDROTHERMAL ABLATION OF ENDOMETRIUM WITH DILATION AND CURETTAGE N/A 2/26/2019    Procedure: HYSTEROSCOPY, WITH DILATION AND CURETTAGE OF UTERUS AND HYDROTHERMAL ENDOMETRIAL ABLATION;  Surgeon: Pia Aguila MD;  Location: [facility] OR;  Service: OB/GYN;  Laterality: N/A;    INSERTION OF TUNNELED CENTRAL VENOUS CATHETER (CVC) WITH SUBCUTANEOUS PORT N/A 7/1/2022    Procedure: [id number];  Surgeon: Beau Quiles MD;  Location: [facility] OR;  Service: General;  Laterality: N/A;    LAPAROSCOPIC DRAINAGE OF CYST OF OVARY Left 2/26/2019    Procedure: DRAINAGE, CYST, OVARY, LAPAROSCOPIC;  Surgeon: Pia Aguila MD;  Location: [facility] OR;  Service: OB/GYN;  Laterality: Left;    ROBOT-ASSISTED LAPAROSCOPIC ABDOMINAL HYSTERECTOMY USING DA BEAN XI N/A 11/5/2019    Procedure: XI ROBOTIC HYSTERECTOMY;  Surgeon: Pia Aguila MD;  Location: [facility] OR;  Service: OB/GYN;  Laterality: N/A;    ROBOT-ASSISTED LAPAROSCOPIC SALPINGO-OOPHORECTOMY USING DA BEAN XI Bilateral 11/5/2019    Procedure: XI ROBOTIC SALPINGO-OOPHORECTOMY;  Surgeon: Pia Aguila MD;  Location: [facility] OR;  Service: OB/GYN;  Laterality: Bilateral;    TUBAL LIGATION      Tummy Tuck           Family History:  Family History   Problem Relation Age of Onset    Cancer Mother          origin? ovarian? metastasized    Cancer Father         throat?    No Known Problems Sister     Heart disease Maternal Grandmother     No Known Problems Maternal Grandfather     No Known Problems Daughter     No Known Problems Daughter     No Known Problems Son     No Known Problems Son     No Known Problems Son     No Known Problems Son     No Known Problems Brother     No Known Problems Other     No Known Problems Other     No Known Problems Other     No Known Problems Other     No Known Problems Other     No Known Problems Maternal Aunt     No Known Problems Sister        Social History:  Social History     Tobacco Use    Smoking status: Never Smoker    Smokeless tobacco: Never Used   Substance and Sexual Activity    Alcohol use: Not Currently     Alcohol/week: 5.0 standard drinks     Types: 5 Cans of beer per week     Comment: socially;  last 06/30/2022    Drug use: No    Sexual activity: Yes     Partners: Male     Birth control/protection: Surgical     Comment: [facility]         Review of Systems     Review of Systems   Constitutional: Negative for fever.   HENT: Negative for sore throat.    Respiratory: Negative for shortness of breath.    Cardiovascular: Negative for chest pain.   Gastrointestinal: Negative for diarrhea, nausea and vomiting.   Genitourinary: Negative for dysuria.   Musculoskeletal: Negative for back pain.        (+) R arm discomfort  (+) R arm swelling   Skin: Negative for rash.   Neurological: Positive for weakness (R arm). Negative for dizziness and numbness.   Hematological: Does not bruise/bleed easily.   All other systems reviewed and are negative.     Physical Exam     Initial Vitals [08/18/22 2042]   BP Pulse Resp Temp SpO2   (!) 164/105 101 18 99.2 °F (37.3 °C) 100 %      MAP       --          Physical Exam  Nursing Notes and Vital Signs Reviewed.  Constitutional: Patient is in no acute distress. Well-developed and well-nourished.  Head: Atraumatic.  "Normocephalic.  Eyes: PERRL. EOM intact. Conjunctivae are not pale. No scleral icterus.  ENT: Mucous membranes are moist. Oropharynx is clear and symmetric.    Neck: Supple. Full ROM.  Cardiovascular: Regular rate. Regular rhythm. No murmurs, rubs, or gallops. Distal pulses are 2+ and symmetric.  Pulmonary/Chest: No respiratory distress. Clear to auscultation bilaterally. No wheezing or rales.  Abdominal: Soft and non-distended.  There is no tenderness.  No rebound, guarding, or rigidity.  Musculoskeletal: Moves all extremities. Swelling and tenderness on R arm from shoulder to lower arm; good radial pulse, neurovascularly intact. No edema.  Skin: Warm and dry.  Neurological:  Alert, awake, and appropriate.  Normal speech.  No acute focal neurological deficits are appreciated.  Psychiatric: Normal affect. Good eye contact. Appropriate in content.     ED Course   Procedures  ED Vital Signs:  Vitals:    08/18/22 2042 08/18/22 2230 08/18/22 2231 08/18/22 2232   BP: (!) 164/105 (!) 141/91  (!) 146/92   Pulse: 101  80 80   Resp: 18  (!) 27 13   Temp: 99.2 °F (37.3 °C)      TempSrc: Oral      SpO2: 100%   98%   Weight: 81.8 kg (180 lb 7.1 oz)      Height: 5' 4" (1.626 m)       08/19/22 0002 08/19/22 0045 08/19/22 0047 08/19/22 0119   BP: (!) 151/80  (!) 157/95    Pulse: 79  84    Resp: 19  18 18   Temp:   98.7 °F (37.1 °C)    TempSrc:   Oral    SpO2: 100%  98%    Weight:  81.8 kg (180 lb 7.1 oz) 79.1 kg (174 lb 6.1 oz)    Height:  5' 4" (1.626 m)      08/19/22 0415 08/19/22 0752 08/19/22 0852   BP: 125/82 (!) 171/95    Pulse: 82 83    Resp: 17 16 16   Temp: 98.3 °F (36.8 °C) 97.9 °F (36.6 °C)    TempSrc: Oral Oral    SpO2: 96% 98%    Weight:      Height:          Abnormal Lab Results:  Labs Reviewed   CBC W/ AUTO DIFFERENTIAL - Abnormal; Notable for the following components:       Result Value    WBC 16.15 (*)     MCH 26.9 (*)     RDW 14.9 (*)     Gran # (ANC) 11.6 (*)     Immature Grans (Abs) 0.08 (*)     All other " components within normal limits   COMPREHENSIVE METABOLIC PANEL - Abnormal; Notable for the following components:    Potassium 3.2 (*)     CO2 20 (*)     Anion Gap 18 (*)     All other components within normal limits   CBC W/ AUTO DIFFERENTIAL - Abnormal; Notable for the following components:    WBC 13.65 (*)     Hemoglobin 11.6 (*)     Hematocrit 35.5 (*)     MCH 26.9 (*)     Gran # (ANC) 9.6 (*)     Immature Grans (Abs) 0.05 (*)     All other components within normal limits    Narrative:     Draw baseline aPTT prior to starting the heparin bolus or  infusion  (if patient is on warfarin prior to heparin therapy)   PROTIME-INR   APTT   SARS-COV-2 RNA AMPLIFICATION, QUAL   APTT    Narrative:     Draw baseline aPTT prior to starting the heparin bolus or  infusion  (if patient is on warfarin prior to heparin therapy)   PROTIME-INR    Narrative:     Draw baseline aPTT prior to starting the heparin bolus or  infusion  (if patient is on warfarin prior to heparin therapy)        All Lab Results:  Results for orders placed or performed during the hospital encounter of 08/18/22   CBC auto differential   Result Value Ref Range    WBC 16.15 (H) 3.90 - 12.70 K/uL    RBC 4.57 4.00 - 5.40 M/uL    Hemoglobin 12.3 12.0 - 16.0 g/dL    Hematocrit 37.9 37.0 - 48.5 %    MCV 83 82 - 98 fL    MCH 26.9 (L) 27.0 - 31.0 pg    MCHC 32.5 32.0 - 36.0 g/dL    RDW 14.9 (H) 11.5 - 14.5 %    Platelets 285 150 - 450 K/uL    MPV 10.6 9.2 - 12.9 fL    Immature Granulocytes 0.5 0.0 - 0.5 %    Gran # (ANC) 11.6 (H) 1.8 - 7.7 K/uL    Immature Grans (Abs) 0.08 (H) 0.00 - 0.04 K/uL    Lymph # 3.7 1.0 - 4.8 K/uL    Mono # 0.7 0.3 - 1.0 K/uL    Eos # 0.1 0.0 - 0.5 K/uL    Baso # 0.04 0.00 - 0.20 K/uL    nRBC 0 0 /100 WBC    Gran % 71.9 38.0 - 73.0 %    Lymph % 22.7 18.0 - 48.0 %    Mono % 4.3 4.0 - 15.0 %    Eosinophil % 0.4 0.0 - 8.0 %    Basophil % 0.2 0.0 - 1.9 %    Differential Method Automated    Comprehensive metabolic panel   Result Value Ref Range     Sodium 142 136 - 145 mmol/L    Potassium 3.2 (L) 3.5 - 5.1 mmol/L    Chloride 104 95 - 110 mmol/L    CO2 20 (L) 23 - 29 mmol/L    Glucose 92 70 - 110 mg/dL    BUN 13 6 - 20 mg/dL    Creatinine 0.8 0.5 - 1.4 mg/dL    Calcium 9.5 8.7 - 10.5 mg/dL    Total Protein 8.0 6.0 - 8.4 g/dL    Albumin 4.1 3.5 - 5.2 g/dL    Total Bilirubin 0.4 0.1 - 1.0 mg/dL    Alkaline Phosphatase 97 55 - 135 U/L    AST 22 10 - 40 U/L    ALT 29 10 - 44 U/L    Anion Gap 18 (H) 8 - 16 mmol/L    eGFR >60 >60 mL/min/1.73 m^2   Protime-INR   Result Value Ref Range    Prothrombin Time 9.9 9.0 - 12.5 sec    INR 0.9 0.8 - 1.2   APTT   Result Value Ref Range    aPTT 25.6 21.0 - 32.0 sec   COVID-19 Rapid Screening   Result Value Ref Range    SARS-CoV-2 RNA, Amplification, Qual Negative Negative   APTT   Result Value Ref Range    aPTT 23.7 21.0 - 32.0 sec   Protime-INR   Result Value Ref Range    Prothrombin Time 10.0 9.0 - 12.5 sec    INR 0.9 0.8 - 1.2   CBC auto differential   Result Value Ref Range    WBC 13.65 (H) 3.90 - 12.70 K/uL    RBC 4.32 4.00 - 5.40 M/uL    Hemoglobin 11.6 (L) 12.0 - 16.0 g/dL    Hematocrit 35.5 (L) 37.0 - 48.5 %    MCV 82 82 - 98 fL    MCH 26.9 (L) 27.0 - 31.0 pg    MCHC 32.7 32.0 - 36.0 g/dL    RDW 14.5 11.5 - 14.5 %    Platelets 258 150 - 450 K/uL    MPV 10.6 9.2 - 12.9 fL    Immature Granulocytes 0.4 0.0 - 0.5 %    Gran # (ANC) 9.6 (H) 1.8 - 7.7 K/uL    Immature Grans (Abs) 0.05 (H) 0.00 - 0.04 K/uL    Lymph # 3.3 1.0 - 4.8 K/uL    Mono # 0.7 0.3 - 1.0 K/uL    Eos # 0.1 0.0 - 0.5 K/uL    Baso # 0.02 0.00 - 0.20 K/uL    nRBC 0 0 /100 WBC    Gran % 70.0 38.0 - 73.0 %    Lymph % 24.2 18.0 - 48.0 %    Mono % 4.9 4.0 - 15.0 %    Eosinophil % 0.4 0.0 - 8.0 %    Basophil % 0.1 0.0 - 1.9 %    Differential Method Automated    APTT   Result Value Ref Range    aPTT 86.5 (H) 21.0 - 32.0 sec   CBC auto differential   Result Value Ref Range    WBC 11.41 3.90 - 12.70 K/uL    RBC 4.37 4.00 - 5.40 M/uL    Hemoglobin 11.4 (L) 12.0 - 16.0  g/dL    Hematocrit 37.8 37.0 - 48.5 %    MCV 87 82 - 98 fL    MCH 26.1 (L) 27.0 - 31.0 pg    MCHC 30.2 (L) 32.0 - 36.0 g/dL    RDW 14.6 (H) 11.5 - 14.5 %    Platelets 283 150 - 450 K/uL    MPV 11.2 9.2 - 12.9 fL    Immature Granulocytes 0.4 0.0 - 0.5 %    Gran # (ANC) 7.0 1.8 - 7.7 K/uL    Immature Grans (Abs) 0.05 (H) 0.00 - 0.04 K/uL    Lymph # 3.7 1.0 - 4.8 K/uL    Mono # 0.6 0.3 - 1.0 K/uL    Eos # 0.1 0.0 - 0.5 K/uL    Baso # 0.02 0.00 - 0.20 K/uL    nRBC 0 0 /100 WBC    Gran % 61.0 38.0 - 73.0 %    Lymph % 32.3 18.0 - 48.0 %    Mono % 5.3 4.0 - 15.0 %    Eosinophil % 0.8 0.0 - 8.0 %    Basophil % 0.2 0.0 - 1.9 %    Differential Method Automated    Magnesium   Result Value Ref Range    Magnesium 2.0 1.6 - 2.6 mg/dL   Basic Metabolic Panel   Result Value Ref Range    Sodium 141 136 - 145 mmol/L    Potassium 4.2 3.5 - 5.1 mmol/L    Chloride 106 95 - 110 mmol/L    CO2 25 23 - 29 mmol/L    Glucose 91 70 - 110 mg/dL    BUN 9 6 - 20 mg/dL    Creatinine 0.7 0.5 - 1.4 mg/dL    Calcium 8.9 8.7 - 10.5 mg/dL    Anion Gap 10 8 - 16 mmol/L    eGFR >60 >60 mL/min/1.73 m^2         Imaging Results:  Imaging Results          US Upper Extremity Veins Right (Final result)  Result time 08/18/22 22:39:55    Final result by Jaylyn Childress MD (08/18/22 22:39:55)                 Impression:      There is a clot in the right subclavian vein, right axillary vein, right brachial vein, right basilic vein consistent with DVT. Sonographer reported critical findings to Dr. Thao at 22:20.      Electronically signed by: Monroe De La O  Date:    08/18/2022  Time:    22:39             Narrative:    EXAMINATION:  US UPPER EXTREMITY VEINS RIGHT    CLINICAL HISTORY:  Right upper arm swelling;    TECHNIQUE:  Duplex and color flow Doppler evaluation and dynamic compression was performed of the right upper extremity veins.    COMPARISON:  None    FINDINGS:  Central veins: The right internal jugular vein is patent.  There is a clot in the right  subclavian vein, right axillary vein, right brachial vein, right basilic vein consistent with DVT.  Sonographer reported critical findings to Dr. Thao at 22:20.    Arm veins: Cephalic vein is patent on the right    Contralateral subclavian/internal jugular veins: The left subclavian and internal jugular veins are patent and free of thrombus.    Other findings: None.                                      The Emergency Provider reviewed the vital signs and test results, which are outlined above.     ED Discussion       11:30 PM: Discussed pt's case with Dr. Almendarez ([facility]) who recommends admission to the hospital to get IR to do a thrombectomy.    11:34 PM: Discussed case with Darya Mary NP (Hospital Medicine). Dr. Thompson agrees with current care and management of pt and accepts admission.   Admitting Service: Hospital Medicine  Admitting Physician: Dr. Thompson  Admit to: Obs obs    11:37 PM: Re-evaluated pt. I have discussed test results, shared treatment plan, and the need for admission with patient and family at bedside. Pt and family express understanding at this time and agree with all information. All questions answered. Pt and family have no further questions or concerns at this time. Pt is ready for admit.         Medical Decision Making:   Clinical Tests:   Lab Tests: Ordered and Reviewed  Radiological Study: Ordered and Reviewed           ED Medication(s):  Medications   heparin 25,000 units in dextrose 5% (100 units/ml) IV bolus from bag INITIAL BOLUS (5,248 Units Intravenous Bolus from Bag 8/19/22 0020)   potassium chloride SA CR tablet 40 mEq (40 mEq Oral Given 8/19/22 0119)       Discharge Medication List as of 8/19/2022 11:58 AM      START taking these medications    Details   apixaban (ELIQUIS DVT-PE TREAT 30D START) 5 mg (74 tabs) DsPk For the first 7 days take two 5 mg tablets twice daily.  After 7 days take one 5 mg tablet twice daily., Normal              Follow-up Information     [facility].    Why: Call in reference to possible procedure to break up blood clot           Maris Hathaway MD Follow up.    Specialty: Hematology and Oncology  Why: At your next scheduled appointment  Contact information:  [id number] [address] [facility]  [place]STR[place] [id number] [phone]                             Scribe Attestation:   Scribe #1: I performed the above scribed service and the documentation accurately describes the services I performed. I attest to the accuracy of the note.     Attending:   Physician Attestation Statement for Scribe #1: I, Alesha Story MD, personally performed the services described in this documentation, as scribed by Vinh Perez, in my presence, and it is both accurate and complete.           Clinical Impression       ICD-10-CM ICD-9-CM   1. Acute deep vein thrombosis (DVT) of right upper extremity, unspecified vein  I82.621 453.82   2. Acute deep vein thrombosis (DVT) of other vein of right upper extremity  I82.621 453.82       Disposition:   Disposition: Placed in Observation  Condition: Fair         Alesha Story MD  08/22/22 3568

## 2022-08-19 NOTE — CONSULTS
Chart reviewed by Dr. Gale.       ASSESSMENT/PLAN:    RUE DVT    IR recommends anticoagulation prior to moving forward with thrombectomy, if not improving next week, can arrange thombectomy as inpatient or outpatient    Thank you for the consult.

## 2022-08-22 ENCOUNTER — OFFICE VISIT (OUTPATIENT)
Dept: FAMILY MEDICINE | Facility: CLINIC | Age: 47
End: 2022-08-22
Payer: COMMERCIAL

## 2022-08-22 VITALS
SYSTOLIC BLOOD PRESSURE: 132 MMHG | OXYGEN SATURATION: 100 % | WEIGHT: 179.69 LBS | DIASTOLIC BLOOD PRESSURE: 92 MMHG | HEART RATE: 80 BPM | RESPIRATION RATE: 16 BRPM | TEMPERATURE: 98 F | BODY MASS INDEX: 30.84 KG/M2

## 2022-08-22 DIAGNOSIS — Z11.52 ENCOUNTER FOR SCREENING FOR COVID-19: ICD-10-CM

## 2022-08-22 DIAGNOSIS — C50.412 MALIGNANT NEOPLASM OF UPPER-OUTER QUADRANT OF LEFT BREAST IN FEMALE, ESTROGEN RECEPTOR NEGATIVE: ICD-10-CM

## 2022-08-22 DIAGNOSIS — R68.89 FLU-LIKE SYMPTOMS: ICD-10-CM

## 2022-08-22 DIAGNOSIS — I82.621 ACUTE DEEP VEIN THROMBOSIS (DVT) OF RIGHT UPPER EXTREMITY, UNSPECIFIED VEIN: ICD-10-CM

## 2022-08-22 DIAGNOSIS — I10 ESSENTIAL HYPERTENSION: Primary | ICD-10-CM

## 2022-08-22 DIAGNOSIS — Z17.1 MALIGNANT NEOPLASM OF UPPER-OUTER QUADRANT OF LEFT BREAST IN FEMALE, ESTROGEN RECEPTOR NEGATIVE: ICD-10-CM

## 2022-08-22 LAB
CTP QC/QA: YES
CTP QC/QA: YES
FLUAV AG NPH QL: NEGATIVE
FLUBV AG NPH QL: NEGATIVE
SARS-COV-2 RDRP RESP QL NAA+PROBE: NEGATIVE

## 2022-08-22 PROCEDURE — 99214 PR OFFICE/OUTPT VISIT, EST, LEVL IV, 30-39 MIN: ICD-10-PCS | Mod: 25,S$GLB,, | Performed by: FAMILY MEDICINE

## 2022-08-22 PROCEDURE — U0002 COVID-19 LAB TEST NON-CDC: HCPCS | Mod: QW,S$GLB,, | Performed by: FAMILY MEDICINE

## 2022-08-22 PROCEDURE — 87804 POCT INFLUENZA A/B: ICD-10-PCS | Mod: 59,QW,S$GLB, | Performed by: FAMILY MEDICINE

## 2022-08-22 PROCEDURE — 99999 PR PBB SHADOW E&M-EST. PATIENT-LVL III: ICD-10-PCS | Mod: PBBFAC,,, | Performed by: FAMILY MEDICINE

## 2022-08-22 PROCEDURE — 87804 INFLUENZA ASSAY W/OPTIC: CPT | Mod: QW,S$GLB,, | Performed by: FAMILY MEDICINE

## 2022-08-22 PROCEDURE — 99214 OFFICE O/P EST MOD 30 MIN: CPT | Mod: 25,S$GLB,, | Performed by: FAMILY MEDICINE

## 2022-08-22 PROCEDURE — 99999 PR PBB SHADOW E&M-EST. PATIENT-LVL III: CPT | Mod: PBBFAC,,, | Performed by: FAMILY MEDICINE

## 2022-08-22 PROCEDURE — U0002: ICD-10-PCS | Mod: QW,S$GLB,, | Performed by: FAMILY MEDICINE

## 2022-08-22 RX ORDER — VERAPAMIL HYDROCHLORIDE 180 MG/1
180 TABLET, FILM COATED, EXTENDED RELEASE ORAL NIGHTLY
Qty: 30 TABLET | Refills: 11 | Status: SHIPPED | OUTPATIENT
Start: 2022-08-22 | End: 2023-08-22

## 2022-08-22 NOTE — PROGRESS NOTES
Subjective:      Patient ID: Florecita Noel is a 46 y.o. female.    Chief Complaint: No chief complaint on file.    HPI    Patient with pmhx of HTN, breast cancer (left), DVT here today for follow up of ER visit. DVT in right arm on 8/18/22. DVT next to port. Placed on eliquis on discharge. Has follow up with heme/onc on 8/24/22. Patient doing chemo every week until the end of the year. Notes cough and congestion (facial) for the last 1-2 as well. Otherwise feeling ok. Children with similar symptoms and were negative for covid.    Past Medical History:   Diagnosis Date    Abnormal Pap smear 1996    Anemia     Hypertension     Hypokalemia     in pregnancy    Hypothyroidism (acquired) 12/28/2018    Malignant neoplasm of upper-outer quadrant of left breast in female, estrogen receptor negative 6/22/2022       Past Surgical History:   Procedure Laterality Date    CHOLECYSTECTOMY      COLONOSCOPY N/A 6/8/2022    Procedure: COLONOSCOPY;  Surgeon: Rosaline Meyer MD;  Location: [facility];  Service: Endoscopy;  Laterality: N/A;    COSMETIC SURGERY      tummy tuck    DIAGNOSTIC LAPAROSCOPY N/A 2/26/2019    Procedure: LAPAROSCOPY, DIAGNOSTIC;  Surgeon: Pia Aguila MD;  Location: [facility] OR;  Service: OB/GYN;  Laterality: N/A;    FULGURATION OF ENDOMETRIOSIS N/A 2/26/2019    Procedure: FULGURATION, ENDOMETRIOSIS;  Surgeon: Pia Aguila MD;  Location: [facility] OR;  Service: OB/GYN;  Laterality: N/A;    HYSTERECTOMY      HYSTEROSCOPY WITH HYDROTHERMAL ABLATION OF ENDOMETRIUM WITH DILATION AND CURETTAGE N/A 2/26/2019    Procedure: HYSTEROSCOPY, WITH DILATION AND CURETTAGE OF UTERUS AND HYDROTHERMAL ENDOMETRIAL ABLATION;  Surgeon: Pia Aguila MD;  Location: [facility];  Service: OB/GYN;  Laterality: N/A;    INSERTION OF TUNNELED CENTRAL VENOUS CATHETER (CVC) WITH SUBCUTANEOUS PORT N/A 7/1/2022    Procedure: [id number];  Surgeon: Beau Quiles MD;  Location: [facility];   Service: General;  Laterality: N/A;    LAPAROSCOPIC DRAINAGE OF CYST OF OVARY Left 2/26/2019    Procedure: DRAINAGE, CYST, OVARY, LAPAROSCOPIC;  Surgeon: Pia Aguila MD;  Location: [facility];  Service: OB/GYN;  Laterality: Left;    ROBOT-ASSISTED LAPAROSCOPIC ABDOMINAL HYSTERECTOMY USING DA BEAN XI N/A 11/5/2019    Procedure: XI ROBOTIC HYSTERECTOMY;  Surgeon: Pia Aguila MD;  Location: [facility];  Service: OB/GYN;  Laterality: N/A;    ROBOT-ASSISTED LAPAROSCOPIC SALPINGO-OOPHORECTOMY USING DA BEAN XI Bilateral 11/5/2019    Procedure: XI ROBOTIC SALPINGO-OOPHORECTOMY;  Surgeon: Pia Aguila MD;  Location: [facility] OR;  Service: OB/GYN;  Laterality: Bilateral;    TUBAL LIGATION      Tummy Tuck         Family History   Problem Relation Age of Onset    Cancer Mother         origin? ovarian? metastasized    Cancer Father         throat?    No Known Problems Sister     Heart disease Maternal Grandmother     No Known Problems Maternal Grandfather     No Known Problems Daughter     No Known Problems Daughter     No Known Problems Son     No Known Problems Son     No Known Problems Son     No Known Problems Son     No Known Problems Brother     No Known Problems Other     No Known Problems Other     No Known Problems Other     No Known Problems Other     No Known Problems Other     No Known Problems Maternal Aunt     No Known Problems Sister        Social History     Socioeconomic History    Marital status: Other   Tobacco Use    Smoking status: Never Smoker    Smokeless tobacco: Never Used   Substance and Sexual Activity    Alcohol use: Not Currently     Alcohol/week: 5.0 standard drinks     Types: 5 Cans of beer per week     Comment: socially;  last 06/30/2022    Drug use: No    Sexual activity: Yes     Partners: Male     Birth control/protection: Surgical     Comment: [facility]      Social Determinants of Health     Financial Resource Strain: Low Risk     Difficulty  of Paying Living Expenses: Not very hard   Food Insecurity: No Food Insecurity    Worried About Running Out of Food in the Last Year: Never true    Ran Out of Food in the Last Year: Never true   Transportation Needs: No Transportation Needs    Lack of Transportation (Medical): No    Lack of Transportation (Non-Medical): No   Physical Activity: Inactive    Days of Exercise per Week: 0 days    Minutes of Exercise per Session: 0 min   Stress: Stress Concern Present    Feeling of Stress : To some extent   Social Connections: Unknown    Frequency of Communication with Friends and Family: Three times a week    Frequency of Social Gatherings with Friends and Family: More than three times a week    Active Member of Clubs or Organizations: No    Attends Club or Organization Meetings: Never    Marital Status:    Housing Stability: Low Risk     Unable to Pay for Housing in the Last Year: No    Number of Places Lived in the Last Year: 1    Unstable Housing in the Last Year: No       Health Maintenance Topics with due status: Not Due       Topic Last Completion Date    TETANUS VACCINE 12/13/2013    Influenza Vaccine 11/26/2021    Colorectal Cancer Screening 06/08/2022    Lipid Panel 07/01/2022    Mammogram 07/11/2022       Medication List with Changes/Refills   New Medications    VERAPAMIL (CALAN-SR) 180 MG CR TABLET    Take 1 tablet (180 mg total) by mouth every evening.   Current Medications    APIXABAN (ELIQUIS DVT-PE TREAT 30D START) 5 MG (74 TABS) DSPK    For the first 7 days take two (5mg) tablets by mouth twice daily.  After 7 days take one (5 mg) tablet by mouth twice daily.    ATORVASTATIN (LIPITOR) 40 MG TABLET    Take 1 tablet (40 mg total) by mouth once daily.    DEXAMETHASONE (DECADRON) 4 MG TAB    Take 2 tablets (8 mg total) by mouth once daily. Take 2 tablets (8 mg) by mouth once daily on days 2,3,4 following chemotherapy.    HYDROCODONE-ACETAMINOPHEN (NORCO)  MG PER TABLET    Take 1  tablet by mouth every 6 (six) hours as needed (Pain).    LIDOCAINE-PRILOCAINE (EMLA) CREAM    Apply topically as needed.    OLANZAPINE (ZYPREXA) 5 MG TABLET    Take 1 tablet (5 mg total) by mouth every evening. Take 1 tablet by mouth every evening on days 1-4 of each chemotherapy cycle    ONDANSETRON (ZOFRAN-ODT) 8 MG TBDL    Take 1 tablet (8 mg total) by mouth every 8 (eight) hours as needed (nausea/vomiting). Take 1 tablet (8 mg) by mouth every 8 hours as needed for nausea/vomiting.   Discontinued Medications    VERAPAMIL (CALAN) 120 MG TABLET    TAKE 1 TABLET(120 MG) BY MOUTH EVERY DAY       Review of patient's allergies indicates:   Allergen Reactions    Sumatriptan Other (See Comments)     Chest tightness that moved into her throat       Review of Systems   Constitutional: Negative for fever.   HENT: Positive for congestion.    Eyes: Negative for blurred vision.   Respiratory: Positive for cough. Negative for shortness of breath.    Cardiovascular: Negative for chest pain and leg swelling.   Gastrointestinal: Negative for abdominal pain, constipation and diarrhea.   Genitourinary: Negative for dysuria.   Musculoskeletal:        Right arm pain   Skin: Negative for rash.   Neurological: Negative for headaches.       Objective:     Vitals:    08/22/22 1616   BP: (!) 132/92   Pulse: 80   Resp: 16   Temp: 98.3 °F (36.8 °C)     Body mass index is 30.84 kg/m².    Physical Exam  Vitals and nursing note reviewed.   Constitutional:       General: She is not in acute distress.     Appearance: She is well-developed.   HENT:      Head: Normocephalic and atraumatic.      Right Ear: External ear normal.      Left Ear: External ear normal.      Nose: Nose normal.   Eyes:      Conjunctiva/sclera: Conjunctivae normal.      Pupils: Pupils are equal, round, and reactive to light.   Neck:      Thyroid: No thyromegaly.   Cardiovascular:      Rate and Rhythm: Normal rate and regular rhythm.      Heart sounds: Normal heart sounds. No  murmur heard.  Pulmonary:      Effort: Pulmonary effort is normal. No respiratory distress.      Breath sounds: Normal breath sounds. No wheezing or rales.   Chest:      Chest wall: No tenderness.   Abdominal:      General: Bowel sounds are normal. There is no distension.      Palpations: Abdomen is soft.      Tenderness: There is no abdominal tenderness.   Musculoskeletal:        Arms:       Comments: Some TTP in area highlighted   Lymphadenopathy:      Cervical: No cervical adenopathy.   Skin:     General: Skin is warm and dry.   Neurological:      Mental Status: She is alert and oriented to person, place, and time.         Assessment and Plan:     Essential hypertension  -     verapamiL (CALAN-SR) 180 MG CR tablet; Take 1 tablet (180 mg total) by mouth every evening.  Dispense: 30 tablet; Refill: 11  Increase verapamil     Malignant neoplasm of upper-outer quadrant of left breast in female, estrogen receptor negative  Following heme/onc  Acute deep vein thrombosis (DVT) of right upper extremity, unspecified vein  Following heme/onc  Continue with NOAC    Encounter for screening for COVID-19  -     POCT COVID-19 Rapid Screening  Flu-like symptoms  -     POCT Influenza A/B  Flu/covid test negative   At home supportive care  Will rtc/message if symptoms worsen do not resolve  Likely viral in origin at this time       Follow up in about 4 weeks (around 9/19/2022) for HTN follow up.

## 2022-08-23 ENCOUNTER — PATIENT MESSAGE (OUTPATIENT)
Dept: FAMILY MEDICINE | Facility: CLINIC | Age: 47
End: 2022-08-23
Payer: COMMERCIAL

## 2022-08-23 DIAGNOSIS — C50.412 MALIGNANT NEOPLASM OF UPPER-OUTER QUADRANT OF LEFT BREAST IN FEMALE, ESTROGEN RECEPTOR NEGATIVE: Primary | ICD-10-CM

## 2022-08-23 DIAGNOSIS — Z17.1 MALIGNANT NEOPLASM OF UPPER-OUTER QUADRANT OF LEFT BREAST IN FEMALE, ESTROGEN RECEPTOR NEGATIVE: Primary | ICD-10-CM

## 2022-08-24 ENCOUNTER — TELEPHONE (OUTPATIENT)
Dept: RADIOLOGY | Facility: HOSPITAL | Age: 47
End: 2022-08-24
Payer: COMMERCIAL

## 2022-08-24 ENCOUNTER — LAB VISIT (OUTPATIENT)
Dept: LAB | Facility: HOSPITAL | Age: 47
End: 2022-08-24
Attending: INTERNAL MEDICINE
Payer: COMMERCIAL

## 2022-08-24 ENCOUNTER — OFFICE VISIT (OUTPATIENT)
Dept: HEMATOLOGY/ONCOLOGY | Facility: CLINIC | Age: 47
End: 2022-08-24
Payer: COMMERCIAL

## 2022-08-24 VITALS
HEART RATE: 82 BPM | OXYGEN SATURATION: 100 % | WEIGHT: 181 LBS | HEIGHT: 64 IN | DIASTOLIC BLOOD PRESSURE: 84 MMHG | TEMPERATURE: 97 F | SYSTOLIC BLOOD PRESSURE: 120 MMHG | BODY MASS INDEX: 30.9 KG/M2

## 2022-08-24 DIAGNOSIS — C50.412 MALIGNANT NEOPLASM OF UPPER-OUTER QUADRANT OF LEFT BREAST IN FEMALE, ESTROGEN RECEPTOR NEGATIVE: Primary | ICD-10-CM

## 2022-08-24 DIAGNOSIS — Z17.1 MALIGNANT NEOPLASM OF UPPER-OUTER QUADRANT OF LEFT BREAST IN FEMALE, ESTROGEN RECEPTOR NEGATIVE: Primary | ICD-10-CM

## 2022-08-24 DIAGNOSIS — I82.621 ACUTE DEEP VEIN THROMBOSIS (DVT) OF RIGHT UPPER EXTREMITY, UNSPECIFIED VEIN: ICD-10-CM

## 2022-08-24 DIAGNOSIS — C50.412 MALIGNANT NEOPLASM OF UPPER-OUTER QUADRANT OF LEFT BREAST IN FEMALE, ESTROGEN RECEPTOR NEGATIVE: ICD-10-CM

## 2022-08-24 DIAGNOSIS — Z17.1 MALIGNANT NEOPLASM OF UPPER-OUTER QUADRANT OF LEFT BREAST IN FEMALE, ESTROGEN RECEPTOR NEGATIVE: ICD-10-CM

## 2022-08-24 LAB
ALBUMIN SERPL BCP-MCNC: 3.6 G/DL (ref 3.5–5.2)
ALP SERPL-CCNC: 108 U/L (ref 55–135)
ALT SERPL W/O P-5'-P-CCNC: 63 U/L (ref 10–44)
ANION GAP SERPL CALC-SCNC: 10 MMOL/L (ref 8–16)
AST SERPL-CCNC: 45 U/L (ref 10–40)
BASOPHILS # BLD AUTO: 0.03 K/UL (ref 0–0.2)
BASOPHILS NFR BLD: 0.3 % (ref 0–1.9)
BILIRUB SERPL-MCNC: 0.2 MG/DL (ref 0.1–1)
BUN SERPL-MCNC: 11 MG/DL (ref 6–20)
CALCIUM SERPL-MCNC: 9.7 MG/DL (ref 8.7–10.5)
CHLORIDE SERPL-SCNC: 108 MMOL/L (ref 95–110)
CO2 SERPL-SCNC: 24 MMOL/L (ref 23–29)
CORTIS SERPL-MCNC: 7.3 UG/DL
CREAT SERPL-MCNC: 0.8 MG/DL (ref 0.5–1.4)
DIFFERENTIAL METHOD: ABNORMAL
EOSINOPHIL # BLD AUTO: 0.1 K/UL (ref 0–0.5)
EOSINOPHIL NFR BLD: 0.9 % (ref 0–8)
ERYTHROCYTE [DISTWIDTH] IN BLOOD BY AUTOMATED COUNT: 14.3 % (ref 11.5–14.5)
EST. GFR  (NO RACE VARIABLE): >60 ML/MIN/1.73 M^2
GLUCOSE SERPL-MCNC: 98 MG/DL (ref 70–110)
HCT VFR BLD AUTO: 34.7 % (ref 37–48.5)
HGB BLD-MCNC: 11.2 G/DL (ref 12–16)
IMM GRANULOCYTES # BLD AUTO: 0.14 K/UL (ref 0–0.04)
IMM GRANULOCYTES NFR BLD AUTO: 1.4 % (ref 0–0.5)
LYMPHOCYTES # BLD AUTO: 3.8 K/UL (ref 1–4.8)
LYMPHOCYTES NFR BLD: 38.3 % (ref 18–48)
MCH RBC QN AUTO: 26.9 PG (ref 27–31)
MCHC RBC AUTO-ENTMCNC: 32.3 G/DL (ref 32–36)
MCV RBC AUTO: 83 FL (ref 82–98)
MONOCYTES # BLD AUTO: 1.1 K/UL (ref 0.3–1)
MONOCYTES NFR BLD: 11.1 % (ref 4–15)
NEUTROPHILS # BLD AUTO: 4.7 K/UL (ref 1.8–7.7)
NEUTROPHILS NFR BLD: 48 % (ref 38–73)
NRBC BLD-RTO: 0 /100 WBC
PLATELET # BLD AUTO: 375 K/UL (ref 150–450)
PMV BLD AUTO: 10.2 FL (ref 9.2–12.9)
POTASSIUM SERPL-SCNC: 3.9 MMOL/L (ref 3.5–5.1)
PROT SERPL-MCNC: 7.4 G/DL (ref 6–8.4)
RBC # BLD AUTO: 4.16 M/UL (ref 4–5.4)
SODIUM SERPL-SCNC: 142 MMOL/L (ref 136–145)
WBC # BLD AUTO: 9.8 K/UL (ref 3.9–12.7)

## 2022-08-24 PROCEDURE — 99214 OFFICE O/P EST MOD 30 MIN: CPT | Mod: 25,S$GLB,, | Performed by: NURSE PRACTITIONER

## 2022-08-24 PROCEDURE — 82533 TOTAL CORTISOL: CPT | Performed by: INTERNAL MEDICINE

## 2022-08-24 PROCEDURE — 99999 PR PBB SHADOW E&M-EST. PATIENT-LVL IV: ICD-10-PCS | Mod: PBBFAC,,, | Performed by: NURSE PRACTITIONER

## 2022-08-24 PROCEDURE — 99999 PR PBB SHADOW E&M-EST. PATIENT-LVL IV: CPT | Mod: PBBFAC,,, | Performed by: NURSE PRACTITIONER

## 2022-08-24 PROCEDURE — 99214 PR OFFICE/OUTPT VISIT, EST, LEVL IV, 30-39 MIN: ICD-10-PCS | Mod: 25,S$GLB,, | Performed by: NURSE PRACTITIONER

## 2022-08-24 PROCEDURE — 85025 COMPLETE CBC W/AUTO DIFF WBC: CPT | Performed by: INTERNAL MEDICINE

## 2022-08-24 PROCEDURE — 80053 COMPREHEN METABOLIC PANEL: CPT | Performed by: INTERNAL MEDICINE

## 2022-08-24 RX ORDER — FAMOTIDINE 10 MG/ML
20 INJECTION INTRAVENOUS
Status: CANCELLED | OUTPATIENT
Start: 2022-08-26

## 2022-08-24 RX ORDER — ENOXAPARIN SODIUM 100 MG/ML
1 INJECTION SUBCUTANEOUS 2 TIMES DAILY
Qty: 2.4 ML | Refills: 0 | Status: SHIPPED | OUTPATIENT
Start: 2022-08-24 | End: 2022-08-28

## 2022-08-24 RX ORDER — EPINEPHRINE 0.3 MG/.3ML
0.3 INJECTION SUBCUTANEOUS ONCE AS NEEDED
Status: CANCELLED | OUTPATIENT
Start: 2022-08-26

## 2022-08-24 RX ORDER — DIPHENHYDRAMINE HYDROCHLORIDE 50 MG/ML
50 INJECTION INTRAMUSCULAR; INTRAVENOUS ONCE AS NEEDED
Status: CANCELLED | OUTPATIENT
Start: 2022-08-26

## 2022-08-24 RX ORDER — SODIUM CHLORIDE 0.9 % (FLUSH) 0.9 %
10 SYRINGE (ML) INJECTION
Status: CANCELLED | OUTPATIENT
Start: 2022-08-26

## 2022-08-24 RX ORDER — HEPARIN 100 UNIT/ML
500 SYRINGE INTRAVENOUS
Status: CANCELLED | OUTPATIENT
Start: 2022-08-26

## 2022-08-24 NOTE — H&P (VIEW-ONLY)
"Subjective:       Patient ID: Florecita Noel is a 46 y.o. female.    Chief Complaint: Review labs. Chemo     HPI: 46 y.o female with h/o hypothyroidism, HTN, newly diagnosed breast cancer on active combined chemo immunotherapy (Carboplatin/Taxol/Keytruda) s/p C2D1 8/15/2022. Patient presented to [facility] ER 8/18/2022 for evaluation of right arm pain and swelling. RUE US revealed a clot in the right subclavian vein, right axillary vein, right brachial vein, right basilic vein consistent with DVT. IR was consulted for thrombectomy but advised anticoagulation prior to thrombectomy. With possible outpatient thrombectomy planned if no improvement in 1 week.     Today patient presents for lab review in consideration of C2D8 Taxol. She feels well overall. Notes improvement in RUE pain/swelling but does not swelling/discomfort if "using arm too much."  She reports taking Eliquis as prescribed without missed doses. Denies noting abnormal bleeding.   Social History     Socioeconomic History    Marital status: Other   Tobacco Use    Smoking status: Never Smoker    Smokeless tobacco: Never Used   Substance and Sexual Activity    Alcohol use: Not Currently     Alcohol/week: 5.0 standard drinks     Types: 5 Cans of beer per week     Comment: socially;  last 06/30/2022    Drug use: No    Sexual activity: Yes     Partners: Male     Birth control/protection: Surgical     Comment: [facility]      Social Determinants of Health     Financial Resource Strain: Low Risk     Difficulty of Paying Living Expenses: Not very hard   Food Insecurity: No Food Insecurity    Worried About Running Out of Food in the Last Year: Never true    Ran Out of Food in the Last Year: Never true   Transportation Needs: No Transportation Needs    Lack of Transportation (Medical): No    Lack of Transportation (Non-Medical): No   Physical Activity: Inactive    Days of Exercise per Week: 0 days    Minutes of Exercise per Session: 0 min   Stress: Stress Concern " Present    Feeling of Stress : To some extent   Social Connections: Unknown    Frequency of Communication with Friends and Family: Three times a week    Frequency of Social Gatherings with Friends and Family: More than three times a week    Active Member of Clubs or Organizations: No    Attends Club or Organization Meetings: Never    Marital Status:    Housing Stability: Low Risk     Unable to Pay for Housing in the Last Year: No    Number of Places Lived in the Last Year: 1    Unstable Housing in the Last Year: No       Past Medical History:   Diagnosis Date    Abnormal Pap smear 1996    Anemia     Hypertension     Hypokalemia     in pregnancy    Hypothyroidism (acquired) 12/28/2018    Malignant neoplasm of upper-outer quadrant of left breast in female, estrogen receptor negative 6/22/2022       Family History   Problem Relation Age of Onset    Cancer Mother         origin? ovarian? metastasized    Cancer Father         throat?    No Known Problems Sister     Heart disease Maternal Grandmother     No Known Problems Maternal Grandfather     No Known Problems Daughter     No Known Problems Daughter     No Known Problems Son     No Known Problems Son     No Known Problems Son     No Known Problems Son     No Known Problems Brother     No Known Problems Other     No Known Problems Other     No Known Problems Other     No Known Problems Other     No Known Problems Other     No Known Problems Maternal Aunt     No Known Problems Sister        Past Surgical History:   Procedure Laterality Date    CHOLECYSTECTOMY      COLONOSCOPY N/A 6/8/2022    Procedure: COLONOSCOPY;  Surgeon: Rosaline Meyer MD;  Location[facility];  Service: Endoscopy;  Laterality: N/A;    COSMETIC SURGERY      tummy tuck    DIAGNOSTIC LAPAROSCOPY N/A 2/26/2019    Procedure: LAPAROSCOPY, DIAGNOSTIC;  Surgeon: Pia Aguila MD;  Location[facility];  Service: OB/GYN;  Laterality: N/A;     FULGURATION OF ENDOMETRIOSIS N/A 2/26/2019    Procedure: FULGURATION, ENDOMETRIOSIS;  Surgeon: Pia Aguila MD;  Location: [facility] OR;  Service: OB/GYN;  Laterality: N/A;    HYSTERECTOMY      HYSTEROSCOPY WITH HYDROTHERMAL ABLATION OF ENDOMETRIUM WITH DILATION AND CURETTAGE N/A 2/26/2019    Procedure: HYSTEROSCOPY, WITH DILATION AND CURETTAGE OF UTERUS AND HYDROTHERMAL ENDOMETRIAL ABLATION;  Surgeon: Pia Aguila MD;  Location: [facility] OR;  Service: OB/GYN;  Laterality: N/A;    INSERTION OF TUNNELED CENTRAL VENOUS CATHETER (CVC) WITH SUBCUTANEOUS PORT N/A 7/1/2022    Procedure: [id number];  Surgeon: Beau Quiles MD;  Location: [facility] OR;  Service: General;  Laterality: N/A;    LAPAROSCOPIC DRAINAGE OF CYST OF OVARY Left 2/26/2019    Procedure: DRAINAGE, CYST, OVARY, LAPAROSCOPIC;  Surgeon: Pia Aguila MD;  Location: [facility] OR;  Service: OB/GYN;  Laterality: Left;    ROBOT-ASSISTED LAPAROSCOPIC ABDOMINAL HYSTERECTOMY USING DA BEAN XI N/A 11/5/2019    Procedure: XI ROBOTIC HYSTERECTOMY;  Surgeon: Pia Aguila MD;  Location: [facility] OR;  Service: OB/GYN;  Laterality: N/A;    ROBOT-ASSISTED LAPAROSCOPIC SALPINGO-OOPHORECTOMY USING DA BEAN XI Bilateral 11/5/2019    Procedure: XI ROBOTIC SALPINGO-OOPHORECTOMY;  Surgeon: Pia Aguila MD;  Location: [facility] OR;  Service: OB/GYN;  Laterality: Bilateral;    TUBAL LIGATION      Tummy Tuck         Review of Systems   Constitutional: Negative for appetite change, chills, fatigue, fever and unexpected weight change.   HENT: Negative for congestion, mouth sores, nosebleeds, sore throat, trouble swallowing and voice change.    Eyes: Negative for visual disturbance.   Respiratory: Negative for cough, chest tightness, shortness of breath and wheezing.    Cardiovascular: Negative for chest pain, palpitations and leg swelling.   Gastrointestinal: Negative for abdominal distention, abdominal pain, anal bleeding, blood in  stool, constipation, diarrhea, nausea and vomiting.   Genitourinary: Negative for difficulty urinating, dysuria and hematuria.   Musculoskeletal: Negative for arthralgias, back pain and myalgias.        Intermittent Right arm discomfort/swelling   Skin: Negative for pallor, rash and wound.   Neurological: Negative for dizziness, syncope, weakness and headaches.   Hematological: Negative for adenopathy. Does not bruise/bleed easily.   Psychiatric/Behavioral: The patient is nervous/anxious.          Medication List with Changes/Refills   New Medications    ENOXAPARIN (LOVENOX) 80 MG/0.8 ML SYRG    Inject 0.8mLs into skin 2 times a day for 3 doses starting 48 hours prior to scheduled procedure for 3 doses   Current Medications    APIXABAN (ELIQUIS DVT-PE TREAT 30D START) 5 MG (74 TABS) DSPK    For the first 7 days take two (5mg) tablets by mouth twice daily.  After 7 days take one (5 mg) tablet by mouth twice daily.    ATORVASTATIN (LIPITOR) 40 MG TABLET    Take 1 tablet (40 mg total) by mouth once daily.    DEXAMETHASONE (DECADRON) 4 MG TAB    Take 2 tablets (8 mg total) by mouth once daily. Take 2 tablets (8 mg) by mouth once daily on days 2,3,4 following chemotherapy.    HYDROCODONE-ACETAMINOPHEN (NORCO)  MG PER TABLET    Take 1 tablet by mouth every 6 (six) hours as needed (Pain).    LIDOCAINE-PRILOCAINE (EMLA) CREAM    Apply topically as needed.    OLANZAPINE (ZYPREXA) 5 MG TABLET    Take 1 tablet (5 mg total) by mouth every evening. Take 1 tablet by mouth every evening on days 1-4 of each chemotherapy cycle    ONDANSETRON (ZOFRAN-ODT) 8 MG TBDL    Take 1 tablet (8 mg total) by mouth every 8 (eight) hours as needed (nausea/vomiting). Take 1 tablet (8 mg) by mouth every 8 hours as needed for nausea/vomiting.    VERAPAMIL (CALAN-SR) 180 MG CR TABLET    Take 1 tablet (180 mg total) by mouth every evening.     Objective:     Vitals:    08/24/22 1259   BP: 120/84   Pulse: 82   Temp: 97.4 °F (36.3 °C)     Lab  Results   Component Value Date    WBC 9.80 08/24/2022    HGB 11.2 (L) 08/24/2022    HCT 34.7 (L) 08/24/2022    MCV 83 08/24/2022     08/24/2022       BMP  Lab Results   Component Value Date     08/24/2022    K 3.9 08/24/2022     08/24/2022    CO2 24 08/24/2022    BUN 11 08/24/2022    CREATININE 0.8 08/24/2022    CALCIUM 9.7 08/24/2022    ANIONGAP 10 08/24/2022    ESTGFRAFRICA >60 07/29/2022    EGFRNONAA >60 07/29/2022     Lab Results   Component Value Date    ALT 63 (H) 08/24/2022    AST 45 (H) 08/24/2022    ALKPHOS 108 08/24/2022    BILITOT 0.2 08/24/2022         Physical Exam  Vitals reviewed.   Constitutional:       Appearance: She is well-developed.   HENT:      Head: Normocephalic.      Right Ear: Hearing and tympanic membrane normal. No drainage.      Left Ear: Hearing and tympanic membrane normal. No drainage.      Nose: Nose normal.   Eyes:      General: Lids are normal. No scleral icterus.        Right eye: No discharge.         Left eye: No discharge.      Conjunctiva/sclera: Conjunctivae normal.   Neck:      Thyroid: No thyroid mass.   Cardiovascular:      Rate and Rhythm: Normal rate and regular rhythm.      Heart sounds: Normal heart sounds. No murmur heard.  Pulmonary:      Effort: Pulmonary effort is normal. No respiratory distress.      Breath sounds: Normal breath sounds. No wheezing or rales.   Abdominal:      General: Bowel sounds are normal. There is no distension.      Palpations: Abdomen is soft.      Tenderness: There is no abdominal tenderness.   Genitourinary:     Comments: deferred  Musculoskeletal:         General: Normal range of motion.      Cervical back: Normal range of motion.   Skin:     General: Skin is warm and dry.   Neurological:      Mental Status: She is alert and oriented to person, place, and time.   Psychiatric:         Speech: Speech normal.         Behavior: Behavior normal. Behavior is cooperative.         Thought Content: Thought content normal.           Assessment:     Problem List Items Addressed This Visit        Hematology    Acute deep vein thrombosis (DVT) of right upper extremity     -Scheduling in progress for mediport removal and reinsertion  -Discussed Preoperative instructions for anticoagulation. Hold Eliquis x 48 hours pre procedure. Start Lovenox 1mg/kg BID morning after last dose Eliquis. Stop Lovenox 24 hours prior to procedure. Resume Eliquis only 24 hours post procedure              Oncology    Malignant neoplasm of upper-outer quadrant of left breast in female, estrogen receptor negative - Primary     Laboratory review stable to proceed with C2D8 chemotherapy however patient reports nasal congestion, cough. She was seen by PCP Monday. Covid and Flu negative    Case discussed with Primary Oncologist    Hold chemo today. Arrange chemo Friday. F/u 1 week with repeat labs for C2D15           Relevant Medications    enoxaparin (LOVENOX) 80 mg/0.8 mL Syrg    Other Relevant Orders    CBC Auto Differential    Comprehensive Metabolic Panel    TSH            Plan:     Malignant neoplasm of upper-outer quadrant of left breast in female, estrogen receptor negative  -     enoxaparin (LOVENOX) 80 mg/0.8 mL Syrg; Inject 0.8mLs into skin 2 times a day for 3 doses starting 48 hours prior to scheduled procedure for 3 doses  Dispense: 2.4 mL; Refill: 0  -     CBC Auto Differential; Future; Expected date: 08/24/2022  -     Comprehensive Metabolic Panel; Future; Expected date: 08/24/2022  -     TSH; Future; Expected date: 08/24/2022    Acute deep vein thrombosis (DVT) of right upper extremity, unspecified vein    Other orders  -     dexamethasone (DECADRON) 10 mg in sodium chloride 0.9% 50 mL IVPB  -     diphenhydrAMINE (BENADRYL) 50 mg in sodium chloride 0.9% 50 mL IVPB  -     famotidine (PF) injection 20 mg  -     PACLitaxeL (TAXOL) 80 mg/m2 = 150 mg in sodium chloride 0.9% 250 mL chemo infusion  -     EPINEPHrine (EPIPEN) 0.3 mg/0.3 mL pen injection 0.3 mg  -      diphenhydrAMINE injection 50 mg  -     hydrocortisone sodium succinate injection 100 mg  -     sodium chloride 0.9% 250 mL flush bag  -     sodium chloride 0.9% flush 10 mL  -     heparin, porcine (PF) 100 unit/mL injection flush 500 Units  -     alteplase injection 2 mg            VAISHALI Mathew

## 2022-08-24 NOTE — ASSESSMENT & PLAN NOTE
-Scheduling in progress for mediport removal and reinsertion  -Discussed Preoperative instructions for anticoagulation. Hold Eliquis x 48 hours pre procedure. Start Lovenox 1mg/kg BID morning after last dose Eliquis. Stop Lovenox 24 hours prior to procedure. Resume Eliquis only 24 hours post procedure

## 2022-08-24 NOTE — ASSESSMENT & PLAN NOTE
Laboratory review stable to proceed with C2D8 chemotherapy however patient reports nasal congestion, cough. She was seen by PCP Monday. Covid and Flu negative    Case discussed with Primary Oncologist    Hold chemo today. Arrange chemo Friday. F/u 1 week with repeat labs for C2D15

## 2022-08-24 NOTE — PROGRESS NOTES
"Subjective:       Patient ID: Florecita Noel is a 46 y.o. female.    Chief Complaint: Review labs. Chemo     HPI: 46 y.o female with h/o hypothyroidism, HTN, newly diagnosed breast cancer on active combined chemo immunotherapy (Carboplatin/Taxol/Keytruda) s/p C2D1 8/15/2022. Patient presented to [facility] 8/18/2022 for evaluation of right arm pain and swelling. RUE US revealed a clot in the right subclavian vein, right axillary vein, right brachial vein, right basilic vein consistent with DVT. IR was consulted for thrombectomy but advised anticoagulation prior to thrombectomy. With possible outpatient thrombectomy planned if no improvement in 1 week.     Today patient presents for lab review in consideration of C2D8 Taxol. She feels well overall. Notes improvement in RUE pain/swelling but does not swelling/discomfort if "using arm too much."  She reports taking Eliquis as prescribed without missed doses. Denies noting abnormal bleeding.   Social History     Socioeconomic History    Marital status: Other   Tobacco Use    Smoking status: Never Smoker    Smokeless tobacco: Never Used   Substance and Sexual Activity    Alcohol use: Not Currently     Alcohol/week: 5.0 standard drinks     Types: 5 Cans of beer per week     Comment: socially;  last 06/30/2022    Drug use: No    Sexual activity: Yes     Partners: Male     Birth control/protection: Surgical     Comment: [facility]      Social Determinants of Health     Financial Resource Strain: Low Risk     Difficulty of Paying Living Expenses: Not very hard   Food Insecurity: No Food Insecurity    Worried About Running Out of Food in the Last Year: Never true    Ran Out of Food in the Last Year: Never true   Transportation Needs: No Transportation Needs    Lack of Transportation (Medical): No    Lack of Transportation (Non-Medical): No   Physical Activity: Inactive    Days of Exercise per Week: 0 days    Minutes of Exercise per Session: 0 min   Stress: Stress Concern " Present    Feeling of Stress : To some extent   Social Connections: Unknown    Frequency of Communication with Friends and Family: Three times a week    Frequency of Social Gatherings with Friends and Family: More than three times a week    Active Member of Clubs or Organizations: No    Attends Club or Organization Meetings: Never    Marital Status:    Housing Stability: Low Risk     Unable to Pay for Housing in the Last Year: No    Number of Places Lived in the Last Year: 1    Unstable Housing in the Last Year: No       Past Medical History:   Diagnosis Date    Abnormal Pap smear 1996    Anemia     Hypertension     Hypokalemia     in pregnancy    Hypothyroidism (acquired) 12/28/2018    Malignant neoplasm of upper-outer quadrant of left breast in female, estrogen receptor negative 6/22/2022       Family History   Problem Relation Age of Onset    Cancer Mother         origin? ovarian? metastasized    Cancer Father         throat?    No Known Problems Sister     Heart disease Maternal Grandmother     No Known Problems Maternal Grandfather     No Known Problems Daughter     No Known Problems Daughter     No Known Problems Son     No Known Problems Son     No Known Problems Son     No Known Problems Son     No Known Problems Brother     No Known Problems Other     No Known Problems Other     No Known Problems Other     No Known Problems Other     No Known Problems Other     No Known Problems Maternal Aunt     No Known Problems Sister        Past Surgical History:   Procedure Laterality Date    CHOLECYSTECTOMY      COLONOSCOPY N/A 6/8/2022    Procedure: COLONOSCOPY;  Surgeon: Rosaline Meyer MD;  Location: [facility];  Service: Endoscopy;  Laterality: N/A;    COSMETIC SURGERY      tummy tuck    DIAGNOSTIC LAPAROSCOPY N/A 2/26/2019    Procedure: LAPAROSCOPY, DIAGNOSTIC;  Surgeon: Pia Aguila MD;  Location: [facility];  Service: OB/GYN;  Laterality: N/A;     FULGURATION OF ENDOMETRIOSIS N/A 2/26/2019    Procedure: FULGURATION, ENDOMETRIOSIS;  Surgeon: Pia Aguila MD;  Location: [facility] OR;  Service: OB/GYN;  Laterality: N/A;    HYSTERECTOMY      HYSTEROSCOPY WITH HYDROTHERMAL ABLATION OF ENDOMETRIUM WITH DILATION AND CURETTAGE N/A 2/26/2019    Procedure: HYSTEROSCOPY, WITH DILATION AND CURETTAGE OF UTERUS AND HYDROTHERMAL ENDOMETRIAL ABLATION;  Surgeon: Pia Aguila MD;  Location: [facility] OR;  Service: OB/GYN;  Laterality: N/A;    INSERTION OF TUNNELED CENTRAL VENOUS CATHETER (CVC) WITH SUBCUTANEOUS PORT N/A 7/1/2022    Procedure: [id number];  Surgeon: Beau Quiles MD;  Location: [facility] OR;  Service: General;  Laterality: N/A;    LAPAROSCOPIC DRAINAGE OF CYST OF OVARY Left 2/26/2019    Procedure: DRAINAGE, CYST, OVARY, LAPAROSCOPIC;  Surgeon: Pia Aguila MD;  Location: [facility] OR;  Service: OB/GYN;  Laterality: Left;    ROBOT-ASSISTED LAPAROSCOPIC ABDOMINAL HYSTERECTOMY USING DA BEAN XI N/A 11/5/2019    Procedure: XI ROBOTIC HYSTERECTOMY;  Surgeon: Pia Aguila MD;  Location: [facility] OR;  Service: OB/GYN;  Laterality: N/A;    ROBOT-ASSISTED LAPAROSCOPIC SALPINGO-OOPHORECTOMY USING DA BEAN XI Bilateral 11/5/2019    Procedure: XI ROBOTIC SALPINGO-OOPHORECTOMY;  Surgeon: Pia Aguila MD;  Location: [facility] OR;  Service: OB/GYN;  Laterality: Bilateral;    TUBAL LIGATION      Tummy Tuck         Review of Systems   Constitutional: Negative for appetite change, chills, fatigue, fever and unexpected weight change.   HENT: Negative for congestion, mouth sores, nosebleeds, sore throat, trouble swallowing and voice change.    Eyes: Negative for visual disturbance.   Respiratory: Negative for cough, chest tightness, shortness of breath and wheezing.    Cardiovascular: Negative for chest pain, palpitations and leg swelling.   Gastrointestinal: Negative for abdominal distention, abdominal pain, anal bleeding, blood in  stool, constipation, diarrhea, nausea and vomiting.   Genitourinary: Negative for difficulty urinating, dysuria and hematuria.   Musculoskeletal: Negative for arthralgias, back pain and myalgias.        Intermittent Right arm discomfort/swelling   Skin: Negative for pallor, rash and wound.   Neurological: Negative for dizziness, syncope, weakness and headaches.   Hematological: Negative for adenopathy. Does not bruise/bleed easily.   Psychiatric/Behavioral: The patient is nervous/anxious.          Medication List with Changes/Refills   New Medications    ENOXAPARIN (LOVENOX) 80 MG/0.8 ML SYRG    Inject 0.8mLs into skin 2 times a day for 3 doses starting 48 hours prior to scheduled procedure for 3 doses   Current Medications    APIXABAN (ELIQUIS DVT-PE TREAT 30D START) 5 MG (74 TABS) DSPK    For the first 7 days take two (5mg) tablets by mouth twice daily.  After 7 days take one (5 mg) tablet by mouth twice daily.    ATORVASTATIN (LIPITOR) 40 MG TABLET    Take 1 tablet (40 mg total) by mouth once daily.    DEXAMETHASONE (DECADRON) 4 MG TAB    Take 2 tablets (8 mg total) by mouth once daily. Take 2 tablets (8 mg) by mouth once daily on days 2,3,4 following chemotherapy.    HYDROCODONE-ACETAMINOPHEN (NORCO)  MG PER TABLET    Take 1 tablet by mouth every 6 (six) hours as needed (Pain).    LIDOCAINE-PRILOCAINE (EMLA) CREAM    Apply topically as needed.    OLANZAPINE (ZYPREXA) 5 MG TABLET    Take 1 tablet (5 mg total) by mouth every evening. Take 1 tablet by mouth every evening on days 1-4 of each chemotherapy cycle    ONDANSETRON (ZOFRAN-ODT) 8 MG TBDL    Take 1 tablet (8 mg total) by mouth every 8 (eight) hours as needed (nausea/vomiting). Take 1 tablet (8 mg) by mouth every 8 hours as needed for nausea/vomiting.    VERAPAMIL (CALAN-SR) 180 MG CR TABLET    Take 1 tablet (180 mg total) by mouth every evening.     Objective:     Vitals:    08/24/22 1259   BP: 120/84   Pulse: 82   Temp: 97.4 °F (36.3 °C)     Lab  Results   Component Value Date    WBC 9.80 08/24/2022    HGB 11.2 (L) 08/24/2022    HCT 34.7 (L) 08/24/2022    MCV 83 08/24/2022     08/24/2022       BMP  Lab Results   Component Value Date     08/24/2022    K 3.9 08/24/2022     08/24/2022    CO2 24 08/24/2022    BUN 11 08/24/2022    CREATININE 0.8 08/24/2022    CALCIUM 9.7 08/24/2022    ANIONGAP 10 08/24/2022    ESTGFRAFRICA >60 07/29/2022    EGFRNONAA >60 07/29/2022     Lab Results   Component Value Date    ALT 63 (H) 08/24/2022    AST 45 (H) 08/24/2022    ALKPHOS 108 08/24/2022    BILITOT 0.2 08/24/2022         Physical Exam  Vitals reviewed.   Constitutional:       Appearance: She is well-developed.   HENT:      Head: Normocephalic.      Right Ear: Hearing and tympanic membrane normal. No drainage.      Left Ear: Hearing and tympanic membrane normal. No drainage.      Nose: Nose normal.   Eyes:      General: Lids are normal. No scleral icterus.        Right eye: No discharge.         Left eye: No discharge.      Conjunctiva/sclera: Conjunctivae normal.   Neck:      Thyroid: No thyroid mass.   Cardiovascular:      Rate and Rhythm: Normal rate and regular rhythm.      Heart sounds: Normal heart sounds. No murmur heard.  Pulmonary:      Effort: Pulmonary effort is normal. No respiratory distress.      Breath sounds: Normal breath sounds. No wheezing or rales.   Abdominal:      General: Bowel sounds are normal. There is no distension.      Palpations: Abdomen is soft.      Tenderness: There is no abdominal tenderness.   Genitourinary:     Comments: deferred  Musculoskeletal:         General: Normal range of motion.      Cervical back: Normal range of motion.   Skin:     General: Skin is warm and dry.   Neurological:      Mental Status: She is alert and oriented to person, place, and time.   Psychiatric:         Speech: Speech normal.         Behavior: Behavior normal. Behavior is cooperative.         Thought Content: Thought content normal.           Assessment:     Problem List Items Addressed This Visit        Hematology    Acute deep vein thrombosis (DVT) of right upper extremity     -Scheduling in progress for mediport removal and reinsertion  -Discussed Preoperative instructions for anticoagulation. Hold Eliquis x 48 hours pre procedure. Start Lovenox 1mg/kg BID morning after last dose Eliquis. Stop Lovenox 24 hours prior to procedure. Resume Eliquis only 24 hours post procedure              Oncology    Malignant neoplasm of upper-outer quadrant of left breast in female, estrogen receptor negative - Primary     Laboratory review stable to proceed with C2D8 chemotherapy however patient reports nasal congestion, cough. She was seen by PCP Monday. Covid and Flu negative    Case discussed with Primary Oncologist    Hold chemo today. Arrange chemo Friday. F/u 1 week with repeat labs for C2D15           Relevant Medications    enoxaparin (LOVENOX) 80 mg/0.8 mL Syrg    Other Relevant Orders    CBC Auto Differential    Comprehensive Metabolic Panel    TSH            Plan:     Malignant neoplasm of upper-outer quadrant of left breast in female, estrogen receptor negative  -     enoxaparin (LOVENOX) 80 mg/0.8 mL Syrg; Inject 0.8mLs into skin 2 times a day for 3 doses starting 48 hours prior to scheduled procedure for 3 doses  Dispense: 2.4 mL; Refill: 0  -     CBC Auto Differential; Future; Expected date: 08/24/2022  -     Comprehensive Metabolic Panel; Future; Expected date: 08/24/2022  -     TSH; Future; Expected date: 08/24/2022    Acute deep vein thrombosis (DVT) of right upper extremity, unspecified vein    Other orders  -     dexamethasone (DECADRON) 10 mg in sodium chloride 0.9% 50 mL IVPB  -     diphenhydrAMINE (BENADRYL) 50 mg in sodium chloride 0.9% 50 mL IVPB  -     famotidine (PF) injection 20 mg  -     PACLitaxeL (TAXOL) 80 mg/m2 = 150 mg in sodium chloride 0.9% 250 mL chemo infusion  -     EPINEPHrine (EPIPEN) 0.3 mg/0.3 mL pen injection 0.3 mg  -      diphenhydrAMINE injection 50 mg  -     hydrocortisone sodium succinate injection 100 mg  -     sodium chloride 0.9% 250 mL flush bag  -     sodium chloride 0.9% flush 10 mL  -     heparin, porcine (PF) 100 unit/mL injection flush 500 Units  -     alteplase injection 2 mg            VAISHALI Mathew

## 2022-08-25 ENCOUNTER — PATIENT MESSAGE (OUTPATIENT)
Dept: HEMATOLOGY/ONCOLOGY | Facility: CLINIC | Age: 47
End: 2022-08-25
Payer: COMMERCIAL

## 2022-08-25 ENCOUNTER — TELEPHONE (OUTPATIENT)
Dept: RADIOLOGY | Facility: HOSPITAL | Age: 47
End: 2022-08-25
Payer: COMMERCIAL

## 2022-08-25 DIAGNOSIS — Z17.1 MALIGNANT NEOPLASM OF UPPER-OUTER QUADRANT OF LEFT BREAST IN FEMALE, ESTROGEN RECEPTOR NEGATIVE: Primary | ICD-10-CM

## 2022-08-25 DIAGNOSIS — C50.412 MALIGNANT NEOPLASM OF UPPER-OUTER QUADRANT OF LEFT BREAST IN FEMALE, ESTROGEN RECEPTOR NEGATIVE: Primary | ICD-10-CM

## 2022-08-25 NOTE — TELEPHONE ENCOUNTER
Interventional Radiology:    Spoke with pt to schedule for mediport exchange for 9/1 @ 1pm.  Pt stated that she is on eliquis and has spoke with her dr for planned bridging with lovenox and is aware that she needs to be off of eliquis for 48 hours and will stop lovenox the day before procedure.  Nothing to eat or drink after midnight, arrival time of 11:30am and will need someone to drive her home once discharged.

## 2022-08-26 ENCOUNTER — INFUSION (OUTPATIENT)
Dept: INFUSION THERAPY | Facility: HOSPITAL | Age: 47
End: 2022-08-26
Attending: INTERNAL MEDICINE
Payer: COMMERCIAL

## 2022-08-26 VITALS
DIASTOLIC BLOOD PRESSURE: 95 MMHG | HEART RATE: 84 BPM | RESPIRATION RATE: 16 BRPM | BODY MASS INDEX: 30.9 KG/M2 | OXYGEN SATURATION: 99 % | TEMPERATURE: 98 F | HEIGHT: 64 IN | WEIGHT: 181 LBS | SYSTOLIC BLOOD PRESSURE: 149 MMHG

## 2022-08-26 DIAGNOSIS — C50.412 MALIGNANT NEOPLASM OF UPPER-OUTER QUADRANT OF LEFT BREAST IN FEMALE, ESTROGEN RECEPTOR NEGATIVE: Primary | ICD-10-CM

## 2022-08-26 DIAGNOSIS — Z17.1 MALIGNANT NEOPLASM OF UPPER-OUTER QUADRANT OF LEFT BREAST IN FEMALE, ESTROGEN RECEPTOR NEGATIVE: Primary | ICD-10-CM

## 2022-08-26 PROCEDURE — 96367 TX/PROPH/DG ADDL SEQ IV INF: CPT

## 2022-08-26 PROCEDURE — 25000003 PHARM REV CODE 250: Performed by: INTERNAL MEDICINE

## 2022-08-26 PROCEDURE — 96375 TX/PRO/DX INJ NEW DRUG ADDON: CPT

## 2022-08-26 PROCEDURE — 63600175 PHARM REV CODE 636 W HCPCS: Performed by: INTERNAL MEDICINE

## 2022-08-26 PROCEDURE — 96413 CHEMO IV INFUSION 1 HR: CPT

## 2022-08-26 RX ORDER — HEPARIN 100 UNIT/ML
500 SYRINGE INTRAVENOUS
Status: DISCONTINUED | OUTPATIENT
Start: 2022-08-26 | End: 2022-08-26 | Stop reason: HOSPADM

## 2022-08-26 RX ORDER — FAMOTIDINE 10 MG/ML
20 INJECTION INTRAVENOUS
Status: COMPLETED | OUTPATIENT
Start: 2022-08-26 | End: 2022-08-26

## 2022-08-26 RX ADMIN — FAMOTIDINE 20 MG: 10 INJECTION INTRAVENOUS at 02:08

## 2022-08-26 RX ADMIN — SODIUM CHLORIDE: 0.9 INJECTION, SOLUTION INTRAVENOUS at 01:08

## 2022-08-26 RX ADMIN — HEPARIN 500 UNITS: 100 SYRINGE at 03:08

## 2022-08-26 RX ADMIN — DIPHENHYDRAMINE HYDROCHLORIDE 50 MG: 50 INJECTION INTRAMUSCULAR; INTRAVENOUS at 02:08

## 2022-08-26 RX ADMIN — PACLITAXEL 150 MG: 6 INJECTION, SOLUTION INTRAVENOUS at 02:08

## 2022-08-26 RX ADMIN — DEXAMETHASONE SODIUM PHOSPHATE 10 MG: 4 INJECTION, SOLUTION INTRA-ARTICULAR; INTRALESIONAL; INTRAMUSCULAR; INTRAVENOUS; SOFT TISSUE at 01:08

## 2022-08-26 NOTE — DISCHARGE INSTRUCTIONS
.[facility]  [address]  [address]  [phone] phone     [phone] fax  Hours of Operation: Monday- Friday 8:00am- 5:00pm  After hours phone  [phone]  Hematology / Oncology Physicians on call    Dr. Harshil Last      Nurse Practitioners:    Chitra Gloria, JAYMIE Lechuga, JAYMIE Storm, JAYMIE Newton, JAYMIE Pena, JAYMIE Alcocer, [place]      Please don't hesitate to call if you have any concerns.   .FALL PREVENTION   Falls often occur due to slipping, tripping or losing your balance. Here are ways to reduce your risk of falling again.   Was there anything that caused your fall that can be fixed, removed or replaced?   Make your home safe by keeping walkways clear of objects you may trip over.   Use non-slip pads under rugs.   Do not walk in poorly lit areas.   Do not stand on chairs or wobbly ladders.   Use caution when reaching overhead or looking upward. This position can cause a loss of balance.   Be sure your shoes fit properly, have non-slip bottoms and are in good condition.   Be cautious when going up and down stairs, curbs, and when walking on uneven sidewalks.   If your balance is poor, consider using a cane or walker.   If your fall was related to alcohol use, stop or limit alcohol intake.   If your fall was related to use of sleeping medicines, talk to your doctor about this. You may need to reduce your dosage at bedtime if you awaken during the night to go to the bathroom.   To reduce the need for nighttime bathroom trips:   Avoid drinking fluids for several hours before going to bed   Empty your bladder before going to bed   Men can keep a urinal at the bedside   © 1684-7746 [employer], [address] [place] [address], [place] [address]. All rights reserved. This information is not intended as a substitute for professional medical care. Always follow your healthcare  professional's instructions.  .WAYS TO HELP PREVENT INFECTION        WASH YOUR HANDS OFTEN DURING THE DAY, ESPECIALLY BEFORE YOU EAT, AFTER USING THE BATHROOM, AND AFTER TOUCHING ANIMALS    STAY AWAY FROM PEOPLE WHO HAVE ILLNESSES YOU CAN CATCH; SUCH AS COLDS, FLU, CHICKEN POX    TRY TO AVOID CROWDS    STAY AWAY FROM CHILDREN WHO RECENTLY HAVE RECEIVED LIVE VIRUS VACCINES    MAINTAIN GOOD MOUTH CARE    DO NOT SQUEEZE OR SCRATCH PIMPLES    CLEAN CUTS & SCRAPES RIGHT AWAY AND DAILY UNTIL HEALED WITH WARM WATER, SOAP & AN ANTISEPTIC    AVOID CONTACT WITH LITTER BOXES, BIRD CAGES, & FISH TANKS    AVOID STANDING WATER, IE., BIRD BATHS, FLOWER POTS/VASES, OR HUMIDIFIERS    WEAR GLOVES WHEN GARDENING OR CLEANING UP AFTER OTHERS, ESPECIALLY BABIES & SMALL CHILDREN    DO NOT EAT RAW FISH, SEAFOOD, MEAT, OR EGGS

## 2022-08-26 NOTE — PLAN OF CARE
Problem: Adult Inpatient Plan of Care  Goal: Plan of Care Review  Description: Patient likes feet elevated, pillow, apple juice  Outcome: Ongoing, Progressing  Flowsheets (Taken 8/26/2022 1355)  Plan of Care Reviewed With: patient  Goal: Patient-Specific Goal (Individualized)  Description: Patient tolerated chemo infusion well today with no adverse reactions  Outcome: Ongoing, Progressing  Flowsheets (Taken 8/26/2022 1355)  Anxieties, Fears or Concerns: No concerns at this time  Individualized Care Needs: Legs elevated, pillow and apple juice provided  Patient-Specific Goals (Include Timeframe): pt will tolerate taxol with no adverse reaction     Problem: Anemia (Chemotherapy Effects)  Goal: Anemia Symptom Improvement  Outcome: Ongoing, Progressing     Problem: Nausea and Vomiting (Chemotherapy Effects)  Goal: Fluid and Electrolyte Balance  Outcome: Ongoing, Progressing     Problem: Neurotoxicity (Chemotherapy Effects)  Goal: Neurotoxicity Symptom Control  Outcome: Ongoing, Progressing     Problem: Neutropenia (Chemotherapy Effects)  Goal: Absence of Infection  Outcome: Ongoing, Progressing     Problem: Thrombocytopenia Bleeding Risk (Chemotherapy Effects)  Goal: Absence of Bleeding  Outcome: Ongoing, Progressing     Problem: Fall Injury Risk  Goal: Absence of Fall and Fall-Related Injury  Outcome: Ongoing, Progressing

## 2022-08-30 ENCOUNTER — TELEPHONE (OUTPATIENT)
Dept: HEMATOLOGY/ONCOLOGY | Facility: CLINIC | Age: 47
End: 2022-08-30
Payer: COMMERCIAL

## 2022-08-30 ENCOUNTER — PATIENT MESSAGE (OUTPATIENT)
Dept: HEMATOLOGY/ONCOLOGY | Facility: CLINIC | Age: 47
End: 2022-08-30
Payer: COMMERCIAL

## 2022-08-30 NOTE — TELEPHONE ENCOUNTER
Unable to reach patient to verify d/c of eliquis and start of Lovenox.  Left message to return call.

## 2022-09-01 ENCOUNTER — HOSPITAL ENCOUNTER (OUTPATIENT)
Facility: HOSPITAL | Age: 47
Discharge: HOME | End: 2022-09-01
Attending: STUDENT IN AN ORGANIZED HEALTH CARE EDUCATION/TRAINING PROGRAM | Admitting: STUDENT IN AN ORGANIZED HEALTH CARE EDUCATION/TRAINING PROGRAM
Payer: COMMERCIAL

## 2022-09-01 ENCOUNTER — HOSPITAL ENCOUNTER (OUTPATIENT)
Dept: RADIOLOGY | Facility: HOSPITAL | Age: 47
Discharge: HOME | End: 2022-09-01
Attending: RADIOLOGY
Payer: COMMERCIAL

## 2022-09-01 DIAGNOSIS — Z17.1 MALIGNANT NEOPLASM OF UPPER-OUTER QUADRANT OF LEFT BREAST IN FEMALE, ESTROGEN RECEPTOR NEGATIVE: ICD-10-CM

## 2022-09-01 DIAGNOSIS — C50.412 MALIGNANT NEOPLASM OF UPPER-OUTER QUADRANT OF LEFT BREAST IN FEMALE, ESTROGEN RECEPTOR NEGATIVE: ICD-10-CM

## 2022-09-01 PROCEDURE — 99153 MOD SED SAME PHYS/QHP EA: CPT

## 2022-09-01 PROCEDURE — 36590 REMOVAL TUNNELED CV CATH: CPT | Mod: RT | Performed by: STUDENT IN AN ORGANIZED HEALTH CARE EDUCATION/TRAINING PROGRAM

## 2022-09-01 PROCEDURE — 25000003 PHARM REV CODE 250: Performed by: STUDENT IN AN ORGANIZED HEALTH CARE EDUCATION/TRAINING PROGRAM

## 2022-09-01 PROCEDURE — 77001 FLUOROGUIDE FOR VEIN DEVICE: CPT | Mod: TC | Performed by: STUDENT IN AN ORGANIZED HEALTH CARE EDUCATION/TRAINING PROGRAM

## 2022-09-01 PROCEDURE — 36561 INSERT TUNNELED CV CATH: CPT | Mod: LT | Performed by: STUDENT IN AN ORGANIZED HEALTH CARE EDUCATION/TRAINING PROGRAM

## 2022-09-01 PROCEDURE — 63600175 PHARM REV CODE 636 W HCPCS: Performed by: STUDENT IN AN ORGANIZED HEALTH CARE EDUCATION/TRAINING PROGRAM

## 2022-09-01 PROCEDURE — 99152 MOD SED SAME PHYS/QHP 5/>YRS: CPT

## 2022-09-01 PROCEDURE — 75820 VEIN X-RAY ARM/LEG: CPT | Mod: TC,59 | Performed by: STUDENT IN AN ORGANIZED HEALTH CARE EDUCATION/TRAINING PROGRAM

## 2022-09-01 PROCEDURE — 76937 US GUIDE VASCULAR ACCESS: CPT | Mod: TC | Performed by: STUDENT IN AN ORGANIZED HEALTH CARE EDUCATION/TRAINING PROGRAM

## 2022-09-01 PROCEDURE — C1788 PORT, INDWELLING, IMP: HCPCS | Performed by: STUDENT IN AN ORGANIZED HEALTH CARE EDUCATION/TRAINING PROGRAM

## 2022-09-01 DEVICE — POWERPORT CLEARVUE IMPLANTABLE PORT WITH ATTACHABLE 8F POLYURETHANE OPEN-ENDED SINGLE-LUMEN VENOUS CATHETER INTERMEDIATE KIT
Type: IMPLANTABLE DEVICE | Site: CHEST | Status: FUNCTIONAL
Brand: POWERPORT CLEARVUE

## 2022-09-01 RX ORDER — VANCOMYCIN HYDROCHLORIDE 1 G/20ML
INJECTION, POWDER, LYOPHILIZED, FOR SOLUTION INTRAVENOUS
Status: DISCONTINUED | OUTPATIENT
Start: 2022-09-01 | End: 2022-09-01 | Stop reason: ALTCHOICE

## 2022-09-01 RX ORDER — FENTANYL CITRATE 50 UG/ML
INJECTION, SOLUTION INTRAMUSCULAR; INTRAVENOUS
Status: DISCONTINUED | OUTPATIENT
Start: 2022-09-01 | End: 2022-09-02 | Stop reason: HOSPADM

## 2022-09-01 RX ORDER — CEFAZOLIN SODIUM 1 G/3ML
INJECTION, POWDER, FOR SOLUTION INTRAMUSCULAR; INTRAVENOUS
Status: DISCONTINUED | OUTPATIENT
Start: 2022-09-01 | End: 2022-09-02 | Stop reason: HOSPADM

## 2022-09-01 RX ORDER — HYDROMORPHONE HYDROCHLORIDE 2 MG/ML
INJECTION, SOLUTION INTRAMUSCULAR; INTRAVENOUS; SUBCUTANEOUS
Status: DISCONTINUED | OUTPATIENT
Start: 2022-09-01 | End: 2022-09-02 | Stop reason: HOSPADM

## 2022-09-01 RX ORDER — ONDANSETRON 8 MG/1
8 TABLET, ORALLY DISINTEGRATING ORAL ONCE
Status: COMPLETED | OUTPATIENT
Start: 2022-09-01 | End: 2022-09-01

## 2022-09-01 RX ORDER — MIDAZOLAM HYDROCHLORIDE 1 MG/ML
INJECTION INTRAMUSCULAR; INTRAVENOUS
Status: DISCONTINUED | OUTPATIENT
Start: 2022-09-01 | End: 2022-09-02 | Stop reason: HOSPADM

## 2022-09-01 RX ADMIN — FENTANYL CITRATE 25 MCG: 50 INJECTION, SOLUTION INTRAMUSCULAR; INTRAVENOUS at 02:09

## 2022-09-01 RX ADMIN — FENTANYL CITRATE 50 MCG: 50 INJECTION, SOLUTION INTRAMUSCULAR; INTRAVENOUS at 02:09

## 2022-09-01 RX ADMIN — ONDANSETRON 8 MG: 8 TABLET, ORALLY DISINTEGRATING ORAL at 04:09

## 2022-09-01 RX ADMIN — MIDAZOLAM HYDROCHLORIDE 1 MG: 1 INJECTION INTRAMUSCULAR; INTRAVENOUS at 02:09

## 2022-09-01 RX ADMIN — HYDROMORPHONE HYDROCHLORIDE 1 MG: 2 INJECTION INTRAMUSCULAR; INTRAVENOUS; SUBCUTANEOUS at 03:09

## 2022-09-01 RX ADMIN — MIDAZOLAM HYDROCHLORIDE 0.5 MG: 1 INJECTION INTRAMUSCULAR; INTRAVENOUS at 02:09

## 2022-09-01 NOTE — DISCHARGE SUMMARY
Radiology Discharge Summary      Hospital Course: No complications    Procedure Performed: Port removal and port placement    Admit Date: 9/1/2022  Discharge Date: 09/01/2022     Instructions Given to Patient: Yes  Diet: Resume prior diet  Activity: activity as tolerated and no driving for today.  Resume anticoagulation 24 hrs post procedure.    Description of Condition on Discharge: Stable  Vital Signs (Most Recent):      Discharge Disposition: Home    Discharge Diagnosis: Port catheter associated thrombosis     Follow-up:   Further follow-up per ordering physician.      Roslyn Gale MD  Resident  Department of Radiology  Pager: [phone]

## 2022-09-01 NOTE — PROCEDURES
Interventional Radiology Post-Procedure Note    Pre Op Diagnosis: Port associated RUE venous thrombosis    Post Op Diagnosis: Same    Procedure: Right chest port removal and left chest port placement    Procedure performed by: Roslyn Gale MD    Written Informed Consent Obtained: Yes    Specimen Removed: No    Estimated Blood Loss: Minimal    Findings:   An incision was made over the patient's indwelling right chest port and the port and port catheter were removed using blunt and sharp dissection.  Extreme care taken upon catheter removal with monitoring of vital signs given associated thrombus.  Pocket was sutured closed.    Using realtime U/S guidance a 8 Fr port catheter was placed into the left internal jugular vein with tip of the catheter in the SVC. A pocket was made in the upper chest wall, and a port was placed. The skin was sutured closed over the port.    See imaging report for further detail.    Port is ready for use.     Roslyn Gale MD  Interventional Radiology

## 2022-09-01 NOTE — SEDATION DOCUMENTATION
Pt placed supine on fluoro table at this time. CM in place, VSS, NADN, and pt verbalizes understanding of procedure.

## 2022-09-01 NOTE — PLAN OF CARE
Ambulated to bathroom and back w/o difficulty.  States nausea has lessened and wants to go home.  Bilateral upper chest dressings dry and intact.  Discharge inst. Reviewed with pt. And spouse and copy given.  Hibiclens and nozin placed in bag with discharge instructions.  IV removed.  1700 Discharged home.  To exit via w/C.

## 2022-09-01 NOTE — NURSING
Nozin administered to both nares. Nozin home use education provided at this time. Patient verbalizes understanding.

## 2022-09-01 NOTE — INTERVAL H&P NOTE
The patient has been examined and the H&P has been reviewed:    I concur with the findings and changes have been noted since the H&P was written: Patient with port catheter associated RUE DVT on anticoagulation since recent admission requiring port removal and replacement as chemotherapy is planned to continue.  Patient reports improvement in RUE symptoms post anticoagulation initiation.  Central right subclavian vein and IJ  evaluated at bedside prior to port procedure.  Bulky thrombus now extends to the central subclavian vein along the catheter course.  IJ remains patent.  Given risk of propagation of thrombus if port placed in right IJ, will place in left IJ with port superiorly on the left chest wall given history of left breast cancer.  Patient in agreement with plan.      There are no hospital problems to display for this patient.

## 2022-09-01 NOTE — SEDATION DOCUMENTATION
Pt transferred to [facility] and transported back to [facility].

## 2022-09-02 ENCOUNTER — INFUSION (OUTPATIENT)
Dept: INFUSION THERAPY | Facility: HOSPITAL | Age: 47
End: 2022-09-02
Attending: STUDENT IN AN ORGANIZED HEALTH CARE EDUCATION/TRAINING PROGRAM
Payer: COMMERCIAL

## 2022-09-02 ENCOUNTER — LAB VISIT (OUTPATIENT)
Dept: LAB | Facility: HOSPITAL | Age: 47
End: 2022-09-02
Attending: INTERNAL MEDICINE
Payer: COMMERCIAL

## 2022-09-02 ENCOUNTER — OFFICE VISIT (OUTPATIENT)
Dept: HEMATOLOGY/ONCOLOGY | Facility: CLINIC | Age: 47
End: 2022-09-02
Payer: COMMERCIAL

## 2022-09-02 VITALS
WEIGHT: 182.31 LBS | HEART RATE: 109 BPM | TEMPERATURE: 97 F | BODY MASS INDEX: 31.12 KG/M2 | OXYGEN SATURATION: 96 % | SYSTOLIC BLOOD PRESSURE: 120 MMHG | DIASTOLIC BLOOD PRESSURE: 82 MMHG | HEIGHT: 64 IN

## 2022-09-02 VITALS
RESPIRATION RATE: 17 BRPM | HEART RATE: 87 BPM | WEIGHT: 182.31 LBS | BODY MASS INDEX: 31.12 KG/M2 | DIASTOLIC BLOOD PRESSURE: 76 MMHG | OXYGEN SATURATION: 99 % | HEIGHT: 64 IN | TEMPERATURE: 98 F | SYSTOLIC BLOOD PRESSURE: 123 MMHG

## 2022-09-02 DIAGNOSIS — C50.412 MALIGNANT NEOPLASM OF UPPER-OUTER QUADRANT OF LEFT BREAST IN FEMALE, ESTROGEN RECEPTOR NEGATIVE: Primary | ICD-10-CM

## 2022-09-02 DIAGNOSIS — C50.412 MALIGNANT NEOPLASM OF UPPER-OUTER QUADRANT OF LEFT BREAST IN FEMALE, ESTROGEN RECEPTOR NEGATIVE: ICD-10-CM

## 2022-09-02 DIAGNOSIS — I82.621 ACUTE DEEP VEIN THROMBOSIS (DVT) OF RIGHT UPPER EXTREMITY, UNSPECIFIED VEIN: ICD-10-CM

## 2022-09-02 DIAGNOSIS — Z17.1 MALIGNANT NEOPLASM OF UPPER-OUTER QUADRANT OF LEFT BREAST IN FEMALE, ESTROGEN RECEPTOR NEGATIVE: Primary | ICD-10-CM

## 2022-09-02 DIAGNOSIS — R74.01 TRANSAMINITIS: ICD-10-CM

## 2022-09-02 DIAGNOSIS — R11.0 NAUSEA: ICD-10-CM

## 2022-09-02 DIAGNOSIS — Z17.1 MALIGNANT NEOPLASM OF UPPER-OUTER QUADRANT OF LEFT BREAST IN FEMALE, ESTROGEN RECEPTOR NEGATIVE: ICD-10-CM

## 2022-09-02 LAB
ALBUMIN SERPL BCP-MCNC: 3.7 G/DL (ref 3.5–5.2)
ALP SERPL-CCNC: 98 U/L (ref 55–135)
ALT SERPL W/O P-5'-P-CCNC: 124 U/L (ref 10–44)
ANION GAP SERPL CALC-SCNC: 11 MMOL/L (ref 8–16)
ANISOCYTOSIS BLD QL SMEAR: SLIGHT
AST SERPL-CCNC: 88 U/L (ref 10–40)
BASOPHILS NFR BLD: 0 % (ref 0–1.9)
BILIRUB SERPL-MCNC: 0.2 MG/DL (ref 0.1–1)
BUN SERPL-MCNC: 17 MG/DL (ref 6–20)
CALCIUM SERPL-MCNC: 9.4 MG/DL (ref 8.7–10.5)
CHLORIDE SERPL-SCNC: 104 MMOL/L (ref 95–110)
CO2 SERPL-SCNC: 23 MMOL/L (ref 23–29)
CREAT SERPL-MCNC: 0.8 MG/DL (ref 0.5–1.4)
DIFFERENTIAL METHOD: ABNORMAL
EOSINOPHIL NFR BLD: 0 % (ref 0–8)
ERYTHROCYTE [DISTWIDTH] IN BLOOD BY AUTOMATED COUNT: 14.6 % (ref 11.5–14.5)
EST. GFR  (NO RACE VARIABLE): >60 ML/MIN/1.73 M^2
GLUCOSE SERPL-MCNC: 146 MG/DL (ref 70–110)
HCT VFR BLD AUTO: 35.6 % (ref 37–48.5)
HGB BLD-MCNC: 11.7 G/DL (ref 12–16)
HYPOCHROMIA BLD QL SMEAR: ABNORMAL
IMM GRANULOCYTES # BLD AUTO: ABNORMAL K/UL (ref 0–0.04)
IMM GRANULOCYTES NFR BLD AUTO: ABNORMAL % (ref 0–0.5)
LYMPHOCYTES NFR BLD: 12 % (ref 18–48)
MCH RBC QN AUTO: 27.1 PG (ref 27–31)
MCHC RBC AUTO-ENTMCNC: 32.9 G/DL (ref 32–36)
MCV RBC AUTO: 83 FL (ref 82–98)
METAMYELOCYTES NFR BLD MANUAL: 2 %
MONOCYTES NFR BLD: 1 % (ref 4–15)
MYELOCYTES NFR BLD MANUAL: 2 %
NEUTROPHILS NFR BLD: 75 % (ref 38–73)
NEUTS BAND NFR BLD MANUAL: 8 %
NRBC BLD-RTO: 0 /100 WBC
OVALOCYTES BLD QL SMEAR: ABNORMAL
PLATELET # BLD AUTO: 313 K/UL (ref 150–450)
PLATELET BLD QL SMEAR: ABNORMAL
PMV BLD AUTO: 10.1 FL (ref 9.2–12.9)
POTASSIUM SERPL-SCNC: 4.2 MMOL/L (ref 3.5–5.1)
PROT SERPL-MCNC: 7.6 G/DL (ref 6–8.4)
RBC # BLD AUTO: 4.31 M/UL (ref 4–5.4)
SODIUM SERPL-SCNC: 138 MMOL/L (ref 136–145)
TSH SERPL DL<=0.005 MIU/L-ACNC: 0.47 UIU/ML (ref 0.4–4)
WBC # BLD AUTO: 14.06 K/UL (ref 3.9–12.7)

## 2022-09-02 PROCEDURE — 96375 TX/PRO/DX INJ NEW DRUG ADDON: CPT

## 2022-09-02 PROCEDURE — 99215 PR OFFICE/OUTPT VISIT, EST, LEVL V, 40-54 MIN: ICD-10-PCS | Mod: S$GLB,,, | Performed by: INTERNAL MEDICINE

## 2022-09-02 PROCEDURE — 25000003 PHARM REV CODE 250: Performed by: INTERNAL MEDICINE

## 2022-09-02 PROCEDURE — 63600175 PHARM REV CODE 636 W HCPCS: Performed by: INTERNAL MEDICINE

## 2022-09-02 PROCEDURE — 99215 OFFICE O/P EST HI 40 MIN: CPT | Mod: S$GLB,,, | Performed by: INTERNAL MEDICINE

## 2022-09-02 PROCEDURE — 96413 CHEMO IV INFUSION 1 HR: CPT

## 2022-09-02 PROCEDURE — 85007 BL SMEAR W/DIFF WBC COUNT: CPT | Performed by: INTERNAL MEDICINE

## 2022-09-02 PROCEDURE — 96367 TX/PROPH/DG ADDL SEQ IV INF: CPT

## 2022-09-02 PROCEDURE — 80053 COMPREHEN METABOLIC PANEL: CPT | Performed by: INTERNAL MEDICINE

## 2022-09-02 PROCEDURE — 84443 ASSAY THYROID STIM HORMONE: CPT | Performed by: INTERNAL MEDICINE

## 2022-09-02 PROCEDURE — 36415 COLL VENOUS BLD VENIPUNCTURE: CPT | Performed by: INTERNAL MEDICINE

## 2022-09-02 PROCEDURE — 99999 PR PBB SHADOW E&M-EST. PATIENT-LVL III: CPT | Mod: PBBFAC,,, | Performed by: INTERNAL MEDICINE

## 2022-09-02 PROCEDURE — 85027 COMPLETE CBC AUTOMATED: CPT | Performed by: INTERNAL MEDICINE

## 2022-09-02 PROCEDURE — 99999 PR PBB SHADOW E&M-EST. PATIENT-LVL III: ICD-10-PCS | Mod: PBBFAC,,, | Performed by: INTERNAL MEDICINE

## 2022-09-02 RX ORDER — DIPHENHYDRAMINE HYDROCHLORIDE 50 MG/ML
50 INJECTION INTRAMUSCULAR; INTRAVENOUS ONCE AS NEEDED
Status: CANCELLED | OUTPATIENT
Start: 2022-09-02

## 2022-09-02 RX ORDER — FAMOTIDINE 10 MG/ML
20 INJECTION INTRAVENOUS
Status: CANCELLED | OUTPATIENT
Start: 2022-09-02

## 2022-09-02 RX ORDER — SODIUM CHLORIDE 0.9 % (FLUSH) 0.9 %
10 SYRINGE (ML) INJECTION
Status: DISCONTINUED | OUTPATIENT
Start: 2022-09-02 | End: 2022-09-02 | Stop reason: HOSPADM

## 2022-09-02 RX ORDER — SODIUM CHLORIDE 0.9 % (FLUSH) 0.9 %
10 SYRINGE (ML) INJECTION
Status: CANCELLED | OUTPATIENT
Start: 2022-09-02

## 2022-09-02 RX ORDER — HEPARIN 100 UNIT/ML
500 SYRINGE INTRAVENOUS
Status: DISCONTINUED | OUTPATIENT
Start: 2022-09-02 | End: 2022-09-02 | Stop reason: HOSPADM

## 2022-09-02 RX ORDER — EPINEPHRINE 0.3 MG/.3ML
0.3 INJECTION SUBCUTANEOUS ONCE AS NEEDED
Status: CANCELLED | OUTPATIENT
Start: 2022-09-02

## 2022-09-02 RX ORDER — HEPARIN 100 UNIT/ML
500 SYRINGE INTRAVENOUS
Status: CANCELLED | OUTPATIENT
Start: 2022-09-02

## 2022-09-02 RX ORDER — FAMOTIDINE 10 MG/ML
20 INJECTION INTRAVENOUS
Status: COMPLETED | OUTPATIENT
Start: 2022-09-02 | End: 2022-09-02

## 2022-09-02 RX ADMIN — PACLITAXEL 150 MG: 6 INJECTION, SOLUTION INTRAVENOUS at 11:09

## 2022-09-02 RX ADMIN — DIPHENHYDRAMINE HYDROCHLORIDE 50 MG: 50 INJECTION, SOLUTION INTRAMUSCULAR; INTRAVENOUS at 10:09

## 2022-09-02 RX ADMIN — FAMOTIDINE 20 MG: 10 INJECTION INTRAVENOUS at 10:09

## 2022-09-02 RX ADMIN — HEPARIN 500 UNITS: 100 SYRINGE at 12:09

## 2022-09-02 RX ADMIN — DEXAMETHASONE SODIUM PHOSPHATE 10 MG: 4 INJECTION, SOLUTION INTRA-ARTICULAR; INTRALESIONAL; INTRAMUSCULAR; INTRAVENOUS; SOFT TISSUE at 10:09

## 2022-09-02 RX ADMIN — SODIUM CHLORIDE: 0.9 INJECTION, SOLUTION INTRAVENOUS at 10:09

## 2022-09-02 NOTE — PLAN OF CARE
Discussed plan of care with pt. Addressed any and ongoing concerns. Pt denies   Problem: Adult Inpatient Plan of Care  Goal: Plan of Care Review  Outcome: Ongoing, Progressing  Goal: Patient-Specific Goal (Individualized)  Outcome: Ongoing, Progressing  Flowsheets (Taken 9/2/2022 1048)  Anxieties, Fears or Concerns: getting new port accessed , site   Individualized Care Needs: in recliner with pillow , blanket from home, family at side  Patient-Specific Goals (Include Timeframe): will tolerate chemo today without adverse reaction  Goal: Absence of Hospital-Acquired Illness or Injury  Outcome: Ongoing, Progressing  Intervention: Identify and Manage Fall Risk  Flowsheets (Taken 9/2/2022 1048)  Safety Promotion/Fall Prevention: in recliner, wheels locked  Intervention: Prevent Infection  Flowsheets (Taken 9/2/2022 1048)  Infection Prevention:   equipment surfaces disinfected   hand hygiene promoted   personal protective equipment utilized  Goal: Optimal Comfort and Wellbeing  Outcome: Ongoing, Progressing  Intervention: Provide Person-Centered Care  Flowsheets (Taken 9/2/2022 1048)  Trust Relationship/Rapport:   care explained   choices provided   emotional support provided   empathic listening provided   questions answered   questions encouraged   reassurance provided   thoughts/feelings acknowledged

## 2022-09-02 NOTE — Clinical Note
Miguel Mcgowan, wanted to get your thoughts on new port placement in light of recent thrombosis and thrombolysis. She will need for upcoming chemo cannot given peripherally

## 2022-09-06 VITALS
OXYGEN SATURATION: 98 % | TEMPERATURE: 98 F | RESPIRATION RATE: 11 BRPM | DIASTOLIC BLOOD PRESSURE: 74 MMHG | HEART RATE: 69 BPM | WEIGHT: 181 LBS | HEIGHT: 64 IN | BODY MASS INDEX: 30.9 KG/M2 | SYSTOLIC BLOOD PRESSURE: 127 MMHG

## 2022-09-09 ENCOUNTER — LAB VISIT (OUTPATIENT)
Dept: LAB | Facility: HOSPITAL | Age: 47
End: 2022-09-09
Attending: INTERNAL MEDICINE
Payer: COMMERCIAL

## 2022-09-09 ENCOUNTER — OFFICE VISIT (OUTPATIENT)
Dept: HEMATOLOGY/ONCOLOGY | Facility: CLINIC | Age: 47
End: 2022-09-09
Payer: COMMERCIAL

## 2022-09-09 ENCOUNTER — INFUSION (OUTPATIENT)
Dept: INFUSION THERAPY | Facility: HOSPITAL | Age: 47
End: 2022-09-09
Attending: INTERNAL MEDICINE
Payer: COMMERCIAL

## 2022-09-09 VITALS
SYSTOLIC BLOOD PRESSURE: 115 MMHG | HEIGHT: 64 IN | WEIGHT: 186.5 LBS | DIASTOLIC BLOOD PRESSURE: 82 MMHG | TEMPERATURE: 98 F | BODY MASS INDEX: 31.84 KG/M2 | RESPIRATION RATE: 16 BRPM | OXYGEN SATURATION: 99 % | HEART RATE: 78 BPM

## 2022-09-09 VITALS
HEART RATE: 108 BPM | TEMPERATURE: 98 F | OXYGEN SATURATION: 97 % | RESPIRATION RATE: 16 BRPM | SYSTOLIC BLOOD PRESSURE: 127 MMHG | DIASTOLIC BLOOD PRESSURE: 84 MMHG

## 2022-09-09 DIAGNOSIS — Z17.1 MALIGNANT NEOPLASM OF UPPER-OUTER QUADRANT OF LEFT BREAST IN FEMALE, ESTROGEN RECEPTOR NEGATIVE: ICD-10-CM

## 2022-09-09 DIAGNOSIS — Z17.1 MALIGNANT NEOPLASM OF UPPER-OUTER QUADRANT OF LEFT BREAST IN FEMALE, ESTROGEN RECEPTOR NEGATIVE: Primary | ICD-10-CM

## 2022-09-09 DIAGNOSIS — C50.412 MALIGNANT NEOPLASM OF UPPER-OUTER QUADRANT OF LEFT BREAST IN FEMALE, ESTROGEN RECEPTOR NEGATIVE: Primary | ICD-10-CM

## 2022-09-09 DIAGNOSIS — I82.621 ACUTE DEEP VEIN THROMBOSIS (DVT) OF RIGHT UPPER EXTREMITY, UNSPECIFIED VEIN: ICD-10-CM

## 2022-09-09 DIAGNOSIS — C50.412 MALIGNANT NEOPLASM OF UPPER-OUTER QUADRANT OF LEFT BREAST IN FEMALE, ESTROGEN RECEPTOR NEGATIVE: ICD-10-CM

## 2022-09-09 DIAGNOSIS — R74.01 TRANSAMINITIS: ICD-10-CM

## 2022-09-09 DIAGNOSIS — D63.0 ANEMIA IN NEOPLASTIC DISEASE: ICD-10-CM

## 2022-09-09 LAB
ALBUMIN SERPL BCP-MCNC: 3.6 G/DL (ref 3.5–5.2)
ALP SERPL-CCNC: 79 U/L (ref 55–135)
ALT SERPL W/O P-5'-P-CCNC: 40 U/L (ref 10–44)
ANION GAP SERPL CALC-SCNC: 7 MMOL/L (ref 8–16)
AST SERPL-CCNC: 14 U/L (ref 10–40)
BASOPHILS NFR BLD: 0 % (ref 0–1.9)
BILIRUB SERPL-MCNC: 0.3 MG/DL (ref 0.1–1)
BUN SERPL-MCNC: 11 MG/DL (ref 6–20)
CALCIUM SERPL-MCNC: 8.9 MG/DL (ref 8.7–10.5)
CHLORIDE SERPL-SCNC: 106 MMOL/L (ref 95–110)
CO2 SERPL-SCNC: 28 MMOL/L (ref 23–29)
CREAT SERPL-MCNC: 0.8 MG/DL (ref 0.5–1.4)
DIFFERENTIAL METHOD: ABNORMAL
EOSINOPHIL NFR BLD: 3 % (ref 0–8)
ERYTHROCYTE [DISTWIDTH] IN BLOOD BY AUTOMATED COUNT: 16.1 % (ref 11.5–14.5)
EST. GFR  (NO RACE VARIABLE): >60 ML/MIN/1.73 M^2
GIANT PLATELETS BLD QL SMEAR: PRESENT
GLUCOSE SERPL-MCNC: 88 MG/DL (ref 70–110)
HCT VFR BLD AUTO: 33.9 % (ref 37–48.5)
HGB BLD-MCNC: 10.8 G/DL (ref 12–16)
IMM GRANULOCYTES # BLD AUTO: ABNORMAL K/UL (ref 0–0.04)
IMM GRANULOCYTES NFR BLD AUTO: ABNORMAL % (ref 0–0.5)
LYMPHOCYTES NFR BLD: 44 % (ref 18–48)
MCH RBC QN AUTO: 27.4 PG (ref 27–31)
MCHC RBC AUTO-ENTMCNC: 31.9 G/DL (ref 32–36)
MCV RBC AUTO: 86 FL (ref 82–98)
MONOCYTES NFR BLD: 3 % (ref 4–15)
NEUTROPHILS NFR BLD: 47 % (ref 38–73)
NEUTS BAND NFR BLD MANUAL: 3 %
NRBC BLD-RTO: 0 /100 WBC
PLATELET # BLD AUTO: 240 K/UL (ref 150–450)
PLATELET BLD QL SMEAR: ABNORMAL
PMV BLD AUTO: 9.9 FL (ref 9.2–12.9)
POTASSIUM SERPL-SCNC: 3.9 MMOL/L (ref 3.5–5.1)
PROT SERPL-MCNC: 6.7 G/DL (ref 6–8.4)
RBC # BLD AUTO: 3.94 M/UL (ref 4–5.4)
SODIUM SERPL-SCNC: 141 MMOL/L (ref 136–145)
STOMATOCYTES BLD QL SMEAR: PRESENT
TARGETS BLD QL SMEAR: ABNORMAL
WBC # BLD AUTO: 11.99 K/UL (ref 3.9–12.7)

## 2022-09-09 PROCEDURE — 25000003 PHARM REV CODE 250: Performed by: INTERNAL MEDICINE

## 2022-09-09 PROCEDURE — 99215 OFFICE O/P EST HI 40 MIN: CPT | Mod: S$GLB,,, | Performed by: INTERNAL MEDICINE

## 2022-09-09 PROCEDURE — 63600175 PHARM REV CODE 636 W HCPCS: Performed by: INTERNAL MEDICINE

## 2022-09-09 PROCEDURE — 85007 BL SMEAR W/DIFF WBC COUNT: CPT | Performed by: INTERNAL MEDICINE

## 2022-09-09 PROCEDURE — 96367 TX/PROPH/DG ADDL SEQ IV INF: CPT

## 2022-09-09 PROCEDURE — 96375 TX/PRO/DX INJ NEW DRUG ADDON: CPT

## 2022-09-09 PROCEDURE — 80053 COMPREHEN METABOLIC PANEL: CPT | Performed by: INTERNAL MEDICINE

## 2022-09-09 PROCEDURE — 99999 PR PBB SHADOW E&M-EST. PATIENT-LVL III: CPT | Mod: PBBFAC,,, | Performed by: INTERNAL MEDICINE

## 2022-09-09 PROCEDURE — 99999 PR PBB SHADOW E&M-EST. PATIENT-LVL III: ICD-10-PCS | Mod: PBBFAC,,, | Performed by: INTERNAL MEDICINE

## 2022-09-09 PROCEDURE — 85027 COMPLETE CBC AUTOMATED: CPT | Performed by: INTERNAL MEDICINE

## 2022-09-09 PROCEDURE — 96417 CHEMO IV INFUS EACH ADDL SEQ: CPT

## 2022-09-09 PROCEDURE — 99215 PR OFFICE/OUTPT VISIT, EST, LEVL V, 40-54 MIN: ICD-10-PCS | Mod: S$GLB,,, | Performed by: INTERNAL MEDICINE

## 2022-09-09 PROCEDURE — 36415 COLL VENOUS BLD VENIPUNCTURE: CPT | Performed by: INTERNAL MEDICINE

## 2022-09-09 PROCEDURE — 96413 CHEMO IV INFUSION 1 HR: CPT

## 2022-09-09 RX ORDER — HEPARIN 100 UNIT/ML
500 SYRINGE INTRAVENOUS
Status: DISCONTINUED | OUTPATIENT
Start: 2022-09-09 | End: 2022-09-09 | Stop reason: HOSPADM

## 2022-09-09 RX ORDER — FAMOTIDINE 10 MG/ML
20 INJECTION INTRAVENOUS
Status: COMPLETED | OUTPATIENT
Start: 2022-09-09 | End: 2022-09-09

## 2022-09-09 RX ORDER — HEPARIN 100 UNIT/ML
500 SYRINGE INTRAVENOUS
Status: CANCELLED | OUTPATIENT
Start: 2022-09-09

## 2022-09-09 RX ORDER — SODIUM CHLORIDE 0.9 % (FLUSH) 0.9 %
10 SYRINGE (ML) INJECTION
Status: CANCELLED | OUTPATIENT
Start: 2022-09-09

## 2022-09-09 RX ORDER — EPINEPHRINE 0.3 MG/.3ML
0.3 INJECTION SUBCUTANEOUS ONCE AS NEEDED
Status: CANCELLED | OUTPATIENT
Start: 2022-09-09

## 2022-09-09 RX ORDER — OMEPRAZOLE 10 MG/1
10 CAPSULE, DELAYED RELEASE ORAL DAILY
COMMUNITY
End: 2023-10-06 | Stop reason: ALTCHOICE

## 2022-09-09 RX ORDER — FAMOTIDINE 10 MG/ML
20 INJECTION INTRAVENOUS
Status: CANCELLED | OUTPATIENT
Start: 2022-09-09

## 2022-09-09 RX ORDER — DIPHENHYDRAMINE HYDROCHLORIDE 50 MG/ML
50 INJECTION INTRAMUSCULAR; INTRAVENOUS ONCE AS NEEDED
Status: CANCELLED | OUTPATIENT
Start: 2022-09-09

## 2022-09-09 RX ADMIN — SODIUM CHLORIDE: 0.9 INJECTION, SOLUTION INTRAVENOUS at 11:09

## 2022-09-09 RX ADMIN — CARBOPLATIN 685 MG: 10 INJECTION, SOLUTION INTRAVENOUS at 01:09

## 2022-09-09 RX ADMIN — SODIUM CHLORIDE 200 MG: 9 INJECTION, SOLUTION INTRAVENOUS at 11:09

## 2022-09-09 RX ADMIN — APREPITANT 130 MG: 130 INJECTION, EMULSION INTRAVENOUS at 12:09

## 2022-09-09 RX ADMIN — DIPHENHYDRAMINE HYDROCHLORIDE 50 MG: 50 INJECTION INTRAMUSCULAR; INTRAVENOUS at 12:09

## 2022-09-09 RX ADMIN — PALONOSETRON HYDROCHLORIDE 0.25 MG: 0.25 INJECTION, SOLUTION INTRAVENOUS at 11:09

## 2022-09-09 RX ADMIN — PACLITAXEL 150 MG: 6 INJECTION, SOLUTION INTRAVENOUS at 12:09

## 2022-09-09 RX ADMIN — FAMOTIDINE 20 MG: 10 INJECTION INTRAVENOUS at 12:09

## 2022-09-09 RX ADMIN — HEPARIN 500 UNITS: 100 SYRINGE at 02:09

## 2022-09-09 NOTE — PLAN OF CARE
Problem: Adult Inpatient Plan of Care  Goal: Plan of Care Review  Outcome: Ongoing, Progressing  Flowsheets (Taken 9/9/2022 1117)  Plan of Care Reviewed With: patient  Goal: Patient-Specific Goal (Individualized)  Outcome: Ongoing, Progressing  Flowsheets (Taken 9/9/2022 1117)  Anxieties, Fears or Concerns: No concerns at this time  Individualized Care Needs: Legs elevated, blanket from home/pillow, family at side, ice chips provided  Patient-Specific Goals (Include Timeframe): Pt to tolerate chemo infusion with no adverse reaction  Goal: Absence of Hospital-Acquired Illness or Injury  Outcome: Ongoing, Progressing  Goal: Optimal Comfort and Wellbeing  Outcome: Ongoing, Progressing     Problem: Infection  Goal: Absence of Infection Signs and Symptoms  Outcome: Ongoing, Progressing     Problem: Fall Injury Risk  Goal: Absence of Fall and Fall-Related Injury  Outcome: Ongoing, Progressing     Problem: Anemia (Chemotherapy Effects)  Goal: Anemia Symptom Improvement  Outcome: Ongoing, Progressing     Problem: Nausea and Vomiting (Chemotherapy Effects)  Goal: Fluid and Electrolyte Balance  Outcome: Ongoing, Progressing     Problem: Neurotoxicity (Chemotherapy Effects)  Goal: Neurotoxicity Symptom Control  Outcome: Ongoing, Progressing     Problem: Neutropenia (Chemotherapy Effects)  Goal: Absence of Infection  Outcome: Ongoing, Progressing     Problem: Thrombocytopenia Bleeding Risk (Chemotherapy Effects)  Goal: Absence of Bleeding  Outcome: Ongoing, Progressing

## 2022-09-09 NOTE — PROGRESS NOTES
Subjective:      DATE OF VISIT: 9/9/22     ?  Patient ID:Jaison Noel is a 46 y.o. female.?? MR#: [id number]   ?   PRIMARY ONCOLOGIST: Dr. Hathaway    ? Primary Care Providers:  Alesha Alonso MD, MD (General)     PRIMARY ONCOLOGIST: Dr. Hathaway    CHIEF COMPLAINT:  Follow-up?   ONCOLOGIC DIAGNOSIS:  Stage IB, cT1c, cN0, cM0 left breast upper outer quadrant 2:00 a.m. invasive ductal carcinoma grade 3 ER/CO negative, HER2 negative  ?   CURRENT TREATMENT:  neoadjuvant chemotherapy, carboplatin paclitaxel pembrolizumab followed by doxorubicin cyclophosphamide pembrolizumab    PAST TREATMENT:  Biopsy only  ?   INTERVAL EVENTS:    Feeling well aside from progressive fatigue.  New port doing well.  Improvement in right upper extremity edema.  No evidence of bleeding.    ONCOLOGIC HISTORY:     Ms. Noel is a 46-year-old woman with hypothyroidism and hypertension.  She presented for routine screening mammogram 11/30/2021 showing left breast mass upper outer posterior position.    12/03/2021 diagnostic mammogram and ultrasound  Left  Mammo Digital Diagnostic Bilat with Pablito  There is a mass seen in the upper outer quadrant of the left breast in the posterior depth.      US Breast Bilateral Limited  There are 3 similar complicated cysts seen in the upper outer quadrant of the left breast in the posterior depth. The largest cyst is thought to be the correlate and measures 0.7 x 0.5 x 0.5 cm.  There are 2 additional complex cysts also seen in the upper outer quadrant of the left breast that measure 0.4 x 0.2 x 0.3 cm and 0.6 x 0.3 x 0.4 cm.      Right  Mammo Digital Diagnostic Bilat with Pablito  There is a mass seen in the lower central region of the right breast in the posterior depth. The posterior margin of this lesion is not seen on mammography.  The lesion appears to abut and overlie the pectoralis musculature.  The lesion appears to measure at least 9 mm in size.      US Breast Bilateral Limited  There is a mass seen in  the lower central region of the right breast. A definitive correlate is not definitely seen on ultrasound.  There is a 6 mm hypoechoic lesion likely representing a complex cyst within the central portion of the breast that appears to be too small to be the correlate for the mammographic finding.  There is an additional 1.0 x 0.4 x 0.6 cm hypoechoic area that is located within a ridge of tissue and also does not definitively correlate to the lesion.  It is possible this lesion was present on prior studies although only space visualized on the most recent study due to increased tissue included on the MLO view.  The visualized margins of the lesion also appear to be fairly smooth and therefore the lesion can be followed.      Impression:  Left  Mass: Left breast mass at the upper outer posterior position. Assessment: 3 - Probably benign. Short Interval Follow-Up in 6 Months is recommended.      Right  Mass: Right breast mass at the lower central posterior position. Assessment: 3 - Probably benign. Short Interval Follow-Up in 6 Months is recommended.      BI-RADS Category:   Overall: 3 - Probably Benign    Repeat evaluation with diagnostic mammogram and ultrasound on 05/24/2022 notable for a suspicious left breast lesion 1.3 cm 2:00 a.m.    Right breast: No detrimental mammographic change. Well-circumscribed lesion within the lower outer posterior breast is unchanged mammographically. 8.6 mm complicated cyst present at 08:00 o'clock, 12 cm from the nipple and corresponds to the stable mammographic finding.       Follow-up imaging of the previously noted central lesions demonstrate no detrimental change.  8 mm complicated cyst noted at 04:00 o'clock, 4 cm from the nipple.         Left breast: Interval increase in size of an upper outer quadrant mass on mammogram. On ultrasound there is an enlarging complicated cystic lesion measuring 1.3 cm at 02:00 o'clock, 7 cm from the nipple. Other complicated cysts within the upper  outer breast demonstrate no detrimental change.  No suspicious left axillary lymph nodes.    Ultrasound-guided biopsy left breast lesion on 2022  Pathology:  Final Pathologic Diagnosis 1. Left breast mass (2 o'clock position), biopsy:       -  High-grade invasive carcinoma of no special type (ductal) with   associated tumor necrosis,          see comment     SYNOPTIC REPORT   PROCEDURE:  Biopsy   SPECIMEN LATERALITY:  Left breast   TUMOR SITE:  2 o'clock position   HISTOLOGIC TYPE:  Invasive carcinoma of no special type (ductal)   HISTOLOGIC GRADE:  Grade 3 of 3          TUBULE FORMATION:  3          PLEOMORPHISM:  3          MITOTIC RATE:  3   TUMOR SIZE:   At least 10 mm in greatest linear dimension   DUCTAL CARCINOMA IN SITU (DCIS):  Not identified   LYMPHOVASCULAR INVASION:  Not identified   MICROCALCIFICATIONS:  Not identified   BREAST BIOMARKERS (PERFORMED WITH APPROPRIATE CONTROLS):           ER:   Negative (0)           MS:   Negative (0)           HER2:  Negative (1+)           KI67:  Greater than 95%       2022 PET-CT without evidence of distant metastatic disease.     FDG avid left breast lesion consistent with the known malignancy.  No other sites of suspicious uptake identified.    Menarche 12 years old  ; 1 miscarriage, age of 1st pregnancy 16 years old  Postmenopausal last menstrual period at 44 years old s/p PIERRE/BSO  Exogenous estrogen for about 1 year last when 45yo.  No history of prior radiation  No prior breast biopsy    Family history notable for mother with either breast or ovarian cancer diagnosis 64 years old.    Cycle 1 day 1 neoadjuvant chemotherapy 07/15/2022      Review of Systems    ?   A comprehensive 14-point review of systems was reviewed with patient and was negative other than as specified above.   ?     Objective:      Physical Exam      ?   Vitals:    22 0918   BP: 115/82   Pulse: 78   Resp: 16   Temp: 97.6 °F (36.4 °C)      ?   ECOG:?0   General appearance:  Generally well appearing, in no acute distress.   Head, eyes, ears, nose, and throat: moist mucous membranes.   Respiratory:  Normal work of breathing  Abdomen: nontender, nondistended.   Extremities: Warm, with mild asymmetry right upper extremity as compared to left.  Neurologic: Alert and oriented. Grossly normal strength, coordination, and gait.   Skin: No rashes, ecchymoses or petechial lesion.   Psychiatric:  Normal mood and affect.      Labs    Lab Results   Component Value Date    WBC 11.99 09/09/2022    HGB 10.8 (L) 09/09/2022    HCT 33.9 (L) 09/09/2022    MCV 86 09/09/2022     09/09/2022         Chemistry        Component Value Date/Time     09/09/2022 0910    K 3.9 09/09/2022 0910     09/09/2022 0910    CO2 28 09/09/2022 0910    BUN 11 09/09/2022 0910    CREATININE 0.8 09/09/2022 0910    GLU 88 09/09/2022 0910        Component Value Date/Time    CALCIUM 8.9 09/09/2022 0910    ALKPHOS 79 09/09/2022 0910    AST 14 09/09/2022 0910    ALT 40 09/09/2022 0910    BILITOT 0.3 09/09/2022 0910    ESTGFRAFRICA >60 07/29/2022 1006    EGFRNONAA >60 07/29/2022 1006        Lab Results   Component Value Date    TSH 0.469 09/02/2022       Imaging:  ?    No results found for this or any previous visit (from the past 2160 hour(s)).  No results found for this or any previous visit (from the past 2160 hour(s)).  Results for orders placed or performed during the hospital encounter of 06/20/22 (from the past 2160 hour(s))   NM PET CT Routine Skull to Mid Thigh    Impression    FDG avid left breast lesion consistent with the known malignancy.  No other sites of suspicious uptake identified.      Electronically signed by: Zay Dickerson DO  Date:    06/20/2022  Time:    13:58         Pathology:    No visits with results within 1 Day(s) from this visit.   Latest known visit with results is:   Hospital Outpatient Visit on 06/09/2022   Component Date Value Ref Range Status    Final Pathologic Diagnosis 06/09/2022   "  Final                    Value:1. Left breast mass (2 o'clock position), biopsy:      -  High-grade invasive carcinoma of no special type (ductal) with  associated tumor necrosis,         see comment    SYNOPTIC REPORT  PROCEDURE:  Biopsy  SPECIMEN LATERALITY:  Left breast  TUMOR SITE:  2 o'clock position  HISTOLOGIC TYPE:  Invasive carcinoma of no special type (ductal)  HISTOLOGIC GRADE:  Grade 3 of 3         TUBULE FORMATION:  3         PLEOMORPHISM:  3         MITOTIC RATE:  3  TUMOR SIZE:   At least 10 mm in greatest linear dimension  DUCTAL CARCINOMA IN SITU (DCIS):  Not identified  LYMPHOVASCULAR INVASION:  Not identified  MICROCALCIFICATIONS:  Not identified  BREAST BIOMARKERS (PERFORMED WITH APPROPRIATE CONTROLS):          ER:   Negative (0)          SC:   Negative (0)          HER2:  Negative (1+)          KI67:  Greater than 95%      Gross 06/09/2022    Final                    Value:Patient ID/Pathology ID [id number]  In formalin, labeled "left breast 02:00 o'clock, in formalin at 08:40", is a  2.5 x 2.5 cm aggregate of pink-yellow needle biopsy cores.  Entirely  submitted in cassette KYP--1-A  Ischemic time is not provided.  The fixation time is greater than 6 hrs and  less than 72 hrs.  A gross picture of the container and cassette is taken and  added to file.  Charly HOLGUIN (ASCP) cm      Comment 06/09/2022    Final                    Value:Immunohistochemical stain with appropriate control for p63 was performed and  shows loss of myoepithelial cells throughout the lesion, with no evidence of  ductal carcinoma in situ.  The patient's history of ovarian carcinoma is  noted.  Immunohistochemical stains for GATA3 and PAX8 appropriate controls  show tumor cell positivity for GATA3 and negativity for [place]ountze 8, confirming  ductal (breast) origin.  This case seen in consultation with Dr. Montesinos who agrees with the above  diagnosis.      Disclaimer 06/09/2022    Final                    Value:Unless the case " is a 'gross only' or additional testing only, the final  diagnosis for each specimen is based on a microscopic examination of  appropriate tissue sections.  ER immunohistochemical staining (clone SP1, DAB detection method) is  performed on formalin-fixed, paraffin embedded tissue sections. The  percentage of cell nuclei stained and the strength of staining is reported  (weak, moderate, strong), using the 2010 ACSO/CAP scoring guidelines. Tumors  used for determing predictive markers are fixed in10% neutral buffered  formalin for 6-72 hours. It has been cleared by the U.[place] FDA for use as an  IVD test. This assay has not been validated on decalcified tissues. Results  should be interpreted with caution given the likelihood of false negativity  on decalcified specimens.  HER-2/aston IHC (4B5) clone, DAB detection method) is done on 10% buffered  formalin-fixed (for 6-72 hrs), paraffin embedded tissue sections. The scoring  is completed on a 4-tiered scoring system of membran                          e staining using the 2014  ASCO/CAP scoring guidelines. It has been cleared by the [place].S[place] FDA for use as  an IVD test. This assay has not been validated on decalcified tissues.  Results should be interpreted with caution given the likelihood of false  negativity on decalcified specimens.  PgR immunohistochemical staining (clone 1E2, DAB detection method) is  performed on formalin-fixed, paraffin embedded tissue sections. The  percentage of cell nuclei stained and the strength of staining is report  (weak, moderate, strong), using 2010 ASCO/CAP scoring guidelines. Tumors used  for determing predictive markers are fixed in 10% neutral buffered formalin  for 6-72 hours. It has been cleared by the [place].S. FDA for use as an IVD test.  This assay has not been validated on decalcified tissues. Results should be  interpreted with caution given the likelihood of false negativity on  decalcified specimens.            ?   Assessment/Plan:    There are no diagnoses linked to this encounter.       No diagnosis found.          Plan:     Problem List Items Addressed This Visit    None    Stage IB, cT1c cN0 cM0 left breast upper outer quadrant 2:00 a.m. invasive ductal carcinoma grade 3 ER/[place] negative, HER2 negative:  - [facility] germline genetic testing negative   Clinical exam and mammogram/ultrasound without axillary adenopathy.  PET scan without evidence of distant metastatic disease.   We discussed that while early stage and T1c, higher risk features given triple negative and high grade 3 and high ki-67 >95% with noted growth over just 6 month interval recommendation for neoadjuavant approach with chemo immunotherapy per Guadalupe et al. NEJ 2020. She is healthy aside from prior hypothyroidism now not on supplement.  We did discuss risks and benefits of therapy and importance of re-evaluation with plan for surgery, radiation and further adjuvant therapy as indicated.  She expressed good understanding and agree with the plan for follow-up per below.    Contralateral breast biopsy benign    Recommend repeat physical exam and consider imaging evaluation following carboplatin paclitaxel and pembrolizumab portion of neoadjuvant therapy.      Prior transaminitis resolved.  Labs with progressive anemia consistent with chemotherapy effect will continue to monitor.  Okay to proceed with cycle 3 day 1 today.      Right upper extremity Thrombosis:  Status post thrombectomy port removal on anticoagulation.  Port replaced by Interventional Radiology left upper chest.      Follow-Up:   [id number]  RV in 1 wk cbc, cmp D1 [id number]

## 2022-09-09 NOTE — DISCHARGE INSTRUCTIONS
.[facility]  [address] [address]  [address]  [phone] phone     [phone] fax  Hours of Operation: Monday- Friday 8:00am- 5:00pm  After hours phone  [phone]  Hematology / Oncology Physicians on call    Dr. Harshil Last      Nurse Practitioners:    Chitra Gloria, JAYMIE Lechuga, JAYMIE Storm, JAYMIE Newton, JAYMIE Pena, JAYMIE Alcocer, PA      Please don't hesitate to call if you have any concerns.    .FALL PREVENTION   Falls often occur due to slipping, tripping or losing your balance. Here are ways to reduce your risk of falling again.   Was there anything that caused your fall that can be fixed, removed or replaced?   Make your home safe by keeping walkways clear of objects you may trip over.   Use non-slip pads under rugs.   Do not walk in poorly lit areas.   Do not stand on chairs or wobbly ladders.   Use caution when reaching overhead or looking upward. This position can cause a loss of balance.   Be sure your shoes fit properly, have non-slip bottoms and are in good condition.   Be cautious when going up and down stairs, curbs, and when walking on uneven sidewalks.   If your balance is poor, consider using a cane or walker.   If your fall was related to alcohol use, stop or limit alcohol intake.   If your fall was related to use of sleeping medicines, talk to your doctor about this. You may need to reduce your dosage at bedtime if you awaken during the night to go to the bathroom.   To reduce the need for nighttime bathroom trips:   Avoid drinking fluids for several hours before going to bed   Empty your bladder before going to bed   Men can keep a urinal at the bedside   © 3180-2045 [employer], [address] [place] [address], [place] [address]. All rights reserved. This information is not intended as a substitute for professional medical care. Always follow your healthcare  professional's instructions.  .WAYS TO HELP PREVENT INFECTION        WASH YOUR HANDS OFTEN DURING THE DAY, ESPECIALLY BEFORE YOU EAT, AFTER USING THE BATHROOM, AND AFTER TOUCHING ANIMALS    STAY AWAY FROM PEOPLE WHO HAVE ILLNESSES YOU CAN CATCH; SUCH AS COLDS, FLU, CHICKEN POX    TRY TO AVOID CROWDS    STAY AWAY FROM CHILDREN WHO RECENTLY HAVE RECEIVED LIVE VIRUS VACCINES    MAINTAIN GOOD MOUTH CARE    DO NOT SQUEEZE OR SCRATCH PIMPLES    CLEAN CUTS & SCRAPES RIGHT AWAY AND DAILY UNTIL HEALED WITH WARM WATER, SOAP & AN ANTISEPTIC    AVOID CONTACT WITH LITTER BOXES, BIRD CAGES, & FISH TANKS    AVOID STANDING WATER, IE., BIRD BATHS, FLOWER POTS/VASES, OR HUMIDIFIERS    WEAR GLOVES WHEN GARDENING OR CLEANING UP AFTER OTHERS, ESPECIALLY BABIES & SMALL CHILDREN    DO NOT EAT RAW FISH, SEAFOOD, MEAT, OR EGGS

## 2022-09-14 ENCOUNTER — PATIENT MESSAGE (OUTPATIENT)
Dept: HEMATOLOGY/ONCOLOGY | Facility: CLINIC | Age: 47
End: 2022-09-14
Payer: COMMERCIAL

## 2022-09-16 ENCOUNTER — OFFICE VISIT (OUTPATIENT)
Dept: HEMATOLOGY/ONCOLOGY | Facility: CLINIC | Age: 47
End: 2022-09-16
Payer: COMMERCIAL

## 2022-09-16 ENCOUNTER — INFUSION (OUTPATIENT)
Dept: INFUSION THERAPY | Facility: HOSPITAL | Age: 47
End: 2022-09-16
Attending: INTERNAL MEDICINE
Payer: COMMERCIAL

## 2022-09-16 VITALS
SYSTOLIC BLOOD PRESSURE: 134 MMHG | WEIGHT: 187.38 LBS | HEART RATE: 90 BPM | OXYGEN SATURATION: 99 % | RESPIRATION RATE: 18 BRPM | DIASTOLIC BLOOD PRESSURE: 91 MMHG | TEMPERATURE: 98 F | HEIGHT: 64 IN | BODY MASS INDEX: 31.99 KG/M2

## 2022-09-16 VITALS
HEART RATE: 81 BPM | WEIGHT: 187.38 LBS | OXYGEN SATURATION: 100 % | TEMPERATURE: 97 F | SYSTOLIC BLOOD PRESSURE: 117 MMHG | DIASTOLIC BLOOD PRESSURE: 76 MMHG | HEIGHT: 64 IN | BODY MASS INDEX: 31.99 KG/M2

## 2022-09-16 DIAGNOSIS — R74.01 TRANSAMINITIS: ICD-10-CM

## 2022-09-16 DIAGNOSIS — C50.412 MALIGNANT NEOPLASM OF UPPER-OUTER QUADRANT OF LEFT BREAST IN FEMALE, ESTROGEN RECEPTOR NEGATIVE: Primary | ICD-10-CM

## 2022-09-16 DIAGNOSIS — Z17.1 MALIGNANT NEOPLASM OF UPPER-OUTER QUADRANT OF LEFT BREAST IN FEMALE, ESTROGEN RECEPTOR NEGATIVE: Primary | ICD-10-CM

## 2022-09-16 DIAGNOSIS — D63.0 ANEMIA IN NEOPLASTIC DISEASE: ICD-10-CM

## 2022-09-16 PROCEDURE — 99999 PR PBB SHADOW E&M-EST. PATIENT-LVL III: CPT | Mod: PBBFAC,,, | Performed by: INTERNAL MEDICINE

## 2022-09-16 PROCEDURE — A4216 STERILE WATER/SALINE, 10 ML: HCPCS | Performed by: INTERNAL MEDICINE

## 2022-09-16 PROCEDURE — 96413 CHEMO IV INFUSION 1 HR: CPT

## 2022-09-16 PROCEDURE — 96375 TX/PRO/DX INJ NEW DRUG ADDON: CPT

## 2022-09-16 PROCEDURE — 99999 PR PBB SHADOW E&M-EST. PATIENT-LVL III: ICD-10-PCS | Mod: PBBFAC,,, | Performed by: INTERNAL MEDICINE

## 2022-09-16 PROCEDURE — 99215 OFFICE O/P EST HI 40 MIN: CPT | Mod: S$GLB,,, | Performed by: INTERNAL MEDICINE

## 2022-09-16 PROCEDURE — 63600175 PHARM REV CODE 636 W HCPCS: Performed by: INTERNAL MEDICINE

## 2022-09-16 PROCEDURE — 25000003 PHARM REV CODE 250: Performed by: INTERNAL MEDICINE

## 2022-09-16 PROCEDURE — 96367 TX/PROPH/DG ADDL SEQ IV INF: CPT

## 2022-09-16 PROCEDURE — 99215 PR OFFICE/OUTPT VISIT, EST, LEVL V, 40-54 MIN: ICD-10-PCS | Mod: S$GLB,,, | Performed by: INTERNAL MEDICINE

## 2022-09-16 RX ORDER — SODIUM CHLORIDE 0.9 % (FLUSH) 0.9 %
10 SYRINGE (ML) INJECTION
Status: DISCONTINUED | OUTPATIENT
Start: 2022-09-16 | End: 2022-09-16 | Stop reason: HOSPADM

## 2022-09-16 RX ORDER — FAMOTIDINE 10 MG/ML
20 INJECTION INTRAVENOUS
Status: COMPLETED | OUTPATIENT
Start: 2022-09-16 | End: 2022-09-16

## 2022-09-16 RX ORDER — EPINEPHRINE 0.3 MG/.3ML
0.3 INJECTION SUBCUTANEOUS ONCE AS NEEDED
Status: CANCELLED | OUTPATIENT
Start: 2022-09-16

## 2022-09-16 RX ORDER — SODIUM CHLORIDE 0.9 % (FLUSH) 0.9 %
10 SYRINGE (ML) INJECTION
Status: CANCELLED | OUTPATIENT
Start: 2022-09-16

## 2022-09-16 RX ORDER — DIPHENHYDRAMINE HYDROCHLORIDE 50 MG/ML
50 INJECTION INTRAMUSCULAR; INTRAVENOUS ONCE AS NEEDED
Status: CANCELLED | OUTPATIENT
Start: 2022-09-16

## 2022-09-16 RX ORDER — FAMOTIDINE 10 MG/ML
20 INJECTION INTRAVENOUS
Status: CANCELLED | OUTPATIENT
Start: 2022-09-16

## 2022-09-16 RX ORDER — HEPARIN 100 UNIT/ML
500 SYRINGE INTRAVENOUS
Status: CANCELLED | OUTPATIENT
Start: 2022-09-16

## 2022-09-16 RX ORDER — HEPARIN 100 UNIT/ML
500 SYRINGE INTRAVENOUS
Status: DISCONTINUED | OUTPATIENT
Start: 2022-09-16 | End: 2022-09-16 | Stop reason: HOSPADM

## 2022-09-16 RX ADMIN — DIPHENHYDRAMINE HYDROCHLORIDE 50 MG: 50 INJECTION, SOLUTION INTRAMUSCULAR; INTRAVENOUS at 10:09

## 2022-09-16 RX ADMIN — FAMOTIDINE 20 MG: 10 INJECTION INTRAVENOUS at 10:09

## 2022-09-16 RX ADMIN — SODIUM CHLORIDE: 0.9 INJECTION, SOLUTION INTRAVENOUS at 10:09

## 2022-09-16 RX ADMIN — HEPARIN 500 UNITS: 100 SYRINGE at 12:09

## 2022-09-16 RX ADMIN — PACLITAXEL 150 MG: 6 INJECTION, SOLUTION INTRAVENOUS at 11:09

## 2022-09-16 RX ADMIN — Medication 10 ML: at 12:09

## 2022-09-16 RX ADMIN — DEXAMETHASONE SODIUM PHOSPHATE 10 MG: 4 INJECTION, SOLUTION INTRA-ARTICULAR; INTRALESIONAL; INTRAMUSCULAR; INTRAVENOUS; SOFT TISSUE at 10:09

## 2022-09-16 NOTE — PROGRESS NOTES
Subjective:      DATE OF VISIT: 9/16/22     ?  Patient ID:Jaison Noel is a 47 y.o. female.?? MR#: [id number]   ?   PRIMARY ONCOLOGIST: Dr. Hathaway    ? Primary Care Providers:  Alesha Alonso MD, MD (General)     PRIMARY ONCOLOGIST: Dr. Hathaway    CHIEF COMPLAINT:  Follow-up?   ONCOLOGIC DIAGNOSIS:  Stage IB, cT1c, cN0, cM0 left breast upper outer quadrant 2:00 a.m. invasive ductal carcinoma grade 3 ER/HI negative, HER2 negative  ?   CURRENT TREATMENT:  neoadjuvant chemotherapy, carboplatin paclitaxel pembrolizumab followed by doxorubicin cyclophosphamide pembrolizumab    PAST TREATMENT:  Biopsy only  ?   INTERVAL EVENTS:    Mild transient neuropathy in fingertips.  No recurrent edema.  Birthday is today. In good spirits.    ONCOLOGIC HISTORY:     Ms. Noel is a 46-year-old woman with hypothyroidism and hypertension.  She presented for routine screening mammogram 11/30/2021 showing left breast mass upper outer posterior position.    12/03/2021 diagnostic mammogram and ultrasound  Left  Mammo Digital Diagnostic Bilat with Pablito  There is a mass seen in the upper outer quadrant of the left breast in the posterior depth.      US Breast Bilateral Limited  There are 3 similar complicated cysts seen in the upper outer quadrant of the left breast in the posterior depth. The largest cyst is thought to be the correlate and measures 0.7 x 0.5 x 0.5 cm.  There are 2 additional complex cysts also seen in the upper outer quadrant of the left breast that measure 0.4 x 0.2 x 0.3 cm and 0.6 x 0.3 x 0.4 cm.      Right  Mammo Digital Diagnostic Bilat with Pablito  There is a mass seen in the lower central region of the right breast in the posterior depth. The posterior margin of this lesion is not seen on mammography.  The lesion appears to abut and overlie the pectoralis musculature.  The lesion appears to measure at least 9 mm in size.      US Breast Bilateral Limited  There is a mass seen in the lower central region of the right  breast. A definitive correlate is not definitely seen on ultrasound.  There is a 6 mm hypoechoic lesion likely representing a complex cyst within the central portion of the breast that appears to be too small to be the correlate for the mammographic finding.  There is an additional 1.0 x 0.4 x 0.6 cm hypoechoic area that is located within a ridge of tissue and also does not definitively correlate to the lesion.  It is possible this lesion was present on prior studies although only space visualized on the most recent study due to increased tissue included on the MLO view.  The visualized margins of the lesion also appear to be fairly smooth and therefore the lesion can be followed.      Impression:  Left  Mass: Left breast mass at the upper outer posterior position. Assessment: 3 - Probably benign. Short Interval Follow-Up in 6 Months is recommended.      Right  Mass: Right breast mass at the lower central posterior position. Assessment: 3 - Probably benign. Short Interval Follow-Up in 6 Months is recommended.      BI-RADS Category:   Overall: 3 - Probably Benign    Repeat evaluation with diagnostic mammogram and ultrasound on 05/24/2022 notable for a suspicious left breast lesion 1.3 cm 2:00 a.m.    Right breast: No detrimental mammographic change. Well-circumscribed lesion within the lower outer posterior breast is unchanged mammographically. 8.6 mm complicated cyst present at 08:00 o'clock, 12 cm from the nipple and corresponds to the stable mammographic finding.       Follow-up imaging of the previously noted central lesions demonstrate no detrimental change.  8 mm complicated cyst noted at 04:00 o'clock, 4 cm from the nipple.         Left breast: Interval increase in size of an upper outer quadrant mass on mammogram. On ultrasound there is an enlarging complicated cystic lesion measuring 1.3 cm at 02:00 o'clock, 7 cm from the nipple. Other complicated cysts within the upper outer breast demonstrate no  detrimental change.  No suspicious left axillary lymph nodes.    Ultrasound-guided biopsy left breast lesion on 2022  Pathology:  Final Pathologic Diagnosis 1. Left breast mass (2 o'clock position), biopsy:       -  High-grade invasive carcinoma of no special type (ductal) with   associated tumor necrosis,          see comment     SYNOPTIC REPORT   PROCEDURE:  Biopsy   SPECIMEN LATERALITY:  Left breast   TUMOR SITE:  2 o'clock position   HISTOLOGIC TYPE:  Invasive carcinoma of no special type (ductal)   HISTOLOGIC GRADE:  Grade 3 of 3          TUBULE FORMATION:  3          PLEOMORPHISM:  3          MITOTIC RATE:  3   TUMOR SIZE:   At least 10 mm in greatest linear dimension   DUCTAL CARCINOMA IN SITU (DCIS):  Not identified   LYMPHOVASCULAR INVASION:  Not identified   MICROCALCIFICATIONS:  Not identified   BREAST BIOMARKERS (PERFORMED WITH APPROPRIATE CONTROLS):           ER:   Negative (0)           HI:   Negative (0)           HER2:  Negative (1+)           KI67:  Greater than 95%       2022 PET-CT without evidence of distant metastatic disease.     FDG avid left breast lesion consistent with the known malignancy.  No other sites of suspicious uptake identified.    Menarche 12 years old  ; 1 miscarriage, age of 1st pregnancy 16 years old  Postmenopausal last menstrual period at 44 years old s/p PIERRE/BSO  Exogenous estrogen for about 1 year last when 43yo.  No history of prior radiation  No prior breast biopsy    Family history notable for mother with either breast or ovarian cancer diagnosis 64 years old.    Cycle 1 day 1 neoadjuvant chemotherapy 07/15/2022      Review of Systems    ?   A comprehensive 14-point review of systems was reviewed with patient and was negative other than as specified above.   ?     Objective:      Physical Exam      ?   Vitals:    22 0828   BP: 117/76   Pulse: 81   Temp: 97.4 °F (36.3 °C)      ?   ECOG:?0   General appearance: Generally well appearing, in no acute  distress.   Head, eyes, ears, nose, and throat: moist mucous membranes.   Respiratory:  Normal work of breathing  Abdomen: nontender, nondistended.   Extremities: Warm,  no appreciable edema  Neurologic: Alert and oriented. Grossly normal strength, coordination, and gait.   Skin: No rashes, ecchymoses or petechial lesion.   Psychiatric:  Normal mood and affect.      Labs    Lab Results   Component Value Date    WBC 6.02 09/16/2022    HGB 10.2 (L) 09/16/2022    HCT 31.3 (L) 09/16/2022    MCV 85 09/16/2022     09/16/2022         Chemistry        Component Value Date/Time     09/16/2022 0820    K 3.7 09/16/2022 0820     09/16/2022 0820    CO2 25 09/16/2022 0820    BUN 11 09/16/2022 0820    CREATININE 0.7 09/16/2022 0820    GLU 94 09/16/2022 0820        Component Value Date/Time    CALCIUM 8.9 09/16/2022 0820    ALKPHOS 79 09/16/2022 0820    AST 31 09/16/2022 0820    ALT 73 (H) 09/16/2022 0820    BILITOT 0.2 09/16/2022 0820    ESTGFRAFRICA >60 07/29/2022 1006    EGFRNONAA >60 07/29/2022 1006        Lab Results   Component Value Date    TSH 0.469 09/02/2022       Imaging:  ?    No results found for this or any previous visit (from the past 2160 hour(s)).  No results found for this or any previous visit (from the past 2160 hour(s)).  Results for orders placed or performed during the hospital encounter of 06/20/22 (from the past 2160 hour(s))   NM PET CT Routine Skull to Mid Thigh    Impression    FDG avid left breast lesion consistent with the known malignancy.  No other sites of suspicious uptake identified.      Electronically signed by: Zay Dickerson DO  Date:    06/20/2022  Time:    13:58         Pathology:    No visits with results within 1 Day(s) from this visit.   Latest known visit with results is:   Hospital Outpatient Visit on 06/09/2022   Component Date Value Ref Range Status    Final Pathologic Diagnosis 06/09/2022    Final                    Value:1. Left breast mass (2 o'clock position),  "biopsy:      -  High-grade invasive carcinoma of no special type (ductal) with  associated tumor necrosis,         see comment    SYNOPTIC REPORT  PROCEDURE:  Biopsy  SPECIMEN LATERALITY:  Left breast  TUMOR SITE:  2 o'clock position  HISTOLOGIC TYPE:  Invasive carcinoma of no special type (ductal)  HISTOLOGIC GRADE:  Grade 3 of 3         TUBULE FORMATION:  3         PLEOMORPHISM:  3         MITOTIC RATE:  3  TUMOR SIZE:   At least 10 mm in greatest linear dimension  DUCTAL CARCINOMA IN SITU (DCIS):  Not identified  LYMPHOVASCULAR INVASION:  Not identified  MICROCALCIFICATIONS:  Not identified  BREAST BIOMARKERS (PERFORMED WITH APPROPRIATE CONTROLS):          ER:   Negative (0)          OK:   Negative (0)          HER2:  Negative (1+)          KI67:  Greater than 95%      Gross 06/09/2022    Final                    Value:Patient ID/Pathology ID [id number]  In formalin, labeled "left breast 02:00 o'clock, in formalin at 08:40", is a  2.5 x 2.5 cm aggregate of pink-yellow needle biopsy cores.  Entirely  submitted in cassette [id number]  Ischemic time is not provided.  The fixation time is greater than 6 hrs and  less than 72 hrs.  A gross picture of the container and cassette is taken and  added to file.  Charly HOLGUIN (ASCP) cm      Comment 06/09/2022    Final                    Value:Immunohistochemical stain with appropriate control for p63 was performed and  shows loss of myoepithelial cells throughout the lesion, with no evidence of  ductal carcinoma in situ.  The patient's history of ovarian carcinoma is  noted.  Immunohistochemical stains for GATA3 and PAX8 appropriate controls  show tumor cell positivity for GATA3 and negativity for [place] 8, confirming  ductal (breast) origin.  This case seen in consultation with Dr. Montesinos who agrees with the above  diagnosis.      Disclaimer 06/09/2022    Final                    Value:Unless the case is a 'gross only' or additional testing only, the final  diagnosis for each " specimen is based on a microscopic examination of  appropriate tissue sections.  ER immunohistochemical staining (clone SP1, DAB detection method) is  performed on formalin-fixed, paraffin embedded tissue sections. The  percentage of cell nuclei stained and the strength of staining is reported  (weak, moderate, strong), using the 2010 ACSO/CAP scoring guidelines. Tumors  used for determing predictive markers are fixed in10% neutral buffered  formalin for 6-72 hours. It has been cleared by the U.S. FDA for use as an  IVD test. This assay has not been validated on decalcified tissues. Results  should be interpreted with caution given the likelihood of false negativity  on decalcified specimens.  HER-2/aston IHC (4B5) clone, DAB detection method) is done on 10% buffered  formalin-fixed (for 6-72 hrs), paraffin embedded tissue sections. The scoring  is completed on a 4-tiered scoring system of membran                          e staining using the 2014  ASCO/CAP scoring guidelines. It has been cleared by the U.S. FDA for use as  an IVD test. This assay has not been validated on decalcified tissues.  Results should be interpreted with caution given the likelihood of false  negativity on decalcified specimens.  PgR immunohistochemical staining (clone 1E2, DAB detection method) is  performed on formalin-fixed, paraffin embedded tissue sections. The  percentage of cell nuclei stained and the strength of staining is report  (weak, moderate, strong), using 2010 ASCO/CAP scoring guidelines. Tumors used  for determing predictive markers are fixed in 10% neutral buffered formalin  for 6-72 hours. It has been cleared by the U.S. FDA for use as an IVD test.  This assay has not been validated on decalcified tissues. Results should be  interpreted with caution given the likelihood of false negativity on  decalcified specimens.            ?   Assessment/Plan:   Malignant neoplasm of upper-outer quadrant of left breast in female,  estrogen receptor negative  -     Cancel: dexamethasone (DECADRON) 10 mg in sodium chloride 0.9% 50 mL IVPB  -     Cancel: diphenhydrAMINE (BENADRYL) 50 mg in sodium chloride 0.9% 50 mL IVPB  -     Cancel: famotidine (PF) injection 20 mg  -     Cancel: PACLitaxeL (TAXOL) 80 mg/m2 = 150 mg in sodium chloride 0.9% 250 mL chemo infusion  -     Cancel: sodium chloride 0.9% 250 mL flush bag  -     Cancel: sodium chloride 0.9% flush 10 mL  -     Cancel: heparin, porcine (PF) 100 unit/mL injection flush 500 Units    Anemia in neoplastic disease    Transaminitis    Other orders  -     EPINEPHrine (EPIPEN) 0.3 mg/0.3 mL pen injection 0.3 mg  -     diphenhydrAMINE injection 50 mg  -     hydrocortisone sodium succinate injection 100 mg  -     alteplase injection 2 mg         1. Malignant neoplasm of upper-outer quadrant of left breast in female, estrogen receptor negative    2. Anemia in neoplastic disease    3. Transaminitis              Plan:     Problem List Items Addressed This Visit          Oncology    Malignant neoplasm of upper-outer quadrant of left breast in female, estrogen receptor negative - Primary     Other Visit Diagnoses       Anemia in neoplastic disease        Transaminitis              Stage IB, cT1c cN0 cM0 left breast upper outer quadrant 2:00 a.m. invasive ductal carcinoma grade 3 ER/OR negative, HER2 negative:  - [facility] germline genetic testing negative   Clinical exam and mammogram/ultrasound without axillary adenopathy.  PET scan without evidence of distant metastatic disease.   We discussed that while early stage and T1c, higher risk features given triple negative and high grade 3 and high ki-67 >95% with noted growth over just 6 month interval recommendation for neoadjuavant approach with chemo immunotherapy per Guadalupe et al. NE 2020. She is healthy aside from prior hypothyroidism now not on supplement.  We did discuss risks and benefits of therapy and importance of re-evaluation with plan for  surgery, radiation and further adjuvant therapy as indicated.  She expressed good understanding and agree with the plan for follow-up per below.    Contralateral breast biopsy benign    Recommend repeat physical exam and consider imaging evaluation following carboplatin paclitaxel and pembrolizumab portion of neoadjuvant therapy.      Prior transaminitis resolved.  Labs with progressive anemia  seen on again seen on labs today.  Continue to monitor.  Right upper extremity Thrombosis:  Status post thrombectomy port removal on anticoagulation.  Port replaced by Interventional Radiology left upper chest.      Follow-Up:   C3D8 today  RV in 1 wk cbc, cmp D1 c3d15 planned

## 2022-09-16 NOTE — PLAN OF CARE
Problem: Anemia (Chemotherapy Effects)  Goal: Anemia Symptom Improvement  Outcome: Ongoing, Progressing     Problem: Nausea and Vomiting (Chemotherapy Effects)  Goal: Fluid and Electrolyte Balance  Outcome: Ongoing, Progressing     Problem: Neurotoxicity (Chemotherapy Effects)  Goal: Neurotoxicity Symptom Control  Outcome: Ongoing, Progressing     Problem: Neutropenia (Chemotherapy Effects)  Goal: Absence of Infection  Outcome: Ongoing, Progressing  Intervention: Prevent Infection and Maximize Resistance  Flowsheets (Taken 9/16/2022 1053)  Infection Prevention:   equipment surfaces disinfected   personal protective equipment utilized   hand hygiene promoted     Problem: Thrombocytopenia Bleeding Risk (Chemotherapy Effects)  Goal: Absence of Bleeding  Outcome: Ongoing, Progressing     Problem: Adult Inpatient Plan of Care  Goal: Plan of Care Review  Outcome: Ongoing, Progressing  Flowsheets (Taken 9/16/2022 1053)  Plan of Care Reviewed With: patient  Goal: Patient-Specific Goal (Individualized)  Outcome: Ongoing, Progressing  Flowsheets (Taken 9/16/2022 1053)  Anxieties, Fears or Concerns: None  Individualized Care Needs: Legs elevated, blanket from home/pillow. Drink provided.  Patient-Specific Goals (Include Timeframe): Patient will tolerate treatment as prescribed.  Goal: Absence of Hospital-Acquired Illness or Injury  Outcome: Ongoing, Progressing  Goal: Optimal Comfort and Wellbeing  Outcome: Ongoing, Progressing  Intervention: Provide Person-Centered Care  Flowsheets (Taken 9/16/2022 1053)  Trust Relationship/Rapport:   care explained   choices provided   emotional support provided   empathic listening provided   thoughts/feelings acknowledged   reassurance provided   questions encouraged   questions answered

## 2022-09-16 NOTE — DISCHARGE INSTRUCTIONS
DON'T FORGET TO SCHEDULE YOUR APPOINTMENT WITH YOUR OTHALMOLOGIST!!!          THANKS FOR ALLOWING ME TO CARE FOR YOU!!!! ~Anne          THANKS FOR CHOOSING [facility]!!!        [facility]  [address] [facility]  [id number] [place] [address] Drive  [phone] phone     [phone] fax  Hours of Operation: Monday- Friday 8:00am- 5:00pm  After hours phone  [phone]  Hematology / Oncology Physicians on call      Dr. Harshil Gloria, JAYMIE Storm NP    Please call with any concerns regarding your appointment today.      .WAYS TO HELP PREVENT INFECTION        WASH YOUR HANDS OFTEN DURING THE DAY, ESPECIALLY BEFORE YOU EAT, AFTER USING THE BATHROOM, AND AFTER TOUCHING ANIMALS    STAY AWAY FROM PEOPLE WHO HAVE ILLNESSES YOU CAN CATCH; SUCH AS COLDS, FLU, CHICKEN POX    TRY TO AVOID CROWDS    STAY AWAY FROM CHILDREN WHO RECENTLY HAVE RECEIVED LIVE VIRUS VACCINES    MAINTAIN GOOD MOUTH CARE    DO NOT SQUEEZE OR SCRATCH PIMPLES    CLEAN CUTS & SCRAPES RIGHT AWAY AND DAILY UNTIL HEALED WITH WARM WATER, SOAP & AN ANTISEPTIC    AVOID CONTACT WITH LITTER BOXES, BIRD CAGES, & FISH TANKS    AVOID STANDING WATER, IE., BIRD BATHS, FLOWER POTS/VASES, OR HUMIDIFIERS    WEAR GLOVES WHEN GARDENING OR CLEANING UP AFTER OTHERS, ESPECIALLY BABIES & SMALL CHILDREN    DO NOT EAT RAW FISH, SEAFOOD, MEAT, OR EGGS    .YOU HAVE STARTED ON CHEMOTHERAPY. IF YOU EXPERIENCE ANY OF THE FOLLOWING PROBLEMS, CALL THE OFFICE IMMEDIATELY.    *FEVER .0 OR GREATER    *CHILLS, ESPECIALLY SHAKING CHILLS, OR SWEATING    *A SEVERE COUGH OR SORE THROAT, OR SINUS PAIN/     PRESSURE    *REDNESS, SWELLING, OR TENDERNESS AROUND A WOUND,     SORE, PIMPLE, RECTAL AREA, OR IV SITE    *SORES OR ULCERS IN THE MOUTH    *BLISTERS ON THE LIPS OR SKIN    *FREQUENT URGENCY TO URINATE OR A BURNING FEELING   WHEN YOU URINATE    *BLOOD IN THE URINE OR STOOL    *ANY UNEXPLAINED BRUISING  OR PROLONGED BLEEDING,     (NOSEBLEEDS OR BLEEDING GUMS)    *LOOSE BOWEL MOVEMENTS THAT DO NOT RESPOND TO     IMODIUM OR MORE THAN THREE TIMES A DAY    *VOMITING UNRESPONSIVE TO ANTINAUSEA MEDICINE    *ANY UNUSUAL PHYSICAL SYMPTOMS THAT BEGAN AFTER     CHEMOTHERAPY    DURING WEEKDAYS, CALL AND ASK TO SPEAK DIRECTLY TO A NURSE.  AT OTHER TIMES, CALL THE OFFICE PHONE NUMBER; THE ANSWERING SERVICE WILL CONTACT THE ON-CALL PHYSICIAN.  SOMEONE IS AVAILABLE 24 HOURS A DAY, SEVEN DAYS A WEEK.    .FALL PREVENTION   Falls often occur due to slipping, tripping or losing your balance. Here are ways to reduce your risk of falling again.   Was there anything that caused your fall that can be fixed, removed or replaced?   Make your home safe by keeping walkways clear of objects you may trip over.   Use non-slip pads under rugs.   Do not walk in poorly lit areas.   Do not stand on chairs or wobbly ladders.   Use caution when reaching overhead or looking upward. This position can cause a loss of balance.   Be sure your shoes fit properly, have non-slip bottoms and are in good condition.   Be cautious when going up and down stairs, curbs, and when walking on uneven sidewalks.   If your balance is poor, consider using a cane or walker.   If your fall was related to alcohol use, stop or limit alcohol intake.   If your fall was related to use of sleeping medicines, talk to your doctor about this. You may need to reduce your dosage at bedtime if you awaken during the night to go to the bathroom.   To reduce the need for nighttime bathroom trips:   Avoid drinking fluids for several hours before going to bed   Empty your bladder before going to bed   Men can keep a urinal at the bedside   © 7957-9230 [employer] [facility], [address] [place] [address], [place] [address]. All rights reserved. This information is not intended as a substitute for professional medical care. Always follow your healthcare professional's instructions.

## 2022-09-23 ENCOUNTER — INFUSION (OUTPATIENT)
Dept: INFUSION THERAPY | Facility: HOSPITAL | Age: 47
End: 2022-09-23
Attending: INTERNAL MEDICINE
Payer: COMMERCIAL

## 2022-09-23 ENCOUNTER — OFFICE VISIT (OUTPATIENT)
Dept: HEMATOLOGY/ONCOLOGY | Facility: CLINIC | Age: 47
End: 2022-09-23
Payer: COMMERCIAL

## 2022-09-23 ENCOUNTER — LAB VISIT (OUTPATIENT)
Dept: LAB | Facility: HOSPITAL | Age: 47
End: 2022-09-23
Attending: INTERNAL MEDICINE
Payer: COMMERCIAL

## 2022-09-23 VITALS
DIASTOLIC BLOOD PRESSURE: 82 MMHG | RESPIRATION RATE: 16 BRPM | HEART RATE: 88 BPM | SYSTOLIC BLOOD PRESSURE: 124 MMHG | OXYGEN SATURATION: 96 % | TEMPERATURE: 98 F

## 2022-09-23 VITALS
BODY MASS INDEX: 32.29 KG/M2 | DIASTOLIC BLOOD PRESSURE: 89 MMHG | OXYGEN SATURATION: 100 % | TEMPERATURE: 97 F | HEART RATE: 87 BPM | SYSTOLIC BLOOD PRESSURE: 126 MMHG | WEIGHT: 189.13 LBS | HEIGHT: 64 IN

## 2022-09-23 DIAGNOSIS — C50.412 MALIGNANT NEOPLASM OF UPPER-OUTER QUADRANT OF LEFT BREAST IN FEMALE, ESTROGEN RECEPTOR NEGATIVE: ICD-10-CM

## 2022-09-23 DIAGNOSIS — Z17.1 MALIGNANT NEOPLASM OF UPPER-OUTER QUADRANT OF LEFT BREAST IN FEMALE, ESTROGEN RECEPTOR NEGATIVE: ICD-10-CM

## 2022-09-23 DIAGNOSIS — I82.621 ACUTE DEEP VEIN THROMBOSIS (DVT) OF RIGHT UPPER EXTREMITY, UNSPECIFIED VEIN: Primary | ICD-10-CM

## 2022-09-23 DIAGNOSIS — C50.412 MALIGNANT NEOPLASM OF UPPER-OUTER QUADRANT OF LEFT BREAST IN FEMALE, ESTROGEN RECEPTOR NEGATIVE: Primary | ICD-10-CM

## 2022-09-23 DIAGNOSIS — Z17.1 MALIGNANT NEOPLASM OF UPPER-OUTER QUADRANT OF LEFT BREAST IN FEMALE, ESTROGEN RECEPTOR NEGATIVE: Primary | ICD-10-CM

## 2022-09-23 LAB
ALBUMIN SERPL BCP-MCNC: 3.8 G/DL (ref 3.5–5.2)
ALP SERPL-CCNC: 76 U/L (ref 55–135)
ALT SERPL W/O P-5'-P-CCNC: 57 U/L (ref 10–44)
ANION GAP SERPL CALC-SCNC: 8 MMOL/L (ref 8–16)
AST SERPL-CCNC: 26 U/L (ref 10–40)
BASOPHILS # BLD AUTO: ABNORMAL K/UL (ref 0–0.2)
BASOPHILS NFR BLD: 0 % (ref 0–1.9)
BILIRUB SERPL-MCNC: 0.2 MG/DL (ref 0.1–1)
BUN SERPL-MCNC: 8 MG/DL (ref 6–20)
CALCIUM SERPL-MCNC: 9.2 MG/DL (ref 8.7–10.5)
CHLORIDE SERPL-SCNC: 110 MMOL/L (ref 95–110)
CO2 SERPL-SCNC: 26 MMOL/L (ref 23–29)
CREAT SERPL-MCNC: 0.8 MG/DL (ref 0.5–1.4)
DIFFERENTIAL METHOD: ABNORMAL
EOSINOPHIL # BLD AUTO: ABNORMAL K/UL (ref 0–0.5)
EOSINOPHIL NFR BLD: 0 % (ref 0–8)
ERYTHROCYTE [DISTWIDTH] IN BLOOD BY AUTOMATED COUNT: 16.5 % (ref 11.5–14.5)
EST. GFR  (NO RACE VARIABLE): >60 ML/MIN/1.73 M^2
GLUCOSE SERPL-MCNC: 93 MG/DL (ref 70–110)
HCT VFR BLD AUTO: 32.7 % (ref 37–48.5)
HGB BLD-MCNC: 10.4 G/DL (ref 12–16)
IMM GRANULOCYTES # BLD AUTO: ABNORMAL K/UL (ref 0–0.04)
IMM GRANULOCYTES NFR BLD AUTO: ABNORMAL % (ref 0–0.5)
LYMPHOCYTES # BLD AUTO: ABNORMAL K/UL (ref 1–4.8)
LYMPHOCYTES NFR BLD: 42 % (ref 18–48)
MCH RBC QN AUTO: 27.8 PG (ref 27–31)
MCHC RBC AUTO-ENTMCNC: 31.8 G/DL (ref 32–36)
MCV RBC AUTO: 87 FL (ref 82–98)
MONOCYTES # BLD AUTO: ABNORMAL K/UL (ref 0.3–1)
MONOCYTES NFR BLD: 13 % (ref 4–15)
NEUTROPHILS NFR BLD: 45 % (ref 38–73)
NRBC BLD-RTO: 0 /100 WBC
PLATELET # BLD AUTO: 328 K/UL (ref 150–450)
PLATELET BLD QL SMEAR: ABNORMAL
PMV BLD AUTO: 9.4 FL (ref 9.2–12.9)
POTASSIUM SERPL-SCNC: 4.1 MMOL/L (ref 3.5–5.1)
PROT SERPL-MCNC: 6.7 G/DL (ref 6–8.4)
RBC # BLD AUTO: 3.74 M/UL (ref 4–5.4)
SODIUM SERPL-SCNC: 144 MMOL/L (ref 136–145)
WBC # BLD AUTO: 7.19 K/UL (ref 3.9–12.7)

## 2022-09-23 PROCEDURE — 36415 COLL VENOUS BLD VENIPUNCTURE: CPT | Performed by: INTERNAL MEDICINE

## 2022-09-23 PROCEDURE — 96413 CHEMO IV INFUSION 1 HR: CPT

## 2022-09-23 PROCEDURE — 96375 TX/PRO/DX INJ NEW DRUG ADDON: CPT

## 2022-09-23 PROCEDURE — 85007 BL SMEAR W/DIFF WBC COUNT: CPT | Performed by: INTERNAL MEDICINE

## 2022-09-23 PROCEDURE — 99999 PR PBB SHADOW E&M-EST. PATIENT-LVL IV: CPT | Mod: PBBFAC,,, | Performed by: NURSE PRACTITIONER

## 2022-09-23 PROCEDURE — 85027 COMPLETE CBC AUTOMATED: CPT | Performed by: INTERNAL MEDICINE

## 2022-09-23 PROCEDURE — 99999 PR PBB SHADOW E&M-EST. PATIENT-LVL IV: ICD-10-PCS | Mod: PBBFAC,,, | Performed by: NURSE PRACTITIONER

## 2022-09-23 PROCEDURE — 99214 OFFICE O/P EST MOD 30 MIN: CPT | Mod: 25,S$GLB,, | Performed by: NURSE PRACTITIONER

## 2022-09-23 PROCEDURE — 63600175 PHARM REV CODE 636 W HCPCS: Performed by: INTERNAL MEDICINE

## 2022-09-23 PROCEDURE — 96367 TX/PROPH/DG ADDL SEQ IV INF: CPT

## 2022-09-23 PROCEDURE — 80053 COMPREHEN METABOLIC PANEL: CPT | Performed by: INTERNAL MEDICINE

## 2022-09-23 PROCEDURE — 99214 PR OFFICE/OUTPT VISIT, EST, LEVL IV, 30-39 MIN: ICD-10-PCS | Mod: 25,S$GLB,, | Performed by: NURSE PRACTITIONER

## 2022-09-23 PROCEDURE — 25000003 PHARM REV CODE 250: Performed by: INTERNAL MEDICINE

## 2022-09-23 RX ORDER — EPINEPHRINE 0.3 MG/.3ML
0.3 INJECTION SUBCUTANEOUS ONCE AS NEEDED
Status: CANCELLED | OUTPATIENT
Start: 2022-09-23

## 2022-09-23 RX ORDER — FAMOTIDINE 10 MG/ML
20 INJECTION INTRAVENOUS
Status: CANCELLED | OUTPATIENT
Start: 2022-09-23

## 2022-09-23 RX ORDER — HEPARIN 100 UNIT/ML
500 SYRINGE INTRAVENOUS
Status: CANCELLED | OUTPATIENT
Start: 2022-09-23

## 2022-09-23 RX ORDER — SODIUM CHLORIDE 0.9 % (FLUSH) 0.9 %
10 SYRINGE (ML) INJECTION
Status: CANCELLED | OUTPATIENT
Start: 2022-09-23

## 2022-09-23 RX ORDER — EPINEPHRINE 1 MG/ML
0.3 INJECTION, SOLUTION INTRACARDIAC; INTRAMUSCULAR; INTRAVENOUS; SUBCUTANEOUS ONCE AS NEEDED
Status: DISCONTINUED | OUTPATIENT
Start: 2022-09-23 | End: 2022-09-23 | Stop reason: HOSPADM

## 2022-09-23 RX ORDER — HEPARIN 100 UNIT/ML
500 SYRINGE INTRAVENOUS
Status: DISCONTINUED | OUTPATIENT
Start: 2022-09-23 | End: 2022-09-23 | Stop reason: HOSPADM

## 2022-09-23 RX ORDER — DIPHENHYDRAMINE HYDROCHLORIDE 50 MG/ML
50 INJECTION INTRAMUSCULAR; INTRAVENOUS ONCE AS NEEDED
Status: DISCONTINUED | OUTPATIENT
Start: 2022-09-23 | End: 2022-09-23 | Stop reason: HOSPADM

## 2022-09-23 RX ORDER — FAMOTIDINE 10 MG/ML
20 INJECTION INTRAVENOUS
Status: COMPLETED | OUTPATIENT
Start: 2022-09-23 | End: 2022-09-23

## 2022-09-23 RX ORDER — DIPHENHYDRAMINE HYDROCHLORIDE 50 MG/ML
50 INJECTION INTRAMUSCULAR; INTRAVENOUS ONCE AS NEEDED
Status: CANCELLED | OUTPATIENT
Start: 2022-09-23

## 2022-09-23 RX ADMIN — PACLITAXEL 150 MG: 6 INJECTION, SOLUTION INTRAVENOUS at 11:09

## 2022-09-23 RX ADMIN — DEXAMETHASONE SODIUM PHOSPHATE 10 MG: 4 INJECTION, SOLUTION INTRA-ARTICULAR; INTRALESIONAL; INTRAMUSCULAR; INTRAVENOUS; SOFT TISSUE at 10:09

## 2022-09-23 RX ADMIN — DIPHENHYDRAMINE HYDROCHLORIDE 50 MG: 50 INJECTION INTRAMUSCULAR; INTRAVENOUS at 10:09

## 2022-09-23 RX ADMIN — FAMOTIDINE 20 MG: 10 INJECTION INTRAVENOUS at 10:09

## 2022-09-23 RX ADMIN — HEPARIN 500 UNITS: 100 SYRINGE at 12:09

## 2022-09-23 NOTE — ASSESSMENT & PLAN NOTE
Laboratory review stable to proceed with C3D15 Taxol chemotherapy. No clinical concerns noted     Proceed with C3D15 chemo today. F/u 1 week with repeat labs for C4D1 (Taxol/Carboplatin/Keytruda)

## 2022-09-23 NOTE — PLAN OF CARE
Problem: Adult Inpatient Plan of Care  Goal: Plan of Care Review  Outcome: Ongoing, Progressing  Flowsheets (Taken 9/23/2022 1010)  Plan of Care Reviewed With: patient  Goal: Patient-Specific Goal (Individualized)  Outcome: Ongoing, Progressing  Flowsheets (Taken 9/23/2022 1010)  Anxieties, Fears or Concerns: None today  Individualized Care Needs: Legs elevated, personal blanket used, drink and snack brought  Patient-Specific Goals (Include Timeframe): Tolerate infusion today  Goal: Optimal Comfort and Wellbeing  Outcome: Ongoing, Progressing  Intervention: Provide Person-Centered Care  Flowsheets (Taken 9/23/2022 1010)  Trust Relationship/Rapport:   care explained   choices provided   emotional support provided   empathic listening provided   questions answered   questions encouraged   reassurance provided   thoughts/feelings acknowledged     Problem: Neutropenia (Chemotherapy Effects)  Goal: Absence of Infection  Outcome: Ongoing, Progressing  Intervention: Prevent Infection and Maximize Resistance  Flowsheets (Taken 9/23/2022 1010)  Infection Prevention:   personal protective equipment utilized   rest/sleep promoted   hand hygiene promoted   equipment surfaces disinfected

## 2022-09-23 NOTE — PROGRESS NOTES
Subjective:       Patient ID: Florecita Noel is a 47 y.o. female.    Chief Complaint: Review labs. Chemo     HPI: 47 y.o female with h/o hypothyroidism, HTN, newly diagnosed breast cancer on active combined chemo immunotherapy (Carboplatin/Taxol/Keytruda). Patient presented to [facility] 8/18/2022 for evaluation of right arm pain and swelling. RUE US revealed a clot in the right subclavian vein, right axillary vein, right brachial vein, right basilic vein consistent with DVT. IR was consulted for thrombectomy but advised anticoagulation prior to thrombectomy. With possible outpatient thrombectomy planned if no improvement in 1 week. Ultimately thrombectomy was not done. She has had continued improvement with anticoagulation. Reports no recent swelling or discomfort.    Today patient presents for lab review in consideration of C3D15 Taxol. She feels well overall and is without complaints. She reports taking Eliquis as prescribed without missed doses. Denies noting abnormal bleeding.   Social History     Socioeconomic History    Marital status: Other   Tobacco Use    Smoking status: Never    Smokeless tobacco: Never   Substance and Sexual Activity    Alcohol use: Not Currently     Alcohol/week: 5.0 standard drinks     Types: 5 Cans of beer per week     Comment: socially;  last 06/30/2022    Drug use: No    Sexual activity: Yes     Partners: Male     Birth control/protection: Surgical     Comment: [email or web address]      Social Determinants of Health     Financial Resource Strain: Low Risk     Difficulty of Paying Living Expenses: Not very hard   Food Insecurity: No Food Insecurity    Worried About Running Out of Food in the Last Year: Never true    Ran Out of Food in the Last Year: Never true   Transportation Needs: No Transportation Needs    Lack of Transportation (Medical): No    Lack of Transportation (Non-Medical): No   Physical Activity: Insufficiently Active    Days of Exercise per Week: 1 day    Minutes of Exercise per Session:  30 min   Stress: Stress Concern Present    Feeling of Stress : To some extent   Social Connections: Unknown    Frequency of Communication with Friends and Family: Three times a week    Frequency of Social Gatherings with Friends and Family: Once a week    Active Member of Clubs or Organizations: No    Attends Club or Organization Meetings: Never    Marital Status:    Housing Stability: Low Risk     Unable to Pay for Housing in the Last Year: No    Number of Places Lived in the Last Year: 1    Unstable Housing in the Last Year: No       Past Medical History:   Diagnosis Date    Abnormal Pap smear 1996    Anemia     Hypertension     Hypokalemia     in pregnancy    Hypothyroidism (acquired) 12/28/2018    Malignant neoplasm of upper-outer quadrant of left breast in female, estrogen receptor negative 6/22/2022       Family History   Problem Relation Age of Onset    Cancer Mother         origin? ovarian? metastasized    Cancer Father         throat?    No Known Problems Sister     Heart disease Maternal Grandmother     No Known Problems Maternal Grandfather     No Known Problems Daughter     No Known Problems Daughter     No Known Problems Son     No Known Problems Son     No Known Problems Son     No Known Problems Son     No Known Problems Brother     No Known Problems Other     No Known Problems Other     No Known Problems Other     No Known Problems Other     No Known Problems Other     No Known Problems Maternal Aunt     No Known Problems Sister        Past Surgical History:   Procedure Laterality Date    CHOLECYSTECTOMY      COLONOSCOPY N/A 6/8/2022    Procedure: COLONOSCOPY;  Surgeon: Rosaline Meyer MD;  Location: [facility];  Service: Endoscopy;  Laterality: N/A;    COSMETIC SURGERY      tummy tuck    DIAGNOSTIC LAPAROSCOPY N/A 2/26/2019    Procedure: LAPAROSCOPY, DIAGNOSTIC;  Surgeon: Pia Aguila MD;  Location: [facility] OR;  Service: OB/GYN;  Laterality: N/A;    FLUOROSCOPY N/A 9/1/2022     Procedure: FLUOROSCOPY/mediport exchange;  Surgeon: Roslyn Gale MD;  Location: [facility] CATH LAB;  Service: General;  Laterality: N/A;    FULGURATION OF ENDOMETRIOSIS N/A 2/26/2019    Procedure: FULGURATION, ENDOMETRIOSIS;  Surgeon: Pia Aguila MD;  Location: [facility] OR;  Service: OB/GYN;  Laterality: N/A;    HYSTERECTOMY      HYSTEROSCOPY WITH HYDROTHERMAL ABLATION OF ENDOMETRIUM WITH DILATION AND CURETTAGE N/A 2/26/2019    Procedure: HYSTEROSCOPY, WITH DILATION AND CURETTAGE OF UTERUS AND HYDROTHERMAL ENDOMETRIAL ABLATION;  Surgeon: Pia Aguila MD;  Location: [facility] OR;  Service: OB/GYN;  Laterality: N/A;    INSERTION OF TUNNELED CENTRAL VENOUS CATHETER (CVC) WITH SUBCUTANEOUS PORT N/A 7/1/2022    Procedure: [id number];  Surgeon: Beau Quiles MD;  Location: [facility] OR;  Service: General;  Laterality: N/A;    LAPAROSCOPIC DRAINAGE OF CYST OF OVARY Left 2/26/2019    Procedure: DRAINAGE, CYST, OVARY, LAPAROSCOPIC;  Surgeon: Pia Aguila MD;  Location: [facility] OR;  Service: OB/GYN;  Laterality: Left;    ROBOT-ASSISTED LAPAROSCOPIC ABDOMINAL HYSTERECTOMY USING DA BEAN XI N/A 11/5/2019    Procedure: XI ROBOTIC HYSTERECTOMY;  Surgeon: Pia Aguila MD;  Location: [facility] OR;  Service: OB/GYN;  Laterality: N/A;    ROBOT-ASSISTED LAPAROSCOPIC SALPINGO-OOPHORECTOMY USING DA BEAN XI Bilateral 11/5/2019    Procedure: XI ROBOTIC SALPINGO-OOPHORECTOMY;  Surgeon: Pia Aguila MD;  Location: [facility] ER;  Service: OB/GYN;  Laterality: Bilateral;    TUBAL LIGATION      Tummy Tuck         Review of Systems   Constitutional:  Negative for appetite change, chills, fatigue, fever and unexpected weight change.   HENT:  Negative for congestion, mouth sores, nosebleeds, sore throat, trouble swallowing and voice change.    Eyes:  Negative for visual disturbance.   Respiratory:  Negative for cough, chest tightness, shortness of breath and wheezing.    Cardiovascular:  Negative  for chest pain, palpitations and leg swelling.   Gastrointestinal:  Negative for abdominal distention, abdominal pain, anal bleeding, blood in stool, constipation, diarrhea, nausea and vomiting.   Genitourinary:  Negative for difficulty urinating, dysuria and hematuria.   Musculoskeletal:  Negative for arthralgias, back pain and myalgias.        Intermittent Right arm discomfort/swelling   Skin:  Negative for pallor, rash and wound.   Neurological:  Negative for dizziness, syncope, weakness and headaches.   Hematological:  Negative for adenopathy. Does not bruise/bleed easily.   Psychiatric/Behavioral:  The patient is nervous/anxious.        Medication List with Changes/Refills   New Medications    APIXABAN (ELIQUIS) 5 MG TAB    Take 1 tablet (5 mg total) by mouth 2 (two) times daily.   Current Medications    APIXABAN (ELIQUIS DVT-PE TREAT 30D START) 5 MG (74 TABS) DSPK    For the first 7 days take two (5mg) tablets by mouth twice daily.  After 7 days take one (5 mg) tablet by mouth twice daily.    ATORVASTATIN (LIPITOR) 40 MG TABLET    Take 1 tablet (40 mg total) by mouth once daily.    DEXAMETHASONE (DECADRON) 4 MG TAB    Take 2 tablets (8 mg total) by mouth once daily. Take 2 tablets (8 mg) by mouth once daily on days 2,3,4 following chemotherapy.    HYDROCODONE-ACETAMINOPHEN (NORCO)  MG PER TABLET    Take 1 tablet by mouth every 6 (six) hours as needed (Pain).    LIDOCAINE-PRILOCAINE (EMLA) CREAM    Apply topically as needed.    OLANZAPINE (ZYPREXA) 5 MG TABLET    Take 1 tablet (5 mg total) by mouth every evening. Take 1 tablet by mouth every evening on days 1-4 of each chemotherapy cycle    OMEPRAZOLE (PRILOSEC) 10 MG CAPSULE    Take 10 mg by mouth once daily. Unsure the mg    ONDANSETRON (ZOFRAN-ODT) 8 MG TBDL    Take 1 tablet (8 mg total) by mouth every 8 (eight) hours as needed (nausea/vomiting). Take 1 tablet (8 mg) by mouth every 8 hours as needed for nausea/vomiting.    VERAPAMIL (CALAN-SR) 180 MG  CR TABLET    Take 1 tablet (180 mg total) by mouth every evening.     Objective:     Vitals:    09/23/22 0849   BP: 126/89   Pulse: 87   Temp: 97.2 °F (36.2 °C)     Lab Results   Component Value Date    WBC 7.19 09/23/2022    HGB 10.4 (L) 09/23/2022    HCT 32.7 (L) 09/23/2022    MCV 87 09/23/2022     09/23/2022       BMP  Lab Results   Component Value Date     09/23/2022    K 4.1 09/23/2022     09/23/2022    CO2 26 09/23/2022    BUN 8 09/23/2022    CREATININE 0.8 09/23/2022    CALCIUM 9.2 09/23/2022    ANIONGAP 8 09/23/2022    ESTGFRAFRICA >60 07/29/2022    EGFRNONAA >60 07/29/2022     Lab Results   Component Value Date    ALT 57 (H) 09/23/2022    AST 26 09/23/2022    ALKPHOS 76 09/23/2022    BILITOT 0.2 09/23/2022         Physical Exam  Vitals reviewed.   Constitutional:       Appearance: She is well-developed.   HENT:      Head: Normocephalic.      Right Ear: Hearing and tympanic membrane normal. No drainage.      Left Ear: Hearing and tympanic membrane normal. No drainage.      Nose: Nose normal.   Eyes:      General: Lids are normal. No scleral icterus.        Right eye: No discharge.         Left eye: No discharge.      Conjunctiva/sclera: Conjunctivae normal.   Neck:      Thyroid: No thyroid mass.   Cardiovascular:      Rate and Rhythm: Normal rate and regular rhythm.      Heart sounds: Normal heart sounds. No murmur heard.  Pulmonary:      Effort: Pulmonary effort is normal. No respiratory distress.      Breath sounds: Normal breath sounds. No wheezing or rales.   Abdominal:      General: Bowel sounds are normal. There is no distension.      Palpations: Abdomen is soft.      Tenderness: There is no abdominal tenderness.   Genitourinary:     Comments: deferred  Musculoskeletal:         General: Normal range of motion.      Cervical back: Normal range of motion.   Skin:     General: Skin is warm and dry.   Neurological:      Mental Status: She is alert and oriented to person, place, and time.    Psychiatric:         Speech: Speech normal.         Behavior: Behavior normal. Behavior is cooperative.         Thought Content: Thought content normal.        Assessment:     Problem List Items Addressed This Visit          Hematology    Acute deep vein thrombosis (DVT) of right upper extremity - Primary     -continue Eliquis 5 mg PO BID         Relevant Medications    apixaban (ELIQUIS) 5 mg Tab       Oncology    Malignant neoplasm of upper-outer quadrant of left breast in female, estrogen receptor negative     Laboratory review stable to proceed with C3D15 Taxol chemotherapy. No clinical concerns noted     Proceed with C3D15 chemo today. F/u 1 week with repeat labs for C4D1 (Taxol/Carboplatin/Keytruda)              Plan:     Acute deep vein thrombosis (DVT) of right upper extremity, unspecified vein  -     apixaban (ELIQUIS) 5 mg Tab; Take 1 tablet (5 mg total) by mouth 2 (two) times daily.  Dispense: 60 tablet; Refill: 3    Malignant neoplasm of upper-outer quadrant of left breast in female, estrogen receptor negative          KATYA Mathew-C

## 2022-09-30 ENCOUNTER — OFFICE VISIT (OUTPATIENT)
Dept: HEMATOLOGY/ONCOLOGY | Facility: CLINIC | Age: 47
End: 2022-09-30
Payer: COMMERCIAL

## 2022-09-30 ENCOUNTER — INFUSION (OUTPATIENT)
Dept: INFUSION THERAPY | Facility: HOSPITAL | Age: 47
End: 2022-09-30
Attending: INTERNAL MEDICINE
Payer: COMMERCIAL

## 2022-09-30 ENCOUNTER — LAB VISIT (OUTPATIENT)
Dept: LAB | Facility: HOSPITAL | Age: 47
End: 2022-09-30
Attending: INTERNAL MEDICINE
Payer: COMMERCIAL

## 2022-09-30 VITALS
SYSTOLIC BLOOD PRESSURE: 129 MMHG | TEMPERATURE: 98 F | DIASTOLIC BLOOD PRESSURE: 85 MMHG | BODY MASS INDEX: 31.79 KG/M2 | RESPIRATION RATE: 16 BRPM | HEIGHT: 64 IN | HEART RATE: 88 BPM | WEIGHT: 186.19 LBS | OXYGEN SATURATION: 98 %

## 2022-09-30 DIAGNOSIS — C50.412 MALIGNANT NEOPLASM OF UPPER-OUTER QUADRANT OF LEFT BREAST IN FEMALE, ESTROGEN RECEPTOR NEGATIVE: Primary | ICD-10-CM

## 2022-09-30 DIAGNOSIS — Z17.1 MALIGNANT NEOPLASM OF UPPER-OUTER QUADRANT OF LEFT BREAST IN FEMALE, ESTROGEN RECEPTOR NEGATIVE: Primary | ICD-10-CM

## 2022-09-30 DIAGNOSIS — Z17.1 MALIGNANT NEOPLASM OF UPPER-OUTER QUADRANT OF LEFT BREAST IN FEMALE, ESTROGEN RECEPTOR NEGATIVE: ICD-10-CM

## 2022-09-30 DIAGNOSIS — C50.412 MALIGNANT NEOPLASM OF UPPER-OUTER QUADRANT OF LEFT BREAST IN FEMALE, ESTROGEN RECEPTOR NEGATIVE: ICD-10-CM

## 2022-09-30 DIAGNOSIS — I82.621 ACUTE DEEP VEIN THROMBOSIS (DVT) OF RIGHT UPPER EXTREMITY, UNSPECIFIED VEIN: ICD-10-CM

## 2022-09-30 LAB
ALBUMIN SERPL BCP-MCNC: 4 G/DL (ref 3.5–5.2)
ALP SERPL-CCNC: 78 U/L (ref 55–135)
ALT SERPL W/O P-5'-P-CCNC: 39 U/L (ref 10–44)
ANION GAP SERPL CALC-SCNC: 12 MMOL/L (ref 8–16)
AST SERPL-CCNC: 20 U/L (ref 10–40)
BASOPHILS # BLD AUTO: 0.04 K/UL (ref 0–0.2)
BASOPHILS NFR BLD: 0.5 % (ref 0–1.9)
BILIRUB SERPL-MCNC: 0.3 MG/DL (ref 0.1–1)
BUN SERPL-MCNC: 12 MG/DL (ref 6–20)
CALCIUM SERPL-MCNC: 9.2 MG/DL (ref 8.7–10.5)
CHLORIDE SERPL-SCNC: 109 MMOL/L (ref 95–110)
CO2 SERPL-SCNC: 21 MMOL/L (ref 23–29)
CREAT SERPL-MCNC: 0.9 MG/DL (ref 0.5–1.4)
DIFFERENTIAL METHOD: ABNORMAL
EOSINOPHIL # BLD AUTO: 0 K/UL (ref 0–0.5)
EOSINOPHIL NFR BLD: 0.4 % (ref 0–8)
ERYTHROCYTE [DISTWIDTH] IN BLOOD BY AUTOMATED COUNT: 16.9 % (ref 11.5–14.5)
EST. GFR  (NO RACE VARIABLE): >60 ML/MIN/1.73 M^2
GLUCOSE SERPL-MCNC: 95 MG/DL (ref 70–110)
HCT VFR BLD AUTO: 33.5 % (ref 37–48.5)
HGB BLD-MCNC: 10.7 G/DL (ref 12–16)
IMM GRANULOCYTES # BLD AUTO: 0.28 K/UL (ref 0–0.04)
IMM GRANULOCYTES NFR BLD AUTO: 3.5 % (ref 0–0.5)
LYMPHOCYTES # BLD AUTO: 3.4 K/UL (ref 1–4.8)
LYMPHOCYTES NFR BLD: 41.6 % (ref 18–48)
MCH RBC QN AUTO: 27.8 PG (ref 27–31)
MCHC RBC AUTO-ENTMCNC: 31.9 G/DL (ref 32–36)
MCV RBC AUTO: 87 FL (ref 82–98)
MONOCYTES # BLD AUTO: 0.7 K/UL (ref 0.3–1)
MONOCYTES NFR BLD: 8.9 % (ref 4–15)
NEUTROPHILS # BLD AUTO: 3.7 K/UL (ref 1.8–7.7)
NEUTROPHILS NFR BLD: 45.1 % (ref 38–73)
NRBC BLD-RTO: 0 /100 WBC
PLATELET # BLD AUTO: 323 K/UL (ref 150–450)
PMV BLD AUTO: 10.2 FL (ref 9.2–12.9)
POTASSIUM SERPL-SCNC: 4 MMOL/L (ref 3.5–5.1)
PROT SERPL-MCNC: 7.2 G/DL (ref 6–8.4)
RBC # BLD AUTO: 3.85 M/UL (ref 4–5.4)
SODIUM SERPL-SCNC: 142 MMOL/L (ref 136–145)
TSH SERPL DL<=0.005 MIU/L-ACNC: 3.05 UIU/ML (ref 0.4–4)
WBC # BLD AUTO: 8.1 K/UL (ref 3.9–12.7)

## 2022-09-30 PROCEDURE — 96413 CHEMO IV INFUSION 1 HR: CPT

## 2022-09-30 PROCEDURE — 96375 TX/PRO/DX INJ NEW DRUG ADDON: CPT

## 2022-09-30 PROCEDURE — 96367 TX/PROPH/DG ADDL SEQ IV INF: CPT

## 2022-09-30 PROCEDURE — 25000003 PHARM REV CODE 250: Performed by: INTERNAL MEDICINE

## 2022-09-30 PROCEDURE — 96417 CHEMO IV INFUS EACH ADDL SEQ: CPT

## 2022-09-30 PROCEDURE — 99215 OFFICE O/P EST HI 40 MIN: CPT | Mod: 95,,,

## 2022-09-30 PROCEDURE — 84443 ASSAY THYROID STIM HORMONE: CPT | Performed by: NURSE PRACTITIONER

## 2022-09-30 PROCEDURE — 85025 COMPLETE CBC W/AUTO DIFF WBC: CPT | Performed by: NURSE PRACTITIONER

## 2022-09-30 PROCEDURE — 63600175 PHARM REV CODE 636 W HCPCS: Performed by: INTERNAL MEDICINE

## 2022-09-30 PROCEDURE — 36415 COLL VENOUS BLD VENIPUNCTURE: CPT | Performed by: NURSE PRACTITIONER

## 2022-09-30 PROCEDURE — 80053 COMPREHEN METABOLIC PANEL: CPT | Performed by: NURSE PRACTITIONER

## 2022-09-30 PROCEDURE — 99215 PR OFFICE/OUTPT VISIT, EST, LEVL V, 40-54 MIN: ICD-10-PCS | Mod: 95,,,

## 2022-09-30 RX ORDER — EPINEPHRINE 0.3 MG/.3ML
0.3 INJECTION SUBCUTANEOUS ONCE AS NEEDED
Status: CANCELLED | OUTPATIENT
Start: 2022-10-14

## 2022-09-30 RX ORDER — SODIUM CHLORIDE 0.9 % (FLUSH) 0.9 %
10 SYRINGE (ML) INJECTION
Status: CANCELLED | OUTPATIENT
Start: 2022-10-14

## 2022-09-30 RX ORDER — SODIUM CHLORIDE 0.9 % (FLUSH) 0.9 %
10 SYRINGE (ML) INJECTION
Status: CANCELLED | OUTPATIENT
Start: 2022-10-07

## 2022-09-30 RX ORDER — DIPHENHYDRAMINE HYDROCHLORIDE 50 MG/ML
50 INJECTION INTRAMUSCULAR; INTRAVENOUS ONCE AS NEEDED
Status: CANCELLED | OUTPATIENT
Start: 2022-10-07

## 2022-09-30 RX ORDER — DIPHENHYDRAMINE HYDROCHLORIDE 50 MG/ML
50 INJECTION INTRAMUSCULAR; INTRAVENOUS ONCE AS NEEDED
Status: CANCELLED | OUTPATIENT
Start: 2022-10-14

## 2022-09-30 RX ORDER — HEPARIN 100 UNIT/ML
500 SYRINGE INTRAVENOUS
Status: CANCELLED | OUTPATIENT
Start: 2022-10-07

## 2022-09-30 RX ORDER — DIPHENHYDRAMINE HYDROCHLORIDE 50 MG/ML
50 INJECTION INTRAMUSCULAR; INTRAVENOUS ONCE AS NEEDED
Status: CANCELLED | OUTPATIENT
Start: 2022-09-30

## 2022-09-30 RX ORDER — SODIUM CHLORIDE 0.9 % (FLUSH) 0.9 %
10 SYRINGE (ML) INJECTION
Status: CANCELLED | OUTPATIENT
Start: 2022-09-30

## 2022-09-30 RX ORDER — FAMOTIDINE 10 MG/ML
20 INJECTION INTRAVENOUS
Status: CANCELLED | OUTPATIENT
Start: 2022-10-14

## 2022-09-30 RX ORDER — HEPARIN 100 UNIT/ML
500 SYRINGE INTRAVENOUS
Status: CANCELLED | OUTPATIENT
Start: 2022-09-30

## 2022-09-30 RX ORDER — EPINEPHRINE 0.3 MG/.3ML
0.3 INJECTION SUBCUTANEOUS ONCE AS NEEDED
Status: CANCELLED | OUTPATIENT
Start: 2022-10-07

## 2022-09-30 RX ORDER — FAMOTIDINE 10 MG/ML
20 INJECTION INTRAVENOUS
Status: COMPLETED | OUTPATIENT
Start: 2022-09-30 | End: 2022-09-30

## 2022-09-30 RX ORDER — HEPARIN 100 UNIT/ML
500 SYRINGE INTRAVENOUS
Status: DISCONTINUED | OUTPATIENT
Start: 2022-09-30 | End: 2022-09-30 | Stop reason: HOSPADM

## 2022-09-30 RX ORDER — FAMOTIDINE 10 MG/ML
20 INJECTION INTRAVENOUS
Status: CANCELLED | OUTPATIENT
Start: 2022-10-07

## 2022-09-30 RX ORDER — FAMOTIDINE 10 MG/ML
20 INJECTION INTRAVENOUS
Status: CANCELLED | OUTPATIENT
Start: 2022-09-30

## 2022-09-30 RX ORDER — EPINEPHRINE 0.3 MG/.3ML
0.3 INJECTION SUBCUTANEOUS ONCE AS NEEDED
Status: CANCELLED | OUTPATIENT
Start: 2022-09-30

## 2022-09-30 RX ORDER — HEPARIN 100 UNIT/ML
500 SYRINGE INTRAVENOUS
Status: CANCELLED | OUTPATIENT
Start: 2022-10-14

## 2022-09-30 RX ADMIN — PACLITAXEL 150 MG: 6 INJECTION, SOLUTION INTRAVENOUS at 11:09

## 2022-09-30 RX ADMIN — DIPHENHYDRAMINE HYDROCHLORIDE 50 MG: 50 INJECTION, SOLUTION INTRAMUSCULAR; INTRAVENOUS at 10:09

## 2022-09-30 RX ADMIN — APREPITANT 130 MG: 130 INJECTION, EMULSION INTRAVENOUS at 09:09

## 2022-09-30 RX ADMIN — HEPARIN 500 UNITS: 100 SYRINGE at 12:09

## 2022-09-30 RX ADMIN — DEXAMETHASONE SODIUM PHOSPHATE 0.25 MG: 4 INJECTION, SOLUTION INTRA-ARTICULAR; INTRALESIONAL; INTRAMUSCULAR; INTRAVENOUS; SOFT TISSUE at 10:09

## 2022-09-30 RX ADMIN — CARBOPLATIN 620 MG: 10 INJECTION, SOLUTION INTRAVENOUS at 12:09

## 2022-09-30 RX ADMIN — SODIUM CHLORIDE 200 MG: 9 INJECTION, SOLUTION INTRAVENOUS at 09:09

## 2022-09-30 RX ADMIN — SODIUM CHLORIDE: 0.9 INJECTION, SOLUTION INTRAVENOUS at 10:09

## 2022-09-30 RX ADMIN — FAMOTIDINE 20 MG: 10 INJECTION INTRAVENOUS at 09:09

## 2022-09-30 NOTE — PLAN OF CARE
Patient reports feeling tired and slightly nauseated. Tolerated chemo infusion well today with no adverse reactions. Patient to return to clinic in 1 week for follow-up and next treatment.

## 2022-09-30 NOTE — DISCHARGE INSTRUCTIONS
.[facility]  [address] [address]  [address]  [phone] phone     [phone] fax  Hours of Operation: Monday- Friday 8:00am- 5:00pm  After hours phone  [phone]  Hematology / Oncology Physicians on call    Dr. Harshil Last      Nurse Practitioners:    Chitra Gloria, JAYMIE Lechuga, JAYMIE Storm, JAYMIE Newton, JAYMIE Pena, JAYMIE Alcocer, PA      Please don't hesitate to call if you have any concerns.     .WAYS TO HELP PREVENT INFECTION        WASH YOUR HANDS OFTEN DURING THE DAY, ESPECIALLY BEFORE YOU EAT, AFTER USING THE BATHROOM, AND AFTER TOUCHING ANIMALS    STAY AWAY FROM PEOPLE WHO HAVE ILLNESSES YOU CAN CATCH; SUCH AS COLDS, FLU, CHICKEN POX    TRY TO AVOID CROWDS    STAY AWAY FROM CHILDREN WHO RECENTLY HAVE RECEIVED LIVE VIRUS VACCINES    MAINTAIN GOOD MOUTH CARE    DO NOT SQUEEZE OR SCRATCH PIMPLES    CLEAN CUTS & SCRAPES RIGHT AWAY AND DAILY UNTIL HEALED WITH WARM WATER, SOAP & AN ANTISEPTIC    AVOID CONTACT WITH LITTER BOXES, BIRD CAGES, & FISH TANKS    AVOID STANDING WATER, IE., BIRD BATHS, FLOWER POTS/VASES, OR HUMIDIFIERS    WEAR GLOVES WHEN GARDENING OR CLEANING UP AFTER OTHERS, ESPECIALLY BABIES & SMALL CHILDREN    DO NOT EAT RAW FISH, SEAFOOD, MEAT, OR EGGS     .FALL PREVENTION   Falls often occur due to slipping, tripping or losing your balance. Here are ways to reduce your risk of falling again.   Was there anything that caused your fall that can be fixed, removed or replaced?   Make your home safe by keeping walkways clear of objects you may trip over.   Use non-slip pads under rugs.   Do not walk in poorly lit areas.   Do not stand on chairs or wobbly ladders.   Use caution when reaching overhead or looking upward. This position can cause a loss of balance.   Be sure your shoes fit properly, have non-slip bottoms and are in good condition.   Be cautious when  going up and down stairs, curbs, and when walking on uneven sidewalks.   If your balance is poor, consider using a cane or walker.   If your fall was related to alcohol use, stop or limit alcohol intake.   If your fall was related to use of sleeping medicines, talk to your doctor about this. You may need to reduce your dosage at bedtime if you awaken during the night to go to the bathroom.   To reduce the need for nighttime bathroom trips:   Avoid drinking fluids for several hours before going to bed   Empty your bladder before going to bed   Men can keep a urinal at the bedside   © 8679-8411 [facility], [address] [place] [address], [place] [address]. All rights reserved. This information is not intended as a substitute for professional medical care. Always follow your healthcare professional's instructions.

## 2022-10-06 ENCOUNTER — LAB VISIT (OUTPATIENT)
Dept: LAB | Facility: HOSPITAL | Age: 47
End: 2022-10-06
Payer: COMMERCIAL

## 2022-10-06 ENCOUNTER — OFFICE VISIT (OUTPATIENT)
Dept: HEMATOLOGY/ONCOLOGY | Facility: CLINIC | Age: 47
End: 2022-10-06
Payer: COMMERCIAL

## 2022-10-06 DIAGNOSIS — C50.412 MALIGNANT NEOPLASM OF UPPER-OUTER QUADRANT OF LEFT BREAST IN FEMALE, ESTROGEN RECEPTOR NEGATIVE: Primary | ICD-10-CM

## 2022-10-06 DIAGNOSIS — C50.412 MALIGNANT NEOPLASM OF UPPER-OUTER QUADRANT OF LEFT BREAST IN FEMALE, ESTROGEN RECEPTOR NEGATIVE: ICD-10-CM

## 2022-10-06 DIAGNOSIS — Z17.1 MALIGNANT NEOPLASM OF UPPER-OUTER QUADRANT OF LEFT BREAST IN FEMALE, ESTROGEN RECEPTOR NEGATIVE: Primary | ICD-10-CM

## 2022-10-06 DIAGNOSIS — Z17.1 MALIGNANT NEOPLASM OF UPPER-OUTER QUADRANT OF LEFT BREAST IN FEMALE, ESTROGEN RECEPTOR NEGATIVE: ICD-10-CM

## 2022-10-06 LAB
ALBUMIN SERPL BCP-MCNC: 4.3 G/DL (ref 3.5–5.2)
ALP SERPL-CCNC: 65 U/L (ref 55–135)
ALT SERPL W/O P-5'-P-CCNC: 27 U/L (ref 10–44)
ANION GAP SERPL CALC-SCNC: 8 MMOL/L (ref 8–16)
AST SERPL-CCNC: 15 U/L (ref 10–40)
BASOPHILS # BLD AUTO: 0.02 K/UL (ref 0–0.2)
BASOPHILS NFR BLD: 0.3 % (ref 0–1.9)
BILIRUB SERPL-MCNC: 0.5 MG/DL (ref 0.1–1)
BUN SERPL-MCNC: 10 MG/DL (ref 6–20)
CALCIUM SERPL-MCNC: 9.6 MG/DL (ref 8.7–10.5)
CHLORIDE SERPL-SCNC: 105 MMOL/L (ref 95–110)
CO2 SERPL-SCNC: 26 MMOL/L (ref 23–29)
CREAT SERPL-MCNC: 0.8 MG/DL (ref 0.5–1.4)
DIFFERENTIAL METHOD: ABNORMAL
EOSINOPHIL # BLD AUTO: 0 K/UL (ref 0–0.5)
EOSINOPHIL NFR BLD: 0.5 % (ref 0–8)
ERYTHROCYTE [DISTWIDTH] IN BLOOD BY AUTOMATED COUNT: 16.1 % (ref 11.5–14.5)
EST. GFR  (NO RACE VARIABLE): >60 ML/MIN/1.73 M^2
GLUCOSE SERPL-MCNC: 91 MG/DL (ref 70–110)
HCT VFR BLD AUTO: 34.7 % (ref 37–48.5)
HGB BLD-MCNC: 11.1 G/DL (ref 12–16)
IMM GRANULOCYTES # BLD AUTO: 0.05 K/UL (ref 0–0.04)
IMM GRANULOCYTES NFR BLD AUTO: 0.8 % (ref 0–0.5)
LYMPHOCYTES # BLD AUTO: 2.8 K/UL (ref 1–4.8)
LYMPHOCYTES NFR BLD: 43.7 % (ref 18–48)
MCH RBC QN AUTO: 27.8 PG (ref 27–31)
MCHC RBC AUTO-ENTMCNC: 32 G/DL (ref 32–36)
MCV RBC AUTO: 87 FL (ref 82–98)
MONOCYTES # BLD AUTO: 0.5 K/UL (ref 0.3–1)
MONOCYTES NFR BLD: 7.8 % (ref 4–15)
NEUTROPHILS # BLD AUTO: 3 K/UL (ref 1.8–7.7)
NEUTROPHILS NFR BLD: 46.9 % (ref 38–73)
NRBC BLD-RTO: 0 /100 WBC
PLATELET # BLD AUTO: 243 K/UL (ref 150–450)
PMV BLD AUTO: 10.5 FL (ref 9.2–12.9)
POTASSIUM SERPL-SCNC: 3.7 MMOL/L (ref 3.5–5.1)
PROT SERPL-MCNC: 7.5 G/DL (ref 6–8.4)
RBC # BLD AUTO: 4 M/UL (ref 4–5.4)
SODIUM SERPL-SCNC: 139 MMOL/L (ref 136–145)
TSH SERPL DL<=0.005 MIU/L-ACNC: 2.77 UIU/ML (ref 0.4–4)
WBC # BLD AUTO: 6.29 K/UL (ref 3.9–12.7)

## 2022-10-06 PROCEDURE — 99212 OFFICE O/P EST SF 10 MIN: CPT | Mod: 95,,, | Performed by: INTERNAL MEDICINE

## 2022-10-06 PROCEDURE — 85025 COMPLETE CBC W/AUTO DIFF WBC: CPT

## 2022-10-06 PROCEDURE — 36415 COLL VENOUS BLD VENIPUNCTURE: CPT

## 2022-10-06 PROCEDURE — 84443 ASSAY THYROID STIM HORMONE: CPT

## 2022-10-06 PROCEDURE — 99212 PR OFFICE/OUTPT VISIT, EST, LEVL II, 10-19 MIN: ICD-10-PCS | Mod: 95,,, | Performed by: INTERNAL MEDICINE

## 2022-10-06 PROCEDURE — 80053 COMPREHEN METABOLIC PANEL: CPT

## 2022-10-06 NOTE — PROGRESS NOTES
Subjective:      DATE OF VISIT: 10/6/22     ?  Patient ID:Jaison Noel is a 47 y.o. female.?? MR#: [id number]   ?   PRIMARY ONCOLOGIST: Dr. Hathaway    ? Primary Care Providers:  Alesha Alonso MD, MD (General)     Covering . Dr Mack    CHIEF COMPLAINT:  Follow-up?   ONCOLOGIC DIAGNOSIS:  Stage IB, cT1c, cN0, cM0 left breast upper outer quadrant 2:00 a.m. invasive ductal carcinoma grade 3 ER/[place] negative, HER2 negative  ?   CURRENT TREATMENT:  neoadjuvant chemotherapy, carboplatin paclitaxel pembrolizumab followed by doxorubicin cyclophosphamide pembrolizumab    PAST TREATMENT:  Biopsy only  ?   INTERVAL EVENTS:    Mild transient neuropathy in fingertips.  No recurrent edema.  Birthday is today. In good spirits.    ONCOLOGIC HISTORY:     Ms. Noel is a 46-year-old woman with hypothyroidism and hypertension.  She presented for routine screening mammogram 11/30/2021 showing left breast mass upper outer posterior position.    12/03/2021 diagnostic mammogram and ultrasound  Left  Mammo Digital Diagnostic Bilat with Pablito  There is a mass seen in the upper outer quadrant of the left breast in the posterior depth.      US Breast Bilateral Limited  There are 3 similar complicated cysts seen in the upper outer quadrant of the left breast in the posterior depth. The largest cyst is thought to be the correlate and measures 0.7 x 0.5 x 0.5 cm.  There are 2 additional complex cysts also seen in the upper outer quadrant of the left breast that measure 0.4 x 0.2 x 0.3 cm and 0.6 x 0.3 x 0.4 cm.      Right  Mammo Digital Diagnostic Bilat with Pablito  There is a mass seen in the lower central region of the right breast in the posterior depth. The posterior margin of this lesion is not seen on mammography.  The lesion appears to abut and overlie the pectoralis musculature.  The lesion appears to measure at least 9 mm in size.      US Breast Bilateral Limited  There is a mass seen in the lower central region of the right breast. A  definitive correlate is not definitely seen on ultrasound.  There is a 6 mm hypoechoic lesion likely representing a complex cyst within the central portion of the breast that appears to be too small to be the correlate for the mammographic finding.  There is an additional 1.0 x 0.4 x 0.6 cm hypoechoic area that is located within a ridge of tissue and also does not definitively correlate to the lesion.  It is possible this lesion was present on prior studies although only space visualized on the most recent study due to increased tissue included on the MLO view.  The visualized margins of the lesion also appear to be fairly smooth and therefore the lesion can be followed.      Impression:  Left  Mass: Left breast mass at the upper outer posterior position. Assessment: 3 - Probably benign. Short Interval Follow-Up in 6 Months is recommended.      Right  Mass: Right breast mass at the lower central posterior position. Assessment: 3 - Probably benign. Short Interval Follow-Up in 6 Months is recommended.      BI-RADS Category:   Overall: 3 - Probably Benign    Repeat evaluation with diagnostic mammogram and ultrasound on 05/24/2022 notable for a suspicious left breast lesion 1.3 cm 2:00 a.m.    Right breast: No detrimental mammographic change. Well-circumscribed lesion within the lower outer posterior breast is unchanged mammographically. 8.6 mm complicated cyst present at 08:00 o'clock, 12 cm from the nipple and corresponds to the stable mammographic finding.       Follow-up imaging of the previously noted central lesions demonstrate no detrimental change.  8 mm complicated cyst noted at 04:00 o'clock, 4 cm from the nipple.         Left breast: Interval increase in size of an upper outer quadrant mass on mammogram. On ultrasound there is an enlarging complicated cystic lesion measuring 1.3 cm at 02:00 o'clock, 7 cm from the nipple. Other complicated cysts within the upper outer breast demonstrate no detrimental  change.  No suspicious left axillary lymph nodes.    Ultrasound-guided biopsy left breast lesion on 2022  Pathology:  Final Pathologic Diagnosis 1. Left breast mass (2 o'clock position), biopsy:       -  High-grade invasive carcinoma of no special type (ductal) with   associated tumor necrosis,          see comment     SYNOPTIC REPORT   PROCEDURE:  Biopsy   SPECIMEN LATERALITY:  Left breast   TUMOR SITE:  2 o'clock position   HISTOLOGIC TYPE:  Invasive carcinoma of no special type (ductal)   HISTOLOGIC GRADE:  Grade 3 of 3          TUBULE FORMATION:  3          PLEOMORPHISM:  3          MITOTIC RATE:  3   TUMOR SIZE:   At least 10 mm in greatest linear dimension   DUCTAL CARCINOMA IN SITU (DCIS):  Not identified   LYMPHOVASCULAR INVASION:  Not identified   MICROCALCIFICATIONS:  Not identified   BREAST BIOMARKERS (PERFORMED WITH APPROPRIATE CONTROLS):           ER:   Negative (0)           MN:   Negative (0)           HER2:  Negative (1+)           KI67:  Greater than 95%       2022 PET-CT without evidence of distant metastatic disease.     FDG avid left breast lesion consistent with the known malignancy.  No other sites of suspicious uptake identified.    Menarche 12 years old  ; 1 miscarriage, age of 1st pregnancy 16 years old  Postmenopausal last menstrual period at 44 years old s/p PIERRE/BSO  Exogenous estrogen for about 1 year last when 45yo.  No history of prior radiation  No prior breast biopsy    Family history notable for mother with either breast or ovarian cancer diagnosis 64 years old.    Cycle 1 day 1 neoadjuvant chemotherapy 07/15/2022      Review of Systems    ?   A comprehensive 14-point review of systems was reviewed with patient and was negative other than as specified above.   ?     Objective:      Physical Exam      ?   There were no vitals filed for this visit.     ?   ECOG:?0   General appearance: Generally well appearing, in no acute distress.   Head, eyes, ears, nose, and throat:  moist mucous membranes.   Respiratory:  Normal work of breathing  Abdomen: nontender, nondistended.   Extremities: Warm,  no appreciable edema  Neurologic: Alert and oriented. Grossly normal strength, coordination, and gait.   Skin: No rashes, ecchymoses or petechial lesion.   Psychiatric:  Normal mood and affect.      Labs    Lab Results   Component Value Date    WBC 6.29 10/06/2022    HGB 11.1 (L) 10/06/2022    HCT 34.7 (L) 10/06/2022    MCV 87 10/06/2022     10/06/2022         Chemistry        Component Value Date/Time     09/30/2022 0731    K 4.0 09/30/2022 0731     09/30/2022 0731    CO2 21 (L) 09/30/2022 0731    BUN 12 09/30/2022 0731    CREATININE 0.9 09/30/2022 0731    GLU 95 09/30/2022 0731        Component Value Date/Time    CALCIUM 9.2 09/30/2022 0731    ALKPHOS 78 09/30/2022 0731    AST 20 09/30/2022 0731    ALT 39 09/30/2022 0731    BILITOT 0.3 09/30/2022 0731    ESTGFRAFRICA >60 07/29/2022 1006    EGFRNONAA >60 07/29/2022 1006        Lab Results   Component Value Date    TSH 3.046 09/30/2022       Imaging:  ?    No results found for this or any previous visit (from the past 2160 hour(s)).  No results found for this or any previous visit (from the past 2160 hour(s)).  No results found for this or any previous visit (from the past 2160 hour(s)).        Pathology:    No visits with results within 1 Day(s) from this visit.   Latest known visit with results is:   Hospital Outpatient Visit on 06/09/2022   Component Date Value Ref Range Status    Final Pathologic Diagnosis 06/09/2022    Final                    Value:1. Left breast mass (2 o'clock position), biopsy:      -  High-grade invasive carcinoma of no special type (ductal) with  associated tumor necrosis,         see comment    SYNOPTIC REPORT  PROCEDURE:  Biopsy  SPECIMEN LATERALITY:  Left breast  TUMOR SITE:  2 o'clock position  HISTOLOGIC TYPE:  Invasive carcinoma of no special type (ductal)  HISTOLOGIC GRADE:  Grade 3 of 3        "  TUBULE FORMATION:  3         PLEOMORPHISM:  3         MITOTIC RATE:  3  TUMOR SIZE:   At least 10 mm in greatest linear dimension  DUCTAL CARCINOMA IN SITU (DCIS):  Not identified  LYMPHOVASCULAR INVASION:  Not identified  MICROCALCIFICATIONS:  Not identified  BREAST BIOMARKERS (PERFORMED WITH APPROPRIATE CONTROLS):          ER:   Negative (0)          RI:   Negative (0)          HER2:  Negative (1+)          KI67:  Greater than 95%      Gross 06/09/2022    Final                    Value:Patient ID/Pathology ID [id number]  In formalin, labeled "left breast 02:00 o'clock, in formalin at 08:40", is a  2.5 x 2.5 cm aggregate of pink-yellow needle biopsy cores.  Entirely  submitted in cassette OAE--1-A  Ischemic time is not provided.  The fixation time is greater than 6 hrs and  less than 72 hrs.  A gross picture of the container and cassette is taken and  added to file.  Charly HOLGUIN ([facility]) cm      Comment 06/09/2022    Final                    Value:Immunohistochemical stain with appropriate control for p63 was performed and  shows loss of myoepithelial cells throughout the lesion, with no evidence of  ductal carcinoma in situ.  The patient's history of ovarian carcinoma is  noted.  Immunohistochemical stains for GATA3 and PAX8 appropriate controls  show tumor cell positivity for GATA3 and negativity for [place] 8, confirming  ductal (breast) origin.  This case seen in consultation with Dr. Montesinos who agrees with the above  diagnosis.      Disclaimer 06/09/2022    Final                    Value:Unless the case is a 'gross only' or additional testing only, the final  diagnosis for each specimen is based on a microscopic examination of  appropriate tissue sections.  ER immunohistochemical staining (clone SP1, DAB detection method) is  performed on formalin-fixed, paraffin embedded tissue sections. The  percentage of cell nuclei stained and the strength of staining is reported  (weak, moderate, strong), using the 2010 " ACSO/CAP scoring guidelines. Tumors  used for determing predictive markers are fixed in10% neutral buffered  formalin for 6-72 hours. It has been cleared by the U.S. FDA for use as an  IVD test. This assay has not been validated on decalcified tissues. Results  should be interpreted with caution given the likelihood of false negativity  on decalcified specimens.  HER-2/aston IHC (4B5) clone, DAB detection method) is done on 10% buffered  formalin-fixed (for 6-72 hrs), paraffin embedded tissue sections. The scoring  is completed on a 4-tiered scoring system of membran                          e staining using the 2014  ASCO/CAP scoring guidelines. It has been cleared by the [place].S. FDA for use as  an IVD test. This assay has not been validated on decalcified tissues.  Results should be interpreted with caution given the likelihood of false  negativity on decalcified specimens.  PgR immunohistochemical staining (clone 1E2, DAB detection method) is  performed on formalin-fixed, paraffin embedded tissue sections. The  percentage of cell nuclei stained and the strength of staining is report  (weak, moderate, strong), using 2010 ASCO/CAP scoring guidelines. Tumors used  for determing predictive markers are fixed in 10% neutral buffered formalin  for 6-72 hours. It has been cleared by the U.S. FDA for use as an IVD test.  This assay has not been validated on decalcified tissues. Results should be  interpreted with caution given the likelihood of false negativity on  decalcified specimens.            ?   Assessment/Plan:   There are no diagnoses linked to this encounter.         No diagnosis found.            Plan:     Problem List Items Addressed This Visit    None  Stage IB, cT1c cN0 cM0 left breast upper outer quadrant 2:00 a.m. invasive ductal carcinoma grade 3 ER/[place] negative, HER2 negative:  - [facility] germline genetic testing negative   Clinical exam and mammogram/ultrasound without axillary adenopathy.  PET scan without  evidence of distant metastatic disease.   We discussed that while early stage and T1c, higher risk features given triple negative and high grade 3 and high ki-67 >95% with noted growth over just 6 month interval recommendation for neoadjuavant approach with chemo immunotherapy per Guadalupe et al. NEJ 2020. She is healthy aside from prior hypothyroidism now not on supplement.  We did discuss risks and benefits of therapy and importance of re-evaluation with plan for surgery, radiation and further adjuvant therapy as indicated.  She expressed good understanding and agree with the plan for follow-up per below.    Contralateral breast biopsy benign    Recommend repeat physical exam and consider imaging evaluation following carboplatin paclitaxel and pembrolizumab portion of neoadjuvant therapy.      Prior transaminitis resolved.  Labs with progressive anemia  seen on again seen on labs today.  Continue to monitor.  Right upper extremity Thrombosis:  Status post thrombectomy port removal on anticoagulation.  Port replaced by Interventional Radiology left upper chest.      Follow-Up:   C3D8 today  RV in 1 wk cbc, cmp D1 c3d15 planned

## 2022-10-07 ENCOUNTER — INFUSION (OUTPATIENT)
Dept: INFUSION THERAPY | Facility: HOSPITAL | Age: 47
End: 2022-10-07
Attending: INTERNAL MEDICINE
Payer: COMMERCIAL

## 2022-10-07 VITALS
SYSTOLIC BLOOD PRESSURE: 122 MMHG | OXYGEN SATURATION: 98 % | RESPIRATION RATE: 16 BRPM | HEART RATE: 99 BPM | BODY MASS INDEX: 31.54 KG/M2 | TEMPERATURE: 98 F | DIASTOLIC BLOOD PRESSURE: 85 MMHG | HEIGHT: 64 IN | WEIGHT: 184.75 LBS

## 2022-10-07 DIAGNOSIS — C50.412 MALIGNANT NEOPLASM OF UPPER-OUTER QUADRANT OF LEFT BREAST IN FEMALE, ESTROGEN RECEPTOR NEGATIVE: Primary | ICD-10-CM

## 2022-10-07 DIAGNOSIS — Z17.1 MALIGNANT NEOPLASM OF UPPER-OUTER QUADRANT OF LEFT BREAST IN FEMALE, ESTROGEN RECEPTOR NEGATIVE: Primary | ICD-10-CM

## 2022-10-07 PROCEDURE — 25000003 PHARM REV CODE 250: Performed by: INTERNAL MEDICINE

## 2022-10-07 PROCEDURE — 96413 CHEMO IV INFUSION 1 HR: CPT

## 2022-10-07 PROCEDURE — 96375 TX/PRO/DX INJ NEW DRUG ADDON: CPT

## 2022-10-07 PROCEDURE — 96367 TX/PROPH/DG ADDL SEQ IV INF: CPT

## 2022-10-07 PROCEDURE — A4216 STERILE WATER/SALINE, 10 ML: HCPCS | Performed by: INTERNAL MEDICINE

## 2022-10-07 PROCEDURE — 63600175 PHARM REV CODE 636 W HCPCS: Performed by: INTERNAL MEDICINE

## 2022-10-07 RX ORDER — HEPARIN 100 UNIT/ML
500 SYRINGE INTRAVENOUS
Status: DISCONTINUED | OUTPATIENT
Start: 2022-10-07 | End: 2022-10-07 | Stop reason: HOSPADM

## 2022-10-07 RX ORDER — FAMOTIDINE 10 MG/ML
20 INJECTION INTRAVENOUS
Status: COMPLETED | OUTPATIENT
Start: 2022-10-07 | End: 2022-10-07

## 2022-10-07 RX ORDER — SODIUM CHLORIDE 0.9 % (FLUSH) 0.9 %
10 SYRINGE (ML) INJECTION
Status: DISCONTINUED | OUTPATIENT
Start: 2022-10-07 | End: 2022-10-07 | Stop reason: HOSPADM

## 2022-10-07 RX ADMIN — PACLITAXEL 150 MG: 6 INJECTION, SOLUTION INTRAVENOUS at 11:10

## 2022-10-07 RX ADMIN — HEPARIN 500 UNITS: 100 SYRINGE at 12:10

## 2022-10-07 RX ADMIN — Medication 10 ML: at 12:10

## 2022-10-07 RX ADMIN — DEXAMETHASONE SODIUM PHOSPHATE 10 MG: 4 INJECTION, SOLUTION INTRA-ARTICULAR; INTRALESIONAL; INTRAMUSCULAR; INTRAVENOUS; SOFT TISSUE at 10:10

## 2022-10-07 RX ADMIN — FAMOTIDINE 20 MG: 10 INJECTION INTRAVENOUS at 10:10

## 2022-10-07 RX ADMIN — SODIUM CHLORIDE: 0.9 INJECTION, SOLUTION INTRAVENOUS at 10:10

## 2022-10-07 RX ADMIN — DIPHENHYDRAMINE HYDROCHLORIDE 50 MG: 50 INJECTION, SOLUTION INTRAMUSCULAR; INTRAVENOUS at 10:10

## 2022-10-07 NOTE — PLAN OF CARE
Problem: Adult Inpatient Plan of Care  Goal: Plan of Care Review  Outcome: Ongoing, Progressing  Flowsheets (Taken 10/7/2022 1053)  Plan of Care Reviewed With: patient  Goal: Patient-Specific Goal (Individualized)  Outcome: Ongoing, Progressing  Flowsheets (Taken 10/7/2022 1053)  Anxieties, Fears or Concerns: patient reports fatigue  Individualized Care Needs: Feet elevated in recliner and warm blanket provided for comfort measures  Patient-Specific Goals (Include Timeframe): tolerate treatment without adverse reaction  Goal: Optimal Comfort and Wellbeing  Outcome: Ongoing, Progressing  Intervention: Provide Person-Centered Care  Flowsheets (Taken 10/7/2022 1053)  Trust Relationship/Rapport:   care explained   reassurance provided   choices provided   thoughts/feelings acknowledged   emotional support provided   empathic listening provided   questions answered   questions encouraged

## 2022-10-07 NOTE — DISCHARGE INSTRUCTIONS
[facility]  [address] [facility]  [address]  [phone] phone     [phone] fax  Hours of Operation: Monday- Friday 8:00am- 5:00pm  After hours phone  [phone]  Hematology / Oncology Physicians on call      GISEL [address] JAYMIE Gloria, JAYMIE Newton, KATYA Mcgraw    Please call with any concerns regarding your appointment today.        FALL PREVENTION   Falls often occur due to slipping, tripping or losing your balance. Here are ways to reduce your risk of falling again.   Was there anything that caused your fall that can be fixed, removed or replaced?   Make your home safe by keeping walkways clear of objects you may trip over.   Use non-slip pads under rugs.   Do not walk in poorly lit areas.   Do not stand on chairs or wobbly ladders.   Use caution when reaching overhead or looking upward. This position can cause a loss of balance.   Be sure your shoes fit properly, have non-slip bottoms and are in good condition.   Be cautious when going up and down stairs, curbs, and when walking on uneven sidewalks.   If your balance is poor, consider using a cane or walker.   If your fall was related to alcohol use, stop or limit alcohol intake.   If your fall was related to use of sleeping medicines, talk to your doctor about this. You may need to reduce your dosage at bedtime if you awaken during the night to go to the bathroom.   To reduce the need for nighttime bathroom trips:   Avoid drinking fluids for several hours before going to bed   Empty your bladder before going to bed   Men can keep a urinal at the bedside   © 7487-8170 [employer], [address] [place] [address], [place] [address]. All rights reserved. This information is not intended as a substitute for professional medical care. Always follow your healthcare professional's instructions.

## 2022-10-11 ENCOUNTER — PATIENT MESSAGE (OUTPATIENT)
Dept: HEMATOLOGY/ONCOLOGY | Facility: CLINIC | Age: 47
End: 2022-10-11
Payer: COMMERCIAL

## 2022-10-11 NOTE — ASSESSMENT & PLAN NOTE
Patient presented to ED  with right arm pain and swelling  US 08/18/2022  revealed a clot in the right subclavian vein, right axillary vein, right brachial vein, right basilic vein consistent with DVT.     -patient continues on Eliquis 5 mg b.i.d. without difficulty  -denies recurrent swelling or discomfort abnormal bleeding bruising

## 2022-10-11 NOTE — ASSESSMENT & PLAN NOTE
Cancer Staging   Malignant neoplasm of upper-outer quadrant of left breast in female, estrogen receptor negative  Staging form: Breast, AJCC 8th Edition  - Clinical stage from 6/22/2022: Stage IB (cT1c, cN0, cM0, G3, ER-, MD-, HER2-) - Signed by Maris Hathaway MD on 6/22/2022    [employer] germline genetic testing negative    Clinical exam and mammogram/ultrasound without axillary adenopathy.  PET scan without evidence of distant metastatic disease.      Contralateral breast biopsy benign     Recommend repeat physical exam and consider imaging evaluation following carboplatin paclitaxel and pembrolizumab portion of neoadjuvant therapy. Plan for surgery, radiation and further adjuvant therapy as indicated.      Labs reviewed, no concerning cytopenias  -Case discussed with  oncologist  -Ok to proceed with C4D1 of  Keytruda/Carbo/Taxol today    Follow-up in 1 week with CBC CMP and MD for C4D8 Taxol

## 2022-10-11 NOTE — PROGRESS NOTES
Subjective:     Patient ID:Jaison Noel is a 47 y.o. female.?? MR#: [id number]   ?   PRIMARY ONCOLOGIST: Dr. Hathaway  ?   CHIEF COMPLAINT:  breast cancer / lab review/ assessment for chemo?????   ?   ONCOLOGIC DIAGNOSIS: Stage IB, cT1c, cN0, cM0 left breast upper outer quadrant 2:00 a.m. invasive ductal carcinoma grade 3 ER/KS negative, HER2 negative  ?   CURRENT TREATMENT: OP PEMBROLIZUMAB CARBOPLATIN (AUC 5) WITH WEEKLY PACLITAXEL FOLLOWED BY PEMBROLIZUMAB DOXORUBICIN CYCLOPHOSPHAMIDE FOLLOWED BY PEMBROLIZUMAB 200 MG Q3W     The patient location is: Home  The chief complaint leading to consultation is: follow-up    Visit type: audiovisual    Face to Face time with patient: 20  40 minutes of total time spent on the encounter, which includes face to face time and non-face to face time preparing to see the patient (eg, review of tests), Obtaining and/or reviewing separately obtained history, Documenting clinical information in the electronic or other health record, Independently interpreting results (not separately reported) and communicating results to the patient/family/caregiver, or Care coordination (not separately reported).         Each patient to whom he or she provides medical services by telemedicine is:  (1) informed of the relationship between the physician and patient and the respective role of any other health care provider with respect to management of the patient; and (2) notified that he or she may decline to receive medical services by telemedicine and may withdraw from such care at any time.    Notes:    HPI   Ms. Noel  is a pleasant 47-year-old with hypothyroidism, HTN,  s/p robotic hysterectomy 9/2019, and breast cancer on active combined chemo immunotherapy with Carboplatin/Taxol/Keytruda. Patient initially presented for routine screening mammogram 11/30/2021 and was found to have left breast mass which was initially thought to be benign.  However, with repeat imaging in 05/2022 an interval increase  in size was noted.  Pathology from biopsy on 06/09/22 resulted high-grade invasive carcinoma.  Subsequent PET scan 06/20/2022  found FDG avid left breast lesion consistent with the known malignancy with no other sites of suspicious uptake identified. Initiated on Carbo/ Taxol/Keytruda 07/15/2022.     Interval History:  patient states overall she is feeling well.  She notes last weekend she had some  aching in bilateral lower extremities -which has since resolved.  She states she has some nausea this morning but not currently.  Denies issues with appetite or maintaining hydration.  Denies v/d/c, fever, chills, sob, cp, mouth sores.    Social History          Oncology History   Malignant neoplasm of upper-outer quadrant of left breast in female, estrogen receptor negative   6/22/2022 Initial Diagnosis    Malignant neoplasm of upper-outer quadrant of left breast in female, estrogen receptor negative     6/22/2022 Cancer Staged    Staging form: Breast, AJCC 8th Edition  - Clinical stage from 6/22/2022: Stage IB (cT1c, cN0, cM0, G3, ER-, OR-, HER2-)       6/29/2022 Genetic Testing    Patient has genetic testing done for [employer].                                              Results revealed patient results are negative     7/15/2022 -  Chemotherapy    Treatment Summary   Plan Name: OP PEMBROLIZUMAB CARBOPLATIN (AUC 5) WITH WEEKLY PACLITAXEL FOLLOWED BY PEMBROLIZUMAB DOXORUBICIN CYCLOPHOSPHAMIDE FOLLOWED BY PEMBROLIZUMAB 200 MG Q3W  Treatment Goal: Curative  Status: Active  Start Date: 7/15/2022  End Date: 6/30/2023 (Planned)  Provider: Maris Hathaway MD  Chemotherapy: DOXOrubicin chemo injection 114 mg, 60 mg/m2, Intravenous, Clinic/HOD 1 time, 0 of 4 cycles  CARBOplatin (PARAPLATIN) 685 mg in sodium chloride 0.9% 318.5 mL chemo infusion, 685 mg, Intravenous, Clinic/HOD 1 time, 4 of 4 cycles  Administration: 685 mg (7/15/2022), 685 mg (8/15/2022), 685 mg (9/9/2022), 620 mg (9/30/2022)  cyclophosphamide 600 mg/m2 =  1,140 mg in sodium chloride 0.9% 250 mL chemo infusion, 600 mg/m2, Intravenous, Clinic/HOD 1 time, 0 of 4 cycles  PACLitaxeL (TAXOL) 80 mg/m2 = 150 mg in sodium chloride 0.9% 250 mL chemo infusion, 80 mg/m2 = 150 mg, Intravenous, Clinic/HOD 1 time, 4 of 4 cycles  Administration: 150 mg (7/15/2022), 150 mg (7/22/2022), 150 mg (8/15/2022), 150 mg (8/5/2022), 150 mg (8/26/2022), 150 mg (9/9/2022), 150 mg (9/2/2022), 150 mg (9/16/2022), 150 mg (9/30/2022), 150 mg (9/23/2022), 150 mg (10/7/2022)          Social History     Socioeconomic History    Marital status: Other   Tobacco Use    Smoking status: Never    Smokeless tobacco: Never   Substance and Sexual Activity    Alcohol use: Not Currently     Alcohol/week: 5.0 standard drinks     Types: 5 Cans of beer per week     Comment: socially;  last 06/30/2022    Drug use: No    Sexual activity: Yes     Partners: Male     Birth control/protection: Surgical     Comment: [facility]      Social Determinants of Health     Financial Resource Strain: Low Risk     Difficulty of Paying Living Expenses: Not very hard   Food Insecurity: No Food Insecurity    Worried About Running Out of Food in the Last Year: Never true    Ran Out of Food in the Last Year: Never true   Transportation Needs: No Transportation Needs    Lack of Transportation (Medical): No    Lack of Transportation (Non-Medical): No   Physical Activity: Inactive    Days of Exercise per Week: 0 days    Minutes of Exercise per Session: 30 min   Stress: Stress Concern Present    Feeling of Stress : To some extent   Social Connections: Unknown    Frequency of Communication with Friends and Family: More than three times a week    Frequency of Social Gatherings with Friends and Family: Once a week    Active Member of Clubs or Organizations: No    Attends Club or Organization Meetings: Never    Marital Status:    Housing Stability: Low Risk     Unable to Pay for Housing in the Last Year: No    Number of Places Lived in the  Last Year: 1    Unstable Housing in the Last Year: No      Family History   Problem Relation Age of Onset    Cancer Mother         origin? ovarian? metastasized    Cancer Father         throat?    No Known Problems Sister     Heart disease Maternal Grandmother     No Known Problems Maternal Grandfather     No Known Problems Daughter     No Known Problems Daughter     No Known Problems Son     No Known Problems Son     No Known Problems Son     No Known Problems Son     No Known Problems Brother     No Known Problems Other     No Known Problems Other     No Known Problems Other     No Known Problems Other     No Known Problems Other     No Known Problems Maternal Aunt     No Known Problems Sister       Past Surgical History:   Procedure Laterality Date    CHOLECYSTECTOMY      COLONOSCOPY N/A 6/8/2022    Procedure: COLONOSCOPY;  Surgeon: Rosaline Meyer MD;  Location: [facility];  Service: Endoscopy;  Laterality: N/A;    COSMETIC SURGERY      tummy tuck    DIAGNOSTIC LAPAROSCOPY N/A 2/26/2019    Procedure: LAPAROSCOPY, DIAGNOSTIC;  Surgeon: Pia Aguila MD;  Location: [facility] OR;  Service: OB/GYN;  Laterality: N/A;    FLUOROSCOPY N/A 9/1/2022    Procedure: FLUOROSCOPY/mediport exchange;  Surgeon: Roslyn Gale MD;  Location: [facility] CATH LAB;  Service: General;  Laterality: N/A;    FULGURATION OF ENDOMETRIOSIS N/A 2/26/2019    Procedure: FULGURATION, ENDOMETRIOSIS;  Surgeon: Pia Aguila MD;  Location: [facility] OR;  Service: OB/GYN;  Laterality: N/A;    HYSTERECTOMY      HYSTEROSCOPY WITH HYDROTHERMAL ABLATION OF ENDOMETRIUM WITH DILATION AND CURETTAGE N/A 2/26/2019    Procedure: HYSTEROSCOPY, WITH DILATION AND CURETTAGE OF UTERUS AND HYDROTHERMAL ENDOMETRIAL ABLATION;  Surgeon: Pia Aguila MD;  Location: [facility];  Service: OB/GYN;  Laterality: N/A;    INSERTION OF TUNNELED CENTRAL VENOUS CATHETER (CVC) WITH SUBCUTANEOUS PORT N/A 7/1/2022    Procedure: [id number];   Surgeon: Beau Quiles MD;  Location: [facility] OR;  Service: General;  Laterality: N/A;    LAPAROSCOPIC DRAINAGE OF CYST OF OVARY Left 2/26/2019    Procedure: DRAINAGE, CYST, OVARY, LAPAROSCOPIC;  Surgeon: Pia Aguila MD;  Location: [facility] OR;  Service: OB/GYN;  Laterality: Left;    ROBOT-ASSISTED LAPAROSCOPIC ABDOMINAL HYSTERECTOMY USING DA BEAN XI N/A 11/5/2019    Procedure: XI ROBOTIC HYSTERECTOMY;  Surgeon: Pia Aguila MD;  Location: [facility] OR;  Service: OB/GYN;  Laterality: N/A;    ROBOT-ASSISTED LAPAROSCOPIC SALPINGO-OOPHORECTOMY USING DA BEAN XI Bilateral 11/5/2019    Procedure: XI ROBOTIC SALPINGO-OOPHORECTOMY;  Surgeon: Pia Aguila MD;  Location: [facility] OR;  Service: OB/GYN;  Laterality: Bilateral;    TUBAL LIGATION      Tummy Tuck          Review of Systems   Constitutional:  Negative for activity change, appetite change, chills, fatigue, fever and unexpected weight change.   HENT:  Negative for congestion, dental problem, mouth sores and nosebleeds.    Eyes:  Negative for visual disturbance.   Respiratory:  Negative for cough, choking and chest tightness.    Cardiovascular:  Negative for chest pain, palpitations and leg swelling.   Gastrointestinal:  Negative for abdominal distention, abdominal pain, anal bleeding, blood in stool, constipation, diarrhea, nausea and vomiting.   Endocrine: Negative.    Genitourinary:  Negative for dysuria, frequency, hematuria and urgency.   Musculoskeletal:  Negative for arthralgias, back pain, gait problem, joint swelling and myalgias.   Skin:  Negative for wound.   Allergic/Immunologic: Negative for immunocompromised state.   Neurological:  Negative for dizziness, light-headedness, numbness and headaches.   Hematological:  Negative for adenopathy. Does not bruise/bleed easily.   Psychiatric/Behavioral:  The patient is not nervous/anxious.      ?   A comprehensive 14-point review of systems was reviewed with patient and was negative other than  as specified above.   ?     Objective:      Physical Exam  Vitals reviewed: virtual visit.   Constitutional:       Appearance: Normal appearance.   HENT:      Head: Normocephalic.   Neurological:      Mental Status: She is alert.   Psychiatric:         Mood and Affect: Mood normal.         Behavior: Behavior normal.         Thought Content: Thought content normal.         Judgment: Judgment normal.         ?   There were no vitals filed for this visit.   ?       ?   Laboratory:  ?   Lab Visit on 09/30/2022   Component Date Value Ref Range Status    WBC 09/30/2022 8.10  3.90 - 12.70 K/uL Final    RBC 09/30/2022 3.85 (L)  4.00 - 5.40 M/uL Final    Hemoglobin 09/30/2022 10.7 (L)  12.0 - 16.0 g/dL Final    Hematocrit 09/30/2022 33.5 (L)  37.0 - 48.5 % Final    MCV 09/30/2022 87  82 - 98 fL Final    MCH 09/30/2022 27.8  27.0 - 31.0 pg Final    MCHC 09/30/2022 31.9 (L)  32.0 - 36.0 g/dL Final    RDW 09/30/2022 16.9 (H)  11.5 - 14.5 % Final    Platelets 09/30/2022 323  150 - 450 K/uL Final    MPV 09/30/2022 10.2  9.2 - 12.9 fL Final    Immature Granulocytes 09/30/2022 3.5 (H)  0.0 - 0.5 % Final    Gran # (ANC) 09/30/2022 3.7  1.8 - 7.7 K/uL Final    Immature Grans (Abs) 09/30/2022 0.28 (H)  0.00 - 0.04 K/uL Final    Lymph # 09/30/2022 3.4  1.0 - 4.8 K/uL Final    Mono # 09/30/2022 0.7  0.3 - 1.0 K/uL Final    Eos # 09/30/2022 0.0  0.0 - 0.5 K/uL Final    Baso # 09/30/2022 0.04  0.00 - 0.20 K/uL Final    nRBC 09/30/2022 0  0 /100 WBC Final    Gran % 09/30/2022 45.1  38.0 - 73.0 % Final    Lymph % 09/30/2022 41.6  18.0 - 48.0 % Final    Mono % 09/30/2022 8.9  4.0 - 15.0 % Final    Eosinophil % 09/30/2022 0.4  0.0 - 8.0 % Final    Basophil % 09/30/2022 0.5  0.0 - 1.9 % Final    Differential Method 09/30/2022 Automated   Final    Sodium 09/30/2022 142  136 - 145 mmol/L Final    Potassium 09/30/2022 4.0  3.5 - 5.1 mmol/L Final    Chloride 09/30/2022 109  95 - 110 mmol/L Final    CO2 09/30/2022 21 (L)  23 - 29 mmol/L Final     Glucose 09/30/2022 95  70 - 110 mg/dL Final    BUN 09/30/2022 12  6 - 20 mg/dL Final    Creatinine 09/30/2022 0.9  0.5 - 1.4 mg/dL Final    Calcium 09/30/2022 9.2  8.7 - 10.5 mg/dL Final    Total Protein 09/30/2022 7.2  6.0 - 8.4 g/dL Final    Albumin 09/30/2022 4.0  3.5 - 5.2 g/dL Final    Total Bilirubin 09/30/2022 0.3  0.1 - 1.0 mg/dL Final    Alkaline Phosphatase 09/30/2022 78  55 - 135 U/L Final    AST 09/30/2022 20  10 - 40 U/L Final    ALT 09/30/2022 39  10 - 44 U/L Final    Anion Gap 09/30/2022 12  8 - 16 mmol/L Final    eGFR 09/30/2022 >60  >60 mL/min/1.73 m^2 Final    TSH 09/30/2022 3.046  0.400 - 4.000 uIU/mL Final      ?   Imaging:    No results found for this or any previous visit (from the past 2160 hour(s)).     No results found for this or any previous visit (from the past 2160 hour(s)).     ?   Assessment/Plan:     Problem List Items Addressed This Visit          Hematology    Acute deep vein thrombosis (DVT) of right upper extremity     Patient presented to ED  with right arm pain and swelling  US 08/18/2022  revealed a clot in the right subclavian vein, right axillary vein, right brachial vein, right basilic vein consistent with DVT.     -patient continues on Eliquis 5 mg b.i.d. without difficulty  -denies recurrent swelling or discomfort abnormal bleeding bruising            Oncology    Malignant neoplasm of upper-outer quadrant of left breast in female, estrogen receptor negative - Primary      Cancer Staging   Malignant neoplasm of upper-outer quadrant of left breast in female, estrogen receptor negative  Staging form: Breast, AJCC 8th Edition  - Clinical stage from 6/22/2022: Stage IB (cT1c, cN0, cM0, G3, ER-, CT-, HER2-) - Signed by Maris Hathaway MD on 6/22/2022    [employer] germline genetic testing negative    Clinical exam and mammogram/ultrasound without axillary adenopathy.  PET scan without evidence of distant metastatic disease.      Contralateral breast biopsy benign     Recommend  repeat physical exam and consider imaging evaluation following carboplatin paclitaxel and pembrolizumab portion of neoadjuvant therapy. Plan for surgery, radiation and further adjuvant therapy as indicated.      Labs reviewed, no concerning cytopenias  -Case discussed with  oncologist  -Ok to proceed with C4D1 of  Keytruda/Carbo/Taxol today    Follow-up in 1 week with CBC CMP and MD for C4D8 Taxol         Relevant Orders    CBC Auto Differential (Completed)    Comprehensive Metabolic Panel (Completed)    TSH (Completed)              STORMY KelleyP-C  Hematology/Oncology

## 2022-10-12 ENCOUNTER — PATIENT MESSAGE (OUTPATIENT)
Dept: HEMATOLOGY/ONCOLOGY | Facility: CLINIC | Age: 47
End: 2022-10-12
Payer: COMMERCIAL

## 2022-10-13 ENCOUNTER — OFFICE VISIT (OUTPATIENT)
Dept: HEMATOLOGY/ONCOLOGY | Facility: CLINIC | Age: 47
End: 2022-10-13
Payer: COMMERCIAL

## 2022-10-13 ENCOUNTER — TELEPHONE (OUTPATIENT)
Dept: HEMATOLOGY/ONCOLOGY | Facility: CLINIC | Age: 47
End: 2022-10-13
Payer: COMMERCIAL

## 2022-10-13 ENCOUNTER — LAB VISIT (OUTPATIENT)
Dept: LAB | Facility: HOSPITAL | Age: 47
End: 2022-10-13
Attending: INTERNAL MEDICINE
Payer: COMMERCIAL

## 2022-10-13 VITALS
OXYGEN SATURATION: 98 % | DIASTOLIC BLOOD PRESSURE: 89 MMHG | TEMPERATURE: 98 F | BODY MASS INDEX: 31.73 KG/M2 | WEIGHT: 185.88 LBS | HEIGHT: 64 IN | SYSTOLIC BLOOD PRESSURE: 124 MMHG | HEART RATE: 88 BPM

## 2022-10-13 DIAGNOSIS — Z17.1 MALIGNANT NEOPLASM OF UPPER-OUTER QUADRANT OF LEFT BREAST IN FEMALE, ESTROGEN RECEPTOR NEGATIVE: ICD-10-CM

## 2022-10-13 DIAGNOSIS — M79.10 MYALGIA: ICD-10-CM

## 2022-10-13 DIAGNOSIS — C50.412 MALIGNANT NEOPLASM OF UPPER-OUTER QUADRANT OF LEFT BREAST IN FEMALE, ESTROGEN RECEPTOR NEGATIVE: ICD-10-CM

## 2022-10-13 DIAGNOSIS — M60.9 MYOSITIS OF LOWER EXTREMITY, UNSPECIFIED LATERALITY, UNSPECIFIED MYOSITIS TYPE: ICD-10-CM

## 2022-10-13 DIAGNOSIS — M79.10 MYALGIA: Primary | ICD-10-CM

## 2022-10-13 DIAGNOSIS — D63.0 ANEMIA IN NEOPLASTIC DISEASE: ICD-10-CM

## 2022-10-13 LAB
ALBUMIN SERPL BCP-MCNC: 3.9 G/DL (ref 3.5–5.2)
ALP SERPL-CCNC: 72 U/L (ref 55–135)
ALT SERPL W/O P-5'-P-CCNC: 38 U/L (ref 10–44)
ANION GAP SERPL CALC-SCNC: 10 MMOL/L (ref 8–16)
AST SERPL-CCNC: 25 U/L (ref 10–40)
BASOPHILS # BLD AUTO: 0.03 K/UL (ref 0–0.2)
BASOPHILS NFR BLD: 0.4 % (ref 0–1.9)
BILIRUB SERPL-MCNC: 0.2 MG/DL (ref 0.1–1)
BUN SERPL-MCNC: 8 MG/DL (ref 6–20)
CALCIUM SERPL-MCNC: 9.5 MG/DL (ref 8.7–10.5)
CHLORIDE SERPL-SCNC: 106 MMOL/L (ref 95–110)
CK SERPL-CCNC: 34 U/L (ref 20–180)
CO2 SERPL-SCNC: 24 MMOL/L (ref 23–29)
CREAT SERPL-MCNC: 0.8 MG/DL (ref 0.5–1.4)
DIFFERENTIAL METHOD: ABNORMAL
EOSINOPHIL # BLD AUTO: 0 K/UL (ref 0–0.5)
EOSINOPHIL NFR BLD: 0.4 % (ref 0–8)
ERYTHROCYTE [DISTWIDTH] IN BLOOD BY AUTOMATED COUNT: 16.9 % (ref 11.5–14.5)
EST. GFR  (NO RACE VARIABLE): >60 ML/MIN/1.73 M^2
GLUCOSE SERPL-MCNC: 96 MG/DL (ref 70–110)
HCT VFR BLD AUTO: 32.5 % (ref 37–48.5)
HGB BLD-MCNC: 10.4 G/DL (ref 12–16)
IMM GRANULOCYTES # BLD AUTO: 0.11 K/UL (ref 0–0.04)
IMM GRANULOCYTES NFR BLD AUTO: 1.6 % (ref 0–0.5)
LYMPHOCYTES # BLD AUTO: 2.5 K/UL (ref 1–4.8)
LYMPHOCYTES NFR BLD: 36.5 % (ref 18–48)
MCH RBC QN AUTO: 28.2 PG (ref 27–31)
MCHC RBC AUTO-ENTMCNC: 32 G/DL (ref 32–36)
MCV RBC AUTO: 88 FL (ref 82–98)
MONOCYTES # BLD AUTO: 0.8 K/UL (ref 0.3–1)
MONOCYTES NFR BLD: 12.1 % (ref 4–15)
NEUTROPHILS # BLD AUTO: 3.3 K/UL (ref 1.8–7.7)
NEUTROPHILS NFR BLD: 49 % (ref 38–73)
NRBC BLD-RTO: 0 /100 WBC
PLATELET # BLD AUTO: 299 K/UL (ref 150–450)
PMV BLD AUTO: 10.2 FL (ref 9.2–12.9)
POTASSIUM SERPL-SCNC: 4 MMOL/L (ref 3.5–5.1)
PROT SERPL-MCNC: 7 G/DL (ref 6–8.4)
RBC # BLD AUTO: 3.69 M/UL (ref 4–5.4)
SODIUM SERPL-SCNC: 140 MMOL/L (ref 136–145)
TSH SERPL DL<=0.005 MIU/L-ACNC: 1.11 UIU/ML (ref 0.4–4)
WBC # BLD AUTO: 6.71 K/UL (ref 3.9–12.7)

## 2022-10-13 PROCEDURE — 99999 PR PBB SHADOW E&M-EST. PATIENT-LVL III: ICD-10-PCS | Mod: PBBFAC,,, | Performed by: INTERNAL MEDICINE

## 2022-10-13 PROCEDURE — 99999 PR PBB SHADOW E&M-EST. PATIENT-LVL III: CPT | Mod: PBBFAC,,, | Performed by: INTERNAL MEDICINE

## 2022-10-13 PROCEDURE — 80053 COMPREHEN METABOLIC PANEL: CPT | Performed by: INTERNAL MEDICINE

## 2022-10-13 PROCEDURE — 36415 COLL VENOUS BLD VENIPUNCTURE: CPT | Performed by: INTERNAL MEDICINE

## 2022-10-13 PROCEDURE — 84443 ASSAY THYROID STIM HORMONE: CPT | Performed by: INTERNAL MEDICINE

## 2022-10-13 PROCEDURE — 82550 ASSAY OF CK (CPK): CPT | Performed by: INTERNAL MEDICINE

## 2022-10-13 PROCEDURE — 99215 OFFICE O/P EST HI 40 MIN: CPT | Mod: S$GLB,,, | Performed by: INTERNAL MEDICINE

## 2022-10-13 PROCEDURE — 85025 COMPLETE CBC W/AUTO DIFF WBC: CPT | Performed by: INTERNAL MEDICINE

## 2022-10-13 PROCEDURE — 99215 PR OFFICE/OUTPT VISIT, EST, LEVL V, 40-54 MIN: ICD-10-PCS | Mod: S$GLB,,, | Performed by: INTERNAL MEDICINE

## 2022-10-13 NOTE — PATIENT INSTRUCTIONS
Add on CK lab today  Tomorrow chemo  RV in 1 wk for cbc, cmp and chemo consideration, Dr. Hathaway

## 2022-10-13 NOTE — PROGRESS NOTES
Subjective:      DATE OF VISIT: 10/13/22  ?  Patient ID:Jaison Noel is a 47 y.o. female.?? MR#: [id number]     ? Primary Care Providers:  Alesha Alonso MD, MD (General)     PRIMARY ONCOLOGIST: Dr. Hathaway    CHIEF COMPLAINT:  Follow-up?     ONCOLOGIC DIAGNOSIS:  Stage IB, cT1c, cN0, cM0 left breast upper outer quadrant 2:00 a.m. invasive ductal carcinoma grade 3 ER/ND negative, HER2 negative  ?   CURRENT TREATMENT:  neoadjuvant chemotherapy, carboplatin paclitaxel pembrolizumab followed by doxorubicin cyclophosphamide pembrolizumab    PAST TREATMENT:  Biopsy only  ?   INTERVAL EVENTS:     She presents in preparation for cycle 4 day 15.  Couple days following cycle 4 day 8 she developed right leg pain severe from knee downward.  Subsequently developed similar left knee /  Calf pain.  She is continued on anticoagulation for upper extremity thrombosis which has resolved edema.  No associated edema lower extremities.  No new chest pain or shortness of breath.  Today possible mild improvement.  No known injury or strain.  No other new symptoms or concerns.    ONCOLOGIC HISTORY:     Ms. Noel is a 46-year-old woman with hypothyroidism and hypertension.  She presented for routine screening mammogram 11/30/2021 showing left breast mass upper outer posterior position.    12/03/2021 diagnostic mammogram and ultrasound  Left  Mammo Digital Diagnostic Bilat with Pablito  There is a mass seen in the upper outer quadrant of the left breast in the posterior depth.      US Breast Bilateral Limited  There are 3 similar complicated cysts seen in the upper outer quadrant of the left breast in the posterior depth. The largest cyst is thought to be the correlate and measures 0.7 x 0.5 x 0.5 cm.  There are 2 additional complex cysts also seen in the upper outer quadrant of the left breast that measure 0.4 x 0.2 x 0.3 cm and 0.6 x 0.3 x 0.4 cm.      Right  Mammo Digital Diagnostic Bilat with Pablito  There is a mass seen in the lower  central region of the right breast in the posterior depth. The posterior margin of this lesion is not seen on mammography.  The lesion appears to abut and overlie the pectoralis musculature.  The lesion appears to measure at least 9 mm in size.      US Breast Bilateral Limited  There is a mass seen in the lower central region of the right breast. A definitive correlate is not definitely seen on ultrasound.  There is a 6 mm hypoechoic lesion likely representing a complex cyst within the central portion of the breast that appears to be too small to be the correlate for the mammographic finding.  There is an additional 1.0 x 0.4 x 0.6 cm hypoechoic area that is located within a ridge of tissue and also does not definitively correlate to the lesion.  It is possible this lesion was present on prior studies although only space visualized on the most recent study due to increased tissue included on the MLO view.  The visualized margins of the lesion also appear to be fairly smooth and therefore the lesion can be followed.      Impression:  Left  Mass: Left breast mass at the upper outer posterior position. Assessment: 3 - Probably benign. Short Interval Follow-Up in 6 Months is recommended.      Right  Mass: Right breast mass at the lower central posterior position. Assessment: 3 - Probably benign. Short Interval Follow-Up in 6 Months is recommended.      BI-RADS Category:   Overall: 3 - Probably Benign    Repeat evaluation with diagnostic mammogram and ultrasound on 05/24/2022 notable for a suspicious left breast lesion 1.3 cm 2:00 a.m.    Right breast: No detrimental mammographic change. Well-circumscribed lesion within the lower outer posterior breast is unchanged mammographically. 8.6 mm complicated cyst present at 08:00 o'clock, 12 cm from the nipple and corresponds to the stable mammographic finding.       Follow-up imaging of the previously noted central lesions demonstrate no detrimental change.  8 mm complicated  cyst noted at 04:00 o'clock, 4 cm from the nipple.         Left breast: Interval increase in size of an upper outer quadrant mass on mammogram. On ultrasound there is an enlarging complicated cystic lesion measuring 1.3 cm at 02:00 o'clock, 7 cm from the nipple. Other complicated cysts within the upper outer breast demonstrate no detrimental change.  No suspicious left axillary lymph nodes.    Ultrasound-guided biopsy left breast lesion on 2022  Pathology:  Final Pathologic Diagnosis 1. Left breast mass (2 o'clock position), biopsy:       -  High-grade invasive carcinoma of no special type (ductal) with   associated tumor necrosis,          see comment     SYNOPTIC REPORT   PROCEDURE:  Biopsy   SPECIMEN LATERALITY:  Left breast   TUMOR SITE:  2 o'clock position   HISTOLOGIC TYPE:  Invasive carcinoma of no special type (ductal)   HISTOLOGIC GRADE:  Grade 3 of 3          TUBULE FORMATION:  3          PLEOMORPHISM:  3          MITOTIC RATE:  3   TUMOR SIZE:   At least 10 mm in greatest linear dimension   DUCTAL CARCINOMA IN SITU (DCIS):  Not identified   LYMPHOVASCULAR INVASION:  Not identified   MICROCALCIFICATIONS:  Not identified   BREAST BIOMARKERS (PERFORMED WITH APPROPRIATE CONTROLS):           ER:   Negative (0)           HI:   Negative (0)           HER2:  Negative (1+)           KI67:  Greater than 95%       2022 PET-CT without evidence of distant metastatic disease.     FDG avid left breast lesion consistent with the known malignancy.  No other sites of suspicious uptake identified.    Menarche 12 years old  ; 1 miscarriage, age of 1st pregnancy 16 years old  Postmenopausal last menstrual period at 44 years old s/p PIERRE/BSO  Exogenous estrogen for about 1 year last when 43yo.  No history of prior radiation  No prior breast biopsy    Family history notable for mother with either breast or ovarian cancer diagnosis 64 years old.    Cycle 1 day 1 neoadjuvant chemotherapy 07/15/2022      Review of  Systems    ?   A comprehensive 14-point review of systems was reviewed with patient and was negative other than as specified above.   ?     Objective:      Physical Exam      ?   Vitals:    10/13/22 0847   BP: 124/89   Pulse: 88   Temp: 97.6 °F (36.4 °C)     Chaperone declined   ?   ECOG:?0   General appearance: Generally well appearing, in no acute distress.   Head, eyes, ears, nose, and throat: moist mucous membranes.   Respiratory:  Normal work of breathing  Breast:  Bilateral breasts without palpable abnormality, skin or nipple abnormality or asymmetry appreciated.  Bilateral axilla without palpable adenopathy.  Abdomen: nontender, nondistended.   Extremities: Warm,  no appreciable edema  Neurologic: Alert and oriented. Grossly normal strength, coordination, and gait.   Skin: No rashes, ecchymoses or petechial lesion.   Psychiatric:  Normal mood and affect.      Labs    Lab Results   Component Value Date    WBC 6.71 10/13/2022    HGB 10.4 (L) 10/13/2022    HCT 32.5 (L) 10/13/2022    MCV 88 10/13/2022     10/13/2022         Chemistry        Component Value Date/Time     10/13/2022 0833    K 4.0 10/13/2022 0833     10/13/2022 0833    CO2 24 10/13/2022 0833    BUN 8 10/13/2022 0833    CREATININE 0.8 10/13/2022 0833    GLU 96 10/13/2022 0833        Component Value Date/Time    CALCIUM 9.5 10/13/2022 0833    ALKPHOS 72 10/13/2022 0833    AST 25 10/13/2022 0833    ALT 38 10/13/2022 0833    BILITOT 0.2 10/13/2022 0833    ESTGFRAFRICA >60 07/29/2022 1006    EGFRNONAA >60 07/29/2022 1006        Lab Results   Component Value Date    TSH 1.108 10/13/2022       Imaging:  ?    No results found for this or any previous visit (from the past 2160 hour(s)).  No results found for this or any previous visit (from the past 2160 hour(s)).  No results found for this or any previous visit (from the past 2160 hour(s)).        Pathology:    No visits with results within 1 Day(s) from this visit.   Latest known visit  "with results is:   Hospital Outpatient Visit on 06/09/2022   Component Date Value Ref Range Status    Final Pathologic Diagnosis 06/09/2022    Final                    Value:1. Left breast mass (2 o'clock position), biopsy:      -  High-grade invasive carcinoma of no special type (ductal) with  associated tumor necrosis,         see comment    SYNOPTIC REPORT  PROCEDURE:  Biopsy  SPECIMEN LATERALITY:  Left breast  TUMOR SITE:  2 o'clock position  HISTOLOGIC TYPE:  Invasive carcinoma of no special type (ductal)  HISTOLOGIC GRADE:  Grade 3 of 3         TUBULE FORMATION:  3         PLEOMORPHISM:  3         MITOTIC RATE:  3  TUMOR SIZE:   At least 10 mm in greatest linear dimension  DUCTAL CARCINOMA IN SITU (DCIS):  Not identified  LYMPHOVASCULAR INVASION:  Not identified  MICROCALCIFICATIONS:  Not identified  BREAST BIOMARKERS (PERFORMED WITH APPROPRIATE CONTROLS):          ER:   Negative (0)          TN:   Negative (0)          HER2:  Negative (1+)          KI67:  Greater than 95%      Gross 06/09/2022    Final                    Value:Patient ID/Pathology ID [id number]  In formalin, labeled "left breast 02:00 o'clock, in formalin at 08:40", is a  2.5 x 2.5 cm aggregate of pink-yellow needle biopsy cores.  Entirely  submitted in cassette DYM--1-A  Ischemic time is not provided.  The fixation time is greater than 6 hrs and  less than 72 hrs.  A gross picture of the container and cassette is taken and  added to file.  [facility] cm      Comment 06/09/2022    Final                    Value:Immunohistochemical stain with appropriate control for p63 was performed and  shows loss of myoepithelial cells throughout the lesion, with no evidence of  ductal carcinoma in situ.  The patient's history of ovarian carcinoma is  noted.  Immunohistochemical stains for GATA3 and PAX8 appropriate controls  show tumor cell positivity for GATA3 and negativity for [place] 8, confirming  ductal (breast) origin.  This case seen in " consultation with Dr. Montesinos who agrees with the above  diagnosis.      Disclaimer 06/09/2022    Final                    Value:Unless the case is a 'gross only' or additional testing only, the final  diagnosis for each specimen is based on a microscopic examination of  appropriate tissue sections.  ER immunohistochemical staining (clone SP1, DAB detection method) is  performed on formalin-fixed, paraffin embedded tissue sections. The  percentage of cell nuclei stained and the strength of staining is reported  (weak, moderate, strong), using the 2010 ACSO/CAP scoring guidelines. Tumors  used for determing predictive markers are fixed in10% neutral buffered  formalin for 6-72 hours. It has been cleared by the U.S[place] FDA for use as an  IVD test. This assay has not been validated on decalcified tissues. Results  should be interpreted with caution given the likelihood of false negativity  on decalcified specimens.  HER-2/aston IHC (4B5) clone, DAB detection method) is done on 10% buffered  formalin-fixed (for 6-72 hrs), paraffin embedded tissue sections. The scoring  is completed on a 4-tiered scoring system of membran                          e staining using the 2014  ASCO/CAP scoring guidelines. It has been cleared by the U.S. FDA for use as  an IVD test. This assay has not been validated on decalcified tissues.  Results should be interpreted with caution given the likelihood of false  negativity on decalcified specimens.  PgR immunohistochemical staining (clone 1E2, DAB detection method) is  performed on formalin-fixed, paraffin embedded tissue sections. The  percentage of cell nuclei stained and the strength of staining is report  (weak, moderate, strong), using 2010 ASCO/CAP scoring guidelines. Tumors used  for determing predictive markers are fixed in 10% neutral buffered formalin  for 6-72 hours. It has been cleared by the [place].S. FDA for use as an IVD test.  This assay has not been validated on decalcified tissues.  Results should be  interpreted with caution given the likelihood of false negativity on  decalcified specimens.            ?   Assessment/Plan:   Myalgia  -     CK; Future; Expected date: 10/13/2022    Malignant neoplasm of upper-outer quadrant of left breast in female, estrogen receptor negative    Anemia in neoplastic disease    Myositis of lower extremity, unspecified laterality, unspecified myositis type           1. Myalgia    2. Malignant neoplasm of upper-outer quadrant of left breast in female, estrogen receptor negative    3. Anemia in neoplastic disease    4. Myositis of lower extremity, unspecified laterality, unspecified myositis type                Plan:     Problem List Items Addressed This Visit          Oncology    Malignant neoplasm of upper-outer quadrant of left breast in female, estrogen receptor negative     Other Visit Diagnoses       Myalgia    -  Primary    Anemia in neoplastic disease        Myositis of lower extremity, unspecified laterality, unspecified myositis type              Stage IB, cT1c cN0 cM0 left breast upper outer quadrant 2:00 a.m. invasive ductal carcinoma grade 3 ER/MI negative, HER2 negative:  - [facility] germline genetic testing negative   Clinical exam and mammogram/ultrasound without axillary adenopathy.  PET scan without evidence of distant metastatic disease.   We discussed that while early stage and T1c, higher risk features given triple negative and high grade 3 and high ki-67 >95% with noted growth over just 6 month interval recommendation for neoadjuavant approach with chemo immunotherapy per Guadalupe et al. NEJ 2020. She is healthy aside from prior hypothyroidism now not on supplement.  We did discuss risks and benefits of therapy and importance of re-evaluation with plan for surgery, radiation and further adjuvant therapy as indicated.  She expressed good understanding and agree with the plan for follow-up per below.    Contralateral breast biopsy benign       Interval physical exam without palpable abnormality in bilateral breast or axilla.  Will discuss with her surgeon interval assessment.  She is been tolerating treatment exceptionally well plan for cycle 4 day 15 however over the last couple days developed bilateral leg pain from knee downward no associated edema and she is on anticoagulation for upper extremity thrombosis feel thrombotic event highly unlikely but if develops additional symptoms low threshold for scan.  Discussed potential side effect from her therapy or immunotherapy/myositis. CK was within normal limits.    Prior transaminitis resolved.  Labs with progressive anemia  seen on again seen on labs today.  Continue to monitor.  Right upper extremity Thrombosis:  Status post thrombectomy port removal on anticoagulation.  Port replaced by Interventional Radiology left upper chest.      Follow-Up:   C3D8 today  RV in 1 wk cbc, cmp D1 c3d15 planned

## 2022-10-13 NOTE — Clinical Note
Wanted to ask what you would like for interval assessment of her breast tumor, too small initially and now to palpate. Will you be assessing her soon and also would you like me to order mammo/US? She is about to finish carbo taxola dn onto AC portion

## 2022-10-13 NOTE — TELEPHONE ENCOUNTER
Nurse asked [facility] lab if CK order can be linked to labs otday, tech confirmed and told nurse to call [facility] lab to confirm linked order. [facility] confirmed.

## 2022-10-14 ENCOUNTER — INFUSION (OUTPATIENT)
Dept: INFUSION THERAPY | Facility: HOSPITAL | Age: 47
End: 2022-10-14
Attending: INTERNAL MEDICINE
Payer: COMMERCIAL

## 2022-10-14 VITALS
HEART RATE: 98 BPM | BODY MASS INDEX: 31.73 KG/M2 | HEIGHT: 64 IN | DIASTOLIC BLOOD PRESSURE: 78 MMHG | RESPIRATION RATE: 18 BRPM | SYSTOLIC BLOOD PRESSURE: 113 MMHG | WEIGHT: 185.88 LBS | TEMPERATURE: 98 F | OXYGEN SATURATION: 97 %

## 2022-10-14 DIAGNOSIS — Z17.1 MALIGNANT NEOPLASM OF UPPER-OUTER QUADRANT OF LEFT BREAST IN FEMALE, ESTROGEN RECEPTOR NEGATIVE: Primary | ICD-10-CM

## 2022-10-14 DIAGNOSIS — C50.412 MALIGNANT NEOPLASM OF UPPER-OUTER QUADRANT OF LEFT BREAST IN FEMALE, ESTROGEN RECEPTOR NEGATIVE: Primary | ICD-10-CM

## 2022-10-14 PROCEDURE — 96367 TX/PROPH/DG ADDL SEQ IV INF: CPT

## 2022-10-14 PROCEDURE — 25000003 PHARM REV CODE 250: Performed by: INTERNAL MEDICINE

## 2022-10-14 PROCEDURE — 96413 CHEMO IV INFUSION 1 HR: CPT

## 2022-10-14 PROCEDURE — A4216 STERILE WATER/SALINE, 10 ML: HCPCS | Performed by: INTERNAL MEDICINE

## 2022-10-14 PROCEDURE — 96375 TX/PRO/DX INJ NEW DRUG ADDON: CPT

## 2022-10-14 PROCEDURE — 63600175 PHARM REV CODE 636 W HCPCS: Performed by: INTERNAL MEDICINE

## 2022-10-14 RX ORDER — SODIUM CHLORIDE 0.9 % (FLUSH) 0.9 %
10 SYRINGE (ML) INJECTION
Status: DISCONTINUED | OUTPATIENT
Start: 2022-10-14 | End: 2022-10-14 | Stop reason: HOSPADM

## 2022-10-14 RX ORDER — FAMOTIDINE 10 MG/ML
20 INJECTION INTRAVENOUS
Status: COMPLETED | OUTPATIENT
Start: 2022-10-14 | End: 2022-10-14

## 2022-10-14 RX ORDER — HEPARIN 100 UNIT/ML
500 SYRINGE INTRAVENOUS
Status: DISCONTINUED | OUTPATIENT
Start: 2022-10-14 | End: 2022-10-14 | Stop reason: HOSPADM

## 2022-10-14 RX ADMIN — SODIUM CHLORIDE: 0.9 INJECTION, SOLUTION INTRAVENOUS at 10:10

## 2022-10-14 RX ADMIN — PACLITAXEL 150 MG: 6 INJECTION, SOLUTION INTRAVENOUS at 11:10

## 2022-10-14 RX ADMIN — FAMOTIDINE 20 MG: 10 INJECTION INTRAVENOUS at 10:10

## 2022-10-14 RX ADMIN — HEPARIN 500 UNITS: 100 SYRINGE at 12:10

## 2022-10-14 RX ADMIN — Medication 10 ML: at 12:10

## 2022-10-14 RX ADMIN — DIPHENHYDRAMINE HYDROCHLORIDE 50 MG: 50 INJECTION, SOLUTION INTRAMUSCULAR; INTRAVENOUS at 10:10

## 2022-10-14 RX ADMIN — DEXAMETHASONE SODIUM PHOSPHATE 10 MG: 4 INJECTION, SOLUTION INTRA-ARTICULAR; INTRALESIONAL; INTRAMUSCULAR; INTRAVENOUS; SOFT TISSUE at 10:10

## 2022-10-14 NOTE — PLAN OF CARE
Discussed plan of care with pt. Addressed any and ongoing concerns. Pt denies   Problem: Adult Inpatient Plan of Care  Goal: Plan of Care Review  10/14/2022 1209 by Isabelle Blum RN  Outcome: Ongoing, Progressing  10/14/2022 1012 by Isabelle Blum RN  Outcome: Ongoing, Progressing  Goal: Patient-Specific Goal (Individualized)  10/14/2022 1209 by Isabelle Blum RN  Outcome: Ongoing, Progressing  10/14/2022 1012 by Isabelle Blum RN  Outcome: Ongoing, Progressing  Flowsheets (Taken 10/14/2022 1012)  Anxieties, Fears or Concerns: Feeling fatigue and pressured has a lot going  Individualized Care Needs: sitting in recliner, blanket from home  Patient-Specific Goals (Include Timeframe): will tolerate treatment without adverse reactions  Goal: Absence of Hospital-Acquired Illness or Injury  10/14/2022 1209 by Isabelle Blum RN  Outcome: Ongoing, Progressing  10/14/2022 1012 by Isabelle Blum RN  Outcome: Ongoing, Progressing  Intervention: Identify and Manage Fall Risk  Flowsheets (Taken 10/14/2022 1209)  Safety Promotion/Fall Prevention: in recliner, wheels locked  Intervention: Prevent Infection  Flowsheets (Taken 10/14/2022 1209)  Infection Prevention:   hand hygiene promoted   personal protective equipment utilized   equipment surfaces disinfected  Goal: Optimal Comfort and Wellbeing  10/14/2022 1209 by Isabelle Blum RN  Outcome: Ongoing, Progressing  10/14/2022 1012 by Isabelle Blum RN  Outcome: Ongoing, Progressing  Intervention: Provide Person-Centered Care  Flowsheets (Taken 10/14/2022 1209)  Trust Relationship/Rapport:   care explained   choices provided   emotional support provided   empathic listening provided   questions answered   questions encouraged   thoughts/feelings acknowledged   reassurance provided

## 2022-10-21 ENCOUNTER — OFFICE VISIT (OUTPATIENT)
Dept: HEMATOLOGY/ONCOLOGY | Facility: CLINIC | Age: 47
End: 2022-10-21
Payer: COMMERCIAL

## 2022-10-21 ENCOUNTER — LAB VISIT (OUTPATIENT)
Dept: LAB | Facility: HOSPITAL | Age: 47
End: 2022-10-21
Attending: INTERNAL MEDICINE
Payer: COMMERCIAL

## 2022-10-21 ENCOUNTER — PATIENT MESSAGE (OUTPATIENT)
Dept: INFUSION THERAPY | Facility: HOSPITAL | Age: 47
End: 2022-10-21

## 2022-10-21 ENCOUNTER — INFUSION (OUTPATIENT)
Dept: INFUSION THERAPY | Facility: HOSPITAL | Age: 47
End: 2022-10-21
Attending: INTERNAL MEDICINE
Payer: COMMERCIAL

## 2022-10-21 ENCOUNTER — DOCUMENTATION ONLY (OUTPATIENT)
Dept: INFUSION THERAPY | Facility: HOSPITAL | Age: 47
End: 2022-10-21

## 2022-10-21 VITALS
SYSTOLIC BLOOD PRESSURE: 123 MMHG | TEMPERATURE: 97 F | BODY MASS INDEX: 32.29 KG/M2 | DIASTOLIC BLOOD PRESSURE: 88 MMHG | HEART RATE: 75 BPM | HEIGHT: 64 IN | WEIGHT: 189.13 LBS | OXYGEN SATURATION: 100 %

## 2022-10-21 VITALS
TEMPERATURE: 98 F | OXYGEN SATURATION: 100 % | SYSTOLIC BLOOD PRESSURE: 124 MMHG | DIASTOLIC BLOOD PRESSURE: 85 MMHG | HEART RATE: 98 BPM | RESPIRATION RATE: 16 BRPM

## 2022-10-21 DIAGNOSIS — C50.412 MALIGNANT NEOPLASM OF UPPER-OUTER QUADRANT OF LEFT BREAST IN FEMALE, ESTROGEN RECEPTOR NEGATIVE: ICD-10-CM

## 2022-10-21 DIAGNOSIS — C50.412 MALIGNANT NEOPLASM OF UPPER-OUTER QUADRANT OF LEFT BREAST IN FEMALE, ESTROGEN RECEPTOR NEGATIVE: Primary | ICD-10-CM

## 2022-10-21 DIAGNOSIS — Z17.1 MALIGNANT NEOPLASM OF UPPER-OUTER QUADRANT OF LEFT BREAST IN FEMALE, ESTROGEN RECEPTOR NEGATIVE: Primary | ICD-10-CM

## 2022-10-21 DIAGNOSIS — M25.50 ARTHRALGIA, UNSPECIFIED JOINT: ICD-10-CM

## 2022-10-21 DIAGNOSIS — R74.01 TRANSAMINITIS: ICD-10-CM

## 2022-10-21 DIAGNOSIS — M79.10 MYALGIA: ICD-10-CM

## 2022-10-21 DIAGNOSIS — Z17.1 MALIGNANT NEOPLASM OF UPPER-OUTER QUADRANT OF LEFT BREAST IN FEMALE, ESTROGEN RECEPTOR NEGATIVE: ICD-10-CM

## 2022-10-21 DIAGNOSIS — D63.0 ANEMIA IN NEOPLASTIC DISEASE: ICD-10-CM

## 2022-10-21 LAB
ALBUMIN SERPL BCP-MCNC: 3.8 G/DL (ref 3.5–5.2)
ALP SERPL-CCNC: 83 U/L (ref 55–135)
ALT SERPL W/O P-5'-P-CCNC: 92 U/L (ref 10–44)
ANION GAP SERPL CALC-SCNC: 8 MMOL/L (ref 8–16)
AST SERPL-CCNC: 61 U/L (ref 10–40)
BASOPHILS # BLD AUTO: 0.03 K/UL (ref 0–0.2)
BASOPHILS NFR BLD: 0.5 % (ref 0–1.9)
BILIRUB SERPL-MCNC: 0.2 MG/DL (ref 0.1–1)
BUN SERPL-MCNC: 6 MG/DL (ref 6–20)
CALCIUM SERPL-MCNC: 9.4 MG/DL (ref 8.7–10.5)
CHLORIDE SERPL-SCNC: 108 MMOL/L (ref 95–110)
CO2 SERPL-SCNC: 26 MMOL/L (ref 23–29)
CREAT SERPL-MCNC: 0.8 MG/DL (ref 0.5–1.4)
DIFFERENTIAL METHOD: ABNORMAL
EOSINOPHIL # BLD AUTO: 0.1 K/UL (ref 0–0.5)
EOSINOPHIL NFR BLD: 1.1 % (ref 0–8)
ERYTHROCYTE [DISTWIDTH] IN BLOOD BY AUTOMATED COUNT: 16.3 % (ref 11.5–14.5)
EST. GFR  (NO RACE VARIABLE): >60 ML/MIN/1.73 M^2
GLUCOSE SERPL-MCNC: 100 MG/DL (ref 70–110)
HCT VFR BLD AUTO: 32.2 % (ref 37–48.5)
HGB BLD-MCNC: 10.2 G/DL (ref 12–16)
IMM GRANULOCYTES # BLD AUTO: 0.16 K/UL (ref 0–0.04)
IMM GRANULOCYTES NFR BLD AUTO: 2.4 % (ref 0–0.5)
LYMPHOCYTES # BLD AUTO: 2.5 K/UL (ref 1–4.8)
LYMPHOCYTES NFR BLD: 38.4 % (ref 18–48)
MCH RBC QN AUTO: 28.3 PG (ref 27–31)
MCHC RBC AUTO-ENTMCNC: 31.7 G/DL (ref 32–36)
MCV RBC AUTO: 89 FL (ref 82–98)
MONOCYTES # BLD AUTO: 0.6 K/UL (ref 0.3–1)
MONOCYTES NFR BLD: 9.7 % (ref 4–15)
NEUTROPHILS # BLD AUTO: 3.2 K/UL (ref 1.8–7.7)
NEUTROPHILS NFR BLD: 47.9 % (ref 38–73)
NRBC BLD-RTO: 0 /100 WBC
PLATELET # BLD AUTO: 312 K/UL (ref 150–450)
PMV BLD AUTO: 10.1 FL (ref 9.2–12.9)
POTASSIUM SERPL-SCNC: 3.5 MMOL/L (ref 3.5–5.1)
PROT SERPL-MCNC: 6.9 G/DL (ref 6–8.4)
RBC # BLD AUTO: 3.61 M/UL (ref 4–5.4)
SODIUM SERPL-SCNC: 142 MMOL/L (ref 136–145)
TSH SERPL DL<=0.005 MIU/L-ACNC: 0.93 UIU/ML (ref 0.4–4)
WBC # BLD AUTO: 6.59 K/UL (ref 3.9–12.7)

## 2022-10-21 PROCEDURE — 25000003 PHARM REV CODE 250: Performed by: INTERNAL MEDICINE

## 2022-10-21 PROCEDURE — 96413 CHEMO IV INFUSION 1 HR: CPT

## 2022-10-21 PROCEDURE — 96411 CHEMO IV PUSH ADDL DRUG: CPT

## 2022-10-21 PROCEDURE — 99999 PR PBB SHADOW E&M-EST. PATIENT-LVL IV: CPT | Mod: PBBFAC,,, | Performed by: INTERNAL MEDICINE

## 2022-10-21 PROCEDURE — 99215 OFFICE O/P EST HI 40 MIN: CPT | Mod: S$GLB,,, | Performed by: INTERNAL MEDICINE

## 2022-10-21 PROCEDURE — 63600175 PHARM REV CODE 636 W HCPCS: Mod: JG | Performed by: INTERNAL MEDICINE

## 2022-10-21 PROCEDURE — 85025 COMPLETE CBC W/AUTO DIFF WBC: CPT | Performed by: INTERNAL MEDICINE

## 2022-10-21 PROCEDURE — 96417 CHEMO IV INFUS EACH ADDL SEQ: CPT

## 2022-10-21 PROCEDURE — 96375 TX/PRO/DX INJ NEW DRUG ADDON: CPT

## 2022-10-21 PROCEDURE — 80053 COMPREHEN METABOLIC PANEL: CPT | Performed by: INTERNAL MEDICINE

## 2022-10-21 PROCEDURE — 36415 COLL VENOUS BLD VENIPUNCTURE: CPT | Performed by: INTERNAL MEDICINE

## 2022-10-21 PROCEDURE — 99215 PR OFFICE/OUTPT VISIT, EST, LEVL V, 40-54 MIN: ICD-10-PCS | Mod: S$GLB,,, | Performed by: INTERNAL MEDICINE

## 2022-10-21 PROCEDURE — 84443 ASSAY THYROID STIM HORMONE: CPT | Performed by: INTERNAL MEDICINE

## 2022-10-21 PROCEDURE — 96367 TX/PROPH/DG ADDL SEQ IV INF: CPT

## 2022-10-21 PROCEDURE — 99999 PR PBB SHADOW E&M-EST. PATIENT-LVL IV: ICD-10-PCS | Mod: PBBFAC,,, | Performed by: INTERNAL MEDICINE

## 2022-10-21 RX ORDER — DOXORUBICIN HYDROCHLORIDE 2 MG/ML
60 INJECTION, SOLUTION INTRAVENOUS
Status: CANCELLED | OUTPATIENT
Start: 2022-10-21

## 2022-10-21 RX ORDER — DIPHENHYDRAMINE HYDROCHLORIDE 50 MG/ML
50 INJECTION INTRAMUSCULAR; INTRAVENOUS ONCE AS NEEDED
Status: CANCELLED | OUTPATIENT
Start: 2022-10-21

## 2022-10-21 RX ORDER — DOXORUBICIN HYDROCHLORIDE 2 MG/ML
60 INJECTION, SOLUTION INTRAVENOUS
Status: COMPLETED | OUTPATIENT
Start: 2022-10-21 | End: 2022-10-21

## 2022-10-21 RX ORDER — SODIUM CHLORIDE 0.9 % (FLUSH) 0.9 %
10 SYRINGE (ML) INJECTION
Status: CANCELLED | OUTPATIENT
Start: 2022-10-21

## 2022-10-21 RX ORDER — HEPARIN 100 UNIT/ML
500 SYRINGE INTRAVENOUS
Status: CANCELLED | OUTPATIENT
Start: 2022-10-21

## 2022-10-21 RX ORDER — HEPARIN 100 UNIT/ML
500 SYRINGE INTRAVENOUS
Status: DISCONTINUED | OUTPATIENT
Start: 2022-10-21 | End: 2022-10-21 | Stop reason: HOSPADM

## 2022-10-21 RX ORDER — EPINEPHRINE 0.3 MG/.3ML
0.3 INJECTION SUBCUTANEOUS ONCE AS NEEDED
Status: CANCELLED | OUTPATIENT
Start: 2022-10-21

## 2022-10-21 RX ADMIN — CYCLOPHOSPHAMIDE 1140 MG: 200 INJECTION, SOLUTION INTRAVENOUS at 11:10

## 2022-10-21 RX ADMIN — HEPARIN 500 UNITS: 100 SYRINGE at 12:10

## 2022-10-21 RX ADMIN — DOXORUBICIN HYDROCHLORIDE 114 MG: 2 INJECTION, SOLUTION INTRAVENOUS at 11:10

## 2022-10-21 RX ADMIN — APREPITANT 130 MG: 130 INJECTION, EMULSION INTRAVENOUS at 10:10

## 2022-10-21 RX ADMIN — SODIUM CHLORIDE: 0.9 INJECTION, SOLUTION INTRAVENOUS at 09:10

## 2022-10-21 RX ADMIN — SODIUM CHLORIDE 200 MG: 9 INJECTION, SOLUTION INTRAVENOUS at 09:10

## 2022-10-21 NOTE — DISCHARGE INSTRUCTIONS
.[facility]  [address] [facility] [facility]  [address]  [phone] phone     [phone] fax  Hours of Operation: Monday- Friday 8:00am- 5:00pm  After hours phone  [phone]  Hematology / Oncology Physicians on call    Dr. Harshil Mack      Nurse Practitioners:    Chitra Gloria, JAYMIE Lechuga, JAYMIE Storm, JAYMIE Newton, JAYMIE Pena, JAYMIE Alcocer, PA      Please don't hesitate to call if you have any concerns.    .FALL PREVENTION   Falls often occur due to slipping, tripping or losing your balance. Here are ways to reduce your risk of falling again.   Was there anything that caused your fall that can be fixed, removed or replaced?   Make your home safe by keeping walkways clear of objects you may trip over.   Use non-slip pads under rugs.   Do not walk in poorly lit areas.   Do not stand on chairs or wobbly ladders.   Use caution when reaching overhead or looking upward. This position can cause a loss of balance.   Be sure your shoes fit properly, have non-slip bottoms and are in good condition.   Be cautious when going up and down stairs, curbs, and when walking on uneven sidewalks.   If your balance is poor, consider using a cane or walker.   If your fall was related to alcohol use, stop or limit alcohol intake.   If your fall was related to use of sleeping medicines, talk to your doctor about this. You may need to reduce your dosage at bedtime if you awaken during the night to go to the bathroom.   To reduce the need for nighttime bathroom trips:   Avoid drinking fluids for several hours before going to bed   Empty your bladder before going to bed   Men can keep a urinal at the bedside   © 5596-9792 [employer], [address] [place] [address], [place] [address]. All rights reserved. This information is not intended as a substitute for professional medical care. Always follow your healthcare  professional's instructions.  .WAYS TO HELP PREVENT INFECTION        WASH YOUR HANDS OFTEN DURING THE DAY, ESPECIALLY BEFORE YOU EAT, AFTER USING THE BATHROOM, AND AFTER TOUCHING ANIMALS    STAY AWAY FROM PEOPLE WHO HAVE ILLNESSES YOU CAN CATCH; SUCH AS COLDS, FLU, CHICKEN POX    TRY TO AVOID CROWDS    STAY AWAY FROM CHILDREN WHO RECENTLY HAVE RECEIVED LIVE VIRUS VACCINES    MAINTAIN GOOD MOUTH CARE    DO NOT SQUEEZE OR SCRATCH PIMPLES    CLEAN CUTS & SCRAPES RIGHT AWAY AND DAILY UNTIL HEALED WITH WARM WATER, SOAP & AN ANTISEPTIC    AVOID CONTACT WITH LITTER BOXES, BIRD CAGES, & FISH TANKS    AVOID STANDING WATER, IE., BIRD BATHS, FLOWER POTS/VASES, OR HUMIDIFIERS    WEAR GLOVES WHEN GARDENING OR CLEANING UP AFTER OTHERS, ESPECIALLY BABIES & SMALL CHILDREN    DO NOT EAT RAW FISH, SEAFOOD, MEAT, OR EGGS

## 2022-10-21 NOTE — PLAN OF CARE
Patient states she feels pretty good besides feeling fatigue. Discussed POC with patient and answered any questions.   Problem: Adult Inpatient Plan of Care  Goal: Plan of Care Review  Outcome: Ongoing, Progressing  Flowsheets (Taken 10/21/2022 1009)  Plan of Care Reviewed With: patient  Goal: Patient-Specific Goal (Individualized)  Outcome: Ongoing, Progressing  Flowsheets (Taken 10/21/2022 1009)  Anxieties, Fears or Concerns: Still feeling fatigue, no other complaints expressed  Individualized Care Needs: Elevated legs , blanket provided, declined snacks and a drink  Patient-Specific Goals (Include Timeframe): To tolerate treatment today  Goal: Optimal Comfort and Wellbeing  Outcome: Ongoing, Progressing  Intervention: Monitor Pain and Promote Comfort  Flowsheets (Taken 10/21/2022 1009)  Pain Management Interventions:   quiet environment facilitated   warm blanket provided  Intervention: Provide Person-Centered Care  Flowsheets (Taken 10/21/2022 1009)  Trust Relationship/Rapport:   care explained   choices provided   emotional support provided   empathic listening provided   questions answered   questions encouraged   reassurance provided   thoughts/feelings acknowledged     Problem: Anemia (Chemotherapy Effects)  Goal: Anemia Symptom Improvement  Outcome: Ongoing, Progressing  Intervention: Monitor and Manage Anemia  Flowsheets (Taken 10/21/2022 1009)  Safety Promotion/Fall Prevention:   in recliner, wheels locked   high risk medications identified   lighting adjusted   medications reviewed  Fatigue Management: frequent rest breaks encouraged     Problem: Nausea and Vomiting (Chemotherapy Effects)  Goal: Fluid and Electrolyte Balance  Outcome: Ongoing, Progressing  Intervention: Prevent and Manage Nausea and Vomiting  Flowsheets (Taken 10/21/2022 1009)  Environmental Support:   calm environment promoted   rest periods encouraged     Problem: Neurotoxicity (Chemotherapy Effects)  Goal: Neurotoxicity Symptom  Control  Outcome: Ongoing, Progressing  Intervention: Prevent or Manage Neurotoxicity Effects  Flowsheets (Taken 10/21/2022 1009)  Sensation Impairment Protection: external pressure sources minimized     Problem: Fall Injury Risk  Goal: Absence of Fall and Fall-Related Injury  Outcome: Ongoing, Progressing  Intervention: Identify and Manage Contributors  Flowsheets (Taken 10/21/2022 1009)  Self-Care Promotion: BADL personal objects within reach  Medication Review/Management:   medications reviewed   infusion initiated  Intervention: Promote Injury-Free Environment  Flowsheets (Taken 10/21/2022 1009)  Safety Promotion/Fall Prevention:   in recliner, wheels locked   high risk medications identified   lighting adjusted   medications reviewed

## 2022-10-21 NOTE — PROGRESS NOTES
Subjective:      DATE OF VISIT: 10/21/22  ?  Patient ID:Jaison Noel is a 47 y.o. female.?? MR#: [id number]     ? Primary Care Providers:  Alesha Alonso MD, MD (General)     PRIMARY ONCOLOGIST: Dr. Hathaway    CHIEF COMPLAINT:  Follow-up?     ONCOLOGIC DIAGNOSIS:  Stage IB, cT1c, cN0, cM0 left breast upper outer quadrant 2:00 a.m. invasive ductal carcinoma grade 3 ER/OK negative, HER2 negative  ?   CURRENT TREATMENT:  neoadjuvant chemotherapy, carboplatin paclitaxel pembrolizumab followed by doxorubicin cyclophosphamide pembrolizumab    PAST TREATMENT:  Biopsy only  ?   INTERVAL EVENTS:    Couple days after Taxol day 15 she again developed arthralgia in knee and legs which resolved use Norco 1 tablet daily.  No additional Tylenol    ONCOLOGIC HISTORY:     Ms. Noel is a 46-year-old woman with hypothyroidism and hypertension.  She presented for routine screening mammogram 11/30/2021 showing left breast mass upper outer posterior position.    12/03/2021 diagnostic mammogram and ultrasound  Left  Mammo Digital Diagnostic Bilat with [place]  There is a mass seen in the upper outer quadrant of the left breast in the posterior depth.      US Breast Bilateral Limited  There are 3 similar complicated cysts seen in the upper outer quadrant of the left breast in the posterior depth. The largest cyst is thought to be the correlate and measures 0.7 x 0.5 x 0.5 cm.  There are 2 additional complex cysts also seen in the upper outer quadrant of the left breast that measure 0.4 x 0.2 x 0.3 cm and 0.6 x 0.3 x 0.4 cm.      Right  Mammo Digital Diagnostic Bilat with P[place]ito  There is a mass seen in the lower central region of the right breast in the posterior depth. The posterior margin of this lesion is not seen on mammography.  The lesion appears to abut and overlie the pectoralis musculature.  The lesion appears to measure at least 9 mm in size.      US Breast Bilateral Limited  There is a mass seen in the lower central region of the  right breast. A definitive correlate is not definitely seen on ultrasound.  There is a 6 mm hypoechoic lesion likely representing a complex cyst within the central portion of the breast that appears to be too small to be the correlate for the mammographic finding.  There is an additional 1.0 x 0.4 x 0.6 cm hypoechoic area that is located within a ridge of tissue and also does not definitively correlate to the lesion.  It is possible this lesion was present on prior studies although only space visualized on the most recent study due to increased tissue included on the MLO view.  The visualized margins of the lesion also appear to be fairly smooth and therefore the lesion can be followed.      Impression:  Left  Mass: Left breast mass at the upper outer posterior position. Assessment: 3 - Probably benign. Short Interval Follow-Up in 6 Months is recommended.      Right  Mass: Right breast mass at the lower central posterior position. Assessment: 3 - Probably benign. Short Interval Follow-Up in 6 Months is recommended.      BI-RADS Category:   Overall: 3 - Probably Benign    Repeat evaluation with diagnostic mammogram and ultrasound on 05/24/2022 notable for a suspicious left breast lesion 1.3 cm 2:00 a.m.    Right breast: No detrimental mammographic change. Well-circumscribed lesion within the lower outer posterior breast is unchanged mammographically. 8.6 mm complicated cyst present at 08:00 o'clock, 12 cm from the nipple and corresponds to the stable mammographic finding.       Follow-up imaging of the previously noted central lesions demonstrate no detrimental change.  8 mm complicated cyst noted at 04:00 o'clock, 4 cm from the nipple.         Left breast: Interval increase in size of an upper outer quadrant mass on mammogram. On ultrasound there is an enlarging complicated cystic lesion measuring 1.3 cm at 02:00 o'clock, 7 cm from the nipple. Other complicated cysts within the upper outer breast demonstrate no  detrimental change.  No suspicious left axillary lymph nodes.    Ultrasound-guided biopsy left breast lesion on 2022  Pathology:  Final Pathologic Diagnosis 1. Left breast mass (2 o'clock position), biopsy:       -  High-grade invasive carcinoma of no special type (ductal) with   associated tumor necrosis,          see comment     SYNOPTIC REPORT   PROCEDURE:  Biopsy   SPECIMEN LATERALITY:  Left breast   TUMOR SITE:  2 o'clock position   HISTOLOGIC TYPE:  Invasive carcinoma of no special type (ductal)   HISTOLOGIC GRADE:  Grade 3 of 3          TUBULE FORMATION:  3          PLEOMORPHISM:  3          MITOTIC RATE:  3   TUMOR SIZE:   At least 10 mm in greatest linear dimension   DUCTAL CARCINOMA IN SITU (DCIS):  Not identified   LYMPHOVASCULAR INVASION:  Not identified   MICROCALCIFICATIONS:  Not identified   BREAST BIOMARKERS (PERFORMED WITH APPROPRIATE CONTROLS):           ER:   Negative (0)           WV:   Negative (0)           HER2:  Negative (1+)           KI67:  Greater than 95%       2022 PET-CT without evidence of distant metastatic disease.     FDG avid left breast lesion consistent with the known malignancy.  No other sites of suspicious uptake identified.    Menarche 12 years old  ; 1 miscarriage, age of 1st pregnancy 16 years old  Postmenopausal last menstrual period at 44 years old s/p PIERRE/BSO  Exogenous estrogen for about 1 year last when 43yo.  No history of prior radiation  No prior breast biopsy    Family history notable for mother with either breast or ovarian cancer diagnosis 64 years old.    Cycle 1 day 1 neoadjuvant chemotherapy 07/15/2022      Review of Systems    ?   A comprehensive 14-point review of systems was reviewed with patient and was negative other than as specified above.   ?     Objective:      Physical Exam      ?   Vitals:    10/21/22 0804   BP: 123/88   Pulse: 75   Temp: 97 °F (36.1 °C)    ECOG:?0   General appearance: Generally well appearing, in no acute distress.    Head, eyes, ears, nose, and throat: moist mucous membranes.   Respiratory:  Normal work of breathing  Abdomen: nontender, nondistended.   Extremities: Warm, without edema.   Neurologic: Alert and oriented. Grossly normal strength, coordination, and gait.   Skin: No rashes, ecchymoses or petechial lesion.   Psychiatric:  Normal mood and affect.      Labs    Lab Results   Component Value Date    WBC 6.59 10/21/2022    HGB 10.2 (L) 10/21/2022    HCT 32.2 (L) 10/21/2022    MCV 89 10/21/2022     10/21/2022         Chemistry        Component Value Date/Time     10/21/2022 0741    K 3.5 10/21/2022 0741     10/21/2022 0741    CO2 26 10/21/2022 0741    BUN 6 10/21/2022 0741    CREATININE 0.8 10/21/2022 0741     10/21/2022 0741        Component Value Date/Time    CALCIUM 9.4 10/21/2022 0741    ALKPHOS 83 10/21/2022 0741    AST 61 (H) 10/21/2022 0741    ALT 92 (H) 10/21/2022 0741    BILITOT 0.2 10/21/2022 0741    ESTGFRAFRICA >60 07/29/2022 1006    EGFRNONAA >60 07/29/2022 1006        Lab Results   Component Value Date    TSH 1.108 10/13/2022       Imaging:  ?    No results found for this or any previous visit (from the past 2160 hour(s)).  No results found for this or any previous visit (from the past 2160 hour(s)).  No results found for this or any previous visit (from the past 2160 hour(s)).        Pathology:    No visits with results within 1 Day(s) from this visit.   Latest known visit with results is:   Hospital Outpatient Visit on 06/09/2022   Component Date Value Ref Range Status    Final Pathologic Diagnosis 06/09/2022    Final                    Value:1. Left breast mass (2 o'clock position), biopsy:      -  High-grade invasive carcinoma of no special type (ductal) with  associated tumor necrosis,         see comment    SYNOPTIC REPORT  PROCEDURE:  Biopsy  SPECIMEN LATERALITY:  Left breast  TUMOR SITE:  2 o'clock position  HISTOLOGIC TYPE:  Invasive carcinoma of no special type  "(ductal)  HISTOLOGIC GRADE:  Grade 3 of 3         TUBULE FORMATION:  3         PLEOMORPHISM:  3         MITOTIC RATE:  3  TUMOR SIZE:   At least 10 mm in greatest linear dimension  DUCTAL CARCINOMA IN SITU (DCIS):  Not identified  LYMPHOVASCULAR INVASION:  Not identified  MICROCALCIFICATIONS:  Not identified  BREAST BIOMARKERS (PERFORMED WITH APPROPRIATE CONTROLS):          ER:   Negative (0)          ND:   Negative (0)          HER2:  Negative (1+)          KI67:  Greater than 95%      Gross 06/09/2022    Final                    Value:Patient ID/Pathology ID [id number]  In formalin, labeled "left breast 02:00 o'clock, in formalin at 08:40", is a  2.5 x 2.5 cm aggregate of pink-yellow needle biopsy cores.  Entirely  submitted in cassette WLN--1-A  Ischemic time is not provided.  The fixation time is greater than 6 hrs and  less than 72 hrs.  A gross picture of the container and cassette is taken and  added to file.  Charly HOLGUIN (ASCP) cm      Comment 06/09/2022    Final                    Value:Immunohistochemical stain with appropriate control for p63 was performed and  shows loss of myoepithelial cells throughout the lesion, with no evidence of  ductal carcinoma in situ.  The patient's history of ovarian carcinoma is  noted.  Immunohistochemical stains for GATA3 and PAX8 appropriate controls  show tumor cell positivity for GATA3 and negativity for [place] 8, confirming  ductal (breast) origin.  This case seen in consultation with Dr. Montesinos who agrees with the above  diagnosis.      Disclaimer 06/09/2022    Final                    Value:Unless the case is a 'gross only' or additional testing only, the final  diagnosis for each specimen is based on a microscopic examination of  appropriate tissue sections.  ER immunohistochemical staining (clone SP1, DAB detection method) is  performed on formalin-fixed, paraffin embedded tissue sections. The  percentage of cell nuclei stained and the strength of staining is " reported  (weak, moderate, strong), using the 2010 ACSO/CAP scoring guidelines. Tumors  used for determing predictive markers are fixed in10% neutral buffered  formalin for 6-72 hours. It has been cleared by the [place] FDA for use as an  IVD test. This assay has not been validated on decalcified tissues. Results  should be interpreted with caution given the likelihood of false negativity  on decalcified specimens.  HER-2/aston IHC (4B5) clone, DAB detection method) is done on 10% buffered  formalin-fixed (for 6-72 hrs), paraffin embedded tissue sections. The scoring  is completed on a 4-tiered scoring system of membran                          e staining using the 2014  ASCO/CAP scoring guidelines. It has been cleared by the [place] FDA for use as  an IVD test. This assay has not been validated on decalcified tissues.  Results should be interpreted with caution given the likelihood of false  negativity on decalcified specimens.  PgR immunohistochemical staining (clone 1E2, DAB detection method) is  performed on formalin-fixed, paraffin embedded tissue sections. The  percentage of cell nuclei stained and the strength of staining is report  (weak, moderate, strong), using 2010 ASCO/CAP scoring guidelines. Tumors used  for determing predictive markers are fixed in 10% neutral buffered formalin  for 6-72 hours. It has been cleared by the [place] FDA for use as an IVD test.  This assay has not been validated on decalcified tissues. Results should be  interpreted with caution given the likelihood of false negativity on  decalcified specimens.            ?   Assessment/Plan:   Malignant neoplasm of upper-outer quadrant of left breast in female, estrogen receptor negative    Anemia in neoplastic disease    Myalgia           1. Malignant neoplasm of upper-outer quadrant of left breast in female, estrogen receptor negative    2. Anemia in neoplastic disease    3. Myalgia                Plan:     Problem List Items Addressed This Visit           Oncology    Malignant neoplasm of upper-outer quadrant of left breast in female, estrogen receptor negative - Primary     Other Visit Diagnoses       Anemia in neoplastic disease        Myalgia              Stage IB, cT1c cN0 cM0 left breast upper outer quadrant 2:00 a.m. invasive ductal carcinoma grade 3 ER/GA negative, HER2 negative:  - [facility] germline genetic testing negative   Clinical exam and mammogram/ultrasound without axillary adenopathy.  PET scan without evidence of distant metastatic disease.   We discussed that while early stage and T1c, higher risk features given triple negative and high grade 3 and high ki-67 >95% with noted growth over just 6 month interval recommendation for neoadjuavant approach with chemo immunotherapy per Guadalupe et al. NEJ 2020. She is healthy aside from prior hypothyroidism now not on supplement.  We did discuss risks and benefits of therapy and importance of re-evaluation with plan for surgery, radiation and further adjuvant therapy as indicated.  She expressed good understanding and agree with the plan for follow-up per below.    Contralateral breast biopsy benign      Interval physical exam without palpable abnormality in bilateral breast or axilla.  Interval mammo/US ordered and recommend surgery folllow-up assessment as well; discussed with patient.     After Taxol day 15 last week again developed arthritic pain in knee lower extremities require Norco 1 tablet daily for couple days has since resolved.  We discussed arthritis possible side effect of chemotherapy or possible immunotherapy.  Labs with transaminitis mild again on lab work.  Mild anemia stable.  She is due for initiation of AC. Will monitor for possible future growth factor needs. Wbc in good range today.     Right upper extremity Thrombosis:  Status post thrombectomy port removal on anticoagulation.  Port replaced by Interventional Radiology left upper chest.      Follow-Up:   C5D1 today  RV wed prior  to JULIAN for labs and MD alvarado  Interval mammo and US ordered

## 2022-10-26 ENCOUNTER — OFFICE VISIT (OUTPATIENT)
Dept: HEMATOLOGY/ONCOLOGY | Facility: CLINIC | Age: 47
End: 2022-10-26
Payer: COMMERCIAL

## 2022-10-26 ENCOUNTER — LAB VISIT (OUTPATIENT)
Dept: LAB | Facility: HOSPITAL | Age: 47
End: 2022-10-26
Attending: INTERNAL MEDICINE
Payer: COMMERCIAL

## 2022-10-26 DIAGNOSIS — C50.412 MALIGNANT NEOPLASM OF UPPER-OUTER QUADRANT OF LEFT BREAST IN FEMALE, ESTROGEN RECEPTOR NEGATIVE: Primary | ICD-10-CM

## 2022-10-26 DIAGNOSIS — Z17.1 MALIGNANT NEOPLASM OF UPPER-OUTER QUADRANT OF LEFT BREAST IN FEMALE, ESTROGEN RECEPTOR NEGATIVE: Primary | ICD-10-CM

## 2022-10-26 DIAGNOSIS — C50.412 MALIGNANT NEOPLASM OF UPPER-OUTER QUADRANT OF LEFT BREAST IN FEMALE, ESTROGEN RECEPTOR NEGATIVE: ICD-10-CM

## 2022-10-26 DIAGNOSIS — Z17.1 MALIGNANT NEOPLASM OF UPPER-OUTER QUADRANT OF LEFT BREAST IN FEMALE, ESTROGEN RECEPTOR NEGATIVE: ICD-10-CM

## 2022-10-26 LAB
ALBUMIN SERPL BCP-MCNC: 4 G/DL (ref 3.5–5.2)
ALP SERPL-CCNC: 76 U/L (ref 55–135)
ALT SERPL W/O P-5'-P-CCNC: 37 U/L (ref 10–44)
ANION GAP SERPL CALC-SCNC: 8 MMOL/L (ref 8–16)
AST SERPL-CCNC: 13 U/L (ref 10–40)
BASOPHILS # BLD AUTO: 0.01 K/UL (ref 0–0.2)
BASOPHILS NFR BLD: 0.2 % (ref 0–1.9)
BILIRUB SERPL-MCNC: 0.5 MG/DL (ref 0.1–1)
BUN SERPL-MCNC: 7 MG/DL (ref 6–20)
CALCIUM SERPL-MCNC: 9.9 MG/DL (ref 8.7–10.5)
CHLORIDE SERPL-SCNC: 104 MMOL/L (ref 95–110)
CO2 SERPL-SCNC: 27 MMOL/L (ref 23–29)
CREAT SERPL-MCNC: 0.7 MG/DL (ref 0.5–1.4)
DIFFERENTIAL METHOD: ABNORMAL
EOSINOPHIL # BLD AUTO: 0.1 K/UL (ref 0–0.5)
EOSINOPHIL NFR BLD: 1.3 % (ref 0–8)
ERYTHROCYTE [DISTWIDTH] IN BLOOD BY AUTOMATED COUNT: 15.3 % (ref 11.5–14.5)
EST. GFR  (NO RACE VARIABLE): >60 ML/MIN/1.73 M^2
GLUCOSE SERPL-MCNC: 94 MG/DL (ref 70–110)
HCT VFR BLD AUTO: 32.7 % (ref 37–48.5)
HGB BLD-MCNC: 10.5 G/DL (ref 12–16)
IMM GRANULOCYTES # BLD AUTO: 0.03 K/UL (ref 0–0.04)
IMM GRANULOCYTES NFR BLD AUTO: 0.6 % (ref 0–0.5)
LYMPHOCYTES # BLD AUTO: 1.5 K/UL (ref 1–4.8)
LYMPHOCYTES NFR BLD: 28 % (ref 18–48)
MCH RBC QN AUTO: 28.4 PG (ref 27–31)
MCHC RBC AUTO-ENTMCNC: 32.1 G/DL (ref 32–36)
MCV RBC AUTO: 88 FL (ref 82–98)
MONOCYTES # BLD AUTO: 0.1 K/UL (ref 0.3–1)
MONOCYTES NFR BLD: 1.1 % (ref 4–15)
NEUTROPHILS # BLD AUTO: 3.7 K/UL (ref 1.8–7.7)
NEUTROPHILS NFR BLD: 68.8 % (ref 38–73)
NRBC BLD-RTO: 0 /100 WBC
PLATELET # BLD AUTO: 231 K/UL (ref 150–450)
PMV BLD AUTO: 10.1 FL (ref 9.2–12.9)
POTASSIUM SERPL-SCNC: 3.6 MMOL/L (ref 3.5–5.1)
PROT SERPL-MCNC: 7.3 G/DL (ref 6–8.4)
RBC # BLD AUTO: 3.7 M/UL (ref 4–5.4)
SODIUM SERPL-SCNC: 139 MMOL/L (ref 136–145)
WBC # BLD AUTO: 5.42 K/UL (ref 3.9–12.7)

## 2022-10-26 PROCEDURE — 99213 PR OFFICE/OUTPT VISIT, EST, LEVL III, 20-29 MIN: ICD-10-PCS | Mod: 95,,, | Performed by: INTERNAL MEDICINE

## 2022-10-26 PROCEDURE — 99213 OFFICE O/P EST LOW 20 MIN: CPT | Mod: 95,,, | Performed by: INTERNAL MEDICINE

## 2022-10-26 PROCEDURE — 80053 COMPREHEN METABOLIC PANEL: CPT | Performed by: INTERNAL MEDICINE

## 2022-10-26 PROCEDURE — 85025 COMPLETE CBC W/AUTO DIFF WBC: CPT | Performed by: INTERNAL MEDICINE

## 2022-10-26 PROCEDURE — 36415 COLL VENOUS BLD VENIPUNCTURE: CPT | Performed by: INTERNAL MEDICINE

## 2022-10-26 RX ORDER — SODIUM CHLORIDE 0.9 % (FLUSH) 0.9 %
10 SYRINGE (ML) INJECTION
Status: CANCELLED | OUTPATIENT
Start: 2022-11-10

## 2022-10-26 RX ORDER — EPINEPHRINE 0.3 MG/.3ML
0.3 INJECTION SUBCUTANEOUS ONCE AS NEEDED
Status: CANCELLED | OUTPATIENT
Start: 2022-11-10

## 2022-10-26 RX ORDER — DIPHENHYDRAMINE HYDROCHLORIDE 50 MG/ML
50 INJECTION INTRAMUSCULAR; INTRAVENOUS ONCE AS NEEDED
Status: CANCELLED | OUTPATIENT
Start: 2022-11-10

## 2022-10-26 RX ORDER — DOXORUBICIN HYDROCHLORIDE 2 MG/ML
60 INJECTION, SOLUTION INTRAVENOUS
Status: CANCELLED | OUTPATIENT
Start: 2022-11-10

## 2022-10-26 RX ORDER — HEPARIN 100 UNIT/ML
500 SYRINGE INTRAVENOUS
Status: CANCELLED | OUTPATIENT
Start: 2022-11-10

## 2022-10-26 NOTE — PROGRESS NOTES
Subjective:      DATE OF VISIT: 10/26/22  ?  Patient ID:Jaison Noel is a 47 y.o. female.?? MR#: [id number]     ? Primary Care Providers:  Alesha Alonso MD, MD (General)     PRIMARY ONCOLOGIST: Dr. Hathaway    CHIEF COMPLAINT:  Follow-up?     ONCOLOGIC DIAGNOSIS:  Stage IB, cT1c, cN0, cM0 left breast upper outer quadrant 2:00 a.m. invasive ductal carcinoma grade 3 ER/DC negative, HER2 negative  ?   CURRENT TREATMENT:  neoadjuvant chemotherapy, carboplatin paclitaxel pembrolizumab followed by doxorubicin cyclophosphamide pembrolizumab    PAST TREATMENT:  Biopsy only  ?   INTERVAL EVENTS:  Today she is feeling week otherwise tolerating treatment well.    ONCOLOGIC HISTORY:     Ms. Noel is a 46-year-old woman with hypothyroidism and hypertension.  She presented for routine screening mammogram 11/30/2021 showing left breast mass upper outer posterior position.    12/03/2021 diagnostic mammogram and ultrasound  Left  Mammo Digital Diagnostic Bilat with Pablito  There is a mass seen in the upper outer quadrant of the left breast in the posterior depth.      US Breast Bilateral Limited  There are 3 similar complicated cysts seen in the upper outer quadrant of the left breast in the posterior depth. The largest cyst is thought to be the correlate and measures 0.7 x 0.5 x 0.5 cm.  There are 2 additional complex cysts also seen in the upper outer quadrant of the left breast that measure 0.4 x 0.2 x 0.3 cm and 0.6 x 0.3 x 0.4 cm.      Right  Mammo Digital Diagnostic Bilat with Pablito  There is a mass seen in the lower central region of the right breast in the posterior depth. The posterior margin of this lesion is not seen on mammography.  The lesion appears to abut and overlie the pectoralis musculature.  The lesion appears to measure at least 9 mm in size.      US Breast Bilateral Limited  There is a mass seen in the lower central region of the right breast. A definitive correlate is not definitely seen on ultrasound.  There  is a 6 mm hypoechoic lesion likely representing a complex cyst within the central portion of the breast that appears to be too small to be the correlate for the mammographic finding.  There is an additional 1.0 x 0.4 x 0.6 cm hypoechoic area that is located within a ridge of tissue and also does not definitively correlate to the lesion.  It is possible this lesion was present on prior studies although only space visualized on the most recent study due to increased tissue included on the MLO view.  The visualized margins of the lesion also appear to be fairly smooth and therefore the lesion can be followed.      Impression:  Left  Mass: Left breast mass at the upper outer posterior position. Assessment: 3 - Probably benign. Short Interval Follow-Up in 6 Months is recommended.      Right  Mass: Right breast mass at the lower central posterior position. Assessment: 3 - Probably benign. Short Interval Follow-Up in 6 Months is recommended.      BI-RADS Category:   Overall: 3 - Probably Benign    Repeat evaluation with diagnostic mammogram and ultrasound on 05/24/2022 notable for a suspicious left breast lesion 1.3 cm 2:00 a.m.    Right breast: No detrimental mammographic change. Well-circumscribed lesion within the lower outer posterior breast is unchanged mammographically. 8.6 mm complicated cyst present at 08:00 o'clock, 12 cm from the nipple and corresponds to the stable mammographic finding.       Follow-up imaging of the previously noted central lesions demonstrate no detrimental change.  8 mm complicated cyst noted at 04:00 o'clock, 4 cm from the nipple.         Left breast: Interval increase in size of an upper outer quadrant mass on mammogram. On ultrasound there is an enlarging complicated cystic lesion measuring 1.3 cm at 02:00 o'clock, 7 cm from the nipple. Other complicated cysts within the upper outer breast demonstrate no detrimental change.  No suspicious left axillary lymph nodes.    Ultrasound-guided  biopsy left breast lesion on 2022  Pathology:  Final Pathologic Diagnosis 1. Left breast mass (2 o'clock position), biopsy:       -  High-grade invasive carcinoma of no special type (ductal) with   associated tumor necrosis,          see comment     SYNOPTIC REPORT   PROCEDURE:  Biopsy   SPECIMEN LATERALITY:  Left breast   TUMOR SITE:  2 o'clock position   HISTOLOGIC TYPE:  Invasive carcinoma of no special type (ductal)   HISTOLOGIC GRADE:  Grade 3 of 3          TUBULE FORMATION:  3          PLEOMORPHISM:  3          MITOTIC RATE:  3   TUMOR SIZE:   At least 10 mm in greatest linear dimension   DUCTAL CARCINOMA IN SITU (DCIS):  Not identified   LYMPHOVASCULAR INVASION:  Not identified   MICROCALCIFICATIONS:  Not identified   BREAST BIOMARKERS (PERFORMED WITH APPROPRIATE CONTROLS):           ER:   Negative (0)           LA:   Negative (0)           HER2:  Negative (1+)           KI67:  Greater than 95%       2022 PET-CT without evidence of distant metastatic disease.     FDG avid left breast lesion consistent with the known malignancy.  No other sites of suspicious uptake identified.    Menarche 12 years old  ; 1 miscarriage, age of 1st pregnancy 16 years old  Postmenopausal last menstrual period at 44 years old s/p PIERRE/BSO  Exogenous estrogen for about 1 year last when 43yo.  No history of prior radiation  No prior breast biopsy    Family history notable for mother with either breast or ovarian cancer diagnosis 64 years old.    Cycle 1 day 1 neoadjuvant chemotherapy 07/15/2022      Review of Systems    ?   A comprehensive 14-point review of systems was reviewed with patient and was negative other than as specified above.   ?     Objective:      Physical Exam      ?   Labs    Lab Results   Component Value Date    WBC 5.42 10/26/2022    HGB 10.5 (L) 10/26/2022    HCT 32.7 (L) 10/26/2022    MCV 88 10/26/2022     10/26/2022         Chemistry        Component Value Date/Time     10/21/2022  0741    K 3.5 10/21/2022 0741     10/21/2022 0741    CO2 26 10/21/2022 0741    BUN 6 10/21/2022 0741    CREATININE 0.8 10/21/2022 0741     10/21/2022 0741        Component Value Date/Time    CALCIUM 9.4 10/21/2022 0741    ALKPHOS 83 10/21/2022 0741    AST 61 (H) 10/21/2022 0741    ALT 92 (H) 10/21/2022 0741    BILITOT 0.2 10/21/2022 0741    ESTGFRAFRICA >60 07/29/2022 1006    EGFRNONAA >60 07/29/2022 1006        Lab Results   Component Value Date    TSH 0.933 10/21/2022       Imaging:  ?    No results found for this or any previous visit (from the past 2160 hour(s)).  No results found for this or any previous visit (from the past 2160 hour(s)).  No results found for this or any previous visit (from the past 2160 hour(s)).        Pathology:    No visits with results within 1 Day(s) from this visit.   Latest known visit with results is:   Hospital Outpatient Visit on 06/09/2022   Component Date Value Ref Range Status    Final Pathologic Diagnosis 06/09/2022    Final                    Value:1. Left breast mass (2 o'clock position), biopsy:      -  High-grade invasive carcinoma of no special type (ductal) with  associated tumor necrosis,         see comment    SYNOPTIC REPORT  PROCEDURE:  Biopsy  SPECIMEN LATERALITY:  Left breast  TUMOR SITE:  2 o'clock position  HISTOLOGIC TYPE:  Invasive carcinoma of no special type (ductal)  HISTOLOGIC GRADE:  Grade 3 of 3         TUBULE FORMATION:  3         PLEOMORPHISM:  3         MITOTIC RATE:  3  TUMOR SIZE:   At least 10 mm in greatest linear dimension  DUCTAL CARCINOMA IN SITU (DCIS):  Not identified  LYMPHOVASCULAR INVASION:  Not identified  MICROCALCIFICATIONS:  Not identified  BREAST BIOMARKERS (PERFORMED WITH APPROPRIATE CONTROLS):          ER:   Negative (0)          HI:   Negative (0)          HER2:  Negative (1+)          KI67:  Greater than 95%      Gross 06/09/2022    Final                    Value:Patient ID/Pathology ID [id number]  In formalin, labeled  ""left breast 02:00 o'clock, in formalin at 08:40", is a  2.5 x 2.5 cm aggregate of pink-yellow needle biopsy cores.  Entirely  submitted in cassette AFS--1-A  Ischemic time is not provided.  The fixation time is greater than 6 hrs and  less than 72 hrs.  A gross picture of the container and cassette is taken and  added to file.  Charly HOLGUIN ([facility]) cm      Comment 06/09/2022    Final                    Value:Immunohistochemical stain with appropriate control for p63 was performed and  shows loss of myoepithelial cells throughout the lesion, with no evidence of  ductal carcinoma in situ.  The patient's history of ovarian carcinoma is  noted.  Immunohistochemical stains for GATA3 and PAX8 appropriate controls  show tumor cell positivity for GATA3 and negativity for Jewett 8, confirming  ductal (breast) origin.  This case seen in consultation with Dr. Montesinos who agrees with the above  diagnosis.      Disclaimer 06/09/2022    Final                    Value:Unless the case is a 'gross only' or additional testing only, the final  diagnosis for each specimen is based on a microscopic examination of  appropriate tissue sections.  ER immunohistochemical staining (clone SP1, DAB detection method) is  performed on formalin-fixed, paraffin embedded tissue sections. The  percentage of cell nuclei stained and the strength of staining is reported  (weak, moderate, strong), using the 2010 ACSO/CAP scoring guidelines. Tumors  used for determing predictive markers are fixed in10% neutral buffered  formalin for 6-72 hours. It has been cleared by the U.S. FDA for use as an  IVD test. This assay has not been validated on decalcified tissues. Results  should be interpreted with caution given the likelihood of false negativity  on decalcified specimens.  HER-2/aston IHC (4B5) clone, DAB detection method) is done on 10% buffered  formalin-fixed (for 6-72 hrs), paraffin embedded tissue sections. The scoring  is completed on a 4-tiered " scoring system of membran                          e staining using the 2014  ASCO/CAP scoring guidelines. It has been cleared by the U.S. FDA for use as  an IVD test. This assay has not been validated on decalcified tissues.  Results should be interpreted with caution given the likelihood of false  negativity on decalcified specimens.  PgR immunohistochemical staining (clone 1E2, DAB detection method) is  performed on formalin-fixed, paraffin embedded tissue sections. The  percentage of cell nuclei stained and the strength of staining is report  (weak, moderate, strong), using 2010 ASCO/CAP scoring guidelines. Tumors used  for determing predictive markers are fixed in 10% neutral buffered formalin  for 6-72 hours. It has been cleared by the U.S. FDA for use as an IVD test.  This assay has not been validated on decalcified tissues. Results should be  interpreted with caution given the likelihood of false negativity on  decalcified specimens.            ?   Assessment/Plan:   There are no diagnoses linked to this encounter.           No diagnosis found.              Plan:     Problem List Items Addressed This Visit    None  Stage IB, cT1c cN0 cM0 left breast upper outer quadrant 2:00 a.m. invasive ductal carcinoma grade 3 ER/[place] negative, HER2 negative:  - [facility] germline genetic testing negative   Clinical exam and mammogram/ultrasound without axillary adenopathy.  PET scan without evidence of distant metastatic disease.   We discussed that while early stage and T1c, higher risk features given triple negative and high grade 3 and high ki-67 >95% with noted growth over just 6 month interval recommendation for neoadjuavant approach with chemo immunotherapy per Guadalupe et al. NEJM 2020. She is healthy aside from prior hypothyroidism now not on supplement.  We did discuss risks and benefits of therapy and importance of re-evaluation with plan for surgery, radiation and further adjuvant therapy as indicated.  She  expressed good understanding and agree with the plan for follow-up per below.    Contralateral breast biopsy benign  This is done by Dr. Hathaway    Interval physical exam without palpable abnormality in bilateral breast or axilla.  Interval mammo/US ordered and recommend surgery folllow-up assessment as well; discussed with patient.       Right upper extremity Thrombosis:  Status post thrombectomy port removal on anticoagulation.  Port replaced by Interventional Radiology left upper chest.      Follow-Up:   C6D1 today  Mammo done report NA  Interval mammo and US no report available at this time.

## 2022-10-28 ENCOUNTER — PATIENT MESSAGE (OUTPATIENT)
Dept: HEMATOLOGY/ONCOLOGY | Facility: CLINIC | Age: 47
End: 2022-10-28
Payer: COMMERCIAL

## 2022-10-31 ENCOUNTER — TELEPHONE (OUTPATIENT)
Dept: SURGERY | Facility: CLINIC | Age: 47
End: 2022-10-31
Payer: COMMERCIAL

## 2022-10-31 NOTE — TELEPHONE ENCOUNTER
----- Message from Beau Quiles MD sent at 10/31/2022  9:06 AM CDT -----  Once she finished chemo we will need to see her back in clinic to discuss next steps. Thanks  ----- Message -----  From: Maris Hathaway MD  Sent: 10/14/2022   7:03 AM CDT  To: Beau Quiles MD    Wanted to ask what you would like for interval assessment of her breast tumor, too small initially and now to palpate. Will you be assessing her soon and also would you like me to order mammo/US? She is about to finish carbo taxola dn onto AC portion

## 2022-11-03 ENCOUNTER — TELEPHONE (OUTPATIENT)
Dept: HEMATOLOGY/ONCOLOGY | Facility: CLINIC | Age: 47
End: 2022-11-03
Payer: COMMERCIAL

## 2022-11-03 ENCOUNTER — PATIENT MESSAGE (OUTPATIENT)
Dept: HEMATOLOGY/ONCOLOGY | Facility: CLINIC | Age: 47
End: 2022-11-03
Payer: COMMERCIAL

## 2022-11-03 NOTE — TELEPHONE ENCOUNTER
ALBA was consulted to refer patient to another organization due to high balance. JERELR researched patient's insurance. ALBA went to [employer] website and they are in network with [facility]. JERELR called patient on today to discuss. Pt agreed for JERELR to fax referral to [facility]. Pt asked about upcoming appt and can she still attend on 11/10. SWER reports [facility] will continue to see her for 30 days. SWER reports she will f/u to get that appt scheduled.     ALBA called and spoke with Cait at [facility]. She asked ALBA to fax the following to [phone]:    Demographics page  Progress Notes  Labs  Path Report  Mammo  Insurance card front and back  Chemo flowsheet    [facility] will remain available.

## 2022-11-07 ENCOUNTER — DOCUMENTATION ONLY (OUTPATIENT)
Dept: HEMATOLOGY/ONCOLOGY | Facility: CLINIC | Age: 47
End: 2022-11-07
Payer: COMMERCIAL

## 2022-11-07 NOTE — PROGRESS NOTES
ALBA received call from [facility] oncology staff. Pt has been scheduled with Dr. Mukherjee on November 15th. JERELR will inform provider and remain available.

## 2022-11-07 NOTE — PROGRESS NOTES
SWER called on today and left VM with [facility] oncology about status of patient's referral. SWER will remain available.

## 2022-11-10 ENCOUNTER — INFUSION (OUTPATIENT)
Dept: INFUSION THERAPY | Facility: HOSPITAL | Age: 47
End: 2022-11-10
Attending: INTERNAL MEDICINE
Payer: COMMERCIAL

## 2022-11-10 ENCOUNTER — LAB VISIT (OUTPATIENT)
Dept: LAB | Facility: HOSPITAL | Age: 47
End: 2022-11-10
Attending: INTERNAL MEDICINE
Payer: COMMERCIAL

## 2022-11-10 ENCOUNTER — OFFICE VISIT (OUTPATIENT)
Dept: HEMATOLOGY/ONCOLOGY | Facility: CLINIC | Age: 47
End: 2022-11-10
Payer: COMMERCIAL

## 2022-11-10 VITALS
HEIGHT: 64 IN | HEART RATE: 69 BPM | OXYGEN SATURATION: 99 % | SYSTOLIC BLOOD PRESSURE: 129 MMHG | DIASTOLIC BLOOD PRESSURE: 88 MMHG | WEIGHT: 183.19 LBS | TEMPERATURE: 97 F | BODY MASS INDEX: 31.27 KG/M2

## 2022-11-10 VITALS
OXYGEN SATURATION: 100 % | RESPIRATION RATE: 18 BRPM | TEMPERATURE: 98 F | HEART RATE: 75 BPM | SYSTOLIC BLOOD PRESSURE: 120 MMHG | WEIGHT: 183.19 LBS | HEIGHT: 64 IN | BODY MASS INDEX: 31.27 KG/M2 | DIASTOLIC BLOOD PRESSURE: 83 MMHG

## 2022-11-10 DIAGNOSIS — C50.412 MALIGNANT NEOPLASM OF UPPER-OUTER QUADRANT OF LEFT BREAST IN FEMALE, ESTROGEN RECEPTOR NEGATIVE: Primary | ICD-10-CM

## 2022-11-10 DIAGNOSIS — Z17.1 MALIGNANT NEOPLASM OF UPPER-OUTER QUADRANT OF LEFT BREAST IN FEMALE, ESTROGEN RECEPTOR NEGATIVE: ICD-10-CM

## 2022-11-10 DIAGNOSIS — C50.412 MALIGNANT NEOPLASM OF UPPER-OUTER QUADRANT OF LEFT BREAST IN FEMALE, ESTROGEN RECEPTOR NEGATIVE: ICD-10-CM

## 2022-11-10 DIAGNOSIS — D63.0 ANEMIA IN NEOPLASTIC DISEASE: Primary | ICD-10-CM

## 2022-11-10 DIAGNOSIS — Z17.1 MALIGNANT NEOPLASM OF UPPER-OUTER QUADRANT OF LEFT BREAST IN FEMALE, ESTROGEN RECEPTOR NEGATIVE: Primary | ICD-10-CM

## 2022-11-10 LAB
ALBUMIN SERPL BCP-MCNC: 3.8 G/DL (ref 3.5–5.2)
ALP SERPL-CCNC: 75 U/L (ref 55–135)
ALT SERPL W/O P-5'-P-CCNC: 31 U/L (ref 10–44)
ANION GAP SERPL CALC-SCNC: 12 MMOL/L (ref 8–16)
AST SERPL-CCNC: 22 U/L (ref 10–40)
BASOPHILS # BLD AUTO: 0.04 K/UL (ref 0–0.2)
BASOPHILS NFR BLD: 0.5 % (ref 0–1.9)
BILIRUB SERPL-MCNC: 0.2 MG/DL (ref 0.1–1)
BUN SERPL-MCNC: 7 MG/DL (ref 6–20)
CALCIUM SERPL-MCNC: 9.4 MG/DL (ref 8.7–10.5)
CHLORIDE SERPL-SCNC: 106 MMOL/L (ref 95–110)
CO2 SERPL-SCNC: 24 MMOL/L (ref 23–29)
CREAT SERPL-MCNC: 0.8 MG/DL (ref 0.5–1.4)
DIFFERENTIAL METHOD: ABNORMAL
EOSINOPHIL # BLD AUTO: 0 K/UL (ref 0–0.5)
EOSINOPHIL NFR BLD: 0.4 % (ref 0–8)
ERYTHROCYTE [DISTWIDTH] IN BLOOD BY AUTOMATED COUNT: 15.9 % (ref 11.5–14.5)
EST. GFR  (NO RACE VARIABLE): >60 ML/MIN/1.73 M^2
GLUCOSE SERPL-MCNC: 96 MG/DL (ref 70–110)
HCT VFR BLD AUTO: 32.8 % (ref 37–48.5)
HGB BLD-MCNC: 10.6 G/DL (ref 12–16)
IMM GRANULOCYTES # BLD AUTO: 0.31 K/UL (ref 0–0.04)
IMM GRANULOCYTES NFR BLD AUTO: 3.6 % (ref 0–0.5)
LYMPHOCYTES # BLD AUTO: 2.5 K/UL (ref 1–4.8)
LYMPHOCYTES NFR BLD: 29.3 % (ref 18–48)
MCH RBC QN AUTO: 28.9 PG (ref 27–31)
MCHC RBC AUTO-ENTMCNC: 32.3 G/DL (ref 32–36)
MCV RBC AUTO: 89 FL (ref 82–98)
MONOCYTES # BLD AUTO: 1.3 K/UL (ref 0.3–1)
MONOCYTES NFR BLD: 15.4 % (ref 4–15)
NEUTROPHILS # BLD AUTO: 4.3 K/UL (ref 1.8–7.7)
NEUTROPHILS NFR BLD: 50.8 % (ref 38–73)
NRBC BLD-RTO: 0 /100 WBC
PLATELET # BLD AUTO: 552 K/UL (ref 150–450)
PMV BLD AUTO: 9.6 FL (ref 9.2–12.9)
POTASSIUM SERPL-SCNC: 3.2 MMOL/L (ref 3.5–5.1)
PROT SERPL-MCNC: 7.3 G/DL (ref 6–8.4)
RBC # BLD AUTO: 3.67 M/UL (ref 4–5.4)
SODIUM SERPL-SCNC: 142 MMOL/L (ref 136–145)
T4 FREE SERPL-MCNC: 0.75 NG/DL (ref 0.71–1.51)
TSH SERPL DL<=0.005 MIU/L-ACNC: 17.08 UIU/ML (ref 0.4–4)
WBC # BLD AUTO: 8.53 K/UL (ref 3.9–12.7)

## 2022-11-10 PROCEDURE — 85025 COMPLETE CBC W/AUTO DIFF WBC: CPT | Performed by: INTERNAL MEDICINE

## 2022-11-10 PROCEDURE — 96375 TX/PRO/DX INJ NEW DRUG ADDON: CPT

## 2022-11-10 PROCEDURE — 99214 OFFICE O/P EST MOD 30 MIN: CPT | Mod: S$GLB,,, | Performed by: INTERNAL MEDICINE

## 2022-11-10 PROCEDURE — 80053 COMPREHEN METABOLIC PANEL: CPT | Performed by: INTERNAL MEDICINE

## 2022-11-10 PROCEDURE — 84443 ASSAY THYROID STIM HORMONE: CPT | Performed by: INTERNAL MEDICINE

## 2022-11-10 PROCEDURE — 25000003 PHARM REV CODE 250: Performed by: INTERNAL MEDICINE

## 2022-11-10 PROCEDURE — 36415 COLL VENOUS BLD VENIPUNCTURE: CPT | Performed by: INTERNAL MEDICINE

## 2022-11-10 PROCEDURE — 99999 PR PBB SHADOW E&M-EST. PATIENT-LVL III: ICD-10-PCS | Mod: PBBFAC,,, | Performed by: INTERNAL MEDICINE

## 2022-11-10 PROCEDURE — 84439 ASSAY OF FREE THYROXINE: CPT | Performed by: INTERNAL MEDICINE

## 2022-11-10 PROCEDURE — 99214 PR OFFICE/OUTPT VISIT, EST, LEVL IV, 30-39 MIN: ICD-10-PCS | Mod: S$GLB,,, | Performed by: INTERNAL MEDICINE

## 2022-11-10 PROCEDURE — 63600175 PHARM REV CODE 636 W HCPCS: Mod: JG | Performed by: INTERNAL MEDICINE

## 2022-11-10 PROCEDURE — 96411 CHEMO IV PUSH ADDL DRUG: CPT

## 2022-11-10 PROCEDURE — 96367 TX/PROPH/DG ADDL SEQ IV INF: CPT

## 2022-11-10 PROCEDURE — 96417 CHEMO IV INFUS EACH ADDL SEQ: CPT

## 2022-11-10 PROCEDURE — 96413 CHEMO IV INFUSION 1 HR: CPT

## 2022-11-10 PROCEDURE — 99999 PR PBB SHADOW E&M-EST. PATIENT-LVL III: CPT | Mod: PBBFAC,,, | Performed by: INTERNAL MEDICINE

## 2022-11-10 RX ORDER — HEPARIN 100 UNIT/ML
500 SYRINGE INTRAVENOUS
Status: DISCONTINUED | OUTPATIENT
Start: 2022-11-10 | End: 2022-11-10 | Stop reason: HOSPADM

## 2022-11-10 RX ORDER — POTASSIUM CHLORIDE 750 MG/1
20 TABLET, EXTENDED RELEASE ORAL
Status: CANCELLED
Start: 2022-11-10

## 2022-11-10 RX ORDER — DOXORUBICIN HYDROCHLORIDE 2 MG/ML
60 INJECTION, SOLUTION INTRAVENOUS
Status: COMPLETED | OUTPATIENT
Start: 2022-11-10 | End: 2022-11-10

## 2022-11-10 RX ADMIN — DOXORUBICIN HYDROCHLORIDE 114 MG: 2 INJECTION, SOLUTION INTRAVENOUS at 12:11

## 2022-11-10 RX ADMIN — SODIUM CHLORIDE 200 MG: 9 INJECTION, SOLUTION INTRAVENOUS at 10:11

## 2022-11-10 RX ADMIN — CYCLOPHOSPHAMIDE 1140 MG: 200 INJECTION, SOLUTION INTRAVENOUS at 12:11

## 2022-11-10 RX ADMIN — SODIUM CHLORIDE: 0.9 INJECTION, SOLUTION INTRAVENOUS at 11:11

## 2022-11-10 RX ADMIN — PEGFILGRASTIM 6 MG: KIT SUBCUTANEOUS at 02:11

## 2022-11-10 RX ADMIN — APREPITANT 130 MG: 130 INJECTION, EMULSION INTRAVENOUS at 11:11

## 2022-11-10 RX ADMIN — DEXAMETHASONE SODIUM PHOSPHATE 0.25 MG: 4 INJECTION, SOLUTION INTRA-ARTICULAR; INTRALESIONAL; INTRAMUSCULAR; INTRAVENOUS; SOFT TISSUE at 11:11

## 2022-11-10 RX ADMIN — HEPARIN 500 UNITS: 100 SYRINGE at 01:11

## 2022-11-10 NOTE — NURSING
Patient to unit today for Christian./Keytruda/Cyclophosphamide. Patient tolerated well. Patient discharged with OBI in place. Neulasta OBI video watched by patient and understanding verbalized. Dr. Mendez came to chairside to discuss TSH level with patient and to follow up with primary care for further treatment.

## 2022-11-10 NOTE — PLAN OF CARE
Problem: Adult Inpatient Plan of Care  Goal: Plan of Care Review  Outcome: Ongoing, Progressing  Flowsheets (Taken 11/10/2022 1532)  Plan of Care Reviewed With: patient  Goal: Patient-Specific Goal (Individualized)  Outcome: Ongoing, Progressing  Flowsheets (Taken 11/10/2022 1532)  Anxieties, Fears or Concerns: Tired  Individualized Care Needs: Legs elevated, warm blankets provided, declined snacks and drink  Patient-Specific Goals (Include Timeframe): Patient will tolerate treatment as prescribed.  Goal: Optimal Comfort and Wellbeing  Outcome: Ongoing, Progressing  Intervention: Provide Person-Centered Care  Flowsheets (Taken 11/10/2022 1532)  Trust Relationship/Rapport:   care explained   choices provided   emotional support provided   empathic listening provided   thoughts/feelings acknowledged   reassurance provided   questions encouraged   questions answered     Problem: Anemia (Chemotherapy Effects)  Goal: Anemia Symptom Improvement  Outcome: Ongoing, Progressing     Problem: Nausea and Vomiting (Chemotherapy Effects)  Goal: Fluid and Electrolyte Balance  Outcome: Ongoing, Progressing     Problem: Neurotoxicity (Chemotherapy Effects)  Goal: Neurotoxicity Symptom Control  Outcome: Ongoing, Progressing     Problem: Neutropenia (Chemotherapy Effects)  Goal: Absence of Infection  Outcome: Ongoing, Progressing  Intervention: Prevent Infection and Maximize Resistance  Flowsheets (Taken 11/10/2022 1532)  Infection Prevention:   equipment surfaces disinfected   personal protective equipment utilized   hand hygiene promoted     Problem: Fall Injury Risk  Goal: Absence of Fall and Fall-Related Injury  Outcome: Ongoing, Progressing  Intervention: Promote Injury-Free Environment  Flowsheets (Taken 11/10/2022 1532)  Safety Promotion/Fall Prevention:   high risk medications identified   assistive device/personal item within reach   in recliner, wheels locked   medications reviewed

## 2022-11-10 NOTE — PROGRESS NOTES
Subjective:      Patient ID: Florecita Noel is a 47 y.o. female.    Chief Complaint: No chief complaint on file.      HPI: This is a 47 year old woman with history of Hypothyroidism and left breast cancer -stage IB, ER/RI negative/HER 2 negative on neoadjuvant chemotherapy with carboplatin/Taxol and Pembrolizumab followed by Doxorubicin/Cyclophosphamide and Pembrolizumab.     Review of Systems   Constitutional:  Negative for chills, fatigue and fever.   HENT:  Negative for mouth sores, nosebleeds and sore throat.    Respiratory:  Negative for chest tightness, shortness of breath and wheezing.    Cardiovascular:  Negative for chest pain, palpitations and leg swelling.   Gastrointestinal:  Negative for abdominal distention, abdominal pain and blood in stool.   Endocrine: Negative for cold intolerance.   Genitourinary:  Negative for hematuria.   Musculoskeletal:  Negative for leg pain.   Neurological:  Negative for dizziness, syncope and headaches.   Hematological:  Negative for adenopathy.   Psychiatric/Behavioral:  Negative for confusion.      Objective:     Physical Exam  Constitutional:       Appearance: Normal appearance.   HENT:      Head: Normocephalic and atraumatic.      Mouth/Throat:      Mouth: Mucous membranes are moist.      Pharynx: Oropharynx is clear. No oropharyngeal exudate or posterior oropharyngeal erythema.   Eyes:      Pupils: Pupils are equal, round, and reactive to light.   Cardiovascular:      Rate and Rhythm: Normal rate and regular rhythm.      Pulses: Normal pulses.      Heart sounds: Normal heart sounds.   Pulmonary:      Effort: Pulmonary effort is normal.      Breath sounds: Normal breath sounds. No wheezing or rales.   Abdominal:      General: Abdomen is flat.      Palpations: Abdomen is soft.   Musculoskeletal:      Right lower leg: No edema.      Left lower leg: No edema.   Lymphadenopathy:      Cervical: No cervical adenopathy.   Skin:     Findings: No bruising, erythema or rash.    Neurological:      General: No focal deficit present.      Mental Status: She is alert and oriented to person, place, and time.   Psychiatric:         Mood and Affect: Mood normal.         Behavior: Behavior normal.         Thought Content: Thought content normal.       Assessment:     Problem List Items Addressed This Visit    None       Left breast cancer, ER/[place] negative/HER 2 negative -stage IB  - on Neoadjuvant chemotherapy.  2. Hypothyroidism  3. Previous catheter related thrombosis - right upper extremity.  - catheter replaced. On Eliquis.   4. Anemia/mild thrombocytosis.  Plan:     Patient is scheduled for C6 D1 today - Doxorubicin/Cyclophosphamide/Pembrolizumab.  Follow up Echo.  She has history of hypothyroidism but has not been taking medication recently. TSH was elevated. She may resume her medications. We plan follow up TSH/FT4. She has anemia that may be related to malignancy/chemotherapy. We plan to check folate/vit B 12 /iron/TIBC/latoya. Platelets were slightly elevated but may be reactive. We plan to monitor with CBC. Potasium was 3.2 and was replaced with potasium chloride.  RTC:  3 weeks - CBC/CMP/TSH/FT4.

## 2022-11-10 NOTE — DISCHARGE INSTRUCTIONS
.THANKS FOR ALLOWING ME TO CARE FOR YOU!!!! ~Anne          THANKS FOR CHOOSING [facility]!!!        [facility]  [address] [facility]  [id number] [place] [address] Drive  [phone] phone     [phone] fax  Hours of Operation: Monday- Friday 8:00am- 5:00pm  After hours phone  [phone]  Hematology / Oncology Physicians on call      Dr. Harshil Gloria, JAYMIE Storm, JAYMIE Newton, JAYMIE Pena, KATYA      Please call with any concerns regarding your appointment today.

## 2022-11-18 ENCOUNTER — HOSPITAL ENCOUNTER (OUTPATIENT)
Dept: CARDIOLOGY | Facility: HOSPITAL | Age: 47
Discharge: HOME | End: 2022-11-18
Attending: INTERNAL MEDICINE
Payer: COMMERCIAL

## 2022-11-18 VITALS
SYSTOLIC BLOOD PRESSURE: 120 MMHG | WEIGHT: 183 LBS | BODY MASS INDEX: 31.24 KG/M2 | DIASTOLIC BLOOD PRESSURE: 83 MMHG | HEIGHT: 64 IN

## 2022-11-18 DIAGNOSIS — Z17.1 MALIGNANT NEOPLASM OF UPPER-OUTER QUADRANT OF LEFT BREAST IN FEMALE, ESTROGEN RECEPTOR NEGATIVE: ICD-10-CM

## 2022-11-18 DIAGNOSIS — C50.412 MALIGNANT NEOPLASM OF UPPER-OUTER QUADRANT OF LEFT BREAST IN FEMALE, ESTROGEN RECEPTOR NEGATIVE: ICD-10-CM

## 2022-11-18 LAB
AORTIC ROOT ANNULUS: 2.76 CM
ASCENDING AORTA: 2.95 CM
AV INDEX (PROSTH): 0.67
AV MEAN GRADIENT: 5 MMHG
AV PEAK GRADIENT: 10 MMHG
AV VALVE AREA: 2.13 CM2
AV VELOCITY RATIO: 0.63
BSA FOR ECHO PROCEDURE: 1.94 M2
CV ECHO LV RWT: 0.38 CM
DOP CALC AO PEAK VEL: 1.55 M/S
DOP CALC AO VTI: 28.4 CM
DOP CALC LVOT AREA: 3.2 CM2
DOP CALC LVOT DIAMETER: 2.02 CM
DOP CALC LVOT PEAK VEL: 0.98 M/S
DOP CALC LVOT STROKE VOLUME: 60.54 CM3
DOP CALC RVOT PEAK VEL: 0.75 M/S
DOP CALC RVOT VTI: 17.8 CM
DOP CALCLVOT PEAK VEL VTI: 18.9 CM
E WAVE DECELERATION TIME: 203.2 MSEC
E/A RATIO: 0.86
E/E' RATIO: 9.13 M/S
ECHO LV POSTERIOR WALL: 0.94 CM (ref 0.6–1.1)
EJECTION FRACTION: 60 %
FRACTIONAL SHORTENING: 35 % (ref 28–44)
INTERVENTRICULAR SEPTUM: 1.01 CM (ref 0.6–1.1)
IVC DIAMETER: 1.28 CM
IVRT: 99.9 MSEC
LA MAJOR: 4.39 CM
LA MINOR: 4.68 CM
LA WIDTH: 3.5 CM
LEFT ATRIUM SIZE: 3.73 CM
LEFT ATRIUM VOLUME INDEX MOD: 24.2 ML/M2
LEFT ATRIUM VOLUME INDEX: 26.7 ML/M2
LEFT ATRIUM VOLUME MOD: 45.58 CM3
LEFT ATRIUM VOLUME: 50.27 CM3
LEFT INTERNAL DIMENSION IN SYSTOLE: 3.23 CM (ref 2.1–4)
LEFT VENTRICLE DIASTOLIC VOLUME INDEX: 61.27 ML/M2
LEFT VENTRICLE DIASTOLIC VOLUME: 115.19 ML
LEFT VENTRICLE MASS INDEX: 92 G/M2
LEFT VENTRICLE SYSTOLIC VOLUME INDEX: 22.3 ML/M2
LEFT VENTRICLE SYSTOLIC VOLUME: 41.86 ML
LEFT VENTRICULAR INTERNAL DIMENSION IN DIASTOLE: 4.94 CM (ref 3.5–6)
LEFT VENTRICULAR MASS: 172.43 G
LV LATERAL E/E' RATIO: 8.11 M/S
LV SEPTAL E/E' RATIO: 10.43 M/S
LVOT MG: 2.29 MMHG
LVOT MV: 0.71 CM/S
MV PEAK A VEL: 0.85 M/S
MV PEAK E VEL: 0.73 M/S
MV STENOSIS PRESSURE HALF TIME: 58.93 MS
MV VALVE AREA P 1/2 METHOD: 3.73 CM2
PISA TR MAX VEL: 2.04 M/S
PULM VEIN S/D RATIO: 1.35
PV MEAN GRADIENT: 1.21 MMHG
PV PEAK D VEL: 0.34 M/S
PV PEAK S VEL: 0.46 M/S
PV PEAK VELOCITY: 1.41 CM/S
QEF: 63 %
RA MAJOR: 4.2 CM
RA PRESSURE: 3 MMHG
RA WIDTH: 3.14 CM
RIGHT VENTRICULAR END-DIASTOLIC DIMENSION: 2.8 CM
SINUS: 2.68 CM
STJ: 2.51 CM
TDI LATERAL: 0.09 M/S
TDI SEPTAL: 0.07 M/S
TDI: 0.08 M/S
TR MAX PG: 17 MMHG
TRICUSPID ANNULAR PLANE SYSTOLIC EXCURSION: 1.81 CM
TV REST PULMONARY ARTERY PRESSURE: 20 MMHG

## 2022-11-18 PROCEDURE — 93356 ECHO (CUPID ONLY): ICD-10-PCS | Mod: ,,, | Performed by: INTERNAL MEDICINE

## 2022-11-18 PROCEDURE — 93356 MYOCRD STRAIN IMG SPCKL TRCK: CPT

## 2022-11-18 PROCEDURE — 93356 MYOCRD STRAIN IMG SPCKL TRCK: CPT | Mod: ,,, | Performed by: INTERNAL MEDICINE

## 2022-11-18 PROCEDURE — 93306 TTE W/DOPPLER COMPLETE: CPT | Mod: 26,,, | Performed by: INTERNAL MEDICINE

## 2022-11-18 PROCEDURE — 93306 ECHO (CUPID ONLY): ICD-10-PCS | Mod: 26,,, | Performed by: INTERNAL MEDICINE

## 2023-01-17 ENCOUNTER — TELEPHONE (OUTPATIENT)
Dept: HEMATOLOGY/ONCOLOGY | Facility: CLINIC | Age: 48
End: 2023-01-17
Payer: COMMERCIAL

## 2023-01-17 ENCOUNTER — PATIENT MESSAGE (OUTPATIENT)
Dept: HEMATOLOGY/ONCOLOGY | Facility: CLINIC | Age: 48
End: 2023-01-17
Payer: COMMERCIAL

## 2023-01-17 NOTE — TELEPHONE ENCOUNTER
----- Message from Molly Storm NP sent at 1/17/2023  3:14 PM CST -----  Patient sent request asking how long to hold Eliquis in preparation for surgery planning. Typically 48 hours before surgery and resume 24 hours after surgery. We may need to arrange visit to discuss with patient. Also CC ing primary oncologist

## 2023-01-17 NOTE — TELEPHONE ENCOUNTER
I called the pt. Back to inform her to hold Eliquis 48 hrs before surgery and to resume back 24 hrs after surgery. She was not available and I left her message on her voicemail.

## 2023-01-18 ENCOUNTER — TELEPHONE (OUTPATIENT)
Dept: HEMATOLOGY/ONCOLOGY | Facility: CLINIC | Age: 48
End: 2023-01-18
Payer: COMMERCIAL

## 2023-01-18 NOTE — TELEPHONE ENCOUNTER
----- Message from Maris Hathaway MD sent at 1/18/2023  7:33 AM CST -----  Please forward to her surgeon/staff  ----- Message -----  From: Molly Storm NP  Sent: 1/17/2023   3:16 PM CST  To: Maris Hathaway MD, #    Patient sent request asking how long to hold Eliquis in preparation for surgery planning. Typically 48 hours before surgery and resume 24 hours after surgery. We may need to arrange visit to discuss with patient. Also CC ing primary oncologist

## 2023-01-18 NOTE — TELEPHONE ENCOUNTER
Pt. Has been contacted and given the instruction on how to hold her Eliquis, and also the same instructions was put in a witting and fax over to the surgery department at [facility] at [phone], phone number is [phone]

## 2023-06-09 ENCOUNTER — PATIENT OUTREACH (OUTPATIENT)
Dept: ADMINISTRATIVE | Facility: HOSPITAL | Age: 48
End: 2023-06-09
Payer: COMMERCIAL

## 2023-06-09 NOTE — PROGRESS NOTES
Working HTN report:     Called to discuss scheduling appointment and to get updated BP reading.  Unable to reach, left message.

## 2023-09-25 ENCOUNTER — PATIENT MESSAGE (OUTPATIENT)
Dept: ADMINISTRATIVE | Facility: HOSPITAL | Age: 48
End: 2023-09-25
Payer: COMMERCIAL

## 2023-10-06 ENCOUNTER — OFFICE VISIT (OUTPATIENT)
Dept: PRIMARY CARE CLINIC | Facility: CLINIC | Age: 48
End: 2023-10-06
Payer: COMMERCIAL

## 2023-10-06 ENCOUNTER — LAB VISIT (OUTPATIENT)
Dept: LAB | Facility: HOSPITAL | Age: 48
End: 2023-10-06
Attending: INTERNAL MEDICINE
Payer: COMMERCIAL

## 2023-10-06 VITALS
WEIGHT: 160.94 LBS | DIASTOLIC BLOOD PRESSURE: 77 MMHG | HEART RATE: 71 BPM | HEIGHT: 64 IN | SYSTOLIC BLOOD PRESSURE: 128 MMHG | OXYGEN SATURATION: 99 % | TEMPERATURE: 98 F | BODY MASS INDEX: 27.48 KG/M2

## 2023-10-06 DIAGNOSIS — C50.412 MALIGNANT NEOPLASM OF UPPER-OUTER QUADRANT OF LEFT BREAST IN FEMALE, ESTROGEN RECEPTOR NEGATIVE: ICD-10-CM

## 2023-10-06 DIAGNOSIS — Z76.89 ESTABLISHING CARE WITH NEW DOCTOR, ENCOUNTER FOR: ICD-10-CM

## 2023-10-06 DIAGNOSIS — E03.9 HYPOTHYROIDISM (ACQUIRED): ICD-10-CM

## 2023-10-06 DIAGNOSIS — Z00.00 ANNUAL PHYSICAL EXAM: Primary | ICD-10-CM

## 2023-10-06 DIAGNOSIS — I10 ESSENTIAL HYPERTENSION: ICD-10-CM

## 2023-10-06 DIAGNOSIS — Z17.1 MALIGNANT NEOPLASM OF UPPER-OUTER QUADRANT OF LEFT BREAST IN FEMALE, ESTROGEN RECEPTOR NEGATIVE: ICD-10-CM

## 2023-10-06 DIAGNOSIS — Z00.00 ANNUAL PHYSICAL EXAM: ICD-10-CM

## 2023-10-06 DIAGNOSIS — Z23 NEED FOR VACCINATION: ICD-10-CM

## 2023-10-06 DIAGNOSIS — Z86.718 HISTORY OF DVT (DEEP VEIN THROMBOSIS): ICD-10-CM

## 2023-10-06 PROBLEM — R07.9 CHEST PAIN SYNDROME: Status: RESOLVED | Noted: 2019-01-03 | Resolved: 2023-10-06

## 2023-10-06 LAB
ALBUMIN SERPL BCP-MCNC: 4.1 G/DL (ref 3.5–5.2)
ALP SERPL-CCNC: 59 U/L (ref 55–135)
ALT SERPL W/O P-5'-P-CCNC: 39 U/L (ref 10–44)
ANION GAP SERPL CALC-SCNC: 13 MMOL/L (ref 8–16)
AST SERPL-CCNC: 48 U/L (ref 10–40)
BACTERIA #/AREA URNS AUTO: NORMAL /HPF
BASOPHILS # BLD AUTO: 0.07 K/UL (ref 0–0.2)
BASOPHILS NFR BLD: 1.1 % (ref 0–1.9)
BILIRUB SERPL-MCNC: 0.3 MG/DL (ref 0.1–1)
BILIRUB UR QL STRIP: NEGATIVE
BUN SERPL-MCNC: 8 MG/DL (ref 6–20)
CALCIUM SERPL-MCNC: 9.4 MG/DL (ref 8.7–10.5)
CHLORIDE SERPL-SCNC: 103 MMOL/L (ref 95–110)
CHOLEST SERPL-MCNC: 435 MG/DL (ref 120–199)
CHOLEST/HDLC SERPL: 3.8 {RATIO} (ref 2–5)
CLARITY UR REFRACT.AUTO: CLEAR
CO2 SERPL-SCNC: 22 MMOL/L (ref 23–29)
COLOR UR AUTO: YELLOW
CREAT SERPL-MCNC: 1.2 MG/DL (ref 0.5–1.4)
DIFFERENTIAL METHOD: NORMAL
EOSINOPHIL # BLD AUTO: 0.1 K/UL (ref 0–0.5)
EOSINOPHIL NFR BLD: 1.2 % (ref 0–8)
ERYTHROCYTE [DISTWIDTH] IN BLOOD BY AUTOMATED COUNT: 14.3 % (ref 11.5–14.5)
EST. GFR  (NO RACE VARIABLE): 56.2 ML/MIN/1.73 M^2
ESTIMATED AVG GLUCOSE: 103 MG/DL (ref 68–131)
GLUCOSE SERPL-MCNC: 68 MG/DL (ref 70–110)
GLUCOSE UR QL STRIP: NEGATIVE
HBA1C MFR BLD: 5.2 % (ref 4–5.6)
HCT VFR BLD AUTO: 41.7 % (ref 37–48.5)
HDLC SERPL-MCNC: 114 MG/DL (ref 40–75)
HDLC SERPL: 26.2 % (ref 20–50)
HGB BLD-MCNC: 13.5 G/DL (ref 12–16)
HGB UR QL STRIP: ABNORMAL
HYALINE CASTS UR QL AUTO: 0 /LPF
IMM GRANULOCYTES # BLD AUTO: 0.02 K/UL (ref 0–0.04)
IMM GRANULOCYTES NFR BLD AUTO: 0.3 % (ref 0–0.5)
KETONES UR QL STRIP: NEGATIVE
LDLC SERPL CALC-MCNC: 299 MG/DL (ref 63–159)
LEUKOCYTE ESTERASE UR QL STRIP: NEGATIVE
LYMPHOCYTES # BLD AUTO: 1.9 K/UL (ref 1–4.8)
LYMPHOCYTES NFR BLD: 28.2 % (ref 18–48)
MCH RBC QN AUTO: 28.7 PG (ref 27–31)
MCHC RBC AUTO-ENTMCNC: 32.4 G/DL (ref 32–36)
MCV RBC AUTO: 89 FL (ref 82–98)
MICROSCOPIC COMMENT: NORMAL
MONOCYTES # BLD AUTO: 0.5 K/UL (ref 0.3–1)
MONOCYTES NFR BLD: 7.7 % (ref 4–15)
NEUTROPHILS # BLD AUTO: 4.1 K/UL (ref 1.8–7.7)
NEUTROPHILS NFR BLD: 61.5 % (ref 38–73)
NITRITE UR QL STRIP: NEGATIVE
NONHDLC SERPL-MCNC: 321 MG/DL
NRBC BLD-RTO: 0 /100 WBC
PH UR STRIP: 6 [PH] (ref 5–8)
PLATELET # BLD AUTO: 292 K/UL (ref 150–450)
PMV BLD AUTO: 11.6 FL (ref 9.2–12.9)
POTASSIUM SERPL-SCNC: 3.7 MMOL/L (ref 3.5–5.1)
PROT SERPL-MCNC: 7.9 G/DL (ref 6–8.4)
PROT UR QL STRIP: ABNORMAL
RBC # BLD AUTO: 4.7 M/UL (ref 4–5.4)
RBC #/AREA URNS AUTO: 2 /HPF (ref 0–4)
SODIUM SERPL-SCNC: 138 MMOL/L (ref 136–145)
SP GR UR STRIP: 1.02 (ref 1–1.03)
SQUAMOUS #/AREA URNS AUTO: 1 /HPF
T4 FREE SERPL-MCNC: <0.42 NG/DL (ref 0.71–1.51)
TRIGL SERPL-MCNC: 110 MG/DL (ref 30–150)
URN SPEC COLLECT METH UR: ABNORMAL
WBC # BLD AUTO: 6.6 K/UL (ref 3.9–12.7)
WBC #/AREA URNS AUTO: 4 /HPF (ref 0–5)

## 2023-10-06 PROCEDURE — 82652 VIT D 1 25-DIHYDROXY: CPT | Performed by: INTERNAL MEDICINE

## 2023-10-06 PROCEDURE — 80053 COMPREHEN METABOLIC PANEL: CPT | Performed by: INTERNAL MEDICINE

## 2023-10-06 PROCEDURE — 90686 IIV4 VACC NO PRSV 0.5 ML IM: CPT | Mod: S$GLB,,, | Performed by: INTERNAL MEDICINE

## 2023-10-06 PROCEDURE — 84443 ASSAY THYROID STIM HORMONE: CPT | Performed by: INTERNAL MEDICINE

## 2023-10-06 PROCEDURE — 84439 ASSAY OF FREE THYROXINE: CPT | Performed by: INTERNAL MEDICINE

## 2023-10-06 PROCEDURE — 83036 HEMOGLOBIN GLYCOSYLATED A1C: CPT | Performed by: INTERNAL MEDICINE

## 2023-10-06 PROCEDURE — 90686 FLU VACCINE (QUAD) GREATER THAN OR EQUAL TO 3YO PRESERVATIVE FREE IM: ICD-10-PCS | Mod: S$GLB,,, | Performed by: INTERNAL MEDICINE

## 2023-10-06 PROCEDURE — 99999 PR PBB SHADOW E&M-EST. PATIENT-LVL III: ICD-10-PCS | Mod: PBBFAC,,, | Performed by: INTERNAL MEDICINE

## 2023-10-06 PROCEDURE — 99999 PR PBB SHADOW E&M-EST. PATIENT-LVL III: CPT | Mod: PBBFAC,,, | Performed by: INTERNAL MEDICINE

## 2023-10-06 PROCEDURE — 90471 FLU VACCINE (QUAD) GREATER THAN OR EQUAL TO 3YO PRESERVATIVE FREE IM: ICD-10-PCS | Mod: S$GLB,,, | Performed by: INTERNAL MEDICINE

## 2023-10-06 PROCEDURE — 36415 COLL VENOUS BLD VENIPUNCTURE: CPT | Mod: PN | Performed by: INTERNAL MEDICINE

## 2023-10-06 PROCEDURE — 80061 LIPID PANEL: CPT | Performed by: INTERNAL MEDICINE

## 2023-10-06 PROCEDURE — 81001 URINALYSIS AUTO W/SCOPE: CPT | Performed by: INTERNAL MEDICINE

## 2023-10-06 PROCEDURE — 85025 COMPLETE CBC W/AUTO DIFF WBC: CPT | Performed by: INTERNAL MEDICINE

## 2023-10-06 PROCEDURE — 90471 IMMUNIZATION ADMIN: CPT | Mod: S$GLB,,, | Performed by: INTERNAL MEDICINE

## 2023-10-06 PROCEDURE — 99396 PREV VISIT EST AGE 40-64: CPT | Mod: 25,S$GLB,, | Performed by: INTERNAL MEDICINE

## 2023-10-06 PROCEDURE — 99396 PR PREVENTIVE VISIT,EST,40-64: ICD-10-PCS | Mod: 25,S$GLB,, | Performed by: INTERNAL MEDICINE

## 2023-10-06 NOTE — PROGRESS NOTES
Subjective     Patient ID: Florecita Noel is a 48 y.o. female.    Chief Complaint: Annual Exam      HPI  here for annual/establish care.  Due for fasting labs.  Feeling well today.  Has some fatigue; hasn't been consistent with thyroid medication.  Last TSH 39.    Past Medical History:   Diagnosis Date    Abnormal Pap smear 1996    Anemia     Hypertension     Hypokalemia     in pregnancy    Hypothyroidism (acquired) 12/28/2018    Malignant neoplasm of upper-outer quadrant of left breast in female, estrogen receptor negative 6/22/2022     Review of patient's allergies indicates:   Allergen Reactions    Sumatriptan Other (See Comments)     Chest tightness that moved into her throat     Past Surgical History:   Procedure Laterality Date    CHOLECYSTECTOMY      COLONOSCOPY N/A 6/8/2022    Procedure: COLONOSCOPY;  Surgeon: Rosaline Meyer MD;  Location: [facility];  Service: Endoscopy;  Laterality: N/A;    COSMETIC SURGERY      tummy tu    DIAGNOSTIC LAPAROSCOPY N/A 2/26/2019    Procedure: LAPAROSCOPY, DIAGNOSTIC;  Surgeon: Pia Aguila MD;  Location: [facility];  Service: OB/GYN;  Laterality: N/A;    FLUOROSCOPY N/A 9/1/2022    Procedure: FLUOROSCOPY/mediport exchange;  Surgeon: Roslyn Gale MD;  Location: [facility] CATH LAB;  Service: General;  Laterality: N/A;    FULGURATION OF ENDOMETRIOSIS N/A 2/26/2019    Procedure: FULGURATION, ENDOMETRIOSIS;  Surgeon: Pia Aguila MD;  Location: [facility] OR;  Service: OB/GYN;  Laterality: N/A;    HYSTERECTOMY      HYSTEROSCOPY WITH HYDROTHERMAL ABLATION OF ENDOMETRIUM WITH DILATION AND CURETTAGE N/A 2/26/2019    Procedure: HYSTEROSCOPY, WITH DILATION AND CURETTAGE OF UTERUS AND HYDROTHERMAL ENDOMETRIAL ABLATION;  Surgeon: Pia Aguila MD;  Location: [facility] OR;  Service: OB/GYN;  Laterality: N/A;    INSERTION OF TUNNELED CENTRAL VENOUS CATHETER (CVC) WITH SUBCUTANEOUS PORT N/A 7/1/2022    Procedure: [id number];  Surgeon: Beau Quiles  MD;  Location: [facility] OR;  Service: General;  Laterality: N/A;    LAPAROSCOPIC DRAINAGE OF CYST OF OVARY Left 2/26/2019    Procedure: DRAINAGE, CYST, OVARY, LAPAROSCOPIC;  Surgeon: Pia Aguila MD;  Location: [facility] OR;  Service: OB/GYN;  Laterality: Left;    ROBOT-ASSISTED LAPAROSCOPIC ABDOMINAL HYSTERECTOMY USING DA BEAN XI N/A 11/5/2019    Procedure: XI ROBOTIC HYSTERECTOMY;  Surgeon: Pia Aguila MD;  Location: [facility] OR;  Service: OB/GYN;  Laterality: N/A;    ROBOT-ASSISTED LAPAROSCOPIC SALPINGO-OOPHORECTOMY USING DA BEAN XI Bilateral 11/5/2019    Procedure: XI ROBOTIC SALPINGO-OOPHORECTOMY;  Surgeon: Pia Aguila MD;  Location: [facility] OR;  Service: OB/GYN;  Laterality: Bilateral;    TUBAL LIGATION      Tummy Tuck       Family History   Problem Relation Age of Onset    Cancer Mother         origin? ovarian? metastasized    Cancer Father         throat?    No Known Problems Sister     Heart disease Maternal Grandmother     No Known Problems Maternal Grandfather     No Known Problems Daughter     No Known Problems Daughter     No Known Problems Son     No Known Problems Son     No Known Problems Son     No Known Problems Son     No Known Problems Brother     No Known Problems Other     No Known Problems Other     No Known Problems Other     No Known Problems Other     No Known Problems Other     No Known Problems Maternal Aunt     No Known Problems Sister      Social History     Socioeconomic History    Marital status: Other   Tobacco Use    Smoking status: Never    Smokeless tobacco: Never   Substance and Sexual Activity    Alcohol use: Not Currently     Alcohol/week: 5.0 standard drinks of alcohol     Types: 5 Cans of beer per week     Comment: socially;  last 06/30/2022    Drug use: No    Sexual activity: Yes     Partners: Male     Birth control/protection: Surgical     Comment: [facility]      Social Determinants of Health     Financial Resource Strain: Medium Risk (10/6/2023)    Overall  "Financial Resource Strain (CARDIA)     Difficulty of Paying Living Expenses: Somewhat hard   Food Insecurity: No Food Insecurity (10/6/2023)    Hunger Vital Sign     Worried About Running Out of Food in the Last Year: Never true     Ran Out of Food in the Last Year: Never true   Transportation Needs: No Transportation Needs (10/6/2023)    PRAPARE - Transportation     Lack of Transportation (Medical): No     Lack of Transportation (Non-Medical): No   Physical Activity: Insufficiently Active (10/6/2023)    Exercise Vital Sign     Days of Exercise per Week: 3 days     Minutes of Exercise per Session: 20 min   Stress: Stress Concern Present (10/6/2023)    [employer] [place] [employer] - Occupational Stress Questionnaire     Feeling of Stress : To some extent   Social Connections: Unknown (10/6/2023)    Social Connection and Isolation Panel [NHANES]     Frequency of Communication with Friends and Family: More than three times a week     Frequency of Social Gatherings with Friends and Family: Once a week     Active Member of Clubs or Organizations: No     Attends Club or Organization Meetings: Never     Marital Status:    Housing Stability: Low Risk  (10/6/2023)    Housing Stability Vital Sign     Unable to Pay for Housing in the Last Year: No     Number of Places Lived in the Last Year: 1     Unstable Housing in the Last Year: No         /77   Pulse 71   Temp 97.8 °F (36.6 °C)   Ht 5' 4" (1.626 m)   Wt 73 kg (160 lb 15 oz)   LMP  (LMP Unknown)   SpO2 99%   BMI 27.62 kg/m²   Outpatient Medications as of 10/6/2023   Medication Sig Dispense Refill    apixaban (ELIQUIS) 5 mg Tab Take 1 tablet (5 mg total) by mouth 2 (two) times daily. 60 tablet 3    verapamiL (CALAN-SR) 180 MG CR tablet Take 1 tablet (180 mg total) by mouth every evening. 30 tablet 11     Facility-Administered Medications as of 10/6/2023   Medication Dose Route Frequency Provider Last Rate Last Admin    lactated ringers " infusion   Intravenous Continuous Li Pierson MD   Stopped at 07/01/22 0421       Review of Systems   All other systems reviewed and are negative.         Objective     Physical Exam  Constitutional:       General: She is not in acute distress.     Appearance: Normal appearance. She is not ill-appearing, toxic-appearing or diaphoretic.   HENT:      Head: Normocephalic and atraumatic.   Neck:      Vascular: No carotid bruit.   Cardiovascular:      Rate and Rhythm: Normal rate and regular rhythm.      Heart sounds: No murmur heard.     No friction rub. No gallop.   Pulmonary:      Effort: Pulmonary effort is normal. No respiratory distress.      Breath sounds: Normal breath sounds. No wheezing or rales.   Abdominal:      General: Abdomen is flat. Bowel sounds are normal. There is no distension.      Palpations: Abdomen is soft. There is no mass.      Tenderness: There is no abdominal tenderness. There is no guarding.   Musculoskeletal:      Cervical back: Neck supple. No tenderness.   Skin:     General: Skin is dry.   Neurological:      General: No focal deficit present.      Mental Status: She is alert and oriented to person, place, and time.   Psychiatric:         Mood and Affect: Mood normal.         Behavior: Behavior normal.            Assessment and Plan     1. Annual physical exam  -     Lipid Panel; Future; Expected date: 10/06/2023  -     Urinalysis; Future; Expected date: 10/06/2023  -     Hemoglobin A1C; Future; Expected date: 10/06/2023  -     TSH; Future; Expected date: 10/06/2023  -     Comprehensive Metabolic Panel; Future; Expected date: 10/06/2023  -     CBC Auto Differential; Future; Expected date: 10/06/2023  -     Calcitriol; Future; Expected date: 10/06/2023  -     T4, Free; Future; Expected date: 10/06/2023    2. Establishing care with new doctor, encounter for    3. Need for vaccination    4. Malignant neoplasm of upper-outer quadrant of left breast in female, estrogen receptor  negative    5. Essential hypertension    6. Hypothyroidism (acquired)  -     TSH; Future; Expected date: 10/06/2023  -     T4, Free; Future; Expected date: 10/06/2023    7. History of DVT (deep vein thrombosis)  Comments:  stable on Eliquis    Other orders  -     Influenza - Quadrivalent (PF)             Immunization History   Administered Date(s) Administered    COVID-19, MRNA, LN-S, PF (Pfizer) (Purple Cap) 04/10/2021, 04/30/2021    Influenza 10/08/2009, 10/09/2013, 10/10/2018, 10/23/2019    Influenza - Quadrivalent - PF *Preferred* (6 months and older) 10/16/2020, 11/26/2021, 10/06/2023    Influenza - Trivalent (ADULT) 10/08/2009, 10/09/2013    Tdap 12/13/2013       1 year annual

## 2023-10-07 LAB — TSH SERPL DL<=0.005 MIU/L-ACNC: 120.47 UIU/ML (ref 0.4–4)

## 2023-10-09 ENCOUNTER — PATIENT MESSAGE (OUTPATIENT)
Dept: PRIMARY CARE CLINIC | Facility: CLINIC | Age: 48
End: 2023-10-09
Payer: COMMERCIAL

## 2023-10-09 DIAGNOSIS — E03.9 ACQUIRED HYPOTHYROIDISM: ICD-10-CM

## 2023-10-09 DIAGNOSIS — E78.2 MIXED HYPERLIPIDEMIA: Primary | ICD-10-CM

## 2023-10-09 DIAGNOSIS — D65: ICD-10-CM

## 2023-10-09 LAB — 1,25(OH)2D3 SERPL-MCNC: 77 PG/ML (ref 20–79)

## 2023-10-09 RX ORDER — ATORVASTATIN CALCIUM 40 MG/1
40 TABLET, FILM COATED ORAL DAILY
Qty: 90 TABLET | Refills: 3 | Status: SHIPPED | OUTPATIENT
Start: 2023-10-09 | End: 2024-10-08

## 2023-10-09 RX ORDER — LEVOTHYROXINE SODIUM 50 UG/1
50 TABLET ORAL
Qty: 30 TABLET | Refills: 11 | Status: SHIPPED | OUTPATIENT
Start: 2023-10-09 | End: 2024-10-08

## 2023-10-10 ENCOUNTER — PATIENT MESSAGE (OUTPATIENT)
Dept: PRIMARY CARE CLINIC | Facility: CLINIC | Age: 48
End: 2023-10-10
Payer: COMMERCIAL

## 2023-12-11 ENCOUNTER — PATIENT OUTREACH (OUTPATIENT)
Dept: ADMINISTRATIVE | Facility: HOSPITAL | Age: 48
End: 2023-12-11
Payer: COMMERCIAL

## 2023-12-11 ENCOUNTER — PATIENT MESSAGE (OUTPATIENT)
Dept: ADMINISTRATIVE | Facility: HOSPITAL | Age: 48
End: 2023-12-11
Payer: COMMERCIAL

## 2023-12-11 NOTE — LETTER
AUTHORIZATION FOR RELEASE OF   CONFIDENTIAL INFORMATION    We are seeing Florecita Noel, date of birth 1975, in the clinic at [facility]. Naomy Smith MD is the patient's PCP. Florecita Noel has an outstanding lab/procedure at the time we reviewed her chart. In order to help keep her health information updated, she has authorized us to request the following medical record(s):        ( X )  MAMMOGRAM                                      (  )  COLONOSCOPY      (  )  PAP SMEAR                                          (  )  OUTSIDE LAB RESULTS     (  )  DEXA SCAN                                          (  )  EYE EXAM            (  )  FOOT EXAM                                          (  )  ENTIRE RECORD     (  )  OUTSIDE IMMUNIZATIONS                 (  )  _______________         Please fax records to [facility], [place], Brandi J., MD, [phone]      Patient Name: Florecita Noel  : 1975  Patient Phone #: [phone]

## 2024-01-04 ENCOUNTER — PATIENT MESSAGE (OUTPATIENT)
Dept: PRIMARY CARE CLINIC | Facility: CLINIC | Age: 49
End: 2024-01-04
Payer: COMMERCIAL

## 2024-04-05 ENCOUNTER — OFFICE VISIT (OUTPATIENT)
Dept: PRIMARY CARE CLINIC | Facility: CLINIC | Age: 49
End: 2024-04-05
Payer: COMMERCIAL

## 2024-04-05 ENCOUNTER — LAB VISIT (OUTPATIENT)
Dept: LAB | Facility: HOSPITAL | Age: 49
End: 2024-04-05
Attending: INTERNAL MEDICINE
Payer: COMMERCIAL

## 2024-04-05 VITALS
DIASTOLIC BLOOD PRESSURE: 96 MMHG | HEART RATE: 73 BPM | SYSTOLIC BLOOD PRESSURE: 132 MMHG | TEMPERATURE: 98 F | BODY MASS INDEX: 27.95 KG/M2 | OXYGEN SATURATION: 98 % | HEIGHT: 64 IN | WEIGHT: 163.69 LBS

## 2024-04-05 DIAGNOSIS — F41.9 ANXIETY: ICD-10-CM

## 2024-04-05 DIAGNOSIS — R49.0 HOARSE: ICD-10-CM

## 2024-04-05 DIAGNOSIS — E03.9 ACQUIRED HYPOTHYROIDISM: ICD-10-CM

## 2024-04-05 DIAGNOSIS — E03.9 HYPOTHYROIDISM (ACQUIRED): ICD-10-CM

## 2024-04-05 DIAGNOSIS — F43.21 GRIEF: ICD-10-CM

## 2024-04-05 DIAGNOSIS — I10 ESSENTIAL HYPERTENSION: ICD-10-CM

## 2024-04-05 DIAGNOSIS — I89.0 LYMPHEDEMA DUE TO RADIATION: ICD-10-CM

## 2024-04-05 DIAGNOSIS — E78.2 MIXED HYPERLIPIDEMIA: ICD-10-CM

## 2024-04-05 DIAGNOSIS — Z17.1 MALIGNANT NEOPLASM OF UPPER-OUTER QUADRANT OF LEFT BREAST IN FEMALE, ESTROGEN RECEPTOR NEGATIVE: ICD-10-CM

## 2024-04-05 DIAGNOSIS — C50.412 MALIGNANT NEOPLASM OF UPPER-OUTER QUADRANT OF LEFT BREAST IN FEMALE, ESTROGEN RECEPTOR NEGATIVE: ICD-10-CM

## 2024-04-05 DIAGNOSIS — Z09 FOLLOW-UP EXAM: Primary | ICD-10-CM

## 2024-04-05 DIAGNOSIS — M79.2 NEUROPATHIC PAIN: ICD-10-CM

## 2024-04-05 LAB
CHOLEST SERPL-MCNC: 430 MG/DL (ref 120–199)
CHOLEST/HDLC SERPL: 4.5 {RATIO} (ref 2–5)
HDLC SERPL-MCNC: 95 MG/DL (ref 40–75)
HDLC SERPL: 22.1 % (ref 20–50)
LDLC SERPL CALC-MCNC: 310.4 MG/DL (ref 63–159)
NONHDLC SERPL-MCNC: 335 MG/DL
TRIGL SERPL-MCNC: 123 MG/DL (ref 30–150)
TSH SERPL DL<=0.005 MIU/L-ACNC: 118.49 UIU/ML (ref 0.4–4)

## 2024-04-05 PROCEDURE — 80061 LIPID PANEL: CPT | Performed by: INTERNAL MEDICINE

## 2024-04-05 PROCEDURE — 99999 PR PBB SHADOW E&M-EST. PATIENT-LVL V: CPT | Mod: PBBFAC,,, | Performed by: INTERNAL MEDICINE

## 2024-04-05 PROCEDURE — 36415 COLL VENOUS BLD VENIPUNCTURE: CPT | Mod: PN | Performed by: INTERNAL MEDICINE

## 2024-04-05 PROCEDURE — 84443 ASSAY THYROID STIM HORMONE: CPT | Performed by: INTERNAL MEDICINE

## 2024-04-05 PROCEDURE — 84439 ASSAY OF FREE THYROXINE: CPT | Performed by: INTERNAL MEDICINE

## 2024-04-05 PROCEDURE — 99215 OFFICE O/P EST HI 40 MIN: CPT | Mod: S$GLB,,, | Performed by: INTERNAL MEDICINE

## 2024-04-05 RX ORDER — VENLAFAXINE HYDROCHLORIDE 37.5 MG/1
CAPSULE, EXTENDED RELEASE ORAL
Qty: 60 CAPSULE | Refills: 2 | Status: SHIPPED | OUTPATIENT
Start: 2024-04-05

## 2024-04-05 RX ORDER — VERAPAMIL HYDROCHLORIDE 180 MG/1
180 TABLET, FILM COATED, EXTENDED RELEASE ORAL NIGHTLY
Qty: 90 TABLET | Refills: 3 | Status: SHIPPED | OUTPATIENT
Start: 2024-04-05 | End: 2025-04-05

## 2024-04-05 RX ORDER — OMEGA-3-ACID ETHYL ESTERS 1 G/1
CAPSULE, LIQUID FILLED ORAL
COMMUNITY
End: 2024-04-05

## 2024-04-05 NOTE — PROGRESS NOTES
Subjective     Patient ID: Florecita Noel is a 48 y.o. female.    Chief Complaint: Follow-up      HPIf/u.  Lots of stress.  Following with her breast doctor regularly.  She has been having some lymphedema of the left breast.  She did have appropriate additional imaging.  She has an upcoming appointment with physical therapy for lymphedema.  This is stressful for her.  So the lot of stress in the last several months with this diagnosis and then other family stressors.  It is uncomfortable.  Not frankly painful but it is a discomfort.  This was discussed in detail.  She is interested in talking to someone after these life changes and also some family grief.  Also c/o ongoing hoarseness w/o other symptoms for about 2 months.  No b symptoms.    Past Medical History:   Diagnosis Date    Abnormal Pap smear 1996    Anemia     Hypertension     Hypokalemia     in pregnancy    Hypothyroidism (acquired) 12/28/2018    Malignant neoplasm of upper-outer quadrant of left breast in female, estrogen receptor negative 6/22/2022     Review of patient's allergies indicates:   Allergen Reactions    Sumatriptan Other (See Comments)     Chest tightness that moved into her throat     Past Surgical History:   Procedure Laterality Date    CHOLECYSTECTOMY      COLONOSCOPY N/A 6/8/2022    Procedure: COLONOSCOPY;  Surgeon: Rosaline Meyer MD;  Location: [facility];  Service: Endoscopy;  Laterality: N/A;    COSMETIC SURGERY      tummy tuck    DIAGNOSTIC LAPAROSCOPY N/A 2/26/2019    Procedure: LAPAROSCOPY, DIAGNOSTIC;  Surgeon: Pia Aguila MD;  Location: [facility] OR;  Service: OB/GYN;  Laterality: N/A;    FLUOROSCOPY N/A 9/1/2022    Procedure: FLUOROSCOPY/mediport exchange;  Surgeon: Roslyn Gale MD;  Location: [facility] CATH LAB;  Service: General;  Laterality: N/A;    FULGURATION OF ENDOMETRIOSIS N/A 2/26/2019    Procedure: FULGURATION, ENDOMETRIOSIS;  Surgeon: Pia Aguila MD;  Location: [facility] OR;  Service: OB/GYN;   Laterality: N/A;    HYSTERECTOMY      HYSTEROSCOPY WITH HYDROTHERMAL ABLATION OF ENDOMETRIUM WITH DILATION AND CURETTAGE N/A 2/26/2019    Procedure: HYSTEROSCOPY, WITH DILATION AND CURETTAGE OF UTERUS AND HYDROTHERMAL ENDOMETRIAL ABLATION;  Surgeon: Pia Aguila MD;  Location: [facility];  Service: OB/GYN;  Laterality: N/A;    INSERTION OF TUNNELED CENTRAL VENOUS CATHETER (CVC) WITH SUBCUTANEOUS PORT N/A 7/1/2022    Procedure: [id number];  Surgeon: Beau Quiles MD;  Location: [facility];  Service: General;  Laterality: N/A;    LAPAROSCOPIC DRAINAGE OF CYST OF OVARY Left 2/26/2019    Procedure: DRAINAGE, CYST, OVARY, LAPAROSCOPIC;  Surgeon: Pia Aguila MD;  Location: [facility];  Service: OB/GYN;  Laterality: Left;    ROBOT-ASSISTED LAPAROSCOPIC ABDOMINAL HYSTERECTOMY USING DA BEAN XI N/A 11/5/2019    Procedure: XI ROBOTIC HYSTERECTOMY;  Surgeon: Pia Aguila MD;  Location: [facility];  Service: OB/GYN;  Laterality: N/A;    ROBOT-ASSISTED LAPAROSCOPIC SALPINGO-OOPHORECTOMY USING DA BEAN XI Bilateral 11/5/2019    Procedure: XI ROBOTIC SALPINGO-OOPHORECTOMY;  Surgeon: Pia Aguila MD;  Location: [facility];  Service: OB/GYN;  Laterality: Bilateral;    TUBAL LIGATION      Tummy Tuck       Family History   Problem Relation Age of Onset    Cancer Mother         origin? ovarian? metastasized    Cancer Father         throat?    No Known Problems Sister     Heart disease Maternal Grandmother     No Known Problems Maternal Grandfather     No Known Problems Daughter     No Known Problems Daughter     No Known Problems Son     No Known Problems Son     No Known Problems Son     No Known Problems Son     No Known Problems Brother     No Known Problems Other     No Known Problems Other     No Known Problems Other     No Known Problems Other     No Known Problems Other     No Known Problems Maternal Aunt     No Known Problems Sister      Social History     Socioeconomic History     Marital status: Other   Tobacco Use    Smoking status: Never    Smokeless tobacco: Never   Substance and Sexual Activity    Alcohol use: Not Currently     Alcohol/week: 5.0 standard drinks of alcohol     Types: 5 Cans of beer per week     Comment: socially;  last 06/30/2022    Drug use: No    Sexual activity: Yes     Partners: Male     Birth control/protection: Surgical     Comment: [facility]      Social Determinants of Health     Financial Resource Strain: Medium Risk (4/4/2024)    Overall Financial Resource Strain (CARDIA)     Difficulty of Paying Living Expenses: Somewhat hard   Food Insecurity: Food Insecurity Present (4/4/2024)    Hunger Vital Sign     Worried About Running Out of Food in the Last Year: Sometimes true     Ran Out of Food in the Last Year: Sometimes true   Transportation Needs: Unmet Transportation Needs (4/4/2024)    PRAPARE - Transportation     Lack of Transportation (Medical): Yes     Lack of Transportation (Non-Medical): Yes   Physical Activity: Sufficiently Active (4/4/2024)    Exercise Vital Sign     Days of Exercise per Week: 3 days     Minutes of Exercise per Session: 60 min   Stress: Stress Concern Present (4/4/2024)    [employer] - Occupational Stress Questionnaire     Feeling of Stress : To some extent   Social Connections: Unknown (4/4/2024)    Social Connection and Isolation Panel [NHANES]     Frequency of Communication with Friends and Family: More than three times a week     Frequency of Social Gatherings with Friends and Family: Once a week     Active Member of Clubs or Organizations: No     Attends Club or Organization Meetings: Never     Marital Status:    Housing Stability: Low Risk  (4/4/2024)    Housing Stability Vital Sign     Unable to Pay for Housing in the Last Year: No     Number of Places Lived in the Last Year: 1     Unstable Housing in the Last Year: No         BP (!) 132/96 (BP Location: Left arm)   Pulse 73   Temp 97.9 °F (36.6  "°C) (Tympanic)   Ht 5' 4" (1.626 m)   Wt 74.2 kg (163 lb 11.1 oz)   LMP  (LMP Unknown)   SpO2 98%   BMI 28.10 kg/m²   No current outpatient medications on file as of 4/5/2024.     Facility-Administered Medications as of 4/5/2024   Medication Dose Route Frequency Provider Last Rate Last Admin    lactated ringers infusion   Intravenous Continuous Li Pierson MD   Stopped at 07/01/22 1137       Review of Systems   All other systems reviewed and are negative.         Objective     Physical Exam  Constitutional:       General: She is not in acute distress.     Appearance: Normal appearance. She is not ill-appearing, toxic-appearing or diaphoretic.   HENT:      Head: Normocephalic and atraumatic.      Mouth/Throat:      Mouth: Mucous membranes are moist.   Eyes:      Conjunctiva/sclera: Conjunctivae normal.   Cardiovascular:      Rate and Rhythm: Normal rate and regular rhythm.      Heart sounds: No murmur heard.     No friction rub. No gallop.   Pulmonary:      Effort: Pulmonary effort is normal. No respiratory distress.      Breath sounds: Normal breath sounds. No wheezing or rales.   Musculoskeletal:      Cervical back: Neck supple.   Skin:     General: Skin is dry.   Neurological:      General: No focal deficit present.      Mental Status: She is alert and oriented to person, place, and time.   Psychiatric:         Mood and Affect: Mood normal.         Behavior: Behavior normal.            Assessment and Plan     1. Follow-up exam    2. 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 Hoarse  -     Ambulatory referral/consult to ENT; Future; Expected date: 04/12/2024    4. Essential hypertension  -     verapamiL (CALAN-SR) 180 MG CR tablet; Take 1 tablet (180 mg total) by mouth every evening.  Dispense: 90 tablet; Refill: 3    5. Anxiety  -     Ambulatory referral/consult to Social Work; Future; Expected date: 04/12/2024  -     venlafaxine (EFFEXOR-XR) 37.5 MG 24 hr capsule; Take 1 capsule daily for 1 week, then increase " to 2 capsules daily  Dispense: 60 capsule; Refill: 2    6. Malignant neoplasm of upper-outer quadrant of left breast in female, estrogen receptor negative  -     Ambulatory referral/consult to Social Work; Future; Expected date: 04/12/2024    7. Hypothyroidism (acquired)    8. Grief  -     Ambulatory referral/consult to Social Work; Future; Expected date: 04/12/2024  -     venlafaxine (EFFEXOR-XR) 37.5 MG 24 hr capsule; Take 1 capsule daily for 1 week, then increase to 2 capsules daily  Dispense: 60 capsule; Refill: 2    9. Neuropathic pain  -     venlafaxine (EFFEXOR-XR) 37.5 MG 24 hr capsule; Take 1 capsule daily for 1 week, then increase to 2 capsules daily  Dispense: 60 capsule; Refill: 2       6 weeks f/u  Follow up in about 6 months (around 10/5/2024).    Immunization History   Administered Date(s) Administered    COVID-19, MRNA, LN-S, PF (Pfizer) (Purple Cap) 04/10/2021, 04/30/2021    Influenza 10/08/2009, 10/09/2013, 10/10/2018, 10/23/2019    Influenza - Quadrivalent - PF *Preferred* (6 months and older) 10/16/2020, 11/26/2021, 10/06/2023    Influenza - Trivalent (ADULT) 10/08/2009, 10/09/2013    Tdap 12/13/2013       I spent a total of 45 minutes on the day of the visit.This includes face to face time and non-face to face time preparing to see the patient (eg, review of tests), obtaining and/or reviewing separately obtained history, documenting clinical information in the electronic or other health record, independently interpreting results and communicating results to the patient/family/caregiver, or care coordinator.

## 2024-04-06 LAB — T4 FREE SERPL-MCNC: <0.42 NG/DL (ref 0.71–1.51)

## 2024-04-08 ENCOUNTER — PATIENT MESSAGE (OUTPATIENT)
Dept: PRIMARY CARE CLINIC | Facility: CLINIC | Age: 49
End: 2024-04-08
Payer: COMMERCIAL

## 2024-04-08 DIAGNOSIS — E03.9 ACQUIRED HYPOTHYROIDISM: Primary | ICD-10-CM

## 2024-04-08 DIAGNOSIS — E78.00 HYPERCHOLESTEROLEMIA: ICD-10-CM

## 2024-04-08 RX ORDER — ROSUVASTATIN CALCIUM 20 MG/1
20 TABLET, COATED ORAL DAILY
Qty: 90 TABLET | Refills: 3 | Status: SHIPPED | OUTPATIENT
Start: 2024-04-08 | End: 2025-04-08

## 2024-04-08 RX ORDER — LEVOTHYROXINE SODIUM 125 UG/1
TABLET ORAL
Qty: 30 TABLET | Refills: 11 | Status: SHIPPED | OUTPATIENT
Start: 2024-04-08

## 2024-04-11 ENCOUNTER — OFFICE VISIT (OUTPATIENT)
Dept: OTOLARYNGOLOGY | Facility: CLINIC | Age: 49
End: 2024-04-11
Payer: COMMERCIAL

## 2024-04-11 VITALS — BODY MASS INDEX: 27.92 KG/M2 | WEIGHT: 163.56 LBS | HEIGHT: 64 IN

## 2024-04-11 DIAGNOSIS — R47.1 DYSARTHRIA: Primary | ICD-10-CM

## 2024-04-11 DIAGNOSIS — R49.0 MUSCLE TENSION DYSPHONIA: ICD-10-CM

## 2024-04-11 DIAGNOSIS — R49.0 HOARSE: ICD-10-CM

## 2024-04-11 PROCEDURE — 99203 OFFICE O/P NEW LOW 30 MIN: CPT | Mod: 25,S$GLB,, | Performed by: STUDENT IN AN ORGANIZED HEALTH CARE EDUCATION/TRAINING PROGRAM

## 2024-04-11 PROCEDURE — 31575 DIAGNOSTIC LARYNGOSCOPY: CPT | Mod: S$GLB,,, | Performed by: STUDENT IN AN ORGANIZED HEALTH CARE EDUCATION/TRAINING PROGRAM

## 2024-04-11 PROCEDURE — 99999 PR PBB SHADOW E&M-EST. PATIENT-LVL III: CPT | Mod: PBBFAC,,, | Performed by: STUDENT IN AN ORGANIZED HEALTH CARE EDUCATION/TRAINING PROGRAM

## 2024-04-11 NOTE — PROGRESS NOTES
Chief complaint:    Chief Complaint   Patient presents with    Sore Throat     Pt has been hoarse for 2 month pt has no problems eating or drinking   Pt voice comes and goes during the day   Pt has no hx of reflux              Referring Provider:  Naomy Smith Md  [id number] [facility] [place] [address]    History of present illness:     Ms. Noel is a 48 y.o. w/hypothyroidism presenting for evaluation of hoarseness.     Onset: 4-5 months ago, worse over the last 1-2 months. No inciting incident   Severity: moderate   Quality: hoarse, raspy, strained, muffled and is stable  The voice has been persistently hoarse.   Associated signs and symptoms:  none  Heartburn: No  Smoking: No  Prior medical therapy: warm water, water and honey    Denies slowed speech, vision change, weakness, numbness    History      Past Medical History:   Past Medical History:   Diagnosis Date    Abnormal Pap smear 1996    Anemia     Hypertension     Hypokalemia     in pregnancy    Hypothyroidism (acquired) 12/28/2018    Malignant neoplasm of upper-outer quadrant of left breast in female, estrogen receptor negative 6/22/2022         Past Surgical History:  Past Surgical History:   Procedure Laterality Date    CHOLECYSTECTOMY      COLONOSCOPY N/A 6/8/2022    Procedure: COLONOSCOPY;  Surgeon: Rosaline Meyer MD;  Location: [facility];  Service: Endoscopy;  Laterality: N/A;    COSMETIC SURGERY      tummy tuck    DIAGNOSTIC LAPAROSCOPY N/A 2/26/2019    Procedure: LAPAROSCOPY, DIAGNOSTIC;  Surgeon: Pia Aguila MD;  Location: [facility] OR;  Service: OB/GYN;  Laterality: N/A;    FLUOROSCOPY N/A 9/1/2022    Procedure: FLUOROSCOPY/mediport exchange;  Surgeon: Roslyn Gale MD;  Location: [facility] CATH LAB;  Service: General;  Laterality: N/A;    FULGURATION OF ENDOMETRIOSIS N/A 2/26/2019    Procedure: FULGURATION, ENDOMETRIOSIS;  Surgeon: Pia Aguila MD;  Location: [facility] OR;  Service: OB/GYN;  Laterality: N/A;     HYSTERECTOMY      HYSTEROSCOPY WITH HYDROTHERMAL ABLATION OF ENDOMETRIUM WITH DILATION AND CURETTAGE N/A 2/26/2019    Procedure: HYSTEROSCOPY, WITH DILATION AND CURETTAGE OF UTERUS AND HYDROTHERMAL ENDOMETRIAL ABLATION;  Surgeon: Pia Aguila MD;  Location: [facility] OR;  Service: OB/GYN;  Laterality: N/A;    INSERTION OF TUNNELED CENTRAL VENOUS CATHETER (CVC) WITH SUBCUTANEOUS PORT N/A 7/1/2022    Procedure: [id number];  Surgeon: Beau Quiles MD;  Location: [facility] OR;  Service: General;  Laterality: N/A;    LAPAROSCOPIC DRAINAGE OF CYST OF OVARY Left 2/26/2019    Procedure: DRAINAGE, CYST, OVARY, LAPAROSCOPIC;  Surgeon: Pia Aguila MD;  Location: [facility] OR;  Service: OB/GYN;  Laterality: Left;    ROBOT-ASSISTED LAPAROSCOPIC ABDOMINAL HYSTERECTOMY USING DA BEAN XI N/A 11/5/2019    Procedure: XI ROBOTIC HYSTERECTOMY;  Surgeon: Pia Aguila MD;  Location: [facility] OR;  Service: OB/GYN;  Laterality: N/A;    ROBOT-ASSISTED LAPAROSCOPIC SALPINGO-OOPHORECTOMY USING DA BEAN XI Bilateral 11/5/2019    Procedure: XI ROBOTIC SALPINGO-OOPHORECTOMY;  Surgeon: Pia Aguila MD;  Location: [facility] OR;  Service: OB/GYN;  Laterality: Bilateral;    TUBAL LIGATION      Tummy Tuck           Medications: Medication list reviewed. She  has a current medication list which includes the following prescription(s): levothyroxine, rosuvastatin, venlafaxine, and verapamil, and the following Facility-Administered Medications: lactated ringers.     Allergies:   Review of patient's allergies indicates:   Allergen Reactions    Sumatriptan Other (See Comments)     Chest tightness that moved into her throat         Family history: family history includes Cancer in her father and mother; Heart disease in her maternal grandmother; No Known Problems in her brother, daughter, daughter, maternal aunt, maternal grandfather, sister, sister, son, son, son, son, and other family members.         Social History       "    Alcohol use:  reports that she does not currently use alcohol after a past usage of about 5.0 standard drinks of alcohol per week.            Tobacco:  reports that she has never smoked. She has never used smokeless tobacco.         Physical Examination      Vitals: Height 5' 4" (1.626 m), weight 74.2 kg (163 lb 9.3 oz).      General: Well developed, well nourished, well hydrated.    Voice: mild dysphonia, mild dysarthria     Head/Face: Normocephalic, atraumatic. No scars or lesions. Facial musculature equal.     Eyes: No scleral icterus or conjunctival hemorrhage. EOMI. PERRLA.     Ears:     Right ear: No gross deformity. EAC is clear of debris and erythema. TM are intact with a pneumatized middle ear. No signs of retraction, fluid or infection.      Left ear: No gross deformity. EAC is clear of debris and erythema. TM are intact with a pneumatized middle ear. No signs of retraction, fluid or infection.      Nose: No gross deformity or lesions. No purulent discharge. No significant NSD.      Mouth/Oropharynx: Lips without any lesions. No mucosal lesions within the oropharynx. No tonsillar exudate or lesions. Pharyngeal walls symmetrical. Uvula midline. Tongue midline without lesions.     Neck: Trachea midline. No masses. No thyromegaly or nodules palpated.     Lymphatic: No lymphadenopathy in the neck.     Extremities: No cyanosis. Warm and well-perfused.     Skin: No scars or lesions on face or neck.      Neurologic: Moving all extremities without gross abnormality.CN II-XII grossly intact. House-Brackmann 1/6. No signs of nystagmus.          Data reviewed      Review of records:      I reviewed records from the referring provider's office visits, including the history, workup, and/or treatment of this problem thus far.        Procedures:    Procedure -Transnasal fiberoptic laryngoscopy     Surgeon: Shan Goldberg M.D. .      Anesthesia: topical 0.05% oxymetazoline with 4% lidocaine      Complications: None. "     Description of Procedure: With the patient in the sitting position, topical lidocaine and oxymetazoline was applied to the nose. The scope was passed through the nose. Examination was carried out of the nose, nasopharynx, oropharynx, hypopharynx, and larynx with findings as noted above. Scope was removed. The patient tolerated the procedure well.      Findings: No masses or lesions in the nose, nasopharynx, oropharynx, hypopharynx, or larynx. Vocal fold abduction and adduction is normal. No pooling of secretions in the piriform sinuses, penetration, or aspiration. Moderate supraglottic squeeze and collapse with phonation.         Assessment/Plan:    1. Dysarthria    2. Hoarse    3. Muscle tension dysphonia      She does have evidence of MTD, but I am concerned there is a component of dysarthria. I would like her to also be evaluated by neuro to ensure no underlying neurologic conditions.  I recommend she started VT for MTD as well. Return to clinic if no improvement.              Shan Goldberg MD  [facility]   [facility] - [address]  [place] [address]  P: [phone]  F: [phone]

## 2024-04-16 ENCOUNTER — CLINICAL SUPPORT (OUTPATIENT)
Dept: REHABILITATION | Facility: HOSPITAL | Age: 49
End: 2024-04-16
Attending: STUDENT IN AN ORGANIZED HEALTH CARE EDUCATION/TRAINING PROGRAM
Payer: COMMERCIAL

## 2024-04-16 DIAGNOSIS — R49.0 MUSCLE TENSION DYSPHONIA: ICD-10-CM

## 2024-04-16 DIAGNOSIS — R47.1 DYSARTHRIA: ICD-10-CM

## 2024-04-16 PROCEDURE — 92524 BEHAVRAL QUALIT ANALYS VOICE: CPT | Performed by: SPEECH-LANGUAGE PATHOLOGIST

## 2024-04-16 PROCEDURE — 92507 TX SP LANG VOICE COMM INDIV: CPT | Performed by: SPEECH-LANGUAGE PATHOLOGIST

## 2024-04-16 NOTE — PLAN OF CARE
"[facility]  Speech Therapy Evaluation -Voice    Date: 4/16/2024     Name: Florecita Noel   MRN: [id number]    Therapy Diagnosis:   Encounter Diagnoses   Name Primary?    Dysarthria     Muscle tension dysphonia     Physician: Shan Goldberg MD  Physician Orders: Ambulatory Referral to Speech Therapy   Medical Diagnosis: Dysarthria [R47.1], Muscle tension dysphonia [R49.0]     Visit # / Visits Authorized:  1 / 1   Date of Evaluation:  4/16/2024   Insurance Authorization Period: 4/11/2024 - 12/31/2024   Plan of Care Certification:    4/16/2024 to 6/25/24      Time In: 11:20 AM   Time Out: 12:03 PM   Total time: 43 mins     Procedure   Qualitative Analysis of Voice and Resonance   Speech Language Voice Therapy     Precautions: Standard and Cancer- breast  Subjective     History of Current Condition:  Florecita Noel is a 48 y.o. female who presents to [facility] Outpatient Speech Therapy for evaluation secondary to Dysarthria [R47.1], Muscle tension dysphonia [R49.0]. Patient was referred to therapy by Shan Goldberg MD, which is the patient's ENT. Patient reports onset of dysphonia, suddenly and related to unclear cause, about 6 or so months ago. She underwent chemoradiation and surgery for breast cancer last year and is unsure exact timing of onset. She reports dysphonia has progressed to occurring all day, every day, with hoarse, rough, strained and effortful vocal quality, reduced loudness/projection, pressure/tension in throat, lower pitch, significant vocal fatigue, running out of air when speaking, with associated frequent throat clearing, and overall voice that "cuts in and out". Dysphonia is the same throughout the day. She saw Dr. Goldberg (ENT) last week with no laryngeal pathology noted, but supraglottic hyperfunction noted on laryngoscopy.       Past Medical History: Florecita Noel  has a past medical history of Abnormal Pap smear (1996), Anemia, Hypertension, Hypokalemia, " Hypothyroidism (acquired) (2018), and Malignant neoplasm of upper-outer quadrant of left breast in female, estrogen receptor negative (2022).  Florecita Noel  has a past surgical history that includes Cholecystectomy; Tummy Tuck; Tubal ligation; Cosmetic surgery; Hysteroscopy with hydrothermal ablation of endometrium with dilation and curettage (N/A, 2019); Diagnostic laparoscopy (N/A, 2019); Laparoscopic drainage of cyst of ovary (Left, 2019); Fulguration of endometriosis (N/A, 2019); Robot-assisted laparoscopic abdominal hysterectomy using da Trell Xi (N/A, 2019); Robot-assisted laparoscopic salpingo-oophorectomy using da Trell Xi (Bilateral, 2019); Hysterectomy; Colonoscopy (N/A, 2022); Insertion of tunneled central venous catheter (CVC) with subcutaneous port (N/A, 2022); and Fluoroscopy (N/A, 2022).  Medical Hx and Allergies: Florecita has a current medication list which includes the following prescription(s): levothyroxine, rosuvastatin, venlafaxine, and verapamil, and the following Facility-Administered Medications: lactated ringers.   Review of patient's allergies indicates:   Allergen Reactions    Sumatriptan Other (See Comments)     Chest tightness that moved into her throat     Prior Therapy:  currently   Social History:  Patient lives in [place], Patient is currently driving  Occupation:  works primarily from home for [employer], minimal voice use  Prior Level of Function: WFL   Current Level of Function: dysphonia impacting efficiency of communication  Pain Scale: no pain indicated throughout session  Patient's Therapy Goals:  return to baseline voice functioning     Objective   Formal Assessment:    VOICE EVALUATION    Vocal Complaints: fatigue with use, changes in modal pitch, difficulty with singing, pain with use, tightness, and reduced volume    Swallowing: WFL   Breathing: WFL  Smokin packs/day   EtOH: occasionally  Caffeine: 3-4 cups/week    Reflux: no   Water Intake: 50-60 oz/day     Vocal activities/abuses:   No unusual voice demands     Environment: NA    Laryngeal endoscopy findings per Dr. Goldberg on 4/11/24:  Procedure -Transnasal fiberoptic laryngoscopy      Surgeon: Shan Goldberg M.D. .       Anesthesia: topical 0.05% oxymetazoline with 4% lidocaine       Complications: None.      Description of Procedure: With the patient in the sitting position, topical lidocaine and oxymetazoline was applied to the nose. The scope was passed through the nose. Examination was carried out of the nose, nasopharynx, oropharynx, hypopharynx, and larynx with findings as noted above. Scope was removed. The patient tolerated the procedure well.       Findings: No masses or lesions in the nose, nasopharynx, oropharynx, hypopharynx, or larynx. Vocal fold abduction and adduction is normal. No pooling of secretions in the piriform sinuses, penetration, or aspiration. Moderate supraglottic squeeze and collapse with phonation.       Perceptual assessment:    -Quality: rough and strained  -Volume: decreased projection  -Pitch: low F0  -Flexibility: diminished    GRABS Scale (0 normal, 1 mild, 2 moderate, 3 severe)  Grade (overall severity): 2  Roughness: 2  Breathiness: 0  Asthenia (weakness): 0  Strain: 2    Maximum Phonation Time (MPT) (ah > 18s WFL):   Trial Seconds    Trial 1 3   Trial 2 3   Trial 3  3   Average  3       S/Z ratio (ratio of 1.4+ may indicate a degree of vocal fold dysfunction):   Trials /S/ (seconds) /Z/ (seconds) Ratio (S/Z ratio)   Trial 1 9 3    Trial 2 9 5    Trial 3  9 4    Average  9 4 2.25       Voice Handicap Index (VHI-10) (completed to assess self-perceived handicap associated with dysphonia; >11 considered abnormal):     My voice makes it difficult for people to hear me 4   2.   I run out of air when I talk 4   3.   People have difficulty understanding me in a noisy room  4   4.   The sound of my voice varies throughout the day  4   5.   My  "family has difficulty understanding me when I call throughout the house 3   6.   I use the phone less often than I would like to 4   7.   I'm tense when talking to others because of my voice  4   8.   I tend to avoid groups of people because of my voice  2   9.   People seem irritated with my voice 0   10. People ask "What's wrong with your voice?" 4   TOTAL 33/40       Reflux Symptom Index (RSI) Score:   Reflux Symptom Index (RSI) is a questionnaire given to the patient to  the possible presence and/or severity of LPR symptoms within the last month and any relationship between this condition and the patient's dysphagia. A score that is greater than 15 may be indicative of LPR.    Hoarseness or problem with your voice  5   2.   Clearing your throat  5   3.   Excess throat mucus or post-nasal drip 0   4.   Difficulty swallowing food, liquid, or pills 0   5.   Coughing after you ate or after lying down  0   6.   Breathing difficulties or choking episodes 0   7.   Troublesome or annoying cough 0   8.   Sensations of something sticking in your throat or a lump in your throat 0   9.   Heartburn, chest pain, indigestion, or stomach acid coming up 0   TOTAL 10/45       Oral-Motor Assessment  WNL    Muscle Tension Assessment  Tension Observed: jaw and neck  Tenderness with palpation/massage: yes  Reduced thyrohyoid space at rest: yes  Comments: -    Breath Support  At rest: Thoracic and Diaphragmatic  Sustained Phonation: Thoracic and Diaphragmatic  Conversation: Thoracic and Diaphragmatic  Speaks on residual air: yes    Impact of Voice Impairment on Functioning: (0=no impact; 10=substantial impact)  Vocal Effort: 9  Vocal Fatigue: 9  Vocal Impact: 6    Education / Stimulability Trials  Discussed importance of vocal hygiene including: hydration, reducing caffeine / drying agents, and reducing throat clearing, coughing, or other phonotraumatic behaviors.  Patient was stimulable for improved voice using semi-occluded " vocal tract exercises.        Treatment   Total Treatment Time Separate from Evaluation: 25 minutes   Treatment included the following:  Discussion of nature of muscle tension dysphonia with rationale for treatment plan  Discussion of semi-occluded vocal tract exercises with ST demonstration and patient execution of sustained pitches for vowels, /v/ and nasal consonant  Discussion of rationale for vocal hygiene program     Education provided:   -role of Speech Therapy, goals/plan of care, scheduling/cancellations, insurance limitations with patient  -Additional Education provided:   Vocal Hygiene    Patient expressed understanding.     Home Program: see education above.  Assessment     Florecita presents to [facility] status post medical diagnosis of Dysarthria [R47.1], Muscle tension dysphonia [R49.0].     Interpretation of objective assessment:   She presents with mild-moderate muscle tension dysphonia characterized by normal cranial nerve functioning, reduced maximum phonation time, rough, hoarse, and strained vocal quality, with reduced projection/loudness, vocal instability, low pitch, in the setting of normal vocal cord mobility with supraglottic hyperfunction noted on laryngoscopy, as well as reduced thyrohyoid space and tenderness on palpation. Dysphonia is impacting overall communication-related quality of life and resulting in reduced desire to communicate. She will benefit from speech therapy to address these deficits.     Demonstrates impairments including limitations as described in the problem list.     Positive prognostic factors: patient motivation to improve, stimulability with exercises in today's session  Negative prognostic factors: NA  Barriers to therapy: No barriers to therapy identified.     Patient's spiritual, cultural, and educational needs considered and patient agreeable to plan of care and goals.    Patient will benefit from skilled therapy.    Rehab Potential:  good    Short Term Goals: (5 weeks) Current Progress:   Patient will recall and utilize vocal hygiene strategies with minimal verbal assistance.       Progressing/ Not Met 4/16/2024   Established this date   Patient will complete SOVT exercises and/or resonant-focused exercises 3-5x daily to strengthen and balance the intrinsic laryngeal musculature and maximize glottic closure without medial hyperfunction.     Progressing/ Not Met 4/16/2024   Established this date   Patient will reduce/eliminate/substitute throat clearing independently.      Progressing/ Not Met 4/16/2024   Established this date    Patient will discriminate between easy and strained phonation with 80% accuracy.      Progressing/ Not Met 4/16/2024   Established this date    Patient will identify the sensations associated with muscle relaxation in the abdominal, thoracic, neck and facial areas during efficient phonation with minimal clinician cue.      Progressing/ Not Met 4/16/2024   Established this date        Long Term Goals: (10 weeks) Current Progress:   Patient will implement and adhere to vocal hygiene protocols on a daily basis, including the elimination of phonotraumatic behaviors.   Established this date     Patient and clinician will facilitate changes in vocal function in order to restore functional use of voice for daily occupational, social, and emotional demands.     Established this date     Patient will re-establish phonation with adequate balance of airflow and resonance with decreased muscle tension.      Established this date         Plan     Recommended Treatment Plan:  Patient will participate in the [facility] rehabilitation program for speech therapy 1 times per week/every other week for 10 weeks to address her voice deficits, to educate patient and their family, and to participate in a home exercise program.    Other Recommendations:   NA    Therapist's Name:   Kathy Hollins MA, CCC-SLP  Speech Language  Pathologist  4/16/2024

## 2024-04-16 NOTE — PROGRESS NOTES
See initial evaluation in plan of care.     Kathy Hollins MA, CCC-SLP  Speech Language Pathologist  4/16/2024

## 2024-04-23 ENCOUNTER — TELEPHONE (OUTPATIENT)
Dept: PRIMARY CARE CLINIC | Facility: CLINIC | Age: 49
End: 2024-04-23
Payer: COMMERCIAL

## 2024-04-23 ENCOUNTER — PATIENT MESSAGE (OUTPATIENT)
Dept: PRIMARY CARE CLINIC | Facility: CLINIC | Age: 49
End: 2024-04-23
Payer: COMMERCIAL

## 2024-04-23 NOTE — TELEPHONE ENCOUNTER
Contacted pt regarding recent scheduling changes. Current appointment has been cancelled. Pt did not answer telephone call. Left a detailed voicemail explaining the cancellation of upcoming appointment. Also sent pt a message via [facility] portal containing reasoning, as well as other available options to pt.

## 2024-05-13 ENCOUNTER — LAB VISIT (OUTPATIENT)
Dept: LAB | Facility: HOSPITAL | Age: 49
End: 2024-05-13
Attending: INTERNAL MEDICINE
Payer: COMMERCIAL

## 2024-05-13 DIAGNOSIS — E03.9 ACQUIRED HYPOTHYROIDISM: ICD-10-CM

## 2024-05-13 DIAGNOSIS — E78.00 HYPERCHOLESTEROLEMIA: ICD-10-CM

## 2024-05-13 LAB
ALBUMIN SERPL BCP-MCNC: 3.8 G/DL (ref 3.5–5.2)
ALP SERPL-CCNC: 56 U/L (ref 55–135)
ALT SERPL W/O P-5'-P-CCNC: 24 U/L (ref 10–44)
ANION GAP SERPL CALC-SCNC: 14 MMOL/L (ref 8–16)
AST SERPL-CCNC: 27 U/L (ref 10–40)
BILIRUB SERPL-MCNC: 0.3 MG/DL (ref 0.1–1)
BUN SERPL-MCNC: 15 MG/DL (ref 6–20)
CALCIUM SERPL-MCNC: 9.6 MG/DL (ref 8.7–10.5)
CHLORIDE SERPL-SCNC: 108 MMOL/L (ref 95–110)
CHOLEST SERPL-MCNC: 186 MG/DL (ref 120–199)
CHOLEST/HDLC SERPL: 2.7 {RATIO} (ref 2–5)
CO2 SERPL-SCNC: 20 MMOL/L (ref 23–29)
CREAT SERPL-MCNC: 0.9 MG/DL (ref 0.5–1.4)
EST. GFR  (NO RACE VARIABLE): >60 ML/MIN/1.73 M^2
GLUCOSE SERPL-MCNC: 84 MG/DL (ref 70–110)
HDLC SERPL-MCNC: 69 MG/DL (ref 40–75)
HDLC SERPL: 37.1 % (ref 20–50)
LDLC SERPL CALC-MCNC: 102 MG/DL (ref 63–159)
NONHDLC SERPL-MCNC: 117 MG/DL
POTASSIUM SERPL-SCNC: 4.1 MMOL/L (ref 3.5–5.1)
PROT SERPL-MCNC: 7.5 G/DL (ref 6–8.4)
SODIUM SERPL-SCNC: 142 MMOL/L (ref 136–145)
T4 FREE SERPL-MCNC: 0.9 NG/DL (ref 0.71–1.51)
TRIGL SERPL-MCNC: 75 MG/DL (ref 30–150)
TSH SERPL DL<=0.005 MIU/L-ACNC: 21.14 UIU/ML (ref 0.4–4)

## 2024-05-13 PROCEDURE — 80053 COMPREHEN METABOLIC PANEL: CPT | Performed by: INTERNAL MEDICINE

## 2024-05-13 PROCEDURE — 36415 COLL VENOUS BLD VENIPUNCTURE: CPT | Mod: PN | Performed by: INTERNAL MEDICINE

## 2024-05-13 PROCEDURE — 80061 LIPID PANEL: CPT | Performed by: INTERNAL MEDICINE

## 2024-05-13 PROCEDURE — 84439 ASSAY OF FREE THYROXINE: CPT | Performed by: INTERNAL MEDICINE

## 2024-05-13 PROCEDURE — 84443 ASSAY THYROID STIM HORMONE: CPT | Performed by: INTERNAL MEDICINE

## 2024-05-17 ENCOUNTER — OFFICE VISIT (OUTPATIENT)
Dept: PRIMARY CARE CLINIC | Facility: CLINIC | Age: 49
End: 2024-05-17
Payer: COMMERCIAL

## 2024-05-17 VITALS
SYSTOLIC BLOOD PRESSURE: 128 MMHG | WEIGHT: 164.56 LBS | TEMPERATURE: 97 F | HEIGHT: 64 IN | DIASTOLIC BLOOD PRESSURE: 88 MMHG | HEART RATE: 65 BPM | OXYGEN SATURATION: 98 % | BODY MASS INDEX: 28.09 KG/M2

## 2024-05-17 DIAGNOSIS — F41.9 ANXIETY: ICD-10-CM

## 2024-05-17 DIAGNOSIS — I89.0 LYMPHEDEMA DUE TO RADIATION: ICD-10-CM

## 2024-05-17 DIAGNOSIS — Z09 FOLLOW-UP EXAM: Primary | ICD-10-CM

## 2024-05-17 DIAGNOSIS — R49.0 HOARSE: ICD-10-CM

## 2024-05-17 DIAGNOSIS — E03.9 ACQUIRED HYPOTHYROIDISM: ICD-10-CM

## 2024-05-17 PROCEDURE — 99215 OFFICE O/P EST HI 40 MIN: CPT | Mod: S$GLB,,, | Performed by: INTERNAL MEDICINE

## 2024-05-17 PROCEDURE — 99999 PR PBB SHADOW E&M-EST. PATIENT-LVL IV: CPT | Mod: PBBFAC,,, | Performed by: INTERNAL MEDICINE

## 2024-05-17 NOTE — PROGRESS NOTES
Subjective     Patient ID: Florecita Noel is a 48 y.o. female.    Chief Complaint: Follow-up, Results, and Anxiety      HPI  F/u   Stress/anxiety, mostly surrounding her medical dx.  Seeing PT for lymphedema.  Not much change.  We discussed may take time and management; she will talk more about expectations with the PT.    Some hoarseness.  Needs thyroid labs rechecked at 4-6 week elham; d/w pt lab results in detail as well as medication plan.  Hypothyroidism may be affecting her mood/stress as well.  D/w pt if hoarseness persistent or pain, rec ENT consult for possible look at cords.  She will let us know if continues.  No b symptoms.    Recent Results (from the past 672 hour(s))   TSH    Collection Time: 05/13/24  8:10 AM   Result Value Ref Range    TSH 21.140 (H) 0.400 - 4.000 uIU/mL   T4, FREE    Collection Time: 05/13/24  8:10 AM   Result Value Ref Range    Free T4 0.90 0.71 - 1.51 ng/dL   Lipid Panel    Collection Time: 05/13/24  8:10 AM   Result Value Ref Range    Cholesterol 186 120 - 199 mg/dL    Triglycerides 75 30 - 150 mg/dL    HDL 69 40 - 75 mg/dL    LDL Cholesterol 102.0 63.0 - 159.0 mg/dL    HDL/Cholesterol Ratio 37.1 20.0 - 50.0 %    Total Cholesterol/HDL Ratio 2.7 2.0 - 5.0    Non-HDL Cholesterol 117 mg/dL   Comprehensive Metabolic Panel    Collection Time: 05/13/24  8:10 AM   Result Value Ref Range    Sodium 142 136 - 145 mmol/L    Potassium 4.1 3.5 - 5.1 mmol/L    Chloride 108 95 - 110 mmol/L    CO2 20 (L) 23 - 29 mmol/L    Glucose 84 70 - 110 mg/dL    BUN 15 6 - 20 mg/dL    Creatinine 0.9 0.5 - 1.4 mg/dL    Calcium 9.6 8.7 - 10.5 mg/dL    Total Protein 7.5 6.0 - 8.4 g/dL    Albumin 3.8 3.5 - 5.2 g/dL    Total Bilirubin 0.3 0.1 - 1.0 mg/dL    Alkaline Phosphatase 56 55 - 135 U/L    AST 27 10 - 40 U/L    ALT 24 10 - 44 U/L    eGFR >60.0 >60 mL/min/1.73 m^2    Anion Gap 14 8 - 16 mmol/L   ]  Past Medical History:   Diagnosis Date    Abnormal Pap smear 1996    Anemia     Hypertension     Hypokalemia      in pregnancy    Hypothyroidism (acquired) 12/28/2018    Malignant neoplasm of upper-outer quadrant of left breast in female, estrogen receptor negative 6/22/2022     Review of patient's allergies indicates:   Allergen Reactions    Sumatriptan Other (See Comments)     Chest tightness that moved into her throat     Past Surgical History:   Procedure Laterality Date    CHOLECYSTECTOMY      COLONOSCOPY N/A 6/8/2022    Procedure: COLONOSCOPY;  Surgeon: Rosaline Meyer MD;  Location: [facility] ENDO;  Service: Endoscopy;  Laterality: N/A;    COSMETIC SURGERY      tummy tuck    DIAGNOSTIC LAPAROSCOPY N/A 2/26/2019    Procedure: LAPAROSCOPY, DIAGNOSTIC;  Surgeon: Pia Aguila MD;  Location: [facility] OR;  Service: OB/GYN;  Laterality: N/A;    FLUOROSCOPY N/A 9/1/2022    Procedure: FLUOROSCOPY/mediport exchange;  Surgeon: Roslyn Gale MD;  Location: [facility] CATH LAB;  Service: General;  Laterality: N/A;    FULGURATION OF ENDOMETRIOSIS N/A 2/26/2019    Procedure: FULGURATION, ENDOMETRIOSIS;  Surgeon: Pia Aguila MD;  Location: [facility] OR;  Service: OB/GYN;  Laterality: N/A;    HYSTERECTOMY      HYSTEROSCOPY WITH HYDROTHERMAL ABLATION OF ENDOMETRIUM WITH DILATION AND CURETTAGE N/A 2/26/2019    Procedure: HYSTEROSCOPY, WITH DILATION AND CURETTAGE OF UTERUS AND HYDROTHERMAL ENDOMETRIAL ABLATION;  Surgeon: Pia Aguila MD;  Location: [facility] OR;  Service: OB/GYN;  Laterality: N/A;    INSERTION OF TUNNELED CENTRAL VENOUS CATHETER (CVC) WITH SUBCUTANEOUS PORT N/A 7/1/2022    Procedure: [id number];  Surgeon: Beau Quiles MD;  Location: [facility] OR;  Service: General;  Laterality: N/A;    LAPAROSCOPIC DRAINAGE OF CYST OF OVARY Left 2/26/2019    Procedure: DRAINAGE, CYST, OVARY, LAPAROSCOPIC;  Surgeon: Pia Aguila MD;  Location: [facility] OR;  Service: OB/GYN;  Laterality: Left;    ROBOT-ASSISTED LAPAROSCOPIC ABDOMINAL HYSTERECTOMY USING DA BEAN XI N/A 11/5/2019    Procedure: XI ROBOTIC  HYSTERECTOMY;  Surgeon: Pia Aguila MD;  Location: [facility] OR;  Service: OB/GYN;  Laterality: N/A;    ROBOT-ASSISTED LAPAROSCOPIC SALPINGO-OOPHORECTOMY USING DA BEAN XI Bilateral 11/5/2019    Procedure: XI ROBOTIC SALPINGO-OOPHORECTOMY;  Surgeon: Pia Aguila MD;  Location: [facility] OR;  Service: OB/GYN;  Laterality: Bilateral;    TUBAL LIGATION      Tumchel Davila       Family History   Problem Relation Name Age of Onset    Cancer Mother Vilma         origin? ovarian? metastasized    Cancer Father Raghu         throat?    No Known Problems Sister Donna     Heart disease Maternal Grandmother Daisha     No Known Problems Maternal Grandfather      No Known Problems Daughter Jadauyn     No Known Problems Daughter Journi     No Known Problems Son Daniel     No Known Problems Son Jadavian     No Known Problems Son Enmanuel     No Known Problems Son Jason     No Known Problems Brother Paul     No Known Problems Other Quellique     No Known Problems Other Darrique     No Known Problems Other Ritchie     No Known Problems Other Aladeya     No Known Problems Other Avante     No Known Problems Maternal Aunt Zaida     No Known Problems Sister Princess      Social History     Socioeconomic History    Marital status: Other   Tobacco Use    Smoking status: Never     Passive exposure: Never    Smokeless tobacco: Never   Substance and Sexual Activity    Alcohol use: Not Currently     Alcohol/week: 5.0 standard drinks of alcohol     Types: 5 Cans of beer per week     Comment: socially;  last 06/30/2022    Drug use: No    Sexual activity: Yes     Partners: Male     Birth control/protection: Surgical     Comment: [facility]      Social Determinants of Health     Financial Resource Strain: Medium Risk (4/4/2024)    Overall Financial Resource Strain (CARDIA)     Difficulty of Paying Living Expenses: Somewhat hard   Food Insecurity: Food Insecurity Present (4/4/2024)    Hunger Vital Sign     Worried About Running Out of Food in the  "Last Year: Sometimes true     Ran Out of Food in the Last Year: Sometimes true   Transportation Needs: Unmet Transportation Needs (4/4/2024)    PRAPARE - Transportation     Lack of Transportation (Medical): Yes     Lack of Transportation (Non-Medical): Yes   Physical Activity: Sufficiently Active (4/4/2024)    Exercise Vital Sign     Days of Exercise per Week: 3 days     Minutes of Exercise per Session: 60 min   Stress: Stress Concern Present (4/4/2024)    [employer] - Occupational Stress Questionnaire     Feeling of Stress : To some extent   Housing Stability: Low Risk  (4/4/2024)    Housing Stability Vital Sign     Unable to Pay for Housing in the Last Year: No     Number of Places Lived in the Last Year: 1     Unstable Housing in the Last Year: No         /88 (BP Location: Left arm)   Pulse 65   Temp 97.1 °F (36.2 °C) (Tympanic)   Ht 5' 4" (1.626 m)   Wt 74.6 kg (164 lb 9.2 oz)   LMP  (LMP Unknown)   SpO2 98%   BMI 28.25 kg/m²   Outpatient Medications as of 5/17/2024   Medication Sig Dispense Refill    levothyroxine (SYNTHROID) 125 MCG tablet One tablet every morning on an empty stomach with water and do not eat or drink anything else for at least 30 minutes. 30 tablet 11    rosuvastatin (CRESTOR) 20 MG tablet Take 1 tablet (20 mg total) by mouth once daily. 90 tablet 3    venlafaxine (EFFEXOR-XR) 37.5 MG 24 hr capsule Take 1 capsule daily for 1 week, then increase to 2 capsules daily 60 capsule 2    verapamiL (CALAN-SR) 180 MG CR tablet Take 1 tablet (180 mg total) by mouth every evening. 90 tablet 3     Facility-Administered Medications as of 5/17/2024   Medication Dose Route Frequency Provider Last Rate Last Admin    lactated ringers infusion   Intravenous Continuous Li Pierson MD   Stopped at 07/01/22 4919       Review of Systems   All other systems reviewed and are negative.         Objective     Physical Exam  Constitutional:       General: She is not in " acute distress.     Appearance: Normal appearance. She is not ill-appearing, toxic-appearing or diaphoretic.   HENT:      Head: Normocephalic and atraumatic.      Mouth/Throat:      Mouth: Mucous membranes are moist.   Eyes:      Conjunctiva/sclera: Conjunctivae normal.   Cardiovascular:      Rate and Rhythm: Normal rate and regular rhythm.      Heart sounds: No murmur heard.     No friction rub. No gallop.   Pulmonary:      Effort: Pulmonary effort is normal. No respiratory distress.      Breath sounds: Normal breath sounds. No wheezing or rales.   Musculoskeletal:      Cervical back: Neck supple.   Skin:     General: Skin is dry.   Neurological:      General: No focal deficit present.      Mental Status: She is alert and oriented to person, place, and time.   Psychiatric:         Mood and Affect: Mood normal.         Behavior: Behavior normal.            Assessment and Plan     1. Follow-up exam    2. Anxiety    3. Hoarse    4. Lymphedema due to radiation    5. Acquired hypothyroidism  -     TSH; Future; Expected date: 06/17/2024  -     T4, Free; Future; Expected date: 06/17/2024       Labs one month    Follow up in about 6 months (around 11/17/2024).    Immunization History   Administered Date(s) Administered    COVID-19, MRNA, LN-S, PF (Pfizer) (Purple Cap) 04/10/2021, 04/30/2021    Influenza 10/08/2009, 10/09/2013, 10/10/2018, 10/23/2019    Influenza - Quadrivalent - PF *Preferred* (6 months and older) 10/16/2020, 11/26/2021, 10/06/2023    Influenza - Trivalent (ADULT) 10/08/2009, 10/09/2013    Tdap 12/13/2013       I spent a total of 40 minutes on the day of the visit.This includes face to face time and non-face to face time preparing to see the patient (eg, review of tests), obtaining and/or reviewing separately obtained history, documenting clinical information in the electronic or other health record, independently interpreting results and communicating results to the patient/family/caregiver, or care  coordinator.

## 2024-10-07 ENCOUNTER — OFFICE VISIT (OUTPATIENT)
Dept: PRIMARY CARE CLINIC | Facility: CLINIC | Age: 49
End: 2024-10-07
Payer: COMMERCIAL

## 2024-10-07 ENCOUNTER — LAB VISIT (OUTPATIENT)
Dept: LAB | Facility: HOSPITAL | Age: 49
End: 2024-10-07
Attending: INTERNAL MEDICINE
Payer: COMMERCIAL

## 2024-10-07 VITALS
HEIGHT: 64 IN | BODY MASS INDEX: 28.39 KG/M2 | SYSTOLIC BLOOD PRESSURE: 126 MMHG | TEMPERATURE: 99 F | DIASTOLIC BLOOD PRESSURE: 74 MMHG | HEART RATE: 80 BPM | WEIGHT: 166.31 LBS | OXYGEN SATURATION: 98 %

## 2024-10-07 DIAGNOSIS — I10 ESSENTIAL HYPERTENSION: ICD-10-CM

## 2024-10-07 DIAGNOSIS — Z17.1 MALIGNANT NEOPLASM OF UPPER-OUTER QUADRANT OF LEFT BREAST IN FEMALE, ESTROGEN RECEPTOR NEGATIVE: ICD-10-CM

## 2024-10-07 DIAGNOSIS — E03.9 ACQUIRED HYPOTHYROIDISM: ICD-10-CM

## 2024-10-07 DIAGNOSIS — Z09 FOLLOW-UP EXAM: Primary | ICD-10-CM

## 2024-10-07 DIAGNOSIS — Z23 FLU VACCINE NEED: ICD-10-CM

## 2024-10-07 DIAGNOSIS — E03.9 HYPOTHYROIDISM (ACQUIRED): ICD-10-CM

## 2024-10-07 DIAGNOSIS — C50.412 MALIGNANT NEOPLASM OF UPPER-OUTER QUADRANT OF LEFT BREAST IN FEMALE, ESTROGEN RECEPTOR NEGATIVE: ICD-10-CM

## 2024-10-07 PROCEDURE — 99999 PR PBB SHADOW E&M-EST. PATIENT-LVL IV: CPT | Mod: PBBFAC,,, | Performed by: INTERNAL MEDICINE

## 2024-10-07 PROCEDURE — 90656 IIV3 VACC NO PRSV 0.5 ML IM: CPT | Mod: S$GLB,,, | Performed by: INTERNAL MEDICINE

## 2024-10-07 PROCEDURE — 84439 ASSAY OF FREE THYROXINE: CPT | Performed by: INTERNAL MEDICINE

## 2024-10-07 PROCEDURE — 84443 ASSAY THYROID STIM HORMONE: CPT | Performed by: INTERNAL MEDICINE

## 2024-10-07 PROCEDURE — 36415 COLL VENOUS BLD VENIPUNCTURE: CPT | Mod: PN | Performed by: INTERNAL MEDICINE

## 2024-10-07 PROCEDURE — 90471 IMMUNIZATION ADMIN: CPT | Mod: S$GLB,,, | Performed by: INTERNAL MEDICINE

## 2024-10-07 PROCEDURE — 99214 OFFICE O/P EST MOD 30 MIN: CPT | Mod: 25,S$GLB,, | Performed by: INTERNAL MEDICINE

## 2024-10-07 NOTE — PROGRESS NOTES
Subjective     Patient ID: Florecita Noel is a 49 y.o. female.    Chief Complaint: Hypothyroidism (Patient is here for a six month follow.)      HPI  6mo f/u.  Checking labs today.  Wants flu shot.  Feeling ok.  Has had some stressors lately but doing ok.  Needs letter for animal at her apartment.    Past Medical History:   Diagnosis Date    Abnormal Pap smear 1996    Anemia     Hypertension     Hypokalemia     in pregnancy    Hypothyroidism (acquired) 12/28/2018    Malignant neoplasm of upper-outer quadrant of left breast in female, estrogen receptor negative 6/22/2022     Review of patient's allergies indicates:   Allergen Reactions    Sumatriptan Other (See Comments)     Chest tightness that moved into her throat     Past Surgical History:   Procedure Laterality Date    CHOLECYSTECTOMY      COLONOSCOPY N/A 6/8/2022    Procedure: COLONOSCOPY;  Surgeon: Rosaline Meyer MD;  Location: [facility];  Service: Endoscopy;  Laterality: N/A;    COSMETIC SURGERY      tummy tuck    DIAGNOSTIC LAPAROSCOPY N/A 2/26/2019    Procedure: LAPAROSCOPY, DIAGNOSTIC;  Surgeon: Pia Aguila MD;  Location: [facility] OR;  Service: OB/GYN;  Laterality: N/A;    FLUOROSCOPY N/A 9/1/2022    Procedure: FLUOROSCOPY/mediport exchange;  Surgeon: Roslyn Gale MD;  Location: [facility] CATH LAB;  Service: General;  Laterality: N/A;    FULGURATION OF ENDOMETRIOSIS N/A 2/26/2019    Procedure: FULGURATION, ENDOMETRIOSIS;  Surgeon: Pia Aguila MD;  Location: [facility] OR;  Service: OB/GYN;  Laterality: N/A;    HYSTERECTOMY      HYSTEROSCOPY WITH HYDROTHERMAL ABLATION OF ENDOMETRIUM WITH DILATION AND CURETTAGE N/A 2/26/2019    Procedure: HYSTEROSCOPY, WITH DILATION AND CURETTAGE OF UTERUS AND HYDROTHERMAL ENDOMETRIAL ABLATION;  Surgeon: Pia Aguila MD;  Location: [facility] OR;  Service: OB/GYN;  Laterality: N/A;    INSERTION OF TUNNELED CENTRAL VENOUS CATHETER (CVC) WITH SUBCUTANEOUS PORT N/A 7/1/2022    Procedure:  [id number];  Surgeon: Beau Quiles MD;  Location: [facility] OR;  Service: General;  Laterality: N/A;    LAPAROSCOPIC DRAINAGE OF CYST OF OVARY Left 2/26/2019    Procedure: DRAINAGE, CYST, OVARY, LAPAROSCOPIC;  Surgeon: Pia Aguila MD;  Location: [facility] OR;  Service: OB/GYN;  Laterality: Left;    ROBOT-ASSISTED LAPAROSCOPIC ABDOMINAL HYSTERECTOMY USING DA BEAN XI N/A 11/5/2019    Procedure: XI ROBOTIC HYSTERECTOMY;  Surgeon: Pia Agulia MD;  Location: [facility] OR;  Service: OB/GYN;  Laterality: N/A;    ROBOT-ASSISTED LAPAROSCOPIC SALPINGO-OOPHORECTOMY USING DA BEAN XI Bilateral 11/5/2019    Procedure: XI ROBOTIC SALPINGO-OOPHORECTOMY;  Surgeon: Pia Aguila MD;  Location: [facility] OR;  Service: OB/GYN;  Laterality: Bilateral;    TUBAL LIGATION      José Miguel Davila       Family History   Problem Relation Name Age of Onset    Cancer Mother Vilma         origin? ovarian? metastasized    Cancer Father Raghu         throat?    No Known Problems Sister Donna     Heart disease Maternal Grandmother Daisha     No Known Problems Maternal Grandfather      No Known Problems Daughter Jadauyn     No Known Problems Daughter Journi     No Known Problems Son Daniel     No Known Problems Son Jadavian     No Known Problems Son Enmanuel     No Known Problems Son Jason     No Known Problems Brother Paul     No Known Problems Other Quellique     No Known Problems Other Darrique     No Known Problems Other Ritchie     No Known Problems Other Aladeya     No Known Problems Other Avante     No Known Problems Maternal Aunt Zaida     No Known Problems Sister Princess      Social History     Socioeconomic History    Marital status: Other   Tobacco Use    Smoking status: Never     Passive exposure: Never    Smokeless tobacco: Never   Substance and Sexual Activity    Alcohol use: Not Currently     Alcohol/week: 5.0 standard drinks of alcohol     Types: 5 Cans of beer per week     Comment: socially;  last 06/30/2022    Drug  "use: No    Sexual activity: Yes     Partners: Male     Birth control/protection: Surgical     Comment: hyst      Social Drivers of Health     Financial Resource Strain: Medium Risk (10/7/2024)    Overall Financial Resource Strain (CARDIA)     Difficulty of Paying Living Expenses: Somewhat hard   Food Insecurity: No Food Insecurity (10/7/2024)    Hunger Vital Sign     Worried About Running Out of Food in the Last Year: Never true     Ran Out of Food in the Last Year: Never true   Transportation Needs: Patient Declined (9/30/2024)    Received from [facility] - Transportation     Lack of Transportation (Medical): Patient declined     Lack of Transportation (Non-Medical): Patient declined   Physical Activity: Inactive (10/7/2024)    Exercise Vital Sign     Days of Exercise per Week: 0 days     Minutes of Exercise per Session: 0 min   Stress: Stress Concern Present (10/7/2024)    [employer] [place] [employer] - Occupational Stress Questionnaire     Feeling of Stress : To some extent   Housing Stability: Patient Declined (9/30/2024)    Received from [facility]    Housing Stability Vital Sign     Unable to Pay for Housing in the Last Year: Patient declined     Homeless in the Last Year: Patient declined         /74 (BP Location: Right arm)   Pulse 80   Temp 98.5 °F (36.9 °C) (Tympanic)   Ht 5' 4" (1.626 m)   Wt 75.4 kg (166 lb 5.4 oz)   LMP  (LMP Unknown)   SpO2 98%   BMI 28.55 kg/m²   Outpatient Medications as of 10/7/2024   Medication Sig Dispense Refill    levothyroxine (SYNTHROID) 125 MCG tablet One tablet every morning on an empty stomach with water and do not eat or drink anything else for at least 30 minutes. 30 tablet 11    rosuvastatin (CRESTOR) 20 MG tablet Take 1 tablet (20 mg total) by mouth once daily. 90 tablet 3    verapamiL (CALAN-SR) 180 MG CR tablet Take 1 tablet (180 mg total) by mouth every evening. 90 tablet 3     Facility-Administered Medications as of " 10/7/2024   Medication Dose Route Frequency Provider Last Rate Last Admin    [COMPLETED] influenza (Flulaval, Fluzone, Fluarix) 45 mcg/0.5 mL IM vaccine (> or = 6 mo) 0.5 mL  0.5 mL Intramuscular 1 time in Clinic/HOD Naomy Smith MD   0.5 mL at 10/07/24 1152    lactated ringers infusion   Intravenous Continuous Li Pierson MD   Stopped at 07/01/22 1137       Review of Systems   All other systems reviewed and are negative.         Objective     Physical Exam  Constitutional:       General: She is not in acute distress.     Appearance: Normal appearance. She is not ill-appearing, toxic-appearing or diaphoretic.   HENT:      Head: Normocephalic and atraumatic.      Mouth/Throat:      Mouth: Mucous membranes are moist.   Eyes:      Conjunctiva/sclera: Conjunctivae normal.   Cardiovascular:      Rate and Rhythm: Normal rate and regular rhythm.      Heart sounds: No murmur heard.     No friction rub. No gallop.   Pulmonary:      Effort: Pulmonary effort is normal. No respiratory distress.      Breath sounds: Normal breath sounds. No wheezing or rales.   Musculoskeletal:      Cervical back: Neck supple.   Skin:     General: Skin is dry.   Neurological:      General: No focal deficit present.      Mental Status: She is alert and oriented to person, place, and time.   Psychiatric:         Mood and Affect: Mood normal.         Behavior: Behavior normal.            Assessment and Plan     1. Follow-up exam    2. Essential hypertension  Comments:  stable; continue current med dose    3. Malignant neoplasm of upper-outer quadrant of left breast in female, estrogen receptor negative  Comments:  has regular f/u with breast surgeon; had recent u/s and mmg    4. Hypothyroidism (acquired)  Comments:  labs today; currently on Synthroid    5. Flu vaccine need  -     influenza (Flulaval, Fluzone, Fluarix) 45 mcg/0.5 mL IM vaccine (> or = 6 mo) 0.5 mL         Follow up in about 6 months (around 4/7/2025) for annual with labs  a week before.    Immunization History   Administered Date(s) Administered    COVID-19, MRNA, LN-S, PF (Pfizer) (Purple Cap) 04/10/2021, 04/30/2021    Influenza 10/08/2009, 10/09/2013, 10/10/2018, 10/23/2019    Influenza - Quadrivalent - PF *Preferred* (6 months and older) 10/16/2020, 11/26/2021, 10/06/2023    Influenza - Trivalent - Afluria, Fluzone MDV 10/08/2009, 10/09/2013    Influenza - Trivalent - Fluarix, Flulaval, Fluzone, Afluria - PF 10/07/2024    Tdap 12/13/2013       I spent a total of 30 minutes on the day of the visit.This includes face to face time and non-face to face time preparing to see the patient (eg, review of tests), obtaining and/or reviewing separately obtained history, documenting clinical information in the electronic or other health record, independently interpreting results and communicating results to the patient/family/caregiver, or care coordinator.

## 2024-10-07 NOTE — LETTER
October 7, 2024    Florecita Noel  [address] [place] [id number]             [facility]  Primary Care  [address] [address]  [place] [address]  Phone: [phone]  Fax: [phone]   October 7, 2024     Patient: Florecita Noel   YOB: 1975   Date of Visit: 10/7/2024       To Whom it May Concern:      Part of Florecita's medical treatment plan involves utilizing her animal , Sol, as a support animal.  Please allow her to have her animal at her apartment.             Sincerely,         Naomy Smith MD

## 2024-10-08 LAB
T4 FREE SERPL-MCNC: 0.75 NG/DL (ref 0.71–1.51)
TSH SERPL DL<=0.005 MIU/L-ACNC: 52.56 UIU/ML (ref 0.4–4)

## 2025-04-04 ENCOUNTER — CLINICAL SUPPORT (OUTPATIENT)
Dept: PRIMARY CARE CLINIC | Facility: CLINIC | Age: 50
End: 2025-04-04
Payer: COMMERCIAL

## 2025-04-04 ENCOUNTER — OFFICE VISIT (OUTPATIENT)
Dept: PRIMARY CARE CLINIC | Facility: CLINIC | Age: 50
End: 2025-04-04
Payer: COMMERCIAL

## 2025-04-04 ENCOUNTER — RESULTS FOLLOW-UP (OUTPATIENT)
Dept: PRIMARY CARE CLINIC | Facility: CLINIC | Age: 50
End: 2025-04-04

## 2025-04-04 ENCOUNTER — LAB VISIT (OUTPATIENT)
Dept: LAB | Facility: HOSPITAL | Age: 50
End: 2025-04-04
Attending: INTERNAL MEDICINE
Payer: COMMERCIAL

## 2025-04-04 ENCOUNTER — PATIENT MESSAGE (OUTPATIENT)
Dept: PULMONOLOGY | Facility: CLINIC | Age: 50
End: 2025-04-04
Payer: COMMERCIAL

## 2025-04-04 VITALS
OXYGEN SATURATION: 99 % | DIASTOLIC BLOOD PRESSURE: 86 MMHG | HEART RATE: 79 BPM | WEIGHT: 166.75 LBS | BODY MASS INDEX: 28.63 KG/M2 | TEMPERATURE: 97 F | SYSTOLIC BLOOD PRESSURE: 124 MMHG

## 2025-04-04 DIAGNOSIS — Z00.00 ANNUAL PHYSICAL EXAM: Primary | ICD-10-CM

## 2025-04-04 DIAGNOSIS — R00.2 PALPITATIONS: ICD-10-CM

## 2025-04-04 DIAGNOSIS — R00.2 PALPITATIONS: Primary | ICD-10-CM

## 2025-04-04 DIAGNOSIS — I10 ESSENTIAL HYPERTENSION: ICD-10-CM

## 2025-04-04 DIAGNOSIS — G47.00 FREQUENT NOCTURNAL AWAKENING: ICD-10-CM

## 2025-04-04 DIAGNOSIS — G47.9 TROUBLE IN SLEEPING: ICD-10-CM

## 2025-04-04 LAB
ABSOLUTE EOSINOPHIL (OHS): 0.12 K/UL
ABSOLUTE MONOCYTE (OHS): 0.58 K/UL (ref 0.3–1)
ABSOLUTE NEUTROPHIL COUNT (OHS): 4.81 K/UL (ref 1.8–7.7)
ALBUMIN SERPL BCP-MCNC: 3.9 G/DL (ref 3.5–5.2)
ALP SERPL-CCNC: 57 UNIT/L (ref 40–150)
ALT SERPL W/O P-5'-P-CCNC: 21 UNIT/L (ref 10–44)
ANION GAP (OHS): 10 MMOL/L (ref 8–16)
AST SERPL-CCNC: 27 UNIT/L (ref 11–45)
BASOPHILS # BLD AUTO: 0.04 K/UL
BASOPHILS NFR BLD AUTO: 0.5 %
BILIRUB SERPL-MCNC: 0.3 MG/DL (ref 0.1–1)
BUN SERPL-MCNC: 11 MG/DL (ref 6–20)
CALCIUM SERPL-MCNC: 9.2 MG/DL (ref 8.7–10.5)
CHLORIDE SERPL-SCNC: 104 MMOL/L (ref 95–110)
CHOLEST SERPL-MCNC: 299 MG/DL (ref 120–199)
CHOLEST/HDLC SERPL: 3.2 {RATIO} (ref 2–5)
CO2 SERPL-SCNC: 26 MMOL/L (ref 23–29)
CREAT SERPL-MCNC: 0.9 MG/DL (ref 0.5–1.4)
EAG (OHS): 105 MG/DL (ref 68–131)
ERYTHROCYTE [DISTWIDTH] IN BLOOD BY AUTOMATED COUNT: 13.7 % (ref 11.5–14.5)
GFR SERPLBLD CREATININE-BSD FMLA CKD-EPI: >60 ML/MIN/1.73/M2
GLUCOSE SERPL-MCNC: 82 MG/DL (ref 70–110)
HBA1C MFR BLD: 5.3 % (ref 4–5.6)
HCT VFR BLD AUTO: 39.1 % (ref 37–48.5)
HDLC SERPL-MCNC: 93 MG/DL (ref 40–75)
HDLC SERPL: 31.1 % (ref 20–50)
HGB BLD-MCNC: 12.5 GM/DL (ref 12–16)
IMM GRANULOCYTES # BLD AUTO: 0.05 K/UL (ref 0–0.04)
IMM GRANULOCYTES NFR BLD AUTO: 0.6 % (ref 0–0.5)
LDLC SERPL CALC-MCNC: 175.8 MG/DL (ref 63–159)
LYMPHOCYTES # BLD AUTO: 2.23 K/UL (ref 1–4.8)
MAGNESIUM SERPL-MCNC: 2 MG/DL (ref 1.6–2.6)
MCH RBC QN AUTO: 28.2 PG (ref 27–31)
MCHC RBC AUTO-ENTMCNC: 32 G/DL (ref 32–36)
MCV RBC AUTO: 88 FL (ref 82–98)
NONHDLC SERPL-MCNC: 206 MG/DL
NUCLEATED RBC (/100WBC) (OHS): 0 /100 WBC
OHS QRS DURATION: 98 MS
OHS QTC CALCULATION: 486 MS
PLATELET # BLD AUTO: 299 K/UL (ref 150–450)
PMV BLD AUTO: 11.2 FL (ref 9.2–12.9)
POTASSIUM SERPL-SCNC: 3.7 MMOL/L (ref 3.5–5.1)
PROT SERPL-MCNC: 7.9 GM/DL (ref 6–8.4)
RBC # BLD AUTO: 4.44 M/UL (ref 4–5.4)
RELATIVE EOSINOPHIL (OHS): 1.5 %
RELATIVE LYMPHOCYTE (OHS): 28.5 % (ref 18–48)
RELATIVE MONOCYTE (OHS): 7.4 % (ref 4–15)
RELATIVE NEUTROPHIL (OHS): 61.5 % (ref 38–73)
SODIUM SERPL-SCNC: 140 MMOL/L (ref 136–145)
T4 FREE SERPL-MCNC: 0.65 NG/DL (ref 0.71–1.51)
TRIGL SERPL-MCNC: 151 MG/DL (ref 30–150)
TSH SERPL-ACNC: 62.21 UIU/ML (ref 0.4–4)
WBC # BLD AUTO: 7.83 K/UL (ref 3.9–12.7)

## 2025-04-04 PROCEDURE — 36415 COLL VENOUS BLD VENIPUNCTURE: CPT | Mod: PN

## 2025-04-04 PROCEDURE — 80061 LIPID PANEL: CPT

## 2025-04-04 PROCEDURE — 93010 ELECTROCARDIOGRAM REPORT: CPT | Mod: S$GLB,,, | Performed by: INTERNAL MEDICINE

## 2025-04-04 PROCEDURE — 84439 ASSAY OF FREE THYROXINE: CPT

## 2025-04-04 PROCEDURE — 99999 PR PBB SHADOW E&M-EST. PATIENT-LVL III: CPT | Mod: PBBFAC,,, | Performed by: INTERNAL MEDICINE

## 2025-04-04 PROCEDURE — 83036 HEMOGLOBIN GLYCOSYLATED A1C: CPT

## 2025-04-04 PROCEDURE — 93005 ELECTROCARDIOGRAM TRACING: CPT | Mod: S$GLB,,, | Performed by: INTERNAL MEDICINE

## 2025-04-04 PROCEDURE — 83735 ASSAY OF MAGNESIUM: CPT

## 2025-04-04 PROCEDURE — 84443 ASSAY THYROID STIM HORMONE: CPT

## 2025-04-04 PROCEDURE — 85025 COMPLETE CBC W/AUTO DIFF WBC: CPT

## 2025-04-04 PROCEDURE — 80053 COMPREHEN METABOLIC PANEL: CPT

## 2025-04-04 PROCEDURE — 99999 PR PBB SHADOW E&M-EST. PATIENT-LVL I: CPT | Mod: PBBFAC,,,

## 2025-04-04 NOTE — PROGRESS NOTES
Subjective     Patient ID: Florecita Noel is a 49 y.o. female.    Chief Complaint: Annual Exam and Panic Attack    Here for annual, labs, utd on mmg/pap cscope, and also for palpitations    History of Present Illness    CHIEF COMPLAINT:  Ms. Noel presents today for evaluation of anxiety with nocturnal palpitations    NOCTURNAL PALPITATIONS AND ANXIETY:  She reports experiencing nocturnal palpitations for several months, occurring with variable frequency from none to twice per week. During episodes, she experiences feeling hot and sweaty. Episodes take 20-30 minutes to resolve with deep breathing exercises. She denies any consistent correlation with eating or drinking patterns.    SLEEP:  She reports poor sleep quality with frequent hourly wakening throughout the night, although she can fall asleep quickly. She experiences daytime fatigue, which progressively worsens until she requires extended periods of sleep. She reports mild dry mouth during sleep requiring occasional sips of water. She uses [employer] sounds for sleep aid.    PREVENTIVE CARE:  She is up to date on preventive health screenings including mammogram and pap smear. Colonoscopy in 2022 was normal with no polyps identified.    EKG today:  nsr; ? Lvh    ROS:  General: -fever, -chills, +fatigue, -weight gain, -weight loss, +night sweats, +dry mouth, +sleep disturbances, +difficulty staying asleep, +excessive drowsiness  Eyes: -vision changes, -redness, -discharge  ENT: -ear pain, -nasal congestion, -sore throat  Cardiovascular: -chest pain, +palpitations, -lower extremity edema, +feelings of fast heart rate  Respiratory: -cough, -shortness of breath  Gastrointestinal: -abdominal pain, -nausea, -vomiting, -diarrhea, -constipation, -blood in stool  Genitourinary: -dysuria, -hematuria, -frequency  Musculoskeletal: -joint pain, -muscle pain  Skin: -rash, -lesion  Neurological: -headache, -dizziness, -numbness, -tingling  Psychiatric: +anxiety,  -depression, -sleep difficulty          Past Medical History:   Diagnosis Date    Abnormal Pap smear 1996    Anemia     Hypertension     Hypokalemia     in pregnancy    Hypothyroidism (acquired) 12/28/2018    Malignant neoplasm of upper-outer quadrant of left breast in female, estrogen receptor negative 6/22/2022     Review of patient's allergies indicates:   Allergen Reactions    Sumatriptan Other (See Comments)     Chest tightness that moved into her throat     Past Surgical History:   Procedure Laterality Date    CHOLECYSTECTOMY      COLONOSCOPY N/A 6/8/2022    Procedure: COLONOSCOPY;  Surgeon: Rosaline Meyer MD;  Location: [facility] ENDO;  Service: Endoscopy;  Laterality: N/A;    COSMETIC SURGERY      tummy tuck    DIAGNOSTIC LAPAROSCOPY N/A 2/26/2019    Procedure: LAPAROSCOPY, DIAGNOSTIC;  Surgeon: Pia Aguila MD;  Location: [facility] OR;  Service: OB/GYN;  Laterality: N/A;    FLUOROSCOPY N/A 9/1/2022    Procedure: FLUOROSCOPY/mediport exchange;  Surgeon: Roslyn Gale MD;  Location: [facility] CATH LAB;  Service: General;  Laterality: N/A;    FULGURATION OF ENDOMETRIOSIS N/A 2/26/2019    Procedure: FULGURATION, ENDOMETRIOSIS;  Surgeon: Pia Aguila MD;  Location: [facility] OR;  Service: OB/GYN;  Laterality: N/A;    HYSTERECTOMY      HYSTEROSCOPY WITH HYDROTHERMAL ABLATION OF ENDOMETRIUM WITH DILATION AND CURETTAGE N/A 2/26/2019    Procedure: HYSTEROSCOPY, WITH DILATION AND CURETTAGE OF UTERUS AND HYDROTHERMAL ENDOMETRIAL ABLATION;  Surgeon: Pia Aguila MD;  Location: [facility] OR;  Service: OB/GYN;  Laterality: N/A;    INSERTION OF TUNNELED CENTRAL VENOUS CATHETER (CVC) WITH SUBCUTANEOUS PORT N/A 7/1/2022    Procedure: [id number];  Surgeon: Beau Quiles MD;  Location: [facility] OR;  Service: General;  Laterality: N/A;    LAPAROSCOPIC DRAINAGE OF CYST OF OVARY Left 2/26/2019    Procedure: DRAINAGE, CYST, OVARY, LAPAROSCOPIC;  Surgeon: Pia Aguila MD;  Location:  [facility] OR;  Service: OB/GYN;  Laterality: Left;    ROBOT-ASSISTED LAPAROSCOPIC ABDOMINAL HYSTERECTOMY USING DA BEAN XI N/A 11/5/2019    Procedure: XI ROBOTIC HYSTERECTOMY;  Surgeon: Pia Aguila MD;  Location: [facility] OR;  Service: OB/GYN;  Laterality: N/A;    ROBOT-ASSISTED LAPAROSCOPIC SALPINGO-OOPHORECTOMY USING DA BEAN XI Bilateral 11/5/2019    Procedure: XI ROBOTIC SALPINGO-OOPHORECTOMY;  Surgeon: Pia Aguila MD;  Location: [facility] OR;  Service: OB/GYN;  Laterality: Bilateral;    TUBAL LIGATION      José Miguel Davila       Family History   Problem Relation Name Age of Onset    Cancer Mother Vilma         origin? ovarian? metastasized    Cancer Father Raghu         throat?    No Known Problems Sister Donna     Heart disease Maternal Grandmother Daisha     No Known Problems Maternal Grandfather      No Known Problems Daughter Jadauyn     No Known Problems Daughter Journi     No Known Problems Son Daniel     No Known Problems Son Jadavian     No Known Problems Son Enmanuel     No Known Problems Son Jason     No Known Problems Brother Paul     No Known Problems Other Quellique     No Known Problems Other Darrique     No Known Problems Other Ritchie     No Known Problems Other Aladeya     No Known Problems Other Avante     No Known Problems Maternal Aunt Zaida     No Known Problems Sister Princess      Social History[1]      /86 (BP Location: Right arm)   Pulse 79   Temp 97.3 °F (36.3 °C) (Tympanic)   Wt 75.7 kg (166 lb 12.5 oz)   LMP  (LMP Unknown)   SpO2 99%   BMI 28.63 kg/m²   Outpatient Medications as of 4/4/2025   Medication Sig Dispense Refill    levothyroxine (SYNTHROID) 125 MCG tablet One tablet every morning on an empty stomach with water and do not eat or drink anything else for at least 30 minutes. 30 tablet 11    rosuvastatin (CRESTOR) 20 MG tablet Take 1 tablet (20 mg total) by mouth once daily. 90 tablet 3    verapamiL (CALAN-SR) 180 MG CR tablet Take 1 tablet (180 mg total) by mouth  every evening. 90 tablet 3     Facility-Administered Medications as of 4/4/2025   Medication Dose Route Frequency Provider Last Rate Last Admin    lactated ringers infusion   Intravenous Continuous Li Pierson MD   Stopped at 07/01/22 1137              Objective     Physical Exam    General: No acute distress. Well-developed. Well-nourished.  Eyes: EOMI. Sclerae anicteric.  HENT: Normocephalic. Atraumatic. Nares patent. Moist oral mucosa.  Ears: Bilateral TMs clear. Bilateral EACs clear.  Cardiovascular: Regular rate. Regular rhythm. No murmurs. No rubs. No gallops. Normal S1, S2. Carotids sound normal.  Respiratory: Normal respiratory effort. Clear to auscultation bilaterally. No rales. No rhonchi. No wheezing. Lungs are clear.  Abdomen: Soft. Non-tender. Non-distended. Normoactive bowel sounds.  Musculoskeletal: No  obvious deformity.  Extremities: No lower extremity edema.  Neurological: Alert & oriented x3. No slurred speech. Normal gait.  Psychiatric: Normal mood. Normal affect. Good insight. Good judgment.  Skin: Warm. Dry. No rash.  Neck: No lymphadenopathy in neck.             Assessment and Plan     1. Annual physical exam    2. Palpitations  -     IN OFFICE EKG 12-LEAD (to Muse)  -     Lipid Panel; Future; Expected date: 04/04/2025  -     Hemoglobin A1C; Future; Expected date: 04/04/2025  -     TSH; Future; Expected date: 04/04/2025  -     Comprehensive Metabolic Panel; Future; Expected date: 04/04/2025  -     CBC Auto Differential; Future; Expected date: 04/04/2025  -     Magnesium; Future; Expected date: 04/04/2025  -     Ambulatory referral/consult to Sleep Disorders; Future; Expected date: 04/11/2025    3. Trouble in sleeping  -     Ambulatory referral/consult to Sleep Disorders; Future; Expected date: 04/11/2025    4. Frequent nocturnal awakening  -     Ambulatory referral/consult to Sleep Disorders; Future; Expected date: 04/11/2025    5. Essential hypertension  Comments:  c/w current verapamil;  DASH; labs         F/u 6 weeks and   Follow up in about 1 year (around 4/4/2026) for annual with labs a week before.    Immunization History   Administered Date(s) Administered    COVID-19, MRNA, LN-S, PF (Pfizer) (Purple Cap) 04/10/2021, 04/30/2021    Influenza 10/08/2009, 10/09/2013, 10/10/2018, 10/23/2019    Influenza - Quadrivalent - PF *Preferred* (6 months and older) 10/16/2020, 11/26/2021, 10/06/2023    Influenza - Trivalent - Afluria, Fluzone MDV 10/08/2009, 10/09/2013    Influenza - Trivalent - Fluarix, Flulaval, Fluzone, Afluria - PF 10/07/2024    Tdap 12/13/2013     Visit today included increased complexity associated with the care of the episodic problem palpitations addressed and managing the longitudinal care of the patient due to the serious and/or complex managed problem(s) htn.           This note was generated with the assistance of ambient listening technology. Verbal consent was obtained by the patient and accompanying visitor(s) for the recording of patient appointment to facilitate this note. I attest to having reviewed and edited the generated note for accuracy, though some syntax or spelling errors may persist. Please contact the author of this note for any clarification.           [1]   Social History  Socioeconomic History    Marital status: Other   Tobacco Use    Smoking status: Never     Passive exposure: Never    Smokeless tobacco: Never   Substance and Sexual Activity    Alcohol use: Not Currently     Alcohol/week: 5.0 standard drinks of alcohol     Types: 5 Cans of beer per week     Comment: socially;  last 06/30/2022    Drug use: No    Sexual activity: Yes     Partners: Male     Birth control/protection: Surgical     Comment: [facility]      Social Drivers of Health     Financial Resource Strain: Medium Risk (10/7/2024)    Overall Financial Resource Strain (CARDIA)     Difficulty of Paying Living Expenses: Somewhat hard   Food Insecurity: No Food Insecurity (10/7/2024)    Hunger Vital Sign      Worried About Running Out of Food in the Last Year: Never true     Ran Out of Food in the Last Year: Never true   Transportation Needs: Patient Declined (9/30/2024)    Received from [facility]    PRAPARE - Transportation     Lack of Transportation (Medical): Patient declined     Lack of Transportation (Non-Medical): Patient declined   Physical Activity: Inactive (10/7/2024)    Exercise Vital Sign     Days of Exercise per Week: 0 days     Minutes of Exercise per Session: 0 min   Stress: Stress Concern Present (10/7/2024)    [employer] - Occupational Stress Questionnaire     Feeling of Stress : To some extent   Housing Stability: Patient Declined (9/30/2024)    Received from [facility]    Housing Stability Vital Sign     Unable to Pay for Housing in the Last Year: Patient declined     Homeless in the Last Year: Patient declined

## 2025-04-07 ENCOUNTER — PATIENT MESSAGE (OUTPATIENT)
Dept: PRIMARY CARE CLINIC | Facility: CLINIC | Age: 50
End: 2025-04-07
Payer: COMMERCIAL

## 2025-04-07 DIAGNOSIS — E03.9 ACQUIRED HYPOTHYROIDISM: Primary | ICD-10-CM

## 2025-04-08 DIAGNOSIS — E03.9 ACQUIRED HYPOTHYROIDISM: Primary | ICD-10-CM

## 2025-04-08 RX ORDER — LIOTHYRONINE SODIUM 5 UG/1
5 TABLET ORAL DAILY
Qty: 90 TABLET | Refills: 3 | Status: SHIPPED | OUTPATIENT
Start: 2025-04-08

## 2025-05-08 DIAGNOSIS — E03.9 ACQUIRED HYPOTHYROIDISM: ICD-10-CM

## 2025-05-08 DIAGNOSIS — E78.00 HYPERCHOLESTEROLEMIA: ICD-10-CM

## 2025-05-08 DIAGNOSIS — I10 ESSENTIAL HYPERTENSION: ICD-10-CM

## 2025-05-08 NOTE — TELEPHONE ENCOUNTER
No care due was identified.  [facility] Embedded Care Due Messages. Reference number: [id number].   5/08/2025 12:59:07 PM CDT

## 2025-05-09 RX ORDER — LIOTHYRONINE SODIUM 5 UG/1
5 TABLET ORAL DAILY
Qty: 90 TABLET | Refills: 3 | Status: SHIPPED | OUTPATIENT
Start: 2025-05-09

## 2025-05-09 RX ORDER — ROSUVASTATIN CALCIUM 20 MG/1
20 TABLET, COATED ORAL DAILY
Qty: 90 TABLET | Refills: 3 | Status: SHIPPED | OUTPATIENT
Start: 2025-05-09 | End: 2026-05-09

## 2025-05-09 RX ORDER — VERAPAMIL HYDROCHLORIDE 180 MG/1
180 TABLET, EXTENDED RELEASE ORAL NIGHTLY
Qty: 90 TABLET | Refills: 3 | Status: SHIPPED | OUTPATIENT
Start: 2025-05-09 | End: 2026-05-09

## 2025-05-14 ENCOUNTER — TELEPHONE (OUTPATIENT)
Dept: PRIMARY CARE CLINIC | Facility: CLINIC | Age: 50
End: 2025-05-14
Payer: COMMERCIAL

## 2025-07-21 DIAGNOSIS — E03.9 ACQUIRED HYPOTHYROIDISM: ICD-10-CM

## 2025-07-21 NOTE — TELEPHONE ENCOUNTER
No care due was identified.  [facility] Embedded Care Due Messages. Reference number: [id number].   7/21/2025 1:28:51 PM CDT

## 2025-07-22 RX ORDER — LEVOTHYROXINE SODIUM 125 UG/1
TABLET ORAL
Qty: 30 TABLET | Refills: 11 | Status: SHIPPED | OUTPATIENT
Start: 2025-07-22

## (undated) DEVICE — SEE MEDLINE ITEM 154981

## (undated) DEVICE — CLOSURE SKIN STERI STRIP 1/2X4

## (undated) DEVICE — GLOVE SURG BIOGEL LATEX SZ 7.5

## (undated) DEVICE — COVER TIP CURVED SCISSORS XI

## (undated) DEVICE — SEALER VESSEL EXTEND

## (undated) DEVICE — OBTURATOR BLADELESS 8MM XI CLR

## (undated) DEVICE — SET CYSTO IRRIGATION UNIV SPIK

## (undated) DEVICE — SYR 10CC LUER LOCK

## (undated) DEVICE — TUBING HEATED INSUFFLATOR

## (undated) DEVICE — SUT ABS CLIP LAPRA-TY CTD

## (undated) DEVICE — SEE L#152161

## (undated) DEVICE — SYR 3CC LUER LOC

## (undated) DEVICE — Device

## (undated) DEVICE — SEE MEDLINE ITEM 157181

## (undated) DEVICE — APPLICATOR CHLORAPREP ORN 26ML

## (undated) DEVICE — SEE MEDLINE ITEM 146292

## (undated) DEVICE — MANIFOLD 4 PORT

## (undated) DEVICE — COVER OVERHEAD SURG LT BLUE

## (undated) DEVICE — IRRIGATOR ENDOSCOPY DISP.

## (undated) DEVICE — POSITIONER HEAD DONUT 9IN FOAM

## (undated) DEVICE — SUT VICRYL PLUS 3-0 SH 18IN

## (undated) DEVICE — DRAPE COLUMN DAVINCI XI

## (undated) DEVICE — NDL PNEUMO INSUFFLATI 120MM

## (undated) DEVICE — ELECTRODE REM PLYHSV RETURN 9

## (undated) DEVICE — CONTAINER SPECIMEN STRL 4OZ

## (undated) DEVICE — SEE MEDLINE ITEM 157027

## (undated) DEVICE — EVACUATOR KIT SMOKE PLUME AWAY

## (undated) DEVICE — COVER LIGHT HANDLE 80/CA

## (undated) DEVICE — GAUZE SPONGE 4X4 12PLY

## (undated) DEVICE — SEE MEDLINE ITEM 157117

## (undated) DEVICE — UNDERGLOVES BIOGEL PI SZ 7 LF

## (undated) DEVICE — TIP RUMI GREEN DISPOSABLE6.7MM

## (undated) DEVICE — SUT CTD VICRYL 0 UND BR SUT

## (undated) DEVICE — SOL 9P NACL IRR PIC IL

## (undated) DEVICE — SUT PDSII DA VIOL1X18 V-38

## (undated) DEVICE — SOL ELECTROLUBE ANTI-STIC

## (undated) DEVICE — NDL SAFETY 25G X 1.5 ECLIPSE

## (undated) DEVICE — GLOVE SURGICAL LATEX SZ 7

## (undated) DEVICE — SYS IRRIG PRESSURIZED SPIKE

## (undated) DEVICE — DRAPE LAVH LAPAROSCOPY W/FLUID

## (undated) DEVICE — SOL NS 1000CC

## (undated) DEVICE — KIT ANTIFOG

## (undated) DEVICE — DRAPE ARM DAVINCI XI

## (undated) DEVICE — TOWEL OR DISP STRL BLUE 4/PK

## (undated) DEVICE — DRAPE STERI LONG

## (undated) DEVICE — GLOVE PROTEXIS HYDROGEL SZ6.5

## (undated) DEVICE — HANDLE PISTOL GRIP HAND CNTRL

## (undated) DEVICE — SUT MONOCRYL 4-0 PS-1 UND

## (undated) DEVICE — CATH URETHRAL 18FR

## (undated) DEVICE — DRAPE MOBILE C-ARM

## (undated) DEVICE — SHEET THYROID W/ISO-BAC

## (undated) DEVICE — GLOVE PROTEXIS HYDROGEL SZ6

## (undated) DEVICE — UNDERGLOVES BIOGEL PI SIZE 8

## (undated) DEVICE — SEE MEDLINE ITEM 152739

## (undated) DEVICE — OCCLUDER COLPO-PNEUMO STERILE

## (undated) DEVICE — SEE MEDLINE ITEM 152622

## (undated) DEVICE — TROCAR ENDOPATH XCEL 5X100MM

## (undated) DEVICE — ADHESIVE MASTISOL VIAL 48/BX

## (undated) DEVICE — ADHESIVE DERMABOND ADVANCED

## (undated) DEVICE — COVER CAMERA OPERATING ROOM

## (undated) DEVICE — DEVICE ABLATION NOVASURE DISP

## (undated) DEVICE — ELECTRODE ENDOPATH + II 5X34

## (undated) DEVICE — SUPPORT ULNA NERVE PROTECTOR

## (undated) DEVICE — TROCAR ENDOPATH XCEL 11MM 10CM

## (undated) DEVICE — SUT MONOCRYL 4.0 PS2 CP496G

## (undated) DEVICE — DRESSING TELFA N ADH 3X8

## (undated) DEVICE — PACK DRAPE PERI/GYN TIBURON

## (undated) DEVICE — CORD LAP 10 DISP

## (undated) DEVICE — SYR 50CC LL

## (undated) DEVICE — SOL WATER STRL IRR 1000ML

## (undated) DEVICE — DRESSING TRANS 4X4 TEGADERM

## (undated) DEVICE — GLOVE PROTEXIS HYDROGEL SZ7

## (undated) DEVICE — SEAL UNIVERSAL 5MM-8MM XI